# Patient Record
Sex: MALE | Race: WHITE | NOT HISPANIC OR LATINO | URBAN - METROPOLITAN AREA
[De-identification: names, ages, dates, MRNs, and addresses within clinical notes are randomized per-mention and may not be internally consistent; named-entity substitution may affect disease eponyms.]

---

## 2017-05-30 ENCOUNTER — EMERGENCY (EMERGENCY)
Facility: HOSPITAL | Age: 67
LOS: 1 days | Discharge: HOME | End: 2017-05-30
Attending: EMERGENCY MEDICINE | Admitting: EMERGENCY MEDICINE
Payer: COMMERCIAL

## 2017-05-30 VITALS
HEART RATE: 92 BPM | SYSTOLIC BLOOD PRESSURE: 138 MMHG | RESPIRATION RATE: 18 BRPM | DIASTOLIC BLOOD PRESSURE: 78 MMHG | OXYGEN SATURATION: 97 %

## 2017-05-30 DIAGNOSIS — I10 ESSENTIAL (PRIMARY) HYPERTENSION: ICD-10-CM

## 2017-05-30 DIAGNOSIS — F17.210 NICOTINE DEPENDENCE, CIGARETTES, UNCOMPLICATED: ICD-10-CM

## 2017-05-30 DIAGNOSIS — M54.5 LOW BACK PAIN: ICD-10-CM

## 2017-05-30 DIAGNOSIS — S22.088A OTHER FRACTURE OF T11-T12 VERTEBRA, INITIAL ENCOUNTER FOR CLOSED FRACTURE: ICD-10-CM
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
## 2017-05-30 DIAGNOSIS — R91.8 OTHER NONSPECIFIC ABNORMAL FINDING OF LUNG FIELD: ICD-10-CM
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
## 2017-05-30 LAB
APPEARANCE UR: CLEAR — SIGNIFICANT CHANGE UP
BACTERIA # UR AUTO: ABNORMAL /HPF
BILIRUB UR-MCNC: NEGATIVE — SIGNIFICANT CHANGE UP
COLOR SPEC: YELLOW — SIGNIFICANT CHANGE UP
DIFF PNL FLD: NEGATIVE — SIGNIFICANT CHANGE UP
GLUCOSE UR QL: NEGATIVE — SIGNIFICANT CHANGE UP
KETONES UR-MCNC: NEGATIVE — SIGNIFICANT CHANGE UP
LEUKOCYTE ESTERASE UR-ACNC: NEGATIVE — SIGNIFICANT CHANGE UP
NITRITE UR-MCNC: NEGATIVE — SIGNIFICANT CHANGE UP
PH UR: 6.5 — SIGNIFICANT CHANGE UP (ref 5–8)
PROT UR-MCNC: SIGNIFICANT CHANGE UP
RBC CASTS # UR COMP ASSIST: SIGNIFICANT CHANGE UP /HPF (ref 0–2)
SP GR SPEC: 1.01 — SIGNIFICANT CHANGE UP (ref 1.01–1.02)
UROBILINOGEN FLD QL: NEGATIVE — SIGNIFICANT CHANGE UP
WBC UR QL: SIGNIFICANT CHANGE UP /HPF (ref 0–5)

## 2017-05-30 PROCEDURE — 71250 CT THORAX DX C-: CPT | Mod: 26

## 2017-05-30 PROCEDURE — 74176 CT ABD & PELVIS W/O CONTRAST: CPT

## 2017-05-30 PROCEDURE — 99284 EMERGENCY DEPT VISIT MOD MDM: CPT | Mod: 25

## 2017-05-30 PROCEDURE — 71250 CT THORAX DX C-: CPT

## 2017-05-30 PROCEDURE — 74176 CT ABD & PELVIS W/O CONTRAST: CPT | Mod: 26

## 2017-05-30 PROCEDURE — 81001 URINALYSIS AUTO W/SCOPE: CPT

## 2017-05-30 PROCEDURE — 72131 CT LUMBAR SPINE W/O DYE: CPT | Mod: 26

## 2017-05-30 RX ORDER — LIDOCAINE 4 G/100G
1 CREAM TOPICAL ONCE
Qty: 0 | Refills: 0 | Status: COMPLETED | OUTPATIENT
Start: 2017-05-30 | End: 2017-05-30

## 2017-05-30 RX ORDER — ACETAMINOPHEN 500 MG
650 TABLET ORAL ONCE
Qty: 0 | Refills: 0 | Status: COMPLETED | OUTPATIENT
Start: 2017-05-30 | End: 2017-05-30

## 2017-05-30 RX ORDER — IBUPROFEN 200 MG
600 TABLET ORAL ONCE
Qty: 0 | Refills: 0 | Status: COMPLETED | OUTPATIENT
Start: 2017-05-30 | End: 2017-05-30

## 2017-05-30 RX ADMIN — LIDOCAINE 1 PATCH: 4 CREAM TOPICAL at 14:37

## 2017-05-30 RX ADMIN — Medication 600 MILLIGRAM(S): at 13:34

## 2017-05-30 RX ADMIN — Medication 650 MILLIGRAM(S): at 14:37

## 2017-05-30 RX ADMIN — Medication 600 MILLIGRAM(S): at 14:37

## 2017-05-30 RX ADMIN — LIDOCAINE 1 PATCH: 4 CREAM TOPICAL at 14:59

## 2017-05-30 RX ADMIN — LIDOCAINE 1 PATCH: 4 CREAM TOPICAL at 13:34

## 2017-05-30 RX ADMIN — Medication 650 MILLIGRAM(S): at 13:34

## 2017-05-30 NOTE — ED PROVIDER NOTE - CARE PLAN
Principal Discharge DX:	Fall, initial encounter Principal Discharge DX:	Other closed fracture of twelfth thoracic vertebra, initial encounter  Secondary Diagnosis:	Lung mass

## 2017-05-30 NOTE — ED PROVIDER NOTE - PHYSICAL EXAMINATION
Skin: Abrasion on RUE, no skin breaks in lower back  MSK: No midline spinal TTP; + lumbar paraspinal TTP  Neuro: CN grossly intact, AAO x 3, 5/5 strength in all extrem

## 2017-05-30 NOTE — ED PROVIDER NOTE - PROGRESS NOTE DETAILS
Ortho spine consulted for T12 compression fx. Ortho spine consulted for T12 compression fx. Discussed results of CT w/ pt, incl. lung mass suspicious for malignancy. primary care physician: Terrance Choi, [phone]. Pt wishes to leave before final read of chest CT. Discussed results of all CT scans w/ pt and important need to follow-up regarding lung mass and f/u with ortho regarding T12 compression fx.

## 2017-05-30 NOTE — ED PROVIDER NOTE - OBJECTIVE STATEMENT
67 y/o M presents after fall from approx 6 ft height after ladder while at work. Landed on buttocks, did not hit head, no LOC. Able to ambulate but feels pain in lower back, paraspinal region. No weakness, numbness, or tingling.   PMHx: HTN

## 2017-05-30 NOTE — ED PROVIDER NOTE - ATTENDING CONTRIBUTION TO CARE
pt is a 67 y/o male with fall from ladder about 6 foot as he slide down a wall with no head injury, not on AC, can not ambulate on scene.  pt with lumbar spine tenderness paraspinal and b/l post pelvis.  ct ordered of lspine/pelvis region, pain control. ua.

## 2017-05-30 NOTE — ED ADULT NURSE NOTE - OBJECTIVE STATEMENT
65 yo male A&OX3 presents to the ED with the c/o fall from 6 ft. Pt states that he was working on a roof and slid off a ladder. Pt denies hitting his head, states that he fell onto his back. B/L flank pain, no diff walking, no LOC. Pt BIBA, no n/v. Lungs clear, equal b/l no cough and no sob. Abd round, soft non tender and non distended. Pt able to ambulate.

## 2017-06-20 PROBLEM — Z00.00 ENCOUNTER FOR PREVENTIVE HEALTH EXAMINATION: Status: ACTIVE | Noted: 2017-06-20

## 2017-06-23 ENCOUNTER — APPOINTMENT (OUTPATIENT)
Dept: THORACIC SURGERY | Facility: HOSPITAL | Age: 67
End: 2017-06-23

## 2017-06-23 VITALS
DIASTOLIC BLOOD PRESSURE: 88 MMHG | SYSTOLIC BLOOD PRESSURE: 142 MMHG | WEIGHT: 200 LBS | OXYGEN SATURATION: 97 % | HEART RATE: 84 BPM | HEIGHT: 72 IN | BODY MASS INDEX: 27.09 KG/M2

## 2017-06-23 DIAGNOSIS — Z87.891 PERSONAL HISTORY OF NICOTINE DEPENDENCE: ICD-10-CM

## 2017-06-23 DIAGNOSIS — Z83.3 FAMILY HISTORY OF DIABETES MELLITUS: ICD-10-CM

## 2017-06-23 DIAGNOSIS — Z78.9 OTHER SPECIFIED HEALTH STATUS: ICD-10-CM

## 2017-06-26 ENCOUNTER — OUTPATIENT (OUTPATIENT)
Dept: OUTPATIENT SERVICES | Facility: HOSPITAL | Age: 67
LOS: 1 days | End: 2017-06-26
Payer: COMMERCIAL

## 2017-06-26 VITALS
DIASTOLIC BLOOD PRESSURE: 70 MMHG | HEART RATE: 75 BPM | HEIGHT: 69 IN | WEIGHT: 195.11 LBS | SYSTOLIC BLOOD PRESSURE: 110 MMHG | RESPIRATION RATE: 16 BRPM | TEMPERATURE: 98 F

## 2017-06-26 DIAGNOSIS — R59.0 LOCALIZED ENLARGED LYMPH NODES: ICD-10-CM

## 2017-06-26 DIAGNOSIS — R91.1 SOLITARY PULMONARY NODULE: ICD-10-CM

## 2017-06-26 DIAGNOSIS — I10 ESSENTIAL (PRIMARY) HYPERTENSION: ICD-10-CM

## 2017-06-26 DIAGNOSIS — Z98.890 OTHER SPECIFIED POSTPROCEDURAL STATES: Chronic | ICD-10-CM

## 2017-06-26 LAB
BLD GP AB SCN SERPL QL: NEGATIVE — SIGNIFICANT CHANGE UP
BUN SERPL-MCNC: 15 MG/DL — SIGNIFICANT CHANGE UP (ref 7–23)
CALCIUM SERPL-MCNC: 9.5 MG/DL — SIGNIFICANT CHANGE UP (ref 8.4–10.5)
CHLORIDE SERPL-SCNC: 98 MMOL/L — SIGNIFICANT CHANGE UP (ref 98–107)
CO2 SERPL-SCNC: 25 MMOL/L — SIGNIFICANT CHANGE UP (ref 22–31)
CREAT SERPL-MCNC: 0.82 MG/DL — SIGNIFICANT CHANGE UP (ref 0.5–1.3)
GLUCOSE SERPL-MCNC: 114 MG/DL — HIGH (ref 70–99)
HCT VFR BLD CALC: 40.3 % — SIGNIFICANT CHANGE UP (ref 39–50)
HGB BLD-MCNC: 13.3 G/DL — SIGNIFICANT CHANGE UP (ref 13–17)
MCHC RBC-ENTMCNC: 28.2 PG — SIGNIFICANT CHANGE UP (ref 27–34)
MCHC RBC-ENTMCNC: 33 % — SIGNIFICANT CHANGE UP (ref 32–36)
MCV RBC AUTO: 85.6 FL — SIGNIFICANT CHANGE UP (ref 80–100)
PLATELET # BLD AUTO: 425 K/UL — HIGH (ref 150–400)
PMV BLD: 9.1 FL — SIGNIFICANT CHANGE UP (ref 7–13)
POTASSIUM SERPL-MCNC: 4.2 MMOL/L — SIGNIFICANT CHANGE UP (ref 3.5–5.3)
POTASSIUM SERPL-SCNC: 4.2 MMOL/L — SIGNIFICANT CHANGE UP (ref 3.5–5.3)
RBC # BLD: 4.71 M/UL — SIGNIFICANT CHANGE UP (ref 4.2–5.8)
RBC # FLD: 14.8 % — HIGH (ref 10.3–14.5)
RH IG SCN BLD-IMP: NEGATIVE — SIGNIFICANT CHANGE UP
SODIUM SERPL-SCNC: 136 MMOL/L — SIGNIFICANT CHANGE UP (ref 135–145)
WBC # BLD: 12.22 K/UL — HIGH (ref 3.8–10.5)
WBC # FLD AUTO: 12.22 K/UL — HIGH (ref 3.8–10.5)

## 2017-06-26 PROCEDURE — 93010 ELECTROCARDIOGRAM REPORT: CPT

## 2017-06-26 RX ORDER — SODIUM CHLORIDE 9 MG/ML
1000 INJECTION, SOLUTION INTRAVENOUS
Qty: 0 | Refills: 0 | Status: DISCONTINUED | OUTPATIENT
Start: 2017-06-28 | End: 2017-07-13

## 2017-06-26 NOTE — H&P PST ADULT - NEGATIVE NEUROLOGICAL SYMPTOMS
no syncope/no paresthesias/no difficulty walking/no loss of sensation/no transient paralysis/no confusion/no hemiparesis/no focal seizures/no generalized seizures/no weakness/no tremors

## 2017-06-26 NOTE — H&P PST ADULT - NEUROLOGICAL DETAILS
responds to verbal commands/sensation intact/normal strength/responds to pain/alert and oriented x 3

## 2017-06-26 NOTE — H&P PST ADULT - HISTORY OF PRESENT ILLNESS
Pt is a 66 yr old male scheduled for Flexible Bronchoscopy, Endobronchial U/S , Mediastinoscopy with Dr bansal 6/28/17. Pt found to have left lung nodule with w/u for fall 5/17 - pt had CT scan and referred to Pulmonary and had recent PET scan. Pt hx of HTN and smoking.

## 2017-06-28 ENCOUNTER — RESULT REVIEW (OUTPATIENT)
Age: 67
End: 2017-06-28

## 2017-06-28 ENCOUNTER — OUTPATIENT (OUTPATIENT)
Dept: OUTPATIENT SERVICES | Facility: HOSPITAL | Age: 67
LOS: 1 days | Discharge: HOME | End: 2017-06-28
Payer: COMMERCIAL

## 2017-06-28 ENCOUNTER — APPOINTMENT (OUTPATIENT)
Dept: THORACIC SURGERY | Facility: HOSPITAL | Age: 67
End: 2017-06-28

## 2017-06-28 ENCOUNTER — TRANSCRIPTION ENCOUNTER (OUTPATIENT)
Age: 67
End: 2017-06-28

## 2017-06-28 VITALS
OXYGEN SATURATION: 99 % | RESPIRATION RATE: 16 BRPM | DIASTOLIC BLOOD PRESSURE: 64 MMHG | SYSTOLIC BLOOD PRESSURE: 104 MMHG | HEART RATE: 76 BPM

## 2017-06-28 VITALS
TEMPERATURE: 98 F | SYSTOLIC BLOOD PRESSURE: 114 MMHG | OXYGEN SATURATION: 97 % | RESPIRATION RATE: 16 BRPM | WEIGHT: 195.11 LBS | HEIGHT: 69 IN | HEART RATE: 78 BPM | DIASTOLIC BLOOD PRESSURE: 67 MMHG

## 2017-06-28 DIAGNOSIS — R59.0 LOCALIZED ENLARGED LYMPH NODES: ICD-10-CM

## 2017-06-28 DIAGNOSIS — Z98.890 OTHER SPECIFIED POSTPROCEDURAL STATES: Chronic | ICD-10-CM

## 2017-06-28 LAB — RH IG SCN BLD-IMP: NEGATIVE — SIGNIFICANT CHANGE UP

## 2017-06-28 PROCEDURE — 88173 CYTOPATH EVAL FNA REPORT: CPT | Mod: 26

## 2017-06-28 PROCEDURE — 88305 TISSUE EXAM BY PATHOLOGIST: CPT | Mod: 26

## 2017-06-28 PROCEDURE — 88172 CYTP DX EVAL FNA 1ST EA SITE: CPT | Mod: 26

## 2017-06-28 RX ORDER — FENTANYL CITRATE 50 UG/ML
50 INJECTION INTRAVENOUS
Qty: 0 | Refills: 0 | Status: DISCONTINUED | OUTPATIENT
Start: 2017-06-28 | End: 2017-06-28

## 2017-06-28 RX ORDER — FENTANYL CITRATE 50 UG/ML
25 INJECTION INTRAVENOUS
Qty: 0 | Refills: 0 | Status: DISCONTINUED | OUTPATIENT
Start: 2017-06-28 | End: 2017-06-28

## 2017-06-28 RX ORDER — ONDANSETRON 8 MG/1
4 TABLET, FILM COATED ORAL ONCE
Qty: 0 | Refills: 0 | Status: DISCONTINUED | OUTPATIENT
Start: 2017-06-28 | End: 2017-06-28

## 2017-06-28 RX ADMIN — SODIUM CHLORIDE 30 MILLILITER(S): 9 INJECTION, SOLUTION INTRAVENOUS at 09:50

## 2017-06-28 NOTE — ASU DISCHARGE PLAN (ADULT/PEDIATRIC). - ITEMS TO FOLLOWUP WITH YOUR PHYSICIAN'S
Follow up with Dr. Larsen in 7-10 days. Follow up with Dr. Larsen in 1 week .Follow up with your pulmonologist in 1-2 weeks

## 2017-06-28 NOTE — ASU DISCHARGE PLAN (ADULT/PEDIATRIC). - MEDICATION SUMMARY - MEDICATIONS TO TAKE
I will START or STAY ON the medications listed below when I get home from the hospital:    ibuprofen 400 mg oral tablet  -- 1 tab(s) by mouth every 6 hours, As Needed last taken 6/12/17  -- Indication: For as needed for pain    valsartan-hydrochlorothiazide 320mg-12.5mg oral tablet  -- 1 tab(s) by mouth once a day  -- Indication: For HTN

## 2017-06-28 NOTE — ASU DISCHARGE PLAN (ADULT/PEDIATRIC). - NOTIFY
Persistent Nausea and Vomiting/Unable to Urinate/Increased Irritability or Sluggishness/Fever greater than 101/Pain not relieved by Medications

## 2017-06-28 NOTE — ASU DISCHARGE PLAN (ADULT/PEDIATRIC). - SPECIAL INSTRUCTIONS
Coughing up blood tinged sputum is normal and expected. If you cough up more than a tablespoon of bright red blood notify MD

## 2017-06-29 LAB
NON-GYNECOLOGICAL CYTOLOGY STUDY: SIGNIFICANT CHANGE UP
NON-GYNECOLOGICAL CYTOLOGY STUDY: SIGNIFICANT CHANGE UP

## 2017-07-10 LAB — NON-GYNECOLOGICAL CYTOLOGY STUDY: SIGNIFICANT CHANGE UP

## 2017-07-11 ENCOUNTER — APPOINTMENT (OUTPATIENT)
Dept: THORACIC SURGERY | Facility: CLINIC | Age: 67
End: 2017-07-11

## 2017-07-11 VITALS
DIASTOLIC BLOOD PRESSURE: 70 MMHG | OXYGEN SATURATION: 98 % | HEART RATE: 79 BPM | SYSTOLIC BLOOD PRESSURE: 110 MMHG | HEIGHT: 72 IN

## 2017-07-11 LAB — NON-GYNECOLOGICAL CYTOLOGY STUDY: SIGNIFICANT CHANGE UP

## 2017-08-01 ENCOUNTER — OUTPATIENT (OUTPATIENT)
Dept: OUTPATIENT SERVICES | Facility: HOSPITAL | Age: 67
LOS: 1 days | End: 2017-08-01
Payer: COMMERCIAL

## 2017-08-01 VITALS
TEMPERATURE: 98 F | DIASTOLIC BLOOD PRESSURE: 72 MMHG | WEIGHT: 195.11 LBS | RESPIRATION RATE: 18 BRPM | OXYGEN SATURATION: 98 % | HEART RATE: 73 BPM | SYSTOLIC BLOOD PRESSURE: 108 MMHG | HEIGHT: 70 IN

## 2017-08-01 DIAGNOSIS — R91.1 SOLITARY PULMONARY NODULE: ICD-10-CM

## 2017-08-01 DIAGNOSIS — R59.0 LOCALIZED ENLARGED LYMPH NODES: ICD-10-CM

## 2017-08-01 DIAGNOSIS — Z98.890 OTHER SPECIFIED POSTPROCEDURAL STATES: Chronic | ICD-10-CM

## 2017-08-01 DIAGNOSIS — I10 ESSENTIAL (PRIMARY) HYPERTENSION: ICD-10-CM

## 2017-08-01 LAB
BUN SERPL-MCNC: 15 MG/DL — SIGNIFICANT CHANGE UP (ref 7–23)
CALCIUM SERPL-MCNC: 9.4 MG/DL — SIGNIFICANT CHANGE UP (ref 8.4–10.5)
CHLORIDE SERPL-SCNC: 98 MMOL/L — SIGNIFICANT CHANGE UP (ref 98–107)
CO2 SERPL-SCNC: 25 MMOL/L — SIGNIFICANT CHANGE UP (ref 22–31)
CREAT SERPL-MCNC: 0.96 MG/DL — SIGNIFICANT CHANGE UP (ref 0.5–1.3)
GLUCOSE SERPL-MCNC: 106 MG/DL — HIGH (ref 70–99)
HCT VFR BLD CALC: 38.6 % — LOW (ref 39–50)
HGB BLD-MCNC: 12.7 G/DL — LOW (ref 13–17)
MCHC RBC-ENTMCNC: 27.4 PG — SIGNIFICANT CHANGE UP (ref 27–34)
MCHC RBC-ENTMCNC: 32.9 % — SIGNIFICANT CHANGE UP (ref 32–36)
MCV RBC AUTO: 83.2 FL — SIGNIFICANT CHANGE UP (ref 80–100)
NRBC # FLD: 0 — SIGNIFICANT CHANGE UP
PLATELET # BLD AUTO: 380 K/UL — SIGNIFICANT CHANGE UP (ref 150–400)
PMV BLD: 9.7 FL — SIGNIFICANT CHANGE UP (ref 7–13)
POTASSIUM SERPL-MCNC: 4.5 MMOL/L — SIGNIFICANT CHANGE UP (ref 3.5–5.3)
POTASSIUM SERPL-SCNC: 4.5 MMOL/L — SIGNIFICANT CHANGE UP (ref 3.5–5.3)
RBC # BLD: 4.64 M/UL — SIGNIFICANT CHANGE UP (ref 4.2–5.8)
RBC # FLD: 15.2 % — HIGH (ref 10.3–14.5)
SODIUM SERPL-SCNC: 135 MMOL/L — SIGNIFICANT CHANGE UP (ref 135–145)
WBC # BLD: 10.16 K/UL — SIGNIFICANT CHANGE UP (ref 3.8–10.5)
WBC # FLD AUTO: 10.16 K/UL — SIGNIFICANT CHANGE UP (ref 3.8–10.5)

## 2017-08-01 PROCEDURE — 93010 ELECTROCARDIOGRAM REPORT: CPT

## 2017-08-01 RX ORDER — SODIUM CHLORIDE 9 MG/ML
1000 INJECTION, SOLUTION INTRAVENOUS
Qty: 0 | Refills: 0 | Status: DISCONTINUED | OUTPATIENT
Start: 2017-08-03 | End: 2017-08-18

## 2017-08-01 RX ORDER — ALPRAZOLAM 0.25 MG
0.25 TABLET ORAL ONCE
Qty: 0 | Refills: 0 | Status: DISCONTINUED | OUTPATIENT
Start: 2017-08-02 | End: 2017-08-17

## 2017-08-01 RX ORDER — SODIUM CHLORIDE 9 MG/ML
3 INJECTION INTRAMUSCULAR; INTRAVENOUS; SUBCUTANEOUS EVERY 8 HOURS
Qty: 0 | Refills: 0 | Status: DISCONTINUED | OUTPATIENT
Start: 2017-08-03 | End: 2017-08-18

## 2017-08-01 NOTE — H&P PST ADULT - NEGATIVE RESPIRATORY AND THORAX SYMPTOMS
no wheezing/no dyspnea/no cough no wheezing/no cough/no dyspnea/no hemoptysis/no pleuritic chest pain

## 2017-08-01 NOTE — H&P PST ADULT - PROBLEM SELECTOR PLAN 1
Insertion of [id number]-[id number] scheduled for 8/3/2017.  Pre-op instructions given. Pt verbalized understanding.  Pepcid given for GI prophylaxis.  Chlorhexidine wash instructions given.

## 2017-08-01 NOTE — H&P PST ADULT - MUSCULOSKELETAL
details… detailed exam normal strength/no joint warmth/no joint swelling/ROM intact/no calf tenderness/no joint erythema negative

## 2017-08-01 NOTE — H&P PST ADULT - RESPIRATORY AND THORAX COMMENTS
Pt found to have pulmonary nodule with w/u after fall 5/17 - PET Scan done Saturday Stopped smoking 6/2017 - denies cough, wheezing, SOB, reports lung cancer discovered incidentally on CT scan due to back injury in 6/2017 Pt reports he stopped smoking 6/2017 - denies cough, wheezing, SOB, reports lung cancer discovered incidentally on CT scan due to back injury in 6/2017

## 2017-08-01 NOTE — H&P PST ADULT - HISTORY OF PRESENT ILLNESS
67yo male with medical history of HTN, reports diagnosed with Lung cancer in 6/2017 and chemotherapy is recommended. Pt presents today for presurgical evaluation for Insertion of [id number]-r-keed scheduled for 8/3/2017.

## 2017-08-01 NOTE — H&P PST ADULT - ACTIVITY
walks daily, gardening , 1 flight stairs w/o SOB ADLs, chores, very active at work - walking a lot, climbing ladders etc

## 2017-08-01 NOTE — H&P PST ADULT - NSANTHOSAYNRD_GEN_A_CORE
denies testing denies sleep study/No. DASHAWN screening performed.  STOP BANG Legend: 0-2 = LOW Risk; 3-4 = INTERMEDIATE Risk; 5-8 = HIGH Risk

## 2017-08-01 NOTE — H&P PST ADULT - LYMPHATIC
anterior cervical R/anterior cervical L/supraclavicular L/supraclavicular R/posterior cervical L/posterior cervical R

## 2017-08-01 NOTE — H&P PST ADULT - PROBLEM SELECTOR PLAN 2
Pt instructed to take meds as prescribed. Pt instructed to take meds as prescribed.  DASHAWN precaution - stop bang 4, OR booking notified.

## 2017-08-01 NOTE — H&P PST ADULT - MALLAMPATI CLASS
Class II - visualization of the soft palate, fauces, and uvula/with phonation Class II - visualization of the soft palate, fauces, and uvula

## 2017-08-01 NOTE — H&P PST ADULT - FAMILY HISTORY
Mother  Still living? Yes, Estimated age: Age Unknown  Diabetes mellitus, Age at diagnosis: Age Unknown

## 2017-08-01 NOTE — H&P PST ADULT - NEGATIVE NEUROLOGICAL SYMPTOMS
no loss of sensation/no weakness/no transient paralysis/no confusion/no paresthesias/no generalized seizures/no focal seizures/no tremors/no syncope/no difficulty walking/no hemiparesis

## 2017-08-01 NOTE — H&P PST ADULT - RS GEN PE MLT RESP DETAILS PC
respirations non-labored/airway patent/breath sounds equal/good air movement/clear to auscultation bilaterally

## 2017-08-02 ENCOUNTER — OUTPATIENT (OUTPATIENT)
Dept: OUTPATIENT SERVICES | Facility: HOSPITAL | Age: 67
LOS: 1 days | End: 2017-08-02
Payer: COMMERCIAL

## 2017-08-02 DIAGNOSIS — C34.32 MALIGNANT NEOPLASM OF LOWER LOBE, LEFT BRONCHUS OR LUNG: ICD-10-CM

## 2017-08-02 DIAGNOSIS — Z98.890 OTHER SPECIFIED POSTPROCEDURAL STATES: Chronic | ICD-10-CM

## 2017-08-02 PROCEDURE — 76604 US EXAM CHEST: CPT | Mod: 26

## 2017-08-02 PROCEDURE — 76604 US EXAM CHEST: CPT

## 2017-08-03 ENCOUNTER — APPOINTMENT (OUTPATIENT)
Dept: THORACIC SURGERY | Facility: HOSPITAL | Age: 67
End: 2017-08-03
Payer: COMMERCIAL

## 2017-08-03 ENCOUNTER — OUTPATIENT (OUTPATIENT)
Dept: OUTPATIENT SERVICES | Facility: HOSPITAL | Age: 67
LOS: 1 days | Discharge: HOME | End: 2017-08-03
Payer: COMMERCIAL

## 2017-08-03 ENCOUNTER — TRANSCRIPTION ENCOUNTER (OUTPATIENT)
Age: 67
End: 2017-08-03

## 2017-08-03 VITALS
OXYGEN SATURATION: 97 % | DIASTOLIC BLOOD PRESSURE: 63 MMHG | TEMPERATURE: 98 F | HEIGHT: 70 IN | WEIGHT: 195.11 LBS | RESPIRATION RATE: 16 BRPM | SYSTOLIC BLOOD PRESSURE: 114 MMHG | HEART RATE: 73 BPM

## 2017-08-03 VITALS
SYSTOLIC BLOOD PRESSURE: 100 MMHG | HEART RATE: 71 BPM | OXYGEN SATURATION: 97 % | DIASTOLIC BLOOD PRESSURE: 62 MMHG | RESPIRATION RATE: 16 BRPM

## 2017-08-03 DIAGNOSIS — R59.0 LOCALIZED ENLARGED LYMPH NODES: ICD-10-CM

## 2017-08-03 DIAGNOSIS — Z98.890 OTHER SPECIFIED POSTPROCEDURAL STATES: Chronic | ICD-10-CM

## 2017-08-03 PROCEDURE — 36561 INSERT TUNNELED CV CATH: CPT

## 2017-08-03 RX ORDER — ASPIRIN/CALCIUM CARB/MAGNESIUM 324 MG
1 TABLET ORAL
Qty: 0 | Refills: 0 | COMMUNITY

## 2017-08-03 RX ORDER — OXYCODONE HYDROCHLORIDE 5 MG/1
1 TABLET ORAL
Qty: 12 | Refills: 0 | OUTPATIENT
Start: 2017-08-03 | End: 2017-08-06

## 2017-08-03 NOTE — BRIEF OPERATIVE NOTE - PRE-OP DX
Malignant neoplasm of lung, unspecified laterality, unspecified part of lung  08/03/2017    Active  Ramon Alcantar

## 2017-08-03 NOTE — ASU DISCHARGE PLAN (ADULT/PEDIATRIC). - POST OP PHONE #
[phone] pt. granted permission to leave message /and or speak with whoever answers the phone.

## 2017-08-03 NOTE — ASU DISCHARGE PLAN (ADULT/PEDIATRIC). - NOTIFY
Bleeding that does not stop/Fever greater than 101/Swelling that continues Increased Irritability or Sluggishness/Bleeding that does not stop/Persistent Nausea and Vomiting/Pain not relieved by Medications/Unable to Urinate/Fever greater than 101/Inability to Tolerate Liquids or Foods/Swelling that continues

## 2017-08-03 NOTE — ASU DISCHARGE PLAN (ADULT/PEDIATRIC). - MEDICATION SUMMARY - MEDICATIONS TO TAKE
I will START or STAY ON the medications listed below when I get home from the hospital:    aspirin 81 mg oral tablet  -- 1 tab(s) by mouth once a day. Last dose 8/1/2017  -- Indication: For Localized enlarged lymph nodes    acetaminophen-oxycodone 325 mg-5 mg oral tablet  -- 1 tab(s) by mouth every 6 hours, As Needed -for severe pain MDD:4  -- Caution federal law prohibits the transfer of this drug to any person other  than the person for whom it was prescribed.  May cause drowsiness.  Alcohol may intensify this effect.  Use care when operating dangerous machinery.  This prescription cannot be refilled.  This product contains acetaminophen.  Do not use  with any other product containing acetaminophen to prevent possible liver damage.  Using more of this medication than prescribed may cause serious breathing problems.    -- Indication: For severe pain    valsartan-hydrochlorothiazide 320mg-12.5mg oral tablet  -- 1 tab(s) by mouth once a day  -- Indication: For htn    folic acid 1 mg oral tablet  -- 1 tab(s) by mouth once a day  -- Indication: For anemia

## 2017-08-04 PROCEDURE — 71010: CPT | Mod: 26

## 2017-08-04 RX ORDER — ASPIRIN/CALCIUM CARB/MAGNESIUM 324 MG
1 TABLET ORAL
Qty: 0 | Refills: 0 | COMMUNITY
Start: 2017-08-04

## 2017-09-26 ENCOUNTER — APPOINTMENT (OUTPATIENT)
Dept: THORACIC SURGERY | Facility: CLINIC | Age: 67
End: 2017-09-26
Payer: COMMERCIAL

## 2017-09-26 VITALS
HEART RATE: 86 BPM | RESPIRATION RATE: 18 BRPM | DIASTOLIC BLOOD PRESSURE: 88 MMHG | SYSTOLIC BLOOD PRESSURE: 110 MMHG | WEIGHT: 190 LBS | OXYGEN SATURATION: 98 % | HEIGHT: 72 IN | BODY MASS INDEX: 25.73 KG/M2

## 2017-09-26 DIAGNOSIS — Z92.3 PERSONAL HISTORY OF IRRADIATION: ICD-10-CM

## 2017-09-26 DIAGNOSIS — Z87.898 PERSONAL HISTORY OF OTHER SPECIFIED CONDITIONS: ICD-10-CM

## 2017-09-26 DIAGNOSIS — Z78.9 OTHER SPECIFIED HEALTH STATUS: ICD-10-CM

## 2017-09-26 DIAGNOSIS — R91.1 SOLITARY PULMONARY NODULE: ICD-10-CM

## 2017-09-26 DIAGNOSIS — S22.089A UNSPECIFIED FRACTURE OF T11-T12 VERTEBRA, INITIAL ENCOUNTER FOR CLOSED FRACTURE: ICD-10-CM

## 2017-09-26 DIAGNOSIS — Z91.81 HISTORY OF FALLING: ICD-10-CM

## 2017-09-26 DIAGNOSIS — Z92.21 PERSONAL HISTORY OF ANTINEOPLASTIC CHEMOTHERAPY: ICD-10-CM

## 2017-09-26 PROCEDURE — 99215 OFFICE O/P EST HI 40 MIN: CPT

## 2017-10-02 PROBLEM — Z87.898 HISTORY OF LYMPHADENOPATHY: Status: RESOLVED | Noted: 2017-10-02 | Resolved: 2017-10-02

## 2017-10-02 PROBLEM — R91.1 NODULE OF LEFT LUNG: Status: RESOLVED | Noted: 2017-10-02 | Resolved: 2017-10-02

## 2017-10-02 PROBLEM — Z92.21 HISTORY OF CHEMOTHERAPY: Status: RESOLVED | Noted: 2017-10-02 | Resolved: 2017-10-02

## 2017-10-02 PROBLEM — S22.089A FRACTURE OF T12 VERTEBRA: Status: RESOLVED | Noted: 2017-10-02 | Resolved: 2017-10-02

## 2017-10-02 PROBLEM — Z91.81 STATUS POST FALL: Status: RESOLVED | Noted: 2017-10-02 | Resolved: 2017-10-02

## 2017-10-02 PROBLEM — Z78.9 HISTORY OF FALL FROM LADDER: Status: RESOLVED | Noted: 2017-10-02 | Resolved: 2017-10-02

## 2017-10-23 ENCOUNTER — INPATIENT (INPATIENT)
Facility: HOSPITAL | Age: 67
LOS: 0 days | Discharge: HOME | DRG: 309 | End: 2017-10-24
Attending: INTERNAL MEDICINE | Admitting: INTERNAL MEDICINE
Payer: COMMERCIAL

## 2017-10-23 VITALS
TEMPERATURE: 98 F | SYSTOLIC BLOOD PRESSURE: 126 MMHG | OXYGEN SATURATION: 96 % | RESPIRATION RATE: 18 BRPM | DIASTOLIC BLOOD PRESSURE: 90 MMHG | HEART RATE: 152 BPM | HEIGHT: 71 IN | WEIGHT: 175.05 LBS

## 2017-10-23 DIAGNOSIS — Z87.891 PERSONAL HISTORY OF NICOTINE DEPENDENCE: ICD-10-CM

## 2017-10-23 DIAGNOSIS — C34.90 MALIGNANT NEOPLASM OF UNSPECIFIED PART OF UNSPECIFIED BRONCHUS OR LUNG: ICD-10-CM

## 2017-10-23 DIAGNOSIS — R13.10 DYSPHAGIA, UNSPECIFIED: ICD-10-CM

## 2017-10-23 DIAGNOSIS — I48.92 UNSPECIFIED ATRIAL FLUTTER: ICD-10-CM

## 2017-10-23 DIAGNOSIS — Z98.890 OTHER SPECIFIED POSTPROCEDURAL STATES: Chronic | ICD-10-CM

## 2017-10-23 DIAGNOSIS — I10 ESSENTIAL (PRIMARY) HYPERTENSION: ICD-10-CM

## 2017-10-23 DIAGNOSIS — J43.9 EMPHYSEMA, UNSPECIFIED: ICD-10-CM

## 2017-10-23 DIAGNOSIS — N17.9 ACUTE KIDNEY FAILURE, UNSPECIFIED: ICD-10-CM

## 2017-10-23 LAB
ALBUMIN SERPL ELPH-MCNC: 3.2 G/DL — LOW (ref 3.3–5)
ALP SERPL-CCNC: 75 U/L — SIGNIFICANT CHANGE UP (ref 30–120)
ALT FLD-CCNC: 17 U/L DA — SIGNIFICANT CHANGE UP (ref 10–60)
ANION GAP SERPL CALC-SCNC: 16 MMOL/L — SIGNIFICANT CHANGE UP (ref 5–17)
APTT BLD: 29 SEC — SIGNIFICANT CHANGE UP (ref 27.5–37.4)
AST SERPL-CCNC: 22 U/L — SIGNIFICANT CHANGE UP (ref 10–40)
BILIRUB SERPL-MCNC: 0.3 MG/DL — SIGNIFICANT CHANGE UP (ref 0.2–1.2)
BUN SERPL-MCNC: 24 MG/DL — HIGH (ref 7–23)
CALCIUM SERPL-MCNC: 9.5 MG/DL — SIGNIFICANT CHANGE UP (ref 8.4–10.5)
CHLORIDE SERPL-SCNC: 98 MMOL/L — SIGNIFICANT CHANGE UP (ref 96–108)
CK MB CFR SERPL CALC: 1.6 NG/ML — SIGNIFICANT CHANGE UP (ref 0–3.6)
CK SERPL-CCNC: 50 U/L — SIGNIFICANT CHANGE UP (ref 39–308)
CO2 SERPL-SCNC: 23 MMOL/L — SIGNIFICANT CHANGE UP (ref 22–31)
CREAT SERPL-MCNC: 1.75 MG/DL — HIGH (ref 0.5–1.3)
D DIMER BLD IA.RAPID-MCNC: 774 NG/ML DDU — HIGH
EOSINOPHIL NFR BLD AUTO: 1 % — SIGNIFICANT CHANGE UP (ref 0–6)
GIANT PLATELETS BLD QL SMEAR: PRESENT — SIGNIFICANT CHANGE UP
GLUCOSE SERPL-MCNC: 109 MG/DL — HIGH (ref 70–99)
HCT VFR BLD CALC: 34.3 % — LOW (ref 39–50)
HGB BLD-MCNC: 11.2 G/DL — LOW (ref 13–17)
INR BLD: 1.11 RATIO — SIGNIFICANT CHANGE UP (ref 0.88–1.16)
LYMPHOCYTES # BLD AUTO: 8 % — LOW (ref 13–44)
MCHC RBC-ENTMCNC: 28.4 PG — SIGNIFICANT CHANGE UP (ref 27–34)
MCHC RBC-ENTMCNC: 32.5 GM/DL — SIGNIFICANT CHANGE UP (ref 32–36)
MCV RBC AUTO: 87.4 FL — SIGNIFICANT CHANGE UP (ref 80–100)
METAMYELOCYTES # FLD: 1 % — HIGH (ref 0–0)
MONOCYTES NFR BLD AUTO: 24 % — HIGH (ref 2–14)
NEUTROPHILS NFR BLD AUTO: 63 % — SIGNIFICANT CHANGE UP (ref 43–77)
NEUTS BAND # BLD: 1 % — SIGNIFICANT CHANGE UP (ref 0–8)
NT-PROBNP SERPL-SCNC: 1074 PG/ML — HIGH (ref 0–125)
PLAT MORPH BLD: NORMAL — SIGNIFICANT CHANGE UP
PLATELET # BLD AUTO: 292 K/UL — SIGNIFICANT CHANGE UP (ref 150–400)
POTASSIUM SERPL-MCNC: 3.5 MMOL/L — SIGNIFICANT CHANGE UP (ref 3.5–5.3)
POTASSIUM SERPL-SCNC: 3.5 MMOL/L — SIGNIFICANT CHANGE UP (ref 3.5–5.3)
PROT SERPL-MCNC: 7.2 G/DL — SIGNIFICANT CHANGE UP (ref 6–8.3)
PROTHROM AB SERPL-ACNC: 12.1 SEC — SIGNIFICANT CHANGE UP (ref 9.8–12.7)
RBC # BLD: 3.92 M/UL — LOW (ref 4.2–5.8)
RBC # FLD: 20.3 % — HIGH (ref 10.3–14.5)
RBC BLD AUTO: SIGNIFICANT CHANGE UP
SCHISTOCYTES BLD QL AUTO: SLIGHT — SIGNIFICANT CHANGE UP
SODIUM SERPL-SCNC: 137 MMOL/L — SIGNIFICANT CHANGE UP (ref 135–145)
T3 SERPL-MCNC: 91 NG/DL — SIGNIFICANT CHANGE UP (ref 80–200)
T4 AB SER-ACNC: 8.7 UG/DL — SIGNIFICANT CHANGE UP (ref 4.6–12)
TROPONIN I SERPL-MCNC: 0.01 NG/ML — LOW (ref 0.02–0.06)
TROPONIN I SERPL-MCNC: 0.03 NG/ML — SIGNIFICANT CHANGE UP (ref 0.02–0.06)
VARIANT LYMPHS # BLD: 2 % — SIGNIFICANT CHANGE UP (ref 0–6)
WBC # BLD: 8.6 K/UL — SIGNIFICANT CHANGE UP (ref 3.8–10.5)
WBC # FLD AUTO: 8.6 K/UL — SIGNIFICANT CHANGE UP (ref 3.8–10.5)

## 2017-10-23 PROCEDURE — 93306 TTE W/DOPPLER COMPLETE: CPT | Mod: 26

## 2017-10-23 PROCEDURE — 76775 US EXAM ABDO BACK WALL LIM: CPT | Mod: 26

## 2017-10-23 PROCEDURE — 71010: CPT | Mod: 26

## 2017-10-23 PROCEDURE — 99223 1ST HOSP IP/OBS HIGH 75: CPT | Mod: AI

## 2017-10-23 PROCEDURE — 93010 ELECTROCARDIOGRAM REPORT: CPT

## 2017-10-23 PROCEDURE — 99285 EMERGENCY DEPT VISIT HI MDM: CPT

## 2017-10-23 PROCEDURE — 93970 EXTREMITY STUDY: CPT | Mod: 26

## 2017-10-23 RX ORDER — DIPHENHYDRAMINE HYDROCHLORIDE AND LIDOCAINE HYDROCHLORIDE AND ALUMINUM HYDROXIDE AND MAGNESIUM HYDRO
30 KIT
Qty: 0 | Refills: 0 | Status: DISCONTINUED | OUTPATIENT
Start: 2017-10-23 | End: 2017-10-24

## 2017-10-23 RX ORDER — TIOTROPIUM BROMIDE 18 UG/1
1 CAPSULE ORAL; RESPIRATORY (INHALATION) DAILY
Qty: 0 | Refills: 0 | Status: DISCONTINUED | OUTPATIENT
Start: 2017-10-23 | End: 2017-10-24

## 2017-10-23 RX ORDER — ALBUTEROL 90 UG/1
2 AEROSOL, METERED ORAL EVERY 6 HOURS
Qty: 0 | Refills: 0 | Status: DISCONTINUED | OUTPATIENT
Start: 2017-10-23 | End: 2017-10-24

## 2017-10-23 RX ORDER — ENOXAPARIN SODIUM 100 MG/ML
40 INJECTION SUBCUTANEOUS DAILY
Qty: 0 | Refills: 0 | Status: DISCONTINUED | OUTPATIENT
Start: 2017-10-23 | End: 2017-10-24

## 2017-10-23 RX ORDER — SODIUM CHLORIDE 9 MG/ML
1000 INJECTION INTRAMUSCULAR; INTRAVENOUS; SUBCUTANEOUS
Qty: 0 | Refills: 0 | Status: COMPLETED | OUTPATIENT
Start: 2017-10-23 | End: 2017-10-23

## 2017-10-23 RX ORDER — SUCRALFATE 1 G
1 TABLET ORAL
Qty: 0 | Refills: 0 | Status: DISCONTINUED | OUTPATIENT
Start: 2017-10-23 | End: 2017-10-24

## 2017-10-23 RX ORDER — DILTIAZEM HCL 120 MG
120 CAPSULE, EXT RELEASE 24 HR ORAL DAILY
Qty: 0 | Refills: 0 | Status: DISCONTINUED | OUTPATIENT
Start: 2017-10-23 | End: 2017-10-24

## 2017-10-23 RX ORDER — PANTOPRAZOLE SODIUM 20 MG/1
40 TABLET, DELAYED RELEASE ORAL EVERY 12 HOURS
Qty: 0 | Refills: 0 | Status: DISCONTINUED | OUTPATIENT
Start: 2017-10-23 | End: 2017-10-24

## 2017-10-23 RX ORDER — SODIUM CHLORIDE 9 MG/ML
1000 INJECTION INTRAMUSCULAR; INTRAVENOUS; SUBCUTANEOUS
Qty: 0 | Refills: 0 | Status: DISCONTINUED | OUTPATIENT
Start: 2017-10-23 | End: 2017-10-24

## 2017-10-23 RX ADMIN — Medication 120 MILLIGRAM(S): at 12:15

## 2017-10-23 RX ADMIN — ENOXAPARIN SODIUM 40 MILLIGRAM(S): 100 INJECTION SUBCUTANEOUS at 16:51

## 2017-10-23 RX ADMIN — SODIUM CHLORIDE 150 MILLILITER(S): 9 INJECTION INTRAMUSCULAR; INTRAVENOUS; SUBCUTANEOUS at 15:01

## 2017-10-23 RX ADMIN — SODIUM CHLORIDE 1000 MILLILITER(S): 9 INJECTION INTRAMUSCULAR; INTRAVENOUS; SUBCUTANEOUS at 12:14

## 2017-10-23 NOTE — CONSULT NOTE ADULT - PROBLEM SELECTOR RECOMMENDATION 3
new onset  cardio eval noted  check lytes, thyroid studies, LE dopplers,   D Dimer noted, elevated, may be due to known dx of cancer  not a candidate for CTA chest at present  monitored unit  ce serially  check ProBNP  pt will be on Eliquis as per Cardio

## 2017-10-23 NOTE — H&P ADULT - ASSESSMENT
65 y/o m with pmh of htn, (diet controlled), non-small lung ca with stage IIIa, on chemo, admitted for aflutter

## 2017-10-23 NOTE — H&P ADULT - PROBLEM SELECTOR PLAN 2
s/p chemo  D-Dimer mildly elevated likely  malingnacy related  but will r/o dvt, as pt cant go for CTA due to RASHAWN

## 2017-10-23 NOTE — ED PROVIDER NOTE - OBJECTIVE STATEMENT
67 yo male with hx of lung cancer on chemo/rad tx, sent by ambulance from oncology office when noted to have rapid atrial flutter on ekg today. Pt denies any symptoms, feels well. No cardiac hx.

## 2017-10-23 NOTE — CONSULT NOTE ADULT - PROBLEM SELECTOR RECOMMENDATION 5
spiriva  proventil PRN  ct chest shows Emphysema - smoking cess ed  monitor sat  keep sat > 88 pct  seasonal vaccination  will need PFT as outpatient  will discuss with pt  prognosis guarded

## 2017-10-23 NOTE — H&P ADULT - NSHPPHYSICALEXAM_GEN_ALL_CORE
PHYSICAL EXAM:  Vital Signs Last 24 Hrs  T(C): 36.6 (23 Oct 2017 09:48), Max: 36.6 (23 Oct 2017 09:48)  T(F): 97.9 (23 Oct 2017 09:48), Max: 97.9 (23 Oct 2017 09:48)  HR: 154 (23 Oct 2017 10:15) (152 - 154)  BP: 97/84 (23 Oct 2017 10:15) (97/84 - 126/90)  BP(mean): --  RR: 15 (23 Oct 2017 10:15) (15 - 18)  SpO2: 100% (23 Oct 2017 10:15) (96% - 100%)    GENERAL: NAD, well-groomed, well-developed  HEAD:  Atraumatic, Normocephalic  EYES: EOMI, PERRLA, conjunctiva and sclera clear  ENMT: No tonsillar erythema, exudates, or enlargement; Moist mucous membranes, Good dentition, No lesions  NECK: Supple, No JVD, Normal thyroid  NERVOUS SYSTEM:  Alert & Oriented X3, Good concentration; Motor Strength 5/5 B/L upper and lower extremities; CHEST/LUNG: Clear to auscultation bilaterally; No rales, rhonchi, wheezing, or rubs  HEART: regular, rapid   ABDOMEN: Soft, Nontender, Nondistended; Bowel sounds present  EXTREMITIES:  2+ Peripheral Pulses, No clubbing, cyanosis, or edema  LYMPH: No lymphadenopathy noted  SKIN: No rashes or lesions

## 2017-10-23 NOTE — CONSULT NOTE ADULT - SUBJECTIVE AND OBJECTIVE BOX
Date/Time Patient Seen:  		  Referring MD:   Data Reviewed	       Patient is a 66y old  Male who presents with a chief complaint of my doc sent me in with rapid heart rate (23 Oct 2017 11:37)      Subjective/HPI  seen and examined  vs and meds reviewed  pt alert and oriented  s/p 3 cycles of chemo in [place] office and radiation therapy   pt reports weight loss    ct chest from prior records shows left Lung Ca and Severe Emphysema       PAST MEDICAL & SURGICAL HISTORY:  Lung nodule: left  Smoking history  HTN (hypertension)  H/O inguinal hernia repair: 1972  No significant past surgical history        Medication list         MEDICATIONS  (STANDING):  diltiazem    milliGRAM(s) Oral daily  diltiazem Infusion 10 mG/Hr (10 mL/Hr) IV Continuous <Continuous>  enoxaparin Injectable 40 milliGRAM(s) SubCutaneous daily  sodium chloride 0.9% Bolus 1000 milliLiter(s) IV Bolus every 1 hour  tiotropium 18 MICROgram(s) Capsule 1 Capsule(s) Inhalation daily    MEDICATIONS  (PRN):  ALBUTerol    90 MICROgram(s) HFA Inhaler 2 Puff(s) Inhalation every 6 hours PRN Shortness of Breath and/or Wheezing         Vitals log        ICU Vital Signs Last 24 Hrs  T(C): 36.6 (23 Oct 2017 09:48), Max: 36.6 (23 Oct 2017 09:48)  T(F): 97.9 (23 Oct 2017 09:48), Max: 97.9 (23 Oct 2017 09:48)  HR: 154 (23 Oct 2017 10:15) (152 - 154)  BP: 97/84 (23 Oct 2017 10:15) (97/84 - 126/90)  BP(mean): --  ABP: --  ABP(mean): --  RR: 15 (23 Oct 2017 10:15) (15 - 18)  SpO2: 100% (23 Oct 2017 10:15) (96% - 100%)           Input and Output:  I&O's Detail      Lab Data                        11.2   8.6   )-----------( 292      ( 23 Oct 2017 09:56 )             34.3     10-23    137  |  98  |  24<H>  ----------------------------<  109<H>  3.5   |  23  |  1.75<H>    Ca    9.5      23 Oct 2017 09:56    TPro  7.2  /  Alb  3.2<L>  /  TBili  0.3  /  DBili  x   /  AST  22  /  ALT  17  /  AlkPhos  75  10-23      CARDIAC MARKERS ( 23 Oct 2017 09:56 )  .013 ng/mL / x     / x     / x     / 1.6 ng/mL    smoker  non drinker  currently on disability from work  lives with girlfriend      Review of Systems	  new onset AFIB  weight loss      Objective     Physical Examination    head at  heart - s1s2  NSR now  lungs - dec BS  abd - soft  cn grossly int  moves all extr      Pertinent Lab findings & Imaging      Martin:  NO   Adequate UO     I&O's Detail           Discussed with:     Cultures:	        Radiology  cxr clear  old ct shows severe emphysema

## 2017-10-23 NOTE — CONSULT NOTE ADULT - ASSESSMENT
The patient is a 66 year old male with a history of HTN, lung cancer currently undergoing chemotherapy and radiation therapy who was sent in for rapid atrial flutter.    Plan:  - Received diltiazem 10 mg IV with little effect. Start diltiazem drip at 10 mg/hr and titrate up as needed.  - Likely a component of dehydration. Continue IVF.  - Start apixaban 5 mg bid  - Check TSH  - Check echocardiogram  - D-dimer elevated. No symptomatic or hypoxic. Cr elevated so cannot receive CTA chest right now. Low suspicion for PE. Patient will be treated with AC regardless.  - Further plan to be based on response to diltiazem drip

## 2017-10-23 NOTE — CONSULT NOTE ADULT - SUBJECTIVE AND OBJECTIVE BOX
History of Present Illness: The patient is a 66 year old male with a history of HTN, lung cancer currently undergoing chemotherapy and radiation therapy who was sent in for rapid atrial flutter. He denies palpitations, dizziness, shortness of breath, chest pain. He went to a routine visit with his oncologist and was noted to be tachycardic. He is currently undergoing radiation therapy and chemotherapy, unsure which medications he is on. The only symptom he has noticed is what he describes as esophageal irritation from radiation therapy which has caused decreased PO intake.    Past Medical/Surgical History:  HTN, lung cancer    Medications:  MEDICATIONS  (STANDING):  diltiazem Infusion 10 mG/Hr (10 mL/Hr) IV Continuous <Continuous>    MEDICATIONS  (PRN):      Family History: Non-contributory family history of premature cardiovascular atherosclerotic disease    Social History: No tobacco, alcohol or drug use    Review of Systems:  General: No fevers, chills, weight loss or gain  Skin: No rashes, color changes  Cardiovascular: No chest pain, orthopnea  Respiratory: No shortness of breath, cough  Gastrointestinal: No nausea, abdominal pain  Genitourinary: No incontinence, pain with urination  Musculoskeletal: No pain, swelling, decreased range of motion  Neurological: No headache, weakness  Psychiatric: No depression, anxiety  Endocrine: No weight loss or gain, increased thirst  All other systems are comprehensively negative.    Physical Exam:  Vitals:        Vital Signs Last 24 Hrs  T(C): 36.6 (23 Oct 2017 09:48), Max: 36.6 (23 Oct 2017 09:48)  T(F): 97.9 (23 Oct 2017 09:48), Max: 97.9 (23 Oct 2017 09:48)  HR: 154 (23 Oct 2017 10:15) (152 - 154)  BP: 97/84 (23 Oct 2017 10:15) (97/84 - 126/90)  BP(mean): --  RR: 15 (23 Oct 2017 10:15) (15 - 18)  SpO2: 100% (23 Oct 2017 10:15) (96% - 100%)  General: NAD  HEENT: MMM  Neck: No JVD, no carotid bruit  Lungs: CTAB  CV: Tachycardic, nl S1/S2, no M/R/G  Abdomen: S/NT/ND, +BS  Extremities: No LE edema, no cyanosis  Neuro: AAOx3, non-focal  Skin: No rash    Labs:                        11.2   8.6   )-----------( 292      ( 23 Oct 2017 09:56 )             34.3     10-23    137  |  98  |  24<H>  ----------------------------<  109<H>  3.5   |  23  |  1.75<H>    Ca    9.5      23 Oct 2017 09:56    TPro  7.2  /  Alb  3.2<L>  /  TBili  0.3  /  DBili  x   /  AST  22  /  ALT  17  /  AlkPhos  75  10-23    CARDIAC MARKERS ( 23 Oct 2017 09:56 )  .013 ng/mL / x     / x     / x     / 1.6 ng/mL      PT/INR - ( 23 Oct 2017 09:56 )   PT: 12.1 sec;   INR: 1.11 ratio         PTT - ( 23 Oct 2017 09:56 )  PTT:29.0 sec    ECG: Atrial flutter 2:1

## 2017-10-23 NOTE — H&P ADULT - HISTORY OF PRESENT ILLNESS
65 y/o m with pmh of htn, (diet controlled), non-small lung ca with stage IIIa, on chemo, went to hemonc clinic for a follow up and found to have rapid heart rate, pt was asymptomatic,   pt was sent to ER for further workup, pt found to be in Aflutter with heart rate of 155, pt denies any dizziness, lightheadness, cp, palpitaiton,  shortness of breath.  pt found also to be in ARF likely secondary to chemo, and dehydration as pt states for past few weeks has severe odynopagia, tried magic wash, with little improvement, therefore has not been eating well, pt was placed on cardizem drip

## 2017-10-23 NOTE — ED PROVIDER NOTE - MEDICAL DECISION MAKING DETAILS
65 yo male with lung cancer found in new onset aflutter today. Plan - LAbs, CE, CXR, Cardio consult. Cardizem 10mg IV given.

## 2017-10-23 NOTE — PROGRESS NOTE ADULT - PROBLEM SELECTOR PLAN 1
ppi and carafate  magic mouthash   upper gastrointestinal endoscopy if no improvement  soft diet  to follow

## 2017-10-23 NOTE — ED ADULT NURSE REASSESSMENT NOTE - NS ED NURSE REASSESS COMMENT FT1
Pt. resting in bed.  Cardiac monitor RSR 70.  Dr. Altamirano cardiologist aware.  PO cardizem initiated. Pt. remains comfortable, no c/p, no sob.

## 2017-10-23 NOTE — CONSULT NOTE ADULT - ASSESSMENT
65 yo male with NSCLC stage IIIA (T1N2) with involvement of LLL lung mass, left hilar and subcarinal lymph nodes, s/p Cisplatin/Alimta/Radiation therapy completed on 10/9/17 presented from office after being found to have atrial flutter  - continue hydration given patient with poor po intake  - nutrition discussed with patient and wife  - rhythm converted with cardizem drip as ordered by Cardiology; no need for transfer for cardioversion  - anticoagulation for now with Lovenox for transient A-flutter; long term anticoagulation to be determined by cardiology  - planned for repeat PET/CT on 10/26/17 as outpatient to determine response to therapy and possibly surgery for underlying lung cancer

## 2017-10-23 NOTE — CONSULT NOTE ADULT - PROBLEM SELECTOR RECOMMENDATION 9
mets lung ca  on chemo and radiation  known to Dr. Orellana group and is s.p. 3 cycles of chemo  s/p radiation therapy  reports weight loss and esophagitis after radiation treatement

## 2017-10-23 NOTE — CONSULT NOTE ADULT - SUBJECTIVE AND OBJECTIVE BOX
Patient is a 66y old  Male who presents with a chief complaint of my doc sent me in with rapid heart rate (23 Oct 2017 11:37)      HPI:  65 y/o m with pmh of htn, (diet controlled), diagnosed with non-small lung ca with stage IIIa (T1N2M0 Alk/ROS neg, PD-L1 pos) in June 2017, underwent evaluation by surgeon (Dr. Salas) and underwent combination chemo (Cisplatin/Alimta) with Radiation therapy which he had from 8/14/17-10/9/17. His course has been complicated by poor nutrition due to radiation induced esophagitis despite use of oral carafate, maalox and diflucan.   He was seen in the office today and found to have tachycardia; in office his EKG showed A-flutter and he was referred to the ER for further evaluation. Once in ER after hydration and cardizem drip, he converted to NSR.   Asked by pulmonary to evaluate.    ROS:  Negative except for: poor appetite, odynophagia, skin dani on back from radiation    PAST MEDICAL & SURGICAL HISTORY:  Lung nodule: left  Smoking history  HTN (hypertension)  H/O inguinal hernia repair: 1972      SOCIAL HISTORY:  + ex tobacco; 40 pack year smoking history quit in 5/2017  + EtOH; prior to chemo drank about 5-6 beers per week  ;  denies illicit drugs    FAMILY HISTORY:  No pertinent family history in first degree relatives      MEDICATIONS  (STANDING):  diltiazem    milliGRAM(s) Oral daily  enoxaparin Injectable 40 milliGRAM(s) SubCutaneous daily  tiotropium 18 MICROgram(s) Capsule 1 Capsule(s) Inhalation daily    MEDICATIONS  (PRN):  ALBUTerol    90 MICROgram(s) HFA Inhaler 2 Puff(s) Inhalation every 6 hours PRN Shortness of Breath and/or Wheezing    Allergies  No Known Allergies    Vital Signs Last 24 Hrs  T(C): 36.6 (23 Oct 2017 09:48), Max: 36.6 (23 Oct 2017 09:48)  T(F): 97.9 (23 Oct 2017 09:48), Max: 97.9 (23 Oct 2017 09:48)  HR: 76 (23 Oct 2017 13:16) (71 - 154)  BP: 101/70 (23 Oct 2017 13:16) (97/84 - 126/90)  RR: 15 (23 Oct 2017 13:16) (15 - 18)  SpO2: 99% (23 Oct 2017 13:16) (96% - 100%)    PHYSICAL EXAM  General: adult in NAD  HEENT: clear oropharynx, mucous membranes dry, anicteric sclera, pink conjunctivae  Neck: supple  CV: normal S1S2 with no murmur rubs or gallops  Lungs: clear to auscultation, no wheezes, no rhales; left chest infusaport without evidence of infection  Abdomen: soft non-tender non-distended, no hepato/splenomegaly  Ext: no clubbing cyanosis or edema  Skin: healing radiation burn on back  Neuro: alert and oriented X3 no focal deficits    LABS:    CBC Full  -  ( 23 Oct 2017 09:56 )  WBC Count : 8.6 K/uL  Hemoglobin : 11.2 g/dL  Hematocrit : 34.3 %  Platelet Count - Automated : 292 K/uL  Mean Cell Volume : 87.4 fl  Mean Cell Hemoglobin : 28.4 pg  Mean Cell Hemoglobin Concentration : 32.5 gm/dL  Auto Neutrophil % : 63.0 %  Auto Lymphocyte % : 8.0 %  Auto Monocyte % : 24.0 %  Auto Eosinophil % : 1.0 %    10-23    137  |  98  |  24<H>  ----------------------------<  109<H>  3.5   |  23  |  1.75<H>    Ca    9.5      23 Oct 2017 09:56    TPro  7.2  /  Alb  3.2<L>  /  TBili  0.3  /  DBili  x   /  AST  22  /  ALT  17  /  AlkPhos  75  10-23    PT/INR - ( 23 Oct 2017 09:56 )   PT: 12.1 sec;   INR: 1.11 ratio    PTT - ( 23 Oct 2017 09:56 )  PTT:29.0 sec      RADIOLOGY :  < from: Xray Chest 1 View AP- PORTABLE-Urgent (10.23.17 @ 10:07) >  AM:  CHEST-PORTABLE URGENT                                  PROCEDURE DATE:  10/23/2017          INTERPRETATION:  Clinical information: Tachycardia    Portable exam, 9:58 AM.    Comparison study dated 8/3/2017.    Cardiac monitor leads present. Mediport catheter present on the left, tip   localized to SVC. Clear lungs. No sign of acute infiltrate effusion or   congestive failure. Heart size within normal limits. Hilar regions,   mediastinal contours intact. Osseous structures intact.    IMPRESSION: See above report    < end of copied text >    < from: US Duplex Venous Lower Ext Complete, Bilateral (10.23.17 @ 13:13) >  IMPRESSION:     No evidence of bilateral lower extremity deep venous thrombosis.    < end of copied text >      < from: US Renal (10.23.17 @ 13:20) >  EXAM:  US KIDNEY(S)                                  PROCEDURE DATE:  10/23/2017          INTERPRETATION:   Clinical information: Acute kidney injury    Transverse and longitudinal images obtained.    Right kidney: Right kidney measures 11 cm vertical height containing   multiple cysts, largest in the lower pole with a maximum span of 3.7 cm.   No solid masses or hydronephrotic changes present in the right kidney. No   calculi evident. No right perirenal collections present.        Left kidney:Left kidney measures 13.1 cm vertical height showing marked   hydronephrotic changes similar to a CT study dated 5/30/2017. No solid   masses or calculi evident. Multiple cysts present, largest in lower pole   with a maximum span of 3.6 cm. That cystis septated. No left perirenal   collections evident.    Urinary bladder contained 196 cc of urine, patient was unable to void.    IMPRESSION:    See above report.      < end of copied text >

## 2017-10-23 NOTE — ED ADULT NURSE NOTE - CHPI ED SYMPTOMS NEG
no syncope/no cough/no nausea/no fever/no vomiting/no dizziness/no chills/no diaphoresis/no shortness of breath/no chest pain/no back pain

## 2017-10-23 NOTE — H&P ADULT - PROBLEM SELECTOR PLAN 1
cardizem drip  spcu  cpk/trop times 3  now revereted back to sinus  will monitor for 24 hours   cardio eval  DVT prophalxis

## 2017-10-23 NOTE — CONSULT NOTE ADULT - PROBLEM SELECTOR RECOMMENDATION 2
RASHAWN  IVF  monitor labs  cr and lytes  replete lytes as needed  I and O  BP control  may need US renal if RASHAWN does not resolve with hydration

## 2017-10-23 NOTE — PROGRESS NOTE ADULT - SUBJECTIVE AND OBJECTIVE BOX
Chief Complaint:  Patient is a 66y old  Male who presents with a chief complaint of My doctor sent me in my heart rate was rasing 65 y/o m with pmh of htn, (diet controlled), non-small lung ca with stage IIIa, on chemo, went to [facility]monc clinic for a follow up and found to have rapid heart rate, pt was asymptomatic,   pt was sent to ER for further workup, pt found to be in Aflutter with heart rate of 155, pt denies any dizziness, lightheadness, cp, palpitaiton,  shortness of breath.  pt found also to be in ARF likely secondary to chemo, and dehydration as pt states for past few weeks has severe odynopagia, tried magic wash, with little improvement, therefore has not been eating well, pt was placed on cardizem drip  he has dysphagia and odynophagia to solids and liquids as well and he had an  upper gastrointestinal endoscopy done in the past 1 month ago, he is here for workup of his heart rate    Allergies:  No Known Allergies      Medications:  ALBUTerol    90 MICROgram(s) HFA Inhaler 2 Puff(s) Inhalation every 6 hours PRN  diltiazem    milliGRAM(s) Oral daily  enoxaparin Injectable 40 milliGRAM(s) SubCutaneous daily  sodium chloride 0.9%. 1000 milliLiter(s) IV Continuous <Continuous>  tiotropium 18 MICROgram(s) Capsule 1 Capsule(s) Inhalation daily      PMHX/PSHX:  Lung nodule  Smoking history  HTN (hypertension)  H/O inguinal hernia repair  No significant past surgical history      Family history:  No pertinent family history in first degree relatives  Diabetes mellitus  Diabetes mellitus      Social History: no etoh lives at home heavy smoker quit 5 months ago, denies etoh or drug abuse    ROS:     General:  No wt loss, fevers, chills, night sweats, fatigue,   Eyes:  Good vision, no reported pain  ENT:  No sore throat, pain, runny nose, dysphagia  CV:  No pain, palpitations, hypo/hypertension  Resp:  No dyspnea, cough, tachypnea, wheezing  GI:  No pain, No nausea, No vomiting, No diarrhea, No constipation, No weight loss, No fever, No pruritis, No rectal bleeding, No tarry stools, No dysphagia,  :  No pain, bleeding, incontinence, nocturia  Muscle:  No pain, weakness  Neuro:  No weakness, tingling, memory problems  Psych:  No fatigue, insomnia, mood problems, depression  Endocrine:  No polyuria, polydipsia, cold/heat intolerance  Heme:  No petechiae, ecchymosis, easy bruisability  Skin:  No rash, tattoos, scars, edema      PHYSICAL EXAM:   Vital Signs:  Vital Signs Last 24 Hrs  T(C): 36.8 (23 Oct 2017 17:40), Max: 36.8 (23 Oct 2017 17:40)  T(F): 98.2 (23 Oct 2017 17:40), Max: 98.2 (23 Oct 2017 17:40)  HR: 65 (23 Oct 2017 17:40) (65 - 154)  BP: 158/66 (23 Oct 2017 17:40) (97/84 - 158/66)  BP(mean): --  RR: 16 (23 Oct 2017 17:40) (13 - 18)  SpO2: 96% (23 Oct 2017 17:40) (95% - 100%)  Daily Height in cm: 180.34 (23 Oct 2017 09:48)    Daily     GENERAL:  Appears stated age, well-groomed, well-nourished, no distress  HEENT:  NC/AT,  conjunctivae clear and pink, no thyromegaly, nodules, adenopathy, no JVD, sclera -anicteric  CHEST:  Full & symmetric excursion, no increased effort, breath sounds clear  HEART:  Regular rhythm, S1, S2, no murmur/rub/S3/S4, no abdominal bruit, no edema  ABDOMEN:  Soft, non-tender, non-distended, normoactive bowel sounds,  no masses ,no hepato-splenomegaly, no signs of chronic liver disease  EXTEREMITIES:  no cyanosis,clubbing or edema  SKIN:  No rash/erythema/ecchymoses/petechiae/wounds/abscess/warm/dry  NEURO:  Alert, oriented, no asterixis, no tremor, no encephalopathy    LABS:                        11.2   8.6   )-----------( 292      ( 23 Oct 2017 09:56 )             34.3     10-23    137  |  98  |  24<H>  ----------------------------<  109<H>  3.5   |  23  |  1.75<H>    Ca    9.5      23 Oct 2017 09:56    TPro  7.2  /  Alb  3.2<L>  /  TBili  0.3  /  DBili  x   /  AST  22  /  ALT  17  /  AlkPhos  75  10-23    LIVER FUNCTIONS - ( 23 Oct 2017 09:56 )  Alb: 3.2 g/dL / Pro: 7.2 g/dL / ALK PHOS: 75 U/L / ALT: 17 U/L DA / AST: 22 U/L / GGT: x           PT/INR - ( 23 Oct 2017 09:56 )   PT: 12.1 sec;   INR: 1.11 ratio         PTT - ( 23 Oct 2017 09:56 )  PTT:29.0 sec        Imaging:

## 2017-10-23 NOTE — H&P ADULT - NSHPREVIEWOFSYSTEMS_GEN_ALL_CORE
REVIEW OF SYSTEMS:    EYES: No eye pain, visual disturbances, or discharge  ENMT:  No difficulty hearing, tinnitus, vertigo; No sinus or throat pain  NECK: No pain or stiffness  BREASTS: No pain, masses, or nipple discharge  RESPIRATORY: No cough, wheezing, chills or hemoptysis; No shortness of breath  CARDIOVASCULAR: rapid heart rate   GASTROINTESTINAL: No abdominal or epigastric pain. No nausea, vomiting, or hematemesis; No diarrhea or constipation. No melena or hematochezia.  GENITOURINARY: No dysuria, frequency, hematuria, or incontinence  NEUROLOGICAL: No headaches, memory loss, loss of strength, numbness, or tremors  SKIN: No itching, burning, rashes, or lesions   LYMPH NODES: No enlarged glands  ENDOCRINE: No heat or cold intolerance; No hair loss; No polydipsia or polyuria  MUSCULOSKELETAL: No joint pain or swelling; No muscle, back, or extremity pain  PSYCHIATRIC: No depression, anxiety, mood swings, or difficulty sleeping  HEME/LYMPH: No easy bruising, or bleeding gums  ALLERGY AND IMMUNOLOGIC: No hives or eczema

## 2017-10-24 ENCOUNTER — TRANSCRIPTION ENCOUNTER (OUTPATIENT)
Age: 67
End: 2017-10-24

## 2017-10-24 VITALS
TEMPERATURE: 98 F | SYSTOLIC BLOOD PRESSURE: 123 MMHG | OXYGEN SATURATION: 97 % | DIASTOLIC BLOOD PRESSURE: 82 MMHG | RESPIRATION RATE: 18 BRPM | HEART RATE: 66 BPM

## 2017-10-24 LAB
ALBUMIN SERPL ELPH-MCNC: 2.3 G/DL — LOW (ref 3.3–5)
ALP SERPL-CCNC: 58 U/L — SIGNIFICANT CHANGE UP (ref 30–120)
ALT FLD-CCNC: 12 U/L DA — SIGNIFICANT CHANGE UP (ref 10–60)
ANION GAP SERPL CALC-SCNC: 12 MMOL/L — SIGNIFICANT CHANGE UP (ref 5–17)
AST SERPL-CCNC: 19 U/L — SIGNIFICANT CHANGE UP (ref 10–40)
BILIRUB SERPL-MCNC: 0.3 MG/DL — SIGNIFICANT CHANGE UP (ref 0.2–1.2)
BUN SERPL-MCNC: 18 MG/DL — SIGNIFICANT CHANGE UP (ref 7–23)
CALCIUM SERPL-MCNC: 8.4 MG/DL — SIGNIFICANT CHANGE UP (ref 8.4–10.5)
CHLORIDE SERPL-SCNC: 105 MMOL/L — SIGNIFICANT CHANGE UP (ref 96–108)
CK SERPL-CCNC: 43 U/L — SIGNIFICANT CHANGE UP (ref 39–308)
CK SERPL-CCNC: 46 U/L — SIGNIFICANT CHANGE UP (ref 39–308)
CO2 SERPL-SCNC: 22 MMOL/L — SIGNIFICANT CHANGE UP (ref 22–31)
CREAT SERPL-MCNC: 1.34 MG/DL — HIGH (ref 0.5–1.3)
GLUCOSE SERPL-MCNC: 81 MG/DL — SIGNIFICANT CHANGE UP (ref 70–99)
POTASSIUM SERPL-MCNC: 3.7 MMOL/L — SIGNIFICANT CHANGE UP (ref 3.5–5.3)
POTASSIUM SERPL-SCNC: 3.7 MMOL/L — SIGNIFICANT CHANGE UP (ref 3.5–5.3)
PROT SERPL-MCNC: 5.4 G/DL — LOW (ref 6–8.3)
SODIUM SERPL-SCNC: 139 MMOL/L — SIGNIFICANT CHANGE UP (ref 135–145)
TROPONIN I SERPL-MCNC: 0.03 NG/ML — SIGNIFICANT CHANGE UP (ref 0.02–0.06)
TROPONIN I SERPL-MCNC: 0.04 NG/ML — SIGNIFICANT CHANGE UP (ref 0.02–0.06)
TSH SERPL-MCNC: 0.25 UIU/ML — LOW (ref 0.27–4.2)

## 2017-10-24 PROCEDURE — 82550 ASSAY OF CK (CPK): CPT

## 2017-10-24 PROCEDURE — 94640 AIRWAY INHALATION TREATMENT: CPT

## 2017-10-24 PROCEDURE — 84436 ASSAY OF TOTAL THYROXINE: CPT

## 2017-10-24 PROCEDURE — 84480 ASSAY TRIIODOTHYRONINE (T3): CPT

## 2017-10-24 PROCEDURE — 93306 TTE W/DOPPLER COMPLETE: CPT

## 2017-10-24 PROCEDURE — 99285 EMERGENCY DEPT VISIT HI MDM: CPT

## 2017-10-24 PROCEDURE — 96374 THER/PROPH/DIAG INJ IV PUSH: CPT

## 2017-10-24 PROCEDURE — 85730 THROMBOPLASTIN TIME PARTIAL: CPT

## 2017-10-24 PROCEDURE — 85379 FIBRIN DEGRADATION QUANT: CPT

## 2017-10-24 PROCEDURE — 84484 ASSAY OF TROPONIN QUANT: CPT

## 2017-10-24 PROCEDURE — 83880 ASSAY OF NATRIURETIC PEPTIDE: CPT

## 2017-10-24 PROCEDURE — 84443 ASSAY THYROID STIM HORMONE: CPT

## 2017-10-24 PROCEDURE — 93005 ELECTROCARDIOGRAM TRACING: CPT

## 2017-10-24 PROCEDURE — 93970 EXTREMITY STUDY: CPT

## 2017-10-24 PROCEDURE — 76775 US EXAM ABDO BACK WALL LIM: CPT

## 2017-10-24 PROCEDURE — 85610 PROTHROMBIN TIME: CPT

## 2017-10-24 PROCEDURE — 80053 COMPREHEN METABOLIC PANEL: CPT

## 2017-10-24 PROCEDURE — 85027 COMPLETE CBC AUTOMATED: CPT

## 2017-10-24 PROCEDURE — 82553 CREATINE MB FRACTION: CPT

## 2017-10-24 PROCEDURE — 36415 COLL VENOUS BLD VENIPUNCTURE: CPT

## 2017-10-24 PROCEDURE — 99239 HOSP IP/OBS DSCHRG MGMT >30: CPT

## 2017-10-24 PROCEDURE — 71045 X-RAY EXAM CHEST 1 VIEW: CPT

## 2017-10-24 RX ORDER — APIXABAN 2.5 MG/1
1 TABLET, FILM COATED ORAL
Qty: 60 | Refills: 0 | OUTPATIENT
Start: 2017-10-24 | End: 2017-11-23

## 2017-10-24 RX ORDER — PANTOPRAZOLE SODIUM 20 MG/1
1 TABLET, DELAYED RELEASE ORAL
Qty: 60 | Refills: 0 | OUTPATIENT
Start: 2017-10-24 | End: 2017-11-23

## 2017-10-24 RX ORDER — SUCRALFATE 1 G
10 TABLET ORAL
Qty: 60 | Refills: 0 | OUTPATIENT
Start: 2017-10-24 | End: 2017-11-23

## 2017-10-24 RX ORDER — FOLIC ACID 0.8 MG
1 TABLET ORAL
Qty: 0 | Refills: 0 | COMMUNITY

## 2017-10-24 RX ORDER — DILTIAZEM HCL 120 MG
1 CAPSULE, EXT RELEASE 24 HR ORAL
Qty: 30 | Refills: 0 | OUTPATIENT
Start: 2017-10-24 | End: 2017-11-23

## 2017-10-24 RX ADMIN — Medication 120 MILLIGRAM(S): at 06:04

## 2017-10-24 RX ADMIN — PANTOPRAZOLE SODIUM 40 MILLIGRAM(S): 20 TABLET, DELAYED RELEASE ORAL at 06:03

## 2017-10-24 RX ADMIN — Medication 1 GRAM(S): at 06:04

## 2017-10-24 RX ADMIN — SODIUM CHLORIDE 150 MILLILITER(S): 9 INJECTION INTRAMUSCULAR; INTRAVENOUS; SUBCUTANEOUS at 05:30

## 2017-10-24 RX ADMIN — SODIUM CHLORIDE 100 MILLILITER(S): 9 INJECTION INTRAMUSCULAR; INTRAVENOUS; SUBCUTANEOUS at 08:12

## 2017-10-24 RX ADMIN — ENOXAPARIN SODIUM 40 MILLIGRAM(S): 100 INJECTION SUBCUTANEOUS at 11:20

## 2017-10-24 NOTE — DISCHARGE NOTE ADULT - HOSPITAL COURSE
65 y/o m with pmh of htn, (diet controlled), non-small lung ca with stage IIIa, on chemo, went to hemonc clinic for a follow up and found to have rapid heart rate, pt was asymptomatic,   pt was sent to ER for further workup, pt found to be in Aflutter with heart rate of 155, pt denies any dizziness, lightheadness, cp, palpitaiton,  shortness of breath, pt was admitted for  Atrial flutte was placed on cardizem dirp, aflutter conerted back to sinus rhytm currently on po cardizem, tolerating well, cpk/trop times 3 negative, cardio wants patient to continue on caridzem and eliquis given unknown duration for aflutter , and will follow up as outpt s  Lung cancer.  Plan: s/p chemo  D-Dimer mildly elevated likely  malingnacy related  DVT negative    echo wnr, no cardiac straining        Acute renal failure, unspecified acute renal failure type.  Plan: likely from dehydration/chemo and HCTZ induced  iv fluids 125 cc/hr  renal sono WNR   creatine trending lower  d/c hctz.     Lung ca  f/u anu as outpt     pt can proceed with d/c planning with close outt follow up, PMD informed

## 2017-10-24 NOTE — DISCHARGE NOTE ADULT - PATIENT PORTAL LINK FT
“You can access the Follow[facility] Patient Portal, offered by [facility], by registering with the following website: [email or web address]”

## 2017-10-24 NOTE — DISCHARGE NOTE ADULT - CARE PROVIDER_API CALL
Pedrito Orellana), Hematology; Internal Medicine; Medical Oncology  [address]  [address]  [place] [address]  Phone: [phone]  Fax: [phone]    Terrance Mccormick (DO), Family Medicine  [address]  [address]  [place] [address]  Phone: [phone]  Fax: [phone]    Dandy Altamirano (MD), Cardiovascular Disease; Internal Medicine  [address]  [address]  [place] [address]  Phone: [phone]  Fax: [phone]

## 2017-10-24 NOTE — DISCHARGE NOTE ADULT - MEDICATION SUMMARY - MEDICATIONS TO TAKE
I will START or STAY ON the medications listed below when I get home from the hospital:    dilTIAZem 120 mg/24 hours oral capsule, extended release  -- 1 cap(s) by mouth once a day  -- Indication: For Aflutter    Eliquis 5 mg oral tablet  -- 1 tab(s) by mouth 2 times a day   -- Check with your doctor before becoming pregnant.  It is very important that you take or use this exactly as directed.  Do not skip doses or discontinue unless directed by your doctor.  Obtain medical advice before taking any non-prescription drugs as some may affect the action of this medication.    -- Indication: For Aflutter    sucralfate 1 g/10 mL oral suspension  -- 10 milliliter(s) by mouth 2 times a day  -- Indication: For odonopahgiua    Protonix 40 mg oral delayed release tablet  -- 1 tab(s) by mouth 2 times a day   -- It is very important that you take or use this exactly as directed.  Do not skip doses or discontinue unless directed by your doctor.  Obtain medical advice before taking any non-prescription drugs as some may affect the action of this medication.  Swallow whole.  Do not crush.    -- Indication: For gerd

## 2017-10-24 NOTE — PROGRESS NOTE ADULT - ASSESSMENT
The patient is a 66 year old male with a history of HTN, lung cancer currently undergoing chemotherapy and radiation therapy who was sent in for rapid atrial flutter.    Plan:  - Cr improved with IVF  - Continue apixaban 5 mg bid  - Continue diltiazem  mg daily  - Echocardiogram with normal LV systolic function, no significant valve issues  - TSH slightly low, normal free T4. Defer to primary.  - Discharge planning

## 2017-10-24 NOTE — PROGRESS NOTE ADULT - PROBLEM SELECTOR PLAN 1
spiriva  proventil PRN  o2 support as needed  keep sat > 90 pct  will need seasonal vaccination  smoking cess ed

## 2017-10-24 NOTE — PROGRESS NOTE ADULT - SUBJECTIVE AND OBJECTIVE BOX
Chief Complaint: Atrial flutter    Interval Events: Converted back to sinus rhythm yesterday.    Review of Systems:  General: No fevers, chills, weight loss or gain  Skin: No rashes, color changes  Cardiovascular: No chest pain, orthopnea  Respiratory: No shortness of breath, cough  Gastrointestinal: No nausea, abdominal pain  Genitourinary: No incontinence, pain with urination  Musculoskeletal: No pain, swelling, decreased range of motion  Neurological: No headache, weakness  Psychiatric: No depression, anxiety  Endocrine: No weight loss or gain, increased thirst  All other systems are comprehensively negative.    Physical Exam:  Vitals:        Vital Signs Last 24 Hrs  T(C): 36.8 (24 Oct 2017 10:49), Max: 36.8 (23 Oct 2017 17:40)  T(F): 98.2 (24 Oct 2017 10:49), Max: 98.2 (23 Oct 2017 17:40)  HR: 66 (24 Oct 2017 10:49) (65 - 80)  BP: 123/82 (24 Oct 2017 10:49) (101/70 - 158/66)  BP(mean): --  RR: 18 (24 Oct 2017 10:49) (13 - 18)  SpO2: 97% (24 Oct 2017 10:49) (95% - 100%)  General: NAD  HEENT: MMM  Neck: No JVD, no carotid bruit  Lungs: CTAB  CV: RRR, nl S1/S2, no M/R/G  Abdomen: S/NT/ND, +BS  Extremities: No LE edema, no cyanosis  Neuro: AAOx3, non-focal  Skin: No rash    Labs:                        11.2   8.6   )-----------( 292      ( 23 Oct 2017 09:56 )             34.3     10-24    139  |  105  |  18  ----------------------------<  81  3.7   |  22  |  1.34<H>    Ca    8.4      24 Oct 2017 07:58    TPro  5.4<L>  /  Alb  2.3<L>  /  TBili  0.3  /  DBili  x   /  AST  19  /  ALT  12  /  AlkPhos  58  10-24    CARDIAC MARKERS ( 24 Oct 2017 08:00 )  .036 ng/mL / x     / 46 U/L / x     / x      CARDIAC MARKERS ( 24 Oct 2017 01:59 )  .032 ng/mL / x     / 43 U/L / x     / x      CARDIAC MARKERS ( 23 Oct 2017 17:24 )  .030 ng/mL / x     / 50 U/L / x     / x      CARDIAC MARKERS ( 23 Oct 2017 09:56 )  .013 ng/mL / x     / x     / x     / 1.6 ng/mL      PT/INR - ( 23 Oct 2017 09:56 )   PT: 12.1 sec;   INR: 1.11 ratio         PTT - ( 23 Oct 2017 09:56 )  PTT:29.0 sec    Telemetry: Sinus rhythm

## 2017-10-24 NOTE — PROGRESS NOTE ADULT - SUBJECTIVE AND OBJECTIVE BOX
Date/Time Patient Seen:  		  Referring MD:   Data Reviewed	       Patient is a 66y old  Male who presents with a chief complaint of My doctor sent me in my heart rate was rasing (23 Oct 2017 15:48)  in bed  seen and examined  vs and meds reviewed  on IVF  on AC      Subjective/HPI     PAST MEDICAL & SURGICAL HISTORY:  Lung nodule: left  Smoking history  HTN (hypertension)  H/O inguinal hernia repair: 1972  No significant past surgical history        Medication list         MEDICATIONS  (STANDING):  diltiazem    milliGRAM(s) Oral daily  enoxaparin Injectable 40 milliGRAM(s) SubCutaneous daily  pantoprazole  Injectable 40 milliGRAM(s) IV Push every 12 hours  sodium chloride 0.9%. 1000 milliLiter(s) (100 mL/Hr) IV Continuous <Continuous>  sucralfate suspension 1 Gram(s) Oral two times a day  tiotropium 18 MICROgram(s) Capsule 1 Capsule(s) Inhalation daily    MEDICATIONS  (PRN):  ALBUTerol    90 MICROgram(s) HFA Inhaler 2 Puff(s) Inhalation every 6 hours PRN Shortness of Breath and/or Wheezing  FIRST- Mouthwash  BLM 30 milliLiter(s) Swish and Swallow four times a day PRN pain         Vitals log        ICU Vital Signs Last 24 Hrs  T(C): 36.8 (24 Oct 2017 04:52), Max: 36.8 (23 Oct 2017 17:40)  T(F): 98.2 (24 Oct 2017 04:52), Max: 98.2 (23 Oct 2017 17:40)  HR: 66 (24 Oct 2017 04:52) (65 - 154)  BP: 114/66 (24 Oct 2017 04:52) (97/84 - 158/66)  BP(mean): --  ABP: --  ABP(mean): --  RR: 18 (24 Oct 2017 04:52) (13 - 18)  SpO2: 97% (24 Oct 2017 04:52) (95% - 100%)           Input and Output:  I&O's Detail    23 Oct 2017 07:01  -  24 Oct 2017 06:47  --------------------------------------------------------  IN:    sodium chloride 0.9%.: 900 mL  Total IN: 900 mL    OUT:  Total OUT: 0 mL    Total NET: 900 mL          Lab Data                        11.2   8.6   )-----------( 292      ( 23 Oct 2017 09:56 )             34.3     10-23    137  |  98  |  24<H>  ----------------------------<  109<H>  3.5   |  23  |  1.75<H>    Ca    9.5      23 Oct 2017 09:56    TPro  7.2  /  Alb  3.2<L>  /  TBili  0.3  /  DBili  x   /  AST  22  /  ALT  17  /  AlkPhos  75  10-23      CARDIAC MARKERS ( 24 Oct 2017 01:59 )  .032 ng/mL / x     / 43 U/L / x     / x      CARDIAC MARKERS ( 23 Oct 2017 17:24 )  .030 ng/mL / x     / 50 U/L / x     / x      CARDIAC MARKERS ( 23 Oct 2017 09:56 )  .013 ng/mL / x     / x     / x     / 1.6 ng/mL        Review of Systems	      Objective     Physical Examination    head at  heart - s1s2  lungs - dec BS  abd - soft      Pertinent Lab findings & Imaging      Mario:  NO   Adequate UO     I&O's Detail    23 Oct 2017 07:01  -  24 Oct 2017 06:47  --------------------------------------------------------  IN:    sodium chloride 0.9%.: 900 mL  Total IN: 900 mL    OUT:  Total OUT: 0 mL    Total NET: 900 mL               Discussed with:     Cultures:	        Radiology

## 2017-10-24 NOTE — DISCHARGE NOTE ADULT - MEDICATION SUMMARY - MEDICATIONS TO STOP TAKING
I will STOP taking the medications listed below when I get home from the hospital:    valsartan-hydrochlorothiazide 320mg-12.5mg oral tablet  -- 1 tab(s) by mouth once a day

## 2017-10-24 NOTE — PROGRESS NOTE ADULT - PROBLEM SELECTOR PLAN 4
lung ca  stage IIIa - NSCLC  known to heme onc  d dimer elevated - poss due to ca or new pe  will discuss with hem onc value of v/q scan

## 2017-10-24 NOTE — DISCHARGE NOTE ADULT - CARE PLAN
Principal Discharge DX:	Atrial flutter  Goal:	rate converted back to sinus, continue on cardizem po/elqiuis  Instructions for follow-up, activity and diet:	f/u cardio in one week  Secondary Diagnosis:	Acute renal failure, unspecified acute renal failure type  Goal:	secondary to dehydration, resolved  Instructions for follow-up, activity and diet:	d/c hctz  Secondary Diagnosis:	HTN (hypertension)  Goal:	stable, d/c hctz, will be on cardizem  Secondary Diagnosis:	Lung cancer  Goal:	f/u onc as outpt in a week

## 2017-10-24 NOTE — PROGRESS NOTE ADULT - SUBJECTIVE AND OBJECTIVE BOX
INTERVAL HPI/OVERNIGHT EVENTS:  HPI:  67 y/o m with pmh of htn, (diet controlled), non-small lung ca with stage IIIa, on chemo, went to [facility] for a follow up and found to have rapid heart rate, pt was asymptomatic,   pt was sent to ER for further workup, pt found to be in Aflutter with heart rate of 155, pt denies any dizziness, lightheadness, cp, palpitaiton,  shortness of breath.  pt found also to be in ARF likely secondary to chemo, and dehydration as pt states for past few weeks has severe odynopagia, tried magic wash, with little improvement, therefore has not been.no   eating well, pt was placed on cardizem drip  No new overnight event.  No N/V/D.  Tolerating diet.      MEDICATIONS  (STANDING):  diltiazem    milliGRAM(s) Oral daily  enoxaparin Injectable 40 milliGRAM(s) SubCutaneous daily  pantoprazole  Injectable 40 milliGRAM(s) IV Push every 12 hours  sodium chloride 0.9%. 1000 milliLiter(s) (100 mL/Hr) IV Continuous <Continuous>  sucralfate suspension 1 Gram(s) Oral two times a day  tiotropium 18 MICROgram(s) Capsule 1 Capsule(s) Inhalation daily    MEDICATIONS  (PRN):  ALBUTerol    90 MICROgram(s) HFA Inhaler 2 Puff(s) Inhalation every 6 hours PRN Shortness of Breath and/or Wheezing  FIRST- Mouthwash  BLM 30 milliLiter(s) Swish and Swallow four times a day PRN pain      Allergies    No Known Allergies    Intolerances          General:  No wt loss, fevers, chills, night sweats, fatigue,   Eyes:  Good vision, no reported pain  ENT:  No sore throat, pain, runny nose, dysphagia  CV:  No pain, palpitations, hypo/hypertension  Resp:  No dyspnea, cough, tachypnea, wheezing  GI:  No pain, No nausea, No vomiting, No diarrhea, No constipation, No weight loss, No fever, No pruritis, No rectal bleeding, No tarry stools, No dysphagia,  :  No pain, bleeding, incontinence, nocturia  Muscle:  No pain, weakness  Neuro:  No weakness, tingling, memory problems  Psych:  No fatigue, insomnia, mood problems, depression  Endocrine:  No polyuria, polydipsia, cold/heat intolerance  Heme:  No petechiae, ecchymosis, easy bruisability  Skin:  No rash, tattoos, scars, edema      PHYSICAL EXAM:   Vital Signs:  Vital Signs Last 24 Hrs  T(C): 36.8 (24 Oct 2017 10:49), Max: 36.8 (23 Oct 2017 17:40)  T(F): 98.2 (24 Oct 2017 10:49), Max: 98.2 (23 Oct 2017 17:40)  HR: 66 (24 Oct 2017 10:49) (65 - 80)  BP: 123/82 (24 Oct 2017 10:49) (108/78 - 158/66)  BP(mean): --  RR: 18 (24 Oct 2017 10:49) (13 - 18)  SpO2: 97% (24 Oct 2017 10:49) (95% - 99%)  Daily     Daily Weight in k.7 (24 Oct 2017 04:52)I&O's Summary    23 Oct 2017 07:  -  24 Oct 2017 07:00  --------------------------------------------------------  IN: 900 mL / OUT: 0 mL / NET: 900 mL    24 Oct 2017 07:01  -  24 Oct 2017 13:33  --------------------------------------------------------  IN: 240 mL / OUT: 0 mL / NET: 240 mL        GENERAL:  Appears stated age, well-groomed, well-nourished, no distress  HEENT:  NC/AT,  conjunctivae clear and pink, no thyromegaly, nodules, adenopathy, no JVD, sclera -anicteric  CHEST:  Full & symmetric excursion, no increased effort, breath sounds clear  HEART:  Regular rhythm, S1, S2, no murmur/rub/S3/S4, no abdominal bruit, no edema  ABDOMEN:  Soft, non-tender, non-distended, normoactive bowel sounds,  no masses ,no hepato-splenomegaly, no signs of chronic liver disease  EXTEREMITIES:  no cyanosis,clubbing or edema  SKIN:  No rash/erythema/ecchymoses/petechiae/wounds/abscess/warm/dry  NEURO:  Alert, oriented, no asterixis, no tremor, no encephalopathy      LABS:                        11.2   8.6   )-----------( 292      ( 23 Oct 2017 09:56 )             34.3     10-    139  |  105  |  18  ----------------------------<  81  3.7   |  22  |  1.34<H>    Ca    8.4      24 Oct 2017 07:58    TPro  5.4<L>  /  Alb  2.3<L>  /  TBili  0.3  /  DBili  x   /  AST  19  /  ALT  12  /  AlkPhos  58  10-24    PT/INR - ( 23 Oct 2017 09:56 )   PT: 12.1 sec;   INR: 1.11 ratio         PTT - ( 23 Oct 2017 09:56 )  PTT:29.0 sec    amylase   lipase  RADIOLOGY & ADDITIONAL TESTS:

## 2017-10-24 NOTE — DISCHARGE NOTE ADULT - PLAN OF CARE
rate converted back to sinus, continue on cardizem po/elqiuis f/u cardio in one week secondary to dehydration, resolved d/c hctz alicia, d/c hctz, will be on [facility] f/u onc as outpt in a week

## 2017-10-24 NOTE — PROGRESS NOTE ADULT - PROBLEM SELECTOR PLAN 5
ac as per cardio, eliquis - on last cardio note ?!?  monitor labs  replete lytes as needed  I and O  BP control  tele monitoring  need to consider vq scan to eval for VTE in pt with malignancy  will discuss with pmd and hem onc

## 2017-10-31 ENCOUNTER — APPOINTMENT (OUTPATIENT)
Dept: THORACIC SURGERY | Facility: CLINIC | Age: 67
End: 2017-10-31
Payer: COMMERCIAL

## 2017-10-31 VITALS
HEIGHT: 72 IN | WEIGHT: 175 LBS | HEART RATE: 96 BPM | OXYGEN SATURATION: 99 % | DIASTOLIC BLOOD PRESSURE: 85 MMHG | BODY MASS INDEX: 23.7 KG/M2 | SYSTOLIC BLOOD PRESSURE: 120 MMHG | RESPIRATION RATE: 16 BRPM

## 2017-10-31 PROCEDURE — 99215 OFFICE O/P EST HI 40 MIN: CPT

## 2017-10-31 RX ORDER — VALSARTAN AND HYDROCHLOROTHIAZIDE 320; 25 MG/1; MG/1
TABLET, FILM COATED ORAL
Refills: 0 | Status: COMPLETED | COMMUNITY
End: 2017-10-31

## 2017-11-01 DIAGNOSIS — R94.31 ABNORMAL ELECTROCARDIOGRAM [ECG] [EKG]: ICD-10-CM

## 2017-11-03 ENCOUNTER — OUTPATIENT (OUTPATIENT)
Dept: OUTPATIENT SERVICES | Facility: HOSPITAL | Age: 67
LOS: 1 days | End: 2017-11-03

## 2017-11-03 VITALS
HEIGHT: 70 IN | TEMPERATURE: 98 F | RESPIRATION RATE: 16 BRPM | SYSTOLIC BLOOD PRESSURE: 140 MMHG | WEIGHT: 175.05 LBS | HEART RATE: 90 BPM | DIASTOLIC BLOOD PRESSURE: 92 MMHG

## 2017-11-03 DIAGNOSIS — R59.0 LOCALIZED ENLARGED LYMPH NODES: ICD-10-CM

## 2017-11-03 DIAGNOSIS — I48.92 UNSPECIFIED ATRIAL FLUTTER: ICD-10-CM

## 2017-11-03 DIAGNOSIS — Z92.21 PERSONAL HISTORY OF ANTINEOPLASTIC CHEMOTHERAPY: Chronic | ICD-10-CM

## 2017-11-03 DIAGNOSIS — I10 ESSENTIAL (PRIMARY) HYPERTENSION: ICD-10-CM

## 2017-11-03 DIAGNOSIS — Z98.890 OTHER SPECIFIED POSTPROCEDURAL STATES: Chronic | ICD-10-CM

## 2017-11-03 LAB
BLD GP AB SCN SERPL QL: NEGATIVE — SIGNIFICANT CHANGE UP
BUN SERPL-MCNC: 14 MG/DL — SIGNIFICANT CHANGE UP (ref 7–23)
CALCIUM SERPL-MCNC: 9.3 MG/DL — SIGNIFICANT CHANGE UP (ref 8.4–10.5)
CHLORIDE SERPL-SCNC: 99 MMOL/L — SIGNIFICANT CHANGE UP (ref 98–107)
CO2 SERPL-SCNC: 28 MMOL/L — SIGNIFICANT CHANGE UP (ref 22–31)
CREAT SERPL-MCNC: 1.61 MG/DL — HIGH (ref 0.5–1.3)
GLUCOSE SERPL-MCNC: 102 MG/DL — HIGH (ref 70–99)
HCT VFR BLD CALC: 29.5 % — LOW (ref 39–50)
HGB BLD-MCNC: 9.8 G/DL — LOW (ref 13–17)
MCHC RBC-ENTMCNC: 29.4 PG — SIGNIFICANT CHANGE UP (ref 27–34)
MCHC RBC-ENTMCNC: 33.2 % — SIGNIFICANT CHANGE UP (ref 32–36)
MCV RBC AUTO: 88.6 FL — SIGNIFICANT CHANGE UP (ref 80–100)
NRBC # FLD: 0 — SIGNIFICANT CHANGE UP
PLATELET # BLD AUTO: 276 K/UL — SIGNIFICANT CHANGE UP (ref 150–400)
PMV BLD: 10.9 FL — SIGNIFICANT CHANGE UP (ref 7–13)
POTASSIUM SERPL-MCNC: 3.7 MMOL/L — SIGNIFICANT CHANGE UP (ref 3.5–5.3)
POTASSIUM SERPL-SCNC: 3.7 MMOL/L — SIGNIFICANT CHANGE UP (ref 3.5–5.3)
RBC # BLD: 3.33 M/UL — LOW (ref 4.2–5.8)
RBC # FLD: 23.5 % — HIGH (ref 10.3–14.5)
RH IG SCN BLD-IMP: NEGATIVE — SIGNIFICANT CHANGE UP
SODIUM SERPL-SCNC: 139 MMOL/L — SIGNIFICANT CHANGE UP (ref 135–145)
WBC # BLD: 8.02 K/UL — SIGNIFICANT CHANGE UP (ref 3.8–10.5)
WBC # FLD AUTO: 8.02 K/UL — SIGNIFICANT CHANGE UP (ref 3.8–10.5)

## 2017-11-03 RX ORDER — SODIUM CHLORIDE 9 MG/ML
1000 INJECTION, SOLUTION INTRAVENOUS
Qty: 0 | Refills: 0 | Status: DISCONTINUED | OUTPATIENT
Start: 2017-11-06 | End: 2017-11-21

## 2017-11-03 RX ORDER — SODIUM CHLORIDE 9 MG/ML
3 INJECTION INTRAMUSCULAR; INTRAVENOUS; SUBCUTANEOUS EVERY 8 HOURS
Qty: 0 | Refills: 0 | Status: DISCONTINUED | OUTPATIENT
Start: 2017-11-06 | End: 2017-11-21

## 2017-11-03 NOTE — H&P PST ADULT - VENOUS THROMBOEMBOLISM CURRENT STATUS
minor surgery planned present cancer or chemotherapy/major surgery, including arthroscopic and laparoscopic (greater than 1 hr)

## 2017-11-03 NOTE — H&P PST ADULT - CARDIOVASCULAR COMMENTS
states he has not been taking his blood pressure medication regularly because it has been running low, states he told his PMD states he stopped taking his blood pressure medication 2 weeks ago due to low blood pressure, saw cardiologist yesterday who started him on HCTZ to help with LE edema

## 2017-11-03 NOTE — H&P PST ADULT - HISTORY OF PRESENT ILLNESS
66 year old male with diagnosis of lung cancer in 6/2017, s/p radiation and chemo presents today for presurgical evaluation for ...    Pt reports recent episode of dehyration for which he was evaluated at [place] [facility]. Pt was found to be in aflutter and was discharged. Pt followed up with Cardiologist and obtained clearance. 66 year old male with diagnosis of lung cancer in 6/2017, s/p radiation and chemo presents today for presurgical evaluation for Bronchoscopy, Mediastinoscopy scheduled on 11/6/17 and Bronchoscopy, Left Video Assisted Thoracoscopy, Lung Resection, Possible Thoracotomy with Epidural scheduled on 11/13/17.    Pt reports recent episode of dehydration on 10/23/17 for which he was evaluated at [place] [facility]. Pt was found to be in aflutter and was treated and discharged. Pt states he followed up with Cardiologist and obtained clearance for upcoming surgeries.

## 2017-11-03 NOTE — H&P PST ADULT - PMH
GERD (gastroesophageal reflux disease)    HTN (hypertension)    Lung cancer  s/p radiation and chemotherapy  Smoking history Atrial flutter  10/2017  GERD (gastroesophageal reflux disease)    HTN (hypertension)    Lung cancer  s/p radiation and chemotherapy  Smoking history

## 2017-11-03 NOTE — H&P PST ADULT - MUSCULOSKELETAL
details… detailed exam no joint warmth/no calf tenderness/no joint erythema/normal strength/ROM intact

## 2017-11-03 NOTE — H&P PST ADULT - NEGATIVE ENMT SYMPTOMS
no hearing difficulty/no tinnitus/no sinus symptoms/no dysphagia/no throat pain/no vertigo/no ear pain

## 2017-11-03 NOTE — H&P PST ADULT - NSANTHOSAYNRD_GEN_A_CORE
No. DASHAWN screening performed.  STOP BANG Legend: 0-2 = LOW Risk; 3-4 = INTERMEDIATE Risk; 5-8 = HIGH Risk

## 2017-11-03 NOTE — H&P PST ADULT - PROBLEM SELECTOR PLAN 1
Bronchoscopy, Mediastinoscopy scheduled on 11/6/17 and Bronchoscopy, Left Video Assisted Thoracoscopy, Lung Resection, Possible Thoracotomy with Epidural scheduled on 11/13/17.  Pre-op instructions provided. Pt verbalized understanding.   Pt to take own medication for GI ppx.  Chlorhexidine wash provided and instructions given.

## 2017-11-06 ENCOUNTER — RESULT REVIEW (OUTPATIENT)
Age: 67
End: 2017-11-06

## 2017-11-06 ENCOUNTER — APPOINTMENT (OUTPATIENT)
Dept: THORACIC SURGERY | Facility: HOSPITAL | Age: 67
End: 2017-11-06
Payer: COMMERCIAL

## 2017-11-06 ENCOUNTER — OUTPATIENT (OUTPATIENT)
Dept: OUTPATIENT SERVICES | Facility: HOSPITAL | Age: 67
LOS: 1 days | Discharge: HOME | End: 2017-11-06
Payer: COMMERCIAL

## 2017-11-06 VITALS
SYSTOLIC BLOOD PRESSURE: 136 MMHG | WEIGHT: 175.05 LBS | DIASTOLIC BLOOD PRESSURE: 76 MMHG | TEMPERATURE: 98 F | HEART RATE: 88 BPM | HEIGHT: 70 IN | OXYGEN SATURATION: 98 % | RESPIRATION RATE: 22 BRPM

## 2017-11-06 VITALS
SYSTOLIC BLOOD PRESSURE: 114 MMHG | HEART RATE: 78 BPM | RESPIRATION RATE: 15 BRPM | DIASTOLIC BLOOD PRESSURE: 83 MMHG | OXYGEN SATURATION: 100 %

## 2017-11-06 DIAGNOSIS — Z92.21 PERSONAL HISTORY OF ANTINEOPLASTIC CHEMOTHERAPY: Chronic | ICD-10-CM

## 2017-11-06 DIAGNOSIS — R59.0 LOCALIZED ENLARGED LYMPH NODES: ICD-10-CM

## 2017-11-06 DIAGNOSIS — Z98.890 OTHER SPECIFIED POSTPROCEDURAL STATES: Chronic | ICD-10-CM

## 2017-11-06 PROCEDURE — 71010: CPT | Mod: 26

## 2017-11-06 PROCEDURE — 31622 DX BRONCHOSCOPE/WASH: CPT

## 2017-11-06 PROCEDURE — 88305 TISSUE EXAM BY PATHOLOGIST: CPT | Mod: 26

## 2017-11-06 PROCEDURE — 39401 MEDIASTINOSCPY W/MEDSTNL BX: CPT

## 2017-11-06 RX ORDER — FENTANYL CITRATE 50 UG/ML
50 INJECTION INTRAVENOUS
Qty: 0 | Refills: 0 | Status: DISCONTINUED | OUTPATIENT
Start: 2017-11-06 | End: 2017-11-06

## 2017-11-06 RX ORDER — ONDANSETRON 8 MG/1
4 TABLET, FILM COATED ORAL ONCE
Qty: 0 | Refills: 0 | Status: DISCONTINUED | OUTPATIENT
Start: 2017-11-06 | End: 2017-11-06

## 2017-11-06 RX ADMIN — SODIUM CHLORIDE 30 MILLILITER(S): 9 INJECTION, SOLUTION INTRAVENOUS at 08:15

## 2017-11-06 NOTE — ASU DISCHARGE PLAN (ADULT/PEDIATRIC). - MEDICATION SUMMARY - MEDICATIONS TO TAKE
I will START or STAY ON the medications listed below when I get home from the hospital:    Percocet 5/325 oral tablet  -- 1 tab(s) by mouth every 4 hours, As Needed -for moderate pain MDD:6   -- Caution federal law prohibits the transfer of this drug to any person other  than the person for whom it was prescribed.  May cause drowsiness.  Alcohol may intensify this effect.  Use care when operating dangerous machinery.  This prescription cannot be refilled.  This product contains acetaminophen.  Do not use  with any other product containing acetaminophen to prevent possible liver damage.  Using more of this medication than prescribed may cause serious breathing problems.    -- Indication: For moderate pain    Tylenol 325 mg oral tablet  -- 2 tab(s) by mouth every 4 hours, As Needed mild pain, do not take tylenol and percocet together as both contain tylenol  -- Indication: For mild pain    aspirin 81 mg oral tablet  -- 1 tab(s) by mouth once a day. last dose 11/2/2017  -- Indication: For anti platelet    valsartan-hydrochlorothiazide 320mg-12.5mg oral tablet  -- 1 tab(s) by mouth once a day  -- Indication: For hypertension    hydroCHLOROthiazide 12.5 mg oral capsule  -- 1 cap(s) by mouth once a day  -- Indication: For hypertension    pantoprazole 40 mg oral delayed release tablet  -- 1 tab(s) by mouth once a day  -- Indication: For GERD

## 2017-11-06 NOTE — ASU DISCHARGE PLAN (ADULT/PEDIATRIC). - NOTIFY
Bleeding that does not stop/Inability to Tolerate Liquids or Foods/Shortness of breath/Swelling that continues/Fever greater than 101/Pain not relieved by Medications Unable to Urinate/Fever greater than 101/Shortness of breath/Inability to Tolerate Liquids or Foods/Pain not relieved by Medications/Bleeding that does not stop/Swelling that continues

## 2017-11-06 NOTE — ASU PATIENT PROFILE, ADULT - PMH
Atrial flutter  10/2017  GERD (gastroesophageal reflux disease)    HTN (hypertension)    Lung cancer  s/p radiation and chemotherapy  Smoking history

## 2017-11-06 NOTE — ASU DISCHARGE PLAN (ADULT/PEDIATRIC). - NURSING INSTRUCTIONS
Do not take pain medication on an empty stomach.  Increase fluids and fiber in diet to prevent constipation.     Contact your healthcare provider if:   •You have a fever.  •Your pain does not go away, even after you take medicine.   •You have new symptoms or your symptoms are worse than before.  •You cannot swallow.  •You cough up blood.  •You are confused and dizzy or feel like you are going to faint.  •You have shortness of breath.  •Your lips or nails turn blue.  •You have chest pain.

## 2017-11-07 ENCOUNTER — TRANSCRIPTION ENCOUNTER (OUTPATIENT)
Age: 67
End: 2017-11-07

## 2017-11-08 LAB — SURGICAL PATHOLOGY STUDY: SIGNIFICANT CHANGE UP

## 2017-11-13 ENCOUNTER — RESULT REVIEW (OUTPATIENT)
Age: 67
End: 2017-11-13

## 2017-11-13 ENCOUNTER — INPATIENT (INPATIENT)
Facility: HOSPITAL | Age: 67
LOS: 3 days | Discharge: HOME HEALTH | End: 2017-11-17
Attending: THORACIC SURGERY (CARDIOTHORACIC VASCULAR SURGERY) | Admitting: THORACIC SURGERY (CARDIOTHORACIC VASCULAR SURGERY)
Payer: COMMERCIAL

## 2017-11-13 ENCOUNTER — APPOINTMENT (OUTPATIENT)
Dept: THORACIC SURGERY | Facility: HOSPITAL | Age: 67
End: 2017-11-13
Payer: COMMERCIAL

## 2017-11-13 VITALS
HEIGHT: 70 IN | RESPIRATION RATE: 18 BRPM | OXYGEN SATURATION: 95 % | TEMPERATURE: 98 F | SYSTOLIC BLOOD PRESSURE: 128 MMHG | HEART RATE: 78 BPM | WEIGHT: 175.05 LBS | DIASTOLIC BLOOD PRESSURE: 85 MMHG

## 2017-11-13 DIAGNOSIS — Z90.2 ACQUIRED ABSENCE OF LUNG [PART OF]: Chronic | ICD-10-CM

## 2017-11-13 DIAGNOSIS — R91.1 SOLITARY PULMONARY NODULE: ICD-10-CM

## 2017-11-13 DIAGNOSIS — Z92.21 PERSONAL HISTORY OF ANTINEOPLASTIC CHEMOTHERAPY: Chronic | ICD-10-CM

## 2017-11-13 DIAGNOSIS — Z98.890 OTHER SPECIFIED POSTPROCEDURAL STATES: Chronic | ICD-10-CM

## 2017-11-13 LAB
BASE EXCESS BLDA CALC-SCNC: -0.2 MMOL/L — SIGNIFICANT CHANGE UP
CA-I BLDA-SCNC: 1.27 MMOL/L — SIGNIFICANT CHANGE UP (ref 1.15–1.29)
GLUCOSE BLDA-MCNC: 128 MG/DL — HIGH (ref 70–99)
HCO3 BLDA-SCNC: 24 MMOL/L — SIGNIFICANT CHANGE UP (ref 22–26)
HCT VFR BLDA CALC: 26.2 % — LOW (ref 39–51)
HGB BLDA-MCNC: 8.4 G/DL — LOW (ref 13–17)
PCO2 BLDA: 54 MMHG — HIGH (ref 35–48)
PH BLDA: 7.29 PH — LOW (ref 7.35–7.45)
PO2 BLDA: 89 MMHG — SIGNIFICANT CHANGE UP (ref 83–108)
POTASSIUM BLDA-SCNC: 3.4 MMOL/L — SIGNIFICANT CHANGE UP (ref 3.4–4.5)
SAO2 % BLDA: 95.4 % — SIGNIFICANT CHANGE UP (ref 95–99)
SODIUM BLDA-SCNC: 135 MMOL/L — LOW (ref 136–146)

## 2017-11-13 PROCEDURE — 99233 SBSQ HOSP IP/OBS HIGH 50: CPT

## 2017-11-13 PROCEDURE — 31622 DX BRONCHOSCOPE/WASH: CPT | Mod: 59

## 2017-11-13 PROCEDURE — 88305 TISSUE EXAM BY PATHOLOGIST: CPT | Mod: 26

## 2017-11-13 PROCEDURE — 32663 THORACOSCOPY W/LOBECTOMY: CPT | Mod: 59

## 2017-11-13 PROCEDURE — 32674 THORACOSCOPY LYMPH NODE EXC: CPT

## 2017-11-13 PROCEDURE — 88309 TISSUE EXAM BY PATHOLOGIST: CPT | Mod: 26

## 2017-11-13 PROCEDURE — S2900 ROBOTIC SURGICAL SYSTEM: CPT | Mod: NC

## 2017-11-13 PROCEDURE — 32124 EXPLORE CHEST FREE ADHESIONS: CPT

## 2017-11-13 PROCEDURE — 88313 SPECIAL STAINS GROUP 2: CPT | Mod: 26

## 2017-11-13 PROCEDURE — 71010: CPT | Mod: 26

## 2017-11-13 RX ORDER — INFLUENZA VIRUS VACCINE 15; 15; 15; 15 UG/.5ML; UG/.5ML; UG/.5ML; UG/.5ML
0.5 SUSPENSION INTRAMUSCULAR ONCE
Qty: 0 | Refills: 0 | Status: DISCONTINUED | OUTPATIENT
Start: 2017-11-13 | End: 2017-11-17

## 2017-11-13 RX ORDER — ONDANSETRON 8 MG/1
4 TABLET, FILM COATED ORAL EVERY 6 HOURS
Qty: 0 | Refills: 0 | Status: DISCONTINUED | OUTPATIENT
Start: 2017-11-13 | End: 2017-11-14

## 2017-11-13 RX ORDER — HEPARIN SODIUM 5000 [USP'U]/ML
5000 INJECTION INTRAVENOUS; SUBCUTANEOUS EVERY 12 HOURS
Qty: 0 | Refills: 0 | Status: DISCONTINUED | OUTPATIENT
Start: 2017-11-13 | End: 2017-11-17

## 2017-11-13 RX ORDER — HYDROMORPHONE HYDROCHLORIDE 2 MG/ML
0.5 INJECTION INTRAMUSCULAR; INTRAVENOUS; SUBCUTANEOUS
Qty: 0 | Refills: 0 | Status: DISCONTINUED | OUTPATIENT
Start: 2017-11-13 | End: 2017-11-14

## 2017-11-13 RX ORDER — TAMSULOSIN HYDROCHLORIDE 0.4 MG/1
0.4 CAPSULE ORAL AT BEDTIME
Qty: 0 | Refills: 0 | Status: DISCONTINUED | OUTPATIENT
Start: 2017-11-13 | End: 2017-11-17

## 2017-11-13 RX ORDER — PANTOPRAZOLE SODIUM 20 MG/1
40 TABLET, DELAYED RELEASE ORAL
Qty: 0 | Refills: 0 | Status: DISCONTINUED | OUTPATIENT
Start: 2017-11-13 | End: 2017-11-17

## 2017-11-13 RX ORDER — DIPHENHYDRAMINE HCL 50 MG
25 CAPSULE ORAL EVERY 4 HOURS
Qty: 0 | Refills: 0 | Status: DISCONTINUED | OUTPATIENT
Start: 2017-11-13 | End: 2017-11-14

## 2017-11-13 RX ORDER — NALOXONE HYDROCHLORIDE 4 MG/.1ML
0.1 SPRAY NASAL
Qty: 0 | Refills: 0 | Status: DISCONTINUED | OUTPATIENT
Start: 2017-11-13 | End: 2017-11-14

## 2017-11-13 RX ORDER — SODIUM CHLORIDE 9 MG/ML
1000 INJECTION, SOLUTION INTRAVENOUS
Qty: 0 | Refills: 0 | Status: DISCONTINUED | OUTPATIENT
Start: 2017-11-13 | End: 2017-11-14

## 2017-11-13 RX ORDER — DOCUSATE SODIUM 100 MG
100 CAPSULE ORAL THREE TIMES A DAY
Qty: 0 | Refills: 0 | Status: DISCONTINUED | OUTPATIENT
Start: 2017-11-13 | End: 2017-11-17

## 2017-11-13 RX ADMIN — HYDROMORPHONE HYDROCHLORIDE 0.5 MILLIGRAM(S): 2 INJECTION INTRAMUSCULAR; INTRAVENOUS; SUBCUTANEOUS at 17:27

## 2017-11-13 RX ADMIN — Medication 100 MILLIGRAM(S): at 21:20

## 2017-11-13 RX ADMIN — TAMSULOSIN HYDROCHLORIDE 0.4 MILLIGRAM(S): 0.4 CAPSULE ORAL at 21:20

## 2017-11-13 RX ADMIN — HYDROMORPHONE HYDROCHLORIDE 0.5 MILLIGRAM(S): 2 INJECTION INTRAMUSCULAR; INTRAVENOUS; SUBCUTANEOUS at 17:42

## 2017-11-13 NOTE — PROGRESS NOTE ADULT - SUBJECTIVE AND OBJECTIVE BOX
DELFINO GONZALEZ            [id number]         No Known Allergies               66 year old male with diagnosis of lung cancer in 6/2017, s/p radiation and chemo presents today for presurgical evaluation for Bronchoscopy, Mediastinoscopy scheduled on 11/6/17 and Bronchoscopy, Left Video Assisted Thoracoscopy, Lung Resection, Possible Thoracotomy with Epidural scheduled on 11/13/17.    Pt reports recent episode of dehydration on 10/23/17 for which he was evaluated at [place] [facility]. Pt was found to be in aflutter and was treated and discharged. Pt states he followed up with Cardiologist and obtained clearance for upcoming surgeries.HPI:       Procedure:  Uniportal VATS converted to thoracotomy / Left Lower Lobectomy  POD# 0    Issues:  Lung cancer  Postop pain  HTN  GERD      Home Medications:  aspirin 81 mg oral tablet: 1 tab(s) orally once a day. last dose 11/2/2017 (13 Nov 2017 11:34)  hydroCHLOROthiazide 12.5 mg oral capsule: 1 cap(s) orally once a day (13 Nov 2017 11:34)  pantoprazole 40 mg oral delayed release tablet: 1 tab(s) orally once a day (13 Nov 2017 11:34)  Tylenol 325 mg oral tablet: 2 tab(s) orally every 4 hours, As Needed mild pain, do not take tylenol and percocet together as both contain tylenol (13 Nov 2017 11:34)  valsartan-hydrochlorothiazide 320mg-12.5mg oral tablet: 1 tab(s) orally once a day (13 Nov 2017 11:34)      PAST MEDICAL & SURGICAL HISTORY:  Atrial flutter: 10/2017  GERD (gastroesophageal reflux disease)  Lung cancer: s/p radiation and chemotherapy  Smoking history  HTN (hypertension)  History of chemotherapy: left chest wall infusaport placement  H/O inguinal hernia repair: 1972        ICU Vital Signs Last 24 Hrs  T(C): 35.7 (13 Nov 2017 17:15), Max: 36.6 (13 Nov 2017 11:22)  T(F): 96.2 (13 Nov 2017 17:15), Max: 97.9 (13 Nov 2017 11:22)  HR: 72 (13 Nov 2017 18:00) (72 - 82)  BP: 110/70 (13 Nov 2017 17:45) (110/70 - 144/63)  BP(mean): 79 (13 Nov 2017 17:45) (79 - 79)  ABP: 102/50 (13 Nov 2017 18:00) (102/50 - 127/71)  ABP(mean): 71 (13 Nov 2017 18:00) (71 - 93)  RR: 12 (13 Nov 2017 18:00) (12 - 18)  SpO2: 100% (13 Nov 2017 18:00) (92% - 100%)    I&O's Summary    13 Nov 2017 07:01  -  13 Nov 2017 19:26  --------------------------------------------------------  IN: 30 mL / OUT: 30 mL / NET: 0 mL      CAPILLARY BLOOD GLUCOSE          MEDICATIONS  (STANDING):  docusate sodium 100 milliGRAM(s) Oral three times a day  HYDROmorphone (10 MICROgram(s)/mL) + BUpivacaine 0.0625% in 0.9% Sodium Chloride PCEA 250 milliLiter(s) Epidural PCA Continuous  lactated ringers. 1000 milliLiter(s) (30 mL/Hr) IV Continuous <Continuous>  pantoprazole    Tablet 40 milliGRAM(s) Oral before breakfast    MEDICATIONS  (PRN):  diphenhydrAMINE   Injectable 25 milliGRAM(s) IV Push every 4 hours PRN Pruritus  HYDROmorphone  Injectable 0.5 milliGRAM(s) IV Push every 3 hours PRN Severe Pain unrelieved by PCEA  HYDROmorphone (10 MICROgram(s)/mL) + BUpivacaine 0.0625% in 0.9% Sodium Chloride PCEA Rescue Clinician  Bolus 5 milliLiter(s) Epidural every 15 minutes PRN for Pain Score greater than 6  naloxone Injectable 0.1 milliGRAM(s) IV Push every 3 minutes PRN For ANY of the following changes in patient status:  A. RR LESS THAN 10 breaths per minute, B. Oxygen saturation LESS THAN 90%, C. Sedation score of 6  ondansetron Injectable 4 milliGRAM(s) IV Push every 6 hours PRN Nausea          Physical exam:                             General:               Pt is awake, alert,  not in any distress                                                  Neuro:                  Nonfocal                             Cardiovascular:     S1 & S2                          Respiratory:         Air entry is fair and equal on both sides, has bilateral conducted sounds                           GI:                          Soft, nondistended and nontender, Bowel sounds active                            Ext:                        No cyanosis or edema       Labs:                                                                 CXR:    Postop changes, bilateral congestion      Plan:    General: 66yMale s/p Uniportal VATS converted to thoracotomy / Left Lower Lobectomy,  POD# 0   experiencing  pain with deep breathing.                             Neuro:                                         Pain control with PCEA / Tylenol    Plan to D/C EPIDURAL in AM if pain control is good                            Cardiovascular:                                          Continue hemodynamic monitoring.    HTN: Hold antihypertensives                            Respiratory:                                         Pt is on 2L  nasal canula,                                           Comfortable, not in any distress.                                         Using incentive spirometry                                          Monitor chest tube output                                         Chest tube to water seal                                                                Continue bronchodilators, pulmonary toilet                            GI                                         On clear liquids                                          Continue GI prophylaxis with Protonix                                         Continue Zofran / Reglan for nausea - PRN	                                                                 Renal:                                         Continue LR 30cc/hr                                         Monitor I/Os and electrolytes                                         Martin                                                  Hem/ Onc:                                                                                  Monitor chest tube output &  signs of bleeding.                                          Follow CBC in AM                           Infectious disease:                                            No signs of infection. Monitor for fever / leukocytosis.                                          All surgical incision / chest tube  sites look clean                            Endocrine                                             Continue Accu-Checks with coverage    Pt is on SQ Heparin and Venodynes for DVT prophylaxis.     Pertinent clinical, laboratory, radiographic, hemodynamic, echocardiographic, respiratory data, microbiologic data and chart were reviewed and analyzed frequently throughout the course of the day and night  Patient seen, examined and plan discussed with CT Surgeon Dr. Larsen / CTICU team during rounds.    Pt's status discussed with family at bedside, updated status.          Artie Call MD DELFINO GONZALEZ            [id number]         No Known Allergies               66 year old male with diagnosis of lung cancer in 6/2017, s/p radiation and chemo presents today for presurgical evaluation for Bronchoscopy, Mediastinoscopy scheduled on 11/6/17 and Bronchoscopy, Left Video Assisted Thoracoscopy, Lung Resection, Possible Thoracotomy with Epidural scheduled on 11/13/17.    Pt reports recent episode of dehydration on 10/23/17 for which he was evaluated at [place] [facility]. Pt was found to be in aflutter and was treated and discharged. Pt states he followed up with Cardiologist and obtained clearance for upcoming surgeries.HPI:       Procedure:  Uniportal VATS  Left Lower Lobectomy  POD# 0    Issues:  Lung cancer  Postop pain  HTN  GERD      Home Medications:  aspirin 81 mg oral tablet: 1 tab(s) orally once a day. last dose 11/2/2017 (13 Nov 2017 11:34)  hydroCHLOROthiazide 12.5 mg oral capsule: 1 cap(s) orally once a day (13 Nov 2017 11:34)  pantoprazole 40 mg oral delayed release tablet: 1 tab(s) orally once a day (13 Nov 2017 11:34)  Tylenol 325 mg oral tablet: 2 tab(s) orally every 4 hours, As Needed mild pain, do not take tylenol and percocet together as both contain tylenol (13 Nov 2017 11:34)  valsartan-hydrochlorothiazide 320mg-12.5mg oral tablet: 1 tab(s) orally once a day (13 Nov 2017 11:34)      PAST MEDICAL & SURGICAL HISTORY:  Atrial flutter: 10/2017  GERD (gastroesophageal reflux disease)  Lung cancer: s/p radiation and chemotherapy  Smoking history  HTN (hypertension)  History of chemotherapy: left chest wall infusaport placement  H/O inguinal hernia repair: 1972        ICU Vital Signs Last 24 Hrs  T(C): 35.7 (13 Nov 2017 17:15), Max: 36.6 (13 Nov 2017 11:22)  T(F): 96.2 (13 Nov 2017 17:15), Max: 97.9 (13 Nov 2017 11:22)  HR: 72 (13 Nov 2017 18:00) (72 - 82)  BP: 110/70 (13 Nov 2017 17:45) (110/70 - 144/63)  BP(mean): 79 (13 Nov 2017 17:45) (79 - 79)  ABP: 102/50 (13 Nov 2017 18:00) (102/50 - 127/71)  ABP(mean): 71 (13 Nov 2017 18:00) (71 - 93)  RR: 12 (13 Nov 2017 18:00) (12 - 18)  SpO2: 100% (13 Nov 2017 18:00) (92% - 100%)    I&O's Summary    13 Nov 2017 07:01  -  13 Nov 2017 19:26  --------------------------------------------------------  IN: 30 mL / OUT: 30 mL / NET: 0 mL      CAPILLARY BLOOD GLUCOSE          MEDICATIONS  (STANDING):  docusate sodium 100 milliGRAM(s) Oral three times a day  HYDROmorphone (10 MICROgram(s)/mL) + BUpivacaine 0.0625% in 0.9% Sodium Chloride PCEA 250 milliLiter(s) Epidural PCA Continuous  lactated ringers. 1000 milliLiter(s) (30 mL/Hr) IV Continuous <Continuous>  pantoprazole    Tablet 40 milliGRAM(s) Oral before breakfast    MEDICATIONS  (PRN):  diphenhydrAMINE   Injectable 25 milliGRAM(s) IV Push every 4 hours PRN Pruritus  HYDROmorphone  Injectable 0.5 milliGRAM(s) IV Push every 3 hours PRN Severe Pain unrelieved by PCEA  HYDROmorphone (10 MICROgram(s)/mL) + BUpivacaine 0.0625% in 0.9% Sodium Chloride PCEA Rescue Clinician  Bolus 5 milliLiter(s) Epidural every 15 minutes PRN for Pain Score greater than 6  naloxone Injectable 0.1 milliGRAM(s) IV Push every 3 minutes PRN For ANY of the following changes in patient status:  A. RR LESS THAN 10 breaths per minute, B. Oxygen saturation LESS THAN 90%, C. Sedation score of 6  ondansetron Injectable 4 milliGRAM(s) IV Push every 6 hours PRN Nausea          Physical exam:                             General:               Pt is awake, alert,  not in any distress                                                  Neuro:                  Nonfocal                             Cardiovascular:     S1 & S2                          Respiratory:         Air entry is fair and equal on both sides, has bilateral conducted sounds                           GI:                          Soft, nondistended and nontender, Bowel sounds active                            Ext:                        No cyanosis or edema       Labs:                                                                 CXR:    Postop changes, bilateral congestion      Plan:    General: 66yMale s/p Uniportal VATS  Left Lower Lobectomy,  POD# 0   experiencing  pain with deep breathing.                             Neuro:                                         Pain control with PCEA / Tylenol    Plan to D/C EPIDURAL in AM if pain control is good                            Cardiovascular:                                          Continue hemodynamic monitoring.    HTN: Hold antihypertensives                            Respiratory:                                         Pt is on 2L  nasal canula,                                           Comfortable, not in any distress.                                         Using incentive spirometry                                          Monitor chest tube output                                         Chest tube to water seal                                                                Continue bronchodilators, pulmonary toilet                            GI                                         On clear liquids                                          Continue GI prophylaxis with Protonix                                         Continue Zofran / Reglan for nausea - PRN	                                                                 Renal:                                         Continue LR 30cc/hr                                         Monitor I/Os and electrolytes                                         Martin                                                  Hem/ Onc:                                                                                  Monitor chest tube output &  signs of bleeding.                                          Follow CBC in AM                           Infectious disease:                                            No signs of infection. Monitor for fever / leukocytosis.                                          All surgical incision / chest tube  sites look clean                            Endocrine                                             Continue Accu-Checks with coverage    Pt is on SQ Heparin 5000 uts Q12hrs and Venodynes for DVT prophylaxis.     Pertinent clinical, laboratory, radiographic, hemodynamic, echocardiographic, respiratory data, microbiologic data and chart were reviewed and analyzed frequently throughout the course of the day and night  Patient seen, examined and plan discussed with CT Surgeon Dr. Larsen / CTICU team during rounds.    Pt's status discussed with family at bedside, updated status.          Artie Call MD

## 2017-11-13 NOTE — BRIEF OPERATIVE NOTE - PROCEDURE
<<-----Click on this checkbox to enter Procedure Pneumonolysis  11/13/2017    Active  MALEXIS6  Lymph node dissection  11/13/2017  Hilar and mediastinal  Active  MALEXIS6  Video assisted thoracoscopic surgery  11/13/2017  Uniportal Left Lower Lobectomy  Active  [email or web address]  Flexible bronchoscopy by cardiothoracic surgery  11/13/2017    Active  MALEXIS6

## 2017-11-14 ENCOUNTER — TRANSCRIPTION ENCOUNTER (OUTPATIENT)
Age: 67
End: 2017-11-14

## 2017-11-14 LAB
APTT BLD: 34.9 SEC — SIGNIFICANT CHANGE UP (ref 27.5–37.4)
BUN SERPL-MCNC: 27 MG/DL — HIGH (ref 7–23)
CALCIUM SERPL-MCNC: 9.2 MG/DL — SIGNIFICANT CHANGE UP (ref 8.4–10.5)
CHLORIDE SERPL-SCNC: 100 MMOL/L — SIGNIFICANT CHANGE UP (ref 98–107)
CO2 SERPL-SCNC: 22 MMOL/L — SIGNIFICANT CHANGE UP (ref 22–31)
CREAT SERPL-MCNC: 1.27 MG/DL — SIGNIFICANT CHANGE UP (ref 0.5–1.3)
GLUCOSE SERPL-MCNC: 144 MG/DL — HIGH (ref 70–99)
HCT VFR BLD CALC: 28.3 % — LOW (ref 39–50)
HGB BLD-MCNC: 9.2 G/DL — LOW (ref 13–17)
INR BLD: 1.1 — SIGNIFICANT CHANGE UP (ref 0.88–1.17)
MCHC RBC-ENTMCNC: 29.8 PG — SIGNIFICANT CHANGE UP (ref 27–34)
MCHC RBC-ENTMCNC: 32.5 % — SIGNIFICANT CHANGE UP (ref 32–36)
MCV RBC AUTO: 91.6 FL — SIGNIFICANT CHANGE UP (ref 80–100)
NRBC # FLD: 0 — SIGNIFICANT CHANGE UP
PLATELET # BLD AUTO: 294 K/UL — SIGNIFICANT CHANGE UP (ref 150–400)
PMV BLD: 10.2 FL — SIGNIFICANT CHANGE UP (ref 7–13)
POTASSIUM SERPL-MCNC: 5.1 MMOL/L — SIGNIFICANT CHANGE UP (ref 3.5–5.3)
POTASSIUM SERPL-SCNC: 5.1 MMOL/L — SIGNIFICANT CHANGE UP (ref 3.5–5.3)
PROTHROM AB SERPL-ACNC: 12.3 SEC — SIGNIFICANT CHANGE UP (ref 9.8–13.1)
RBC # BLD: 3.09 M/UL — LOW (ref 4.2–5.8)
RBC # FLD: 23.5 % — HIGH (ref 10.3–14.5)
SODIUM SERPL-SCNC: 136 MMOL/L — SIGNIFICANT CHANGE UP (ref 135–145)
WBC # BLD: 14.3 K/UL — HIGH (ref 3.8–10.5)
WBC # FLD AUTO: 14.3 K/UL — HIGH (ref 3.8–10.5)

## 2017-11-14 PROCEDURE — 71010: CPT | Mod: 26

## 2017-11-14 PROCEDURE — 99233 SBSQ HOSP IP/OBS HIGH 50: CPT

## 2017-11-14 RX ORDER — OXYCODONE HYDROCHLORIDE 5 MG/1
5 TABLET ORAL
Qty: 0 | Refills: 0 | Status: DISCONTINUED | OUTPATIENT
Start: 2017-11-14 | End: 2017-11-14

## 2017-11-14 RX ORDER — HYDROMORPHONE HYDROCHLORIDE 2 MG/ML
0.5 INJECTION INTRAMUSCULAR; INTRAVENOUS; SUBCUTANEOUS
Qty: 0 | Refills: 0 | Status: DISCONTINUED | OUTPATIENT
Start: 2017-11-14 | End: 2017-11-16

## 2017-11-14 RX ORDER — OXYCODONE HYDROCHLORIDE 5 MG/1
10 TABLET ORAL
Qty: 0 | Refills: 0 | Status: DISCONTINUED | OUTPATIENT
Start: 2017-11-14 | End: 2017-11-14

## 2017-11-14 RX ORDER — ONDANSETRON 8 MG/1
4 TABLET, FILM COATED ORAL EVERY 6 HOURS
Qty: 0 | Refills: 0 | Status: DISCONTINUED | OUTPATIENT
Start: 2017-11-14 | End: 2017-11-16

## 2017-11-14 RX ORDER — SODIUM CHLORIDE 9 MG/ML
1000 INJECTION INTRAMUSCULAR; INTRAVENOUS; SUBCUTANEOUS
Qty: 0 | Refills: 0 | Status: DISCONTINUED | OUTPATIENT
Start: 2017-11-14 | End: 2017-11-16

## 2017-11-14 RX ORDER — HYDROMORPHONE HYDROCHLORIDE 2 MG/ML
30 INJECTION INTRAMUSCULAR; INTRAVENOUS; SUBCUTANEOUS
Qty: 0 | Refills: 0 | Status: DISCONTINUED | OUTPATIENT
Start: 2017-11-14 | End: 2017-11-16

## 2017-11-14 RX ORDER — NALOXONE HYDROCHLORIDE 4 MG/.1ML
0.1 SPRAY NASAL
Qty: 0 | Refills: 0 | Status: DISCONTINUED | OUTPATIENT
Start: 2017-11-14 | End: 2017-11-16

## 2017-11-14 RX ADMIN — HYDROMORPHONE HYDROCHLORIDE 30 MILLILITER(S): 2 INJECTION INTRAMUSCULAR; INTRAVENOUS; SUBCUTANEOUS at 11:25

## 2017-11-14 RX ADMIN — HYDROMORPHONE HYDROCHLORIDE 0.5 MILLIGRAM(S): 2 INJECTION INTRAMUSCULAR; INTRAVENOUS; SUBCUTANEOUS at 16:51

## 2017-11-14 RX ADMIN — Medication 100 MILLIGRAM(S): at 06:41

## 2017-11-14 RX ADMIN — SODIUM CHLORIDE 30 MILLILITER(S): 9 INJECTION INTRAMUSCULAR; INTRAVENOUS; SUBCUTANEOUS at 08:51

## 2017-11-14 RX ADMIN — HEPARIN SODIUM 5000 UNIT(S): 5000 INJECTION INTRAVENOUS; SUBCUTANEOUS at 06:41

## 2017-11-14 RX ADMIN — Medication 100 MILLIGRAM(S): at 22:31

## 2017-11-14 RX ADMIN — TAMSULOSIN HYDROCHLORIDE 0.4 MILLIGRAM(S): 0.4 CAPSULE ORAL at 22:31

## 2017-11-14 RX ADMIN — Medication 100 MILLIGRAM(S): at 13:44

## 2017-11-14 RX ADMIN — HEPARIN SODIUM 5000 UNIT(S): 5000 INJECTION INTRAVENOUS; SUBCUTANEOUS at 17:17

## 2017-11-14 RX ADMIN — SODIUM CHLORIDE 30 MILLILITER(S): 9 INJECTION INTRAMUSCULAR; INTRAVENOUS; SUBCUTANEOUS at 22:32

## 2017-11-14 RX ADMIN — PANTOPRAZOLE SODIUM 40 MILLIGRAM(S): 20 TABLET, DELAYED RELEASE ORAL at 06:41

## 2017-11-14 RX ADMIN — HYDROMORPHONE HYDROCHLORIDE 30 MILLILITER(S): 2 INJECTION INTRAMUSCULAR; INTRAVENOUS; SUBCUTANEOUS at 19:11

## 2017-11-14 NOTE — DISCHARGE NOTE ADULT - CARE PROVIDER_API CALL
Kamaljit Larsen), Surgery; Thoracic Surgery  [address] [address] [place] [address]  Phone: [phone]  Fax: [phone]

## 2017-11-14 NOTE — DISCHARGE NOTE ADULT - MEDICATION SUMMARY - MEDICATIONS TO TAKE
I will START or STAY ON the medications listed below when I get home from the hospital:    aspirin 81 mg oral tablet  -- 1 tab(s) by mouth once a day.   -- Indication: For anti platelet    Percocet 5/325 oral tablet  -- 2 tab(s) by mouth every 4 hours, As Needed  -for severe pain. Can take 1 tab for moderate MDD:10  -- Caution federal law prohibits the transfer of this drug to any person other  than the person for whom it was prescribed.  May cause drowsiness.  Alcohol may intensify this effect.  Use care when operating dangerous machinery.  This prescription cannot be refilled.  This product contains acetaminophen.  Do not use  with any other product containing acetaminophen to prevent possible liver damage.  Using more of this medication than prescribed may cause serious breathing problems.    -- Indication: For pain    Tylenol 325 mg oral tablet  -- 2 tab(s) by mouth every 4 hours, As Needed mild pain, do not take tylenol and percocet together as both contain tylenol  -- Indication: For pain    metoprolol tartrate 50 mg oral tablet  -- 1 tab(s) by mouth 2 times a day  -- Indication: For heart rate c ontrol    docusate sodium 100 mg oral capsule  -- 1 cap(s) by mouth 3 times a day  -- Indication: For constipation    pantoprazole 40 mg oral delayed release tablet  -- 1 tab(s) by mouth once a day  -- Indication: For Stomach protection

## 2017-11-14 NOTE — DISCHARGE NOTE ADULT - MEDICATION SUMMARY - MEDICATIONS TO STOP TAKING
I will STOP taking the medications listed below when I get home from the hospital:    valsartan-hydrochlorothiazide 320mg-12.5mg oral tablet  -- 1 tab(s) by mouth once a day    hydroCHLOROthiazide 12.5 mg oral capsule  -- 1 cap(s) by mouth once a day

## 2017-11-14 NOTE — DISCHARGE NOTE ADULT - HOME CARE AGENCY
[facility] [phone] initial visit will be day after discharge home. A nurse will call prior to the home visit.

## 2017-11-14 NOTE — DISCHARGE NOTE ADULT - MEDICATION SUMMARY - MEDICATIONS TO CHANGE
I will SWITCH the dose or number of times a day I take the medications listed below when I get home from the hospital:    Percocet 5/325 oral tablet  -- 1 tab(s) by mouth every 4 hours, As Needed -for moderate pain MDD:6   -- Caution federal law prohibits the transfer of this drug to any person other  than the person for whom it was prescribed.  May cause drowsiness.  Alcohol may intensify this effect.  Use care when operating dangerous machinery.  This prescription cannot be refilled.  This product contains acetaminophen.  Do not use  with any other product containing acetaminophen to prevent possible liver damage.  Using more of this medication than prescribed may cause serious breathing problems.

## 2017-11-14 NOTE — DISCHARGE NOTE ADULT - NS AS ACTIVITY OBS
Do not drive or operate machinery/Showering allowed/Walking-Indoors allowed/Do not make important decisions/Walking-Outdoors allowed/No Heavy lifting/straining/Stairs allowed Walking-Indoors allowed/Do not make important decisions/Do not drive or operate machinery/No Heavy lifting/straining/Walking-Outdoors allowed/Stairs allowed/Sex allowed

## 2017-11-14 NOTE — DISCHARGE NOTE ADULT - PLAN OF CARE
s/p left vats, left lower lobectomy- continued wound healing Follow up with Dr. Larsen in 2 weeks   Chest x-ray prior to appointment with Dr. Larsen.  Please bring copy of chest x-ray film to appointment with Dr. Larsen  Follow up with primary care provider in one week   Pain management  Use incentive spirometer  Increase ambulation as tolerated Follow up with Dr. Larsen next week in the office. Call to make your apt. [phone]  Chest x-ray prior to appointment with Dr. Larsen.  Please bring copy of chest x-ray film to appointment with Dr. Larsen  Follow up with primary care provider in one week   Pain management  Use incentive spirometer  Increase ambulation as tolerated. Walk 4-5 x per day. Increase as tolerated. You may climb stairs. Keep chest tube site clean and dry. Just sponge bathe until tube in removed. Visiting nurse to see you and change CT site dressing daily and empty mini atrium as needed. Keep track of amount emptied and notify Dr. Larsen at your apt. You had some Afib while in hospital. You meds were increased. Stop other blood pressure medications for now. See Dr. Rodríguez next week for follow up.

## 2017-11-14 NOTE — DISCHARGE NOTE ADULT - INSTRUCTIONS
Resume your usual diet. Take medication as ordered, follow up with MD as advised, monitor chest drain output, call MD if any sign of infection, gradually increase activity, eat heart healthy diet

## 2017-11-14 NOTE — DISCHARGE NOTE ADULT - CARE PLAN
Principal Discharge DX:	Lung cancer  Goal:	s/p left vats, left lower lobectomy- continued wound healing  Instructions for follow-up, activity and diet:	Follow up with Dr. Larsen in 2 weeks   Chest x-ray prior to appointment with Dr. Larsen.  Please bring copy of chest x-ray film to appointment with Dr. Larsen  Follow up with primary care provider in one week   Pain management  Use incentive spirometer  Increase ambulation as tolerated Principal Discharge DX:	Lung cancer  Goal:	s/p left vats, left lower lobectomy- continued wound healing  Instructions for follow-up, activity and diet:	Follow up with Dr. Larsen next week in the office. Call to make your apt. [phone]  Chest x-ray prior to appointment with Dr. Larsen.  Please bring copy of chest x-ray film to appointment with Dr. Larsen  Follow up with primary care provider in one week   Pain management  Use incentive spirometer  Increase ambulation as tolerated. Walk 4-5 x per day. Increase as tolerated. You may climb stairs. Keep chest tube site clean and dry. Just sponge bathe until tube in removed. Visiting nurse to see you and change CT site dressing daily and empty mini atrium as needed. Keep track of amount emptied and notify Dr. Larsen at your apt.  Secondary Diagnosis:	Paroxysmal atrial fibrillation  Instructions for follow-up, activity and diet:	You had some Afib while in hospital. You meds were increased. Stop other blood pressure medications for now. See Dr. Rodríguez next week for follow up.

## 2017-11-14 NOTE — DISCHARGE NOTE ADULT - HOSPITAL COURSE
66 year old male with diagnosis of lung cancer in 6/2017, s/p radiation and chemo.  Patient s/p left vats, left lower lobectomy on 11/13/17, post op course without any complication. 66 year old male with diagnosis of lung cancer in 6/2017, s/p radiation and chemo.  Patient s/p left vats, left lower lobectomy on 11/13/17, post op course pt. did well. CT remained for extended time due to high output from continued pleural effusion. 66 year old male with diagnosis of lung cancer in 6/2017, s/p radiation and chemo.  Patient s/p left vats, left lower lobectomy on 11/13/17, post op course pt. did well. CT remained for extended time due to high output from continued pleural effusion. Epidural and darden removed. Pain continued to be controlled. Pt. had brief episode of afib, beta blockers increased. Seen by Cardiology, planned for fu as outpt. CT placed to mini atrium today. VNS arranged. Pt. feels well. Ambulating frequently. CXR and Vitals stable. VATS c/d/i. Cleared for discharge by Dr. Larsen.

## 2017-11-14 NOTE — PROGRESS NOTE ADULT - SUBJECTIVE AND OBJECTIVE BOX
DELFINO GONZALEZ          [id number]    HPI:      Procedure:  POD # :     Issues:        Interval/Overnight Events/ ROS  Pt remained hemodynamically stable overnight, not on any pressors or inotropes. OOB to chair, breathing comfortably with minimal pain. Ambulated several times . Denies pain, no SOB, no palpitations, no nausea/ no vomiting, no dizziness  A-line and darden d/sulema         PAST MEDICAL & SURGICAL HISTORY:  Atrial flutter: 10/2017  GERD (gastroesophageal reflux disease)  Lung cancer: s/p radiation and chemotherapy  Smoking history  HTN (hypertension)  History of chemotherapy: left chest wall infusaport placement  H/O inguinal hernia repair: 1972    Allergies    No Known Allergies    Intolerances            ***VITAL SIGNS:  Vital Signs Last 24 Hrs  T(C): 36.5 (14 Nov 2017 11:40), Max: 36.7 (14 Nov 2017 08:00)  T(F): 97.7 (14 Nov 2017 11:40), Max: 98 (14 Nov 2017 08:00)  HR: 87 (14 Nov 2017 11:40) (66 - 99)  BP: 124/84 (14 Nov 2017 11:40) (95/62 - 144/63)  BP(mean): 80 (14 Nov 2017 10:00) (70 - 91)  RR: 16 (14 Nov 2017 11:40) (10 - 20)  SpO2: 100% (14 Nov 2017 11:40) (92% - 100%)    I/Os:   I&O's Detail    13 Nov 2017 07:01  -  14 Nov 2017 07:00  --------------------------------------------------------  IN:    lactated ringers.: 330 mL    Oral Fluid: 240 mL    sodium chloride 0.9%.: 60 mL  Total IN: 630 mL    OUT:    Chest Tube: 130 mL    Indwelling Catheter - Urethral: 455 mL  Total OUT: 585 mL    Total NET: 45 mL      14 Nov 2017 07:01  -  14 Nov 2017 11:48  --------------------------------------------------------  IN:    Oral Fluid: 250 mL    sodium chloride 0.9%.: 120 mL  Total IN: 370 mL    OUT:    Indwelling Catheter - Urethral: 60 mL  Total OUT: 60 mL    Total NET: 310 mL          CAPILLARY BLOOD GLUCOSE          =======================  MEDICATIONS  ===================  MEDICATIONS  (STANDING):  docusate sodium 100 milliGRAM(s) Oral three times a day  heparin  Injectable 5000 Unit(s) SubCutaneous every 12 hours  HYDROmorphone PCA (1 mG/mL) 30 milliLiter(s) PCA Continuous PCA Continuous  influenza   Vaccine 0.5 milliLiter(s) IntraMuscular once  pantoprazole    Tablet 40 milliGRAM(s) Oral before breakfast  sodium chloride 0.9%. 1000 milliLiter(s) (30 mL/Hr) IV Continuous <Continuous>  tamsulosin 0.4 milliGRAM(s) Oral at bedtime    MEDICATIONS  (PRN):  HYDROmorphone PCA (1 mG/mL) Rescue Clinician Bolus 0.5 milliGRAM(s) IV Push every 15 minutes PRN for Pain Scale GREATER THAN 6  naloxone Injectable 0.1 milliGRAM(s) IV Push every 3 minutes PRN For ANY of the following changes in patient status:  A. RR LESS THAN 10 breaths per minute, B. Oxygen saturation LESS THAN 90%, C. Sedation score of 6  ondansetron Injectable 4 milliGRAM(s) IV Push every 6 hours PRN Nausea      ======================VENTILATOR SETTINGS  ==============      =================== PATIENT CARE ACCESS DEVICES ==========  Peripheral IV  Central Venous Line	R	L	IJ	Fem	SC			Placed:   Arterial Line	R	L	PT	DP	Fem	Rad	Ax	Placed:   Midline:				  Urinary Catheter, Date Placed:   Necessity of urinary, arterial, and venous catheters discussed    ======================= PHYSICAL EXAM===================  General:                         Comfortable, Awake, alert, not in any distress  Neuro:                            Moving all extremities to commands. No focal deficits	  HEENT:                           GOPAL/ ETT/ NGT/ trach  Respiratory:	Lungs clear on auscultation bilaterally with good aeration.                                           No rales, rhonchi, no wheezing. Effort even and unlabored.  CV:		Regular rate and rhythm. Normal S1/S2. No murmurs  Abdomen:	                     Soft,  nontender, not-distended. Bowel sounds present / absent.   Skin:		No rash.  Extremities:	Warm, no cyanosis or edema.  Palpable pulses    ============================ LABS =======================                        9.2    14.30 )-----------( 294      ( 14 Nov 2017 03:20 )             28.3     11-14    136  |  100  |  27<H>  ----------------------------<  144<H>  5.1   |  22  |  1.27    Ca    9.2      14 Nov 2017 03:20        PT/INR - ( 14 Nov 2017 03:20 )   PT: 12.3 SEC;   INR: 1.10          PTT - ( 14 Nov 2017 03:20 )  PTT:34.9 SEC  ABG - ( 13 Nov 2017 14:03 )  pH: 7.29  /  pCO2: 54    /  pO2: 89    / HCO3: 24    / Base Excess: -0.2  /  SaO2: 95.4                    ===================== IMAGING STUDIES ===================  Radiology personally reviewed.    ====================ASSESSMENT AND PLAN ================      ====================== NEUROLOGY=======================  Pain control with PCA / PCEA / Tylenol IV / Toradol / Percocet  Pt is on Precedex for agitation  Pt is sedated with Propofol / Fentanyl    ==================== RESPIRATORY========================  Pt is on            L nasal canula / Face tent____% FiO2  Comfortable, no evidence of distress.  Using incentive spirometry & doing                ml  Monitor chest tube output  Chest tube to suction / water seal	    Mechanical Ventilation:    Mechanical ventilator status assessed & settings reviewed  Continue bronchodilators, pulmonary toilet  Head of bed elevation to 30-40 degrees    ====================CARDIOVASCULAR=====================  Continue hemodynamic monitoring/ telemetry  Not on any pressors  Continue cardiovascular / antihypertensive medications    ===================== RENAL ============================  Continue LR 30CC/hr      D/C IVF  Monitor I/Os, BUN/ Cr  and electrolytes  D/C Darden      Keep Darden  BPH: Continue Flomax/ Finasteride      ==================== GASTROINTESTINAL===================  On regular diet, tolerating well  Continue GI prophylaxis with Pepcid / Protonix  Continue Zofran / Reglan for nausea - PRN	  NPO    =======================    ENDOCRIN  =====================  Glycemic monitoring  F/S with coverage  ===================HEMATOLOGIC/ONCOLOGIC =============  Monitor chest tube output. No signs of active bleeding.   Follow CBC, coags  in AM  DVT prophylaxis with SCD, sc Heparin    ========================INFECTIOUS DISEASE===============  No signs of infection. Monitor for fever / leukocytosis.  All surgical incision / chest tube  sites look clean  D/C Darden      Pertinent clinical, laboratory, radiographic, hemodynamic, echocardiographic, respiratory data, microbiologic data and chart were reviewed and analyzed frequently throughout the course of the day and night. GI and DVT prophylaxis, glycemic control, head of bed elevation and skin care issues were addressed.  Patient seen, examined and plan discussed with CT Surgery / CTICU team during rounds.         I have spent               minutes of critical care time with this pt between            am/pm    and               am/ pm         minutes spent on total encounter; more than 50% of the visit was spent counseling and/or coordinating care by the attending physician.        BRITTON Garcia MD DELFINO GONZALEZ          [id number]    HPI: 66 year old male with diagnosis of lung cancer in 6/2017, s/p radiation and chemo admitted for Bronchoscopy, Mediastinoscopy,  Left Video Assisted Thoracoscopy, Lung Resection, Possible Thoracotomy with Epidural scheduled .  Pt reports recent episode of dehydration on 10/23/17 for which he was evaluated at [place] [facility]. Pt was found to be in aflutter and was treated and discharged. Pt states he followed up with Cardiologist and obtained clearance for upcoming surgeries.       Procedure:  Uniportal VATS  Left Lower Lobectomy  POD# 0    Issues:  Lung cancer  Postop pain  HTN  GERD      Home Medications:  aspirin 81 mg oral tablet: 1 tab(s) orally once a day. last dose 11/2/2017 (13 Nov 2017 11:34)  hydroCHLOROthiazide 12.5 mg oral capsule: 1 cap(s) orally once a day (13 Nov 2017 11:34)  pantoprazole 40 mg oral delayed release tablet: 1 tab(s) orally once a day (13 Nov 2017 11:34)  Tylenol 325 mg oral tablet: 2 tab(s) orally every 4 hours, As Needed mild pain, do not take tylenol and percocet together as both contain tylenol (13 Nov 2017 11:34)  valsartan-hydrochlorothiazide 320mg-12.5mg oral tablet: 1 tab(s) orally once a day (13 Nov 2017 11:34)        Procedure:  POD # :     Issues:        Interval/Overnight Events/ ROS  Pt remained hemodynamically stable overnight, not on any pressors or inotropes. OOB to chair, breathing comfortably with minimal pain. Ambulated several times . Denies pain, no SOB, no palpitations, no nausea/ no vomiting, no dizziness  A-line and darden d/sulema         PAST MEDICAL & SURGICAL HISTORY:  Atrial flutter: 10/2017  GERD (gastroesophageal reflux disease)  Lung cancer: s/p radiation and chemotherapy  Smoking history  HTN (hypertension)  History of chemotherapy: left chest wall infusaport placement  H/O inguinal hernia repair: 1972    Allergies    No Known Allergies    Intolerances            ***VITAL SIGNS:  Vital Signs Last 24 Hrs  T(C): 36.5 (14 Nov 2017 11:40), Max: 36.7 (14 Nov 2017 08:00)  T(F): 97.7 (14 Nov 2017 11:40), Max: 98 (14 Nov 2017 08:00)  HR: 87 (14 Nov 2017 11:40) (66 - 99)  BP: 124/84 (14 Nov 2017 11:40) (95/62 - 144/63)  BP(mean): 80 (14 Nov 2017 10:00) (70 - 91)  RR: 16 (14 Nov 2017 11:40) (10 - 20)  SpO2: 100% (14 Nov 2017 11:40) (92% - 100%)    I/Os:   I&O's Detail    13 Nov 2017 07:01  -  14 Nov 2017 07:00  --------------------------------------------------------  IN:    lactated ringers.: 330 mL    Oral Fluid: 240 mL    sodium chloride 0.9%.: 60 mL  Total IN: 630 mL    OUT:    Chest Tube: 130 mL    Indwelling Catheter - Urethral: 455 mL  Total OUT: 585 mL    Total NET: 45 mL      14 Nov 2017 07:01  -  14 Nov 2017 11:48  --------------------------------------------------------  IN:    Oral Fluid: 250 mL    sodium chloride 0.9%.: 120 mL  Total IN: 370 mL    OUT:    Indwelling Catheter - Urethral: 60 mL  Total OUT: 60 mL    Total NET: 310 mL          CAPILLARY BLOOD GLUCOSE          =======================  MEDICATIONS  ===================  MEDICATIONS  (STANDING):  docusate sodium 100 milliGRAM(s) Oral three times a day  heparin  Injectable 5000 Unit(s) SubCutaneous every 12 hours  HYDROmorphone PCA (1 mG/mL) 30 milliLiter(s) PCA Continuous PCA Continuous  influenza   Vaccine 0.5 milliLiter(s) IntraMuscular once  pantoprazole    Tablet 40 milliGRAM(s) Oral before breakfast  sodium chloride 0.9%. 1000 milliLiter(s) (30 mL/Hr) IV Continuous <Continuous>  tamsulosin 0.4 milliGRAM(s) Oral at bedtime    MEDICATIONS  (PRN):  HYDROmorphone PCA (1 mG/mL) Rescue Clinician Bolus 0.5 milliGRAM(s) IV Push every 15 minutes PRN for Pain Scale GREATER THAN 6  naloxone Injectable 0.1 milliGRAM(s) IV Push every 3 minutes PRN For ANY of the following changes in patient status:  A. RR LESS THAN 10 breaths per minute, B. Oxygen saturation LESS THAN 90%, C. Sedation score of 6  ondansetron Injectable 4 milliGRAM(s) IV Push every 6 hours PRN Nausea      ======================VENTILATOR SETTINGS  ==============      =================== PATIENT CARE ACCESS DEVICES ==========  Peripheral IV  Central Venous Line	R	L	IJ	Fem	SC			Placed:   Arterial Line	R	L	PT	DP	Fem	Rad	Ax	Placed:   Midline:				  Urinary Catheter, Date Placed:   Necessity of urinary, arterial, and venous catheters discussed    ======================= PHYSICAL EXAM===================  General:                         Comfortable, Awake, alert, not in any distress  Neuro:                            Moving all extremities to commands. No focal deficits	  HEENT:                           GOPAL/ ETT/ NGT/ trach  Respiratory:	Lungs clear on auscultation bilaterally with good aeration.                                           No rales, rhonchi, no wheezing. Effort even and unlabored.  CV:		Regular rate and rhythm. Normal S1/S2. No murmurs  Abdomen:	                     Soft,  nontender, not-distended. Bowel sounds present / absent.   Skin:		No rash.  Extremities:	Warm, no cyanosis or edema.  Palpable pulses    ============================ LABS =======================                        9.2    14.30 )-----------( 294      ( 14 Nov 2017 03:20 )             28.3     11-14    136  |  100  |  27<H>  ----------------------------<  144<H>  5.1   |  22  |  1.27    Ca    9.2      14 Nov 2017 03:20        PT/INR - ( 14 Nov 2017 03:20 )   PT: 12.3 SEC;   INR: 1.10          PTT - ( 14 Nov 2017 03:20 )  PTT:34.9 SEC  ABG - ( 13 Nov 2017 14:03 )  pH: 7.29  /  pCO2: 54    /  pO2: 89    / HCO3: 24    / Base Excess: -0.2  /  SaO2: 95.4                    ===================== IMAGING STUDIES ===================  Radiology personally reviewed.    ====================ASSESSMENT AND PLAN ================      ====================== NEUROLOGY=======================  Pain control with PCA / PCEA / Tylenol IV / Toradol / Percocet  Pt is on Precedex for agitation  Pt is sedated with Propofol / Fentanyl    ==================== RESPIRATORY========================  Pt is on            L nasal canula / Face tent____% FiO2  Comfortable, no evidence of distress.  Using incentive spirometry & doing                ml  Monitor chest tube output  Chest tube to suction / water seal	    Mechanical Ventilation:    Mechanical ventilator status assessed & settings reviewed  Continue bronchodilators, pulmonary toilet  Head of bed elevation to 30-40 degrees    ====================CARDIOVASCULAR=====================  Continue hemodynamic monitoring/ telemetry  Not on any pressors  Continue cardiovascular / antihypertensive medications    ===================== RENAL ============================  Continue LR 30CC/hr      D/C IVF  Monitor I/Os, BUN/ Cr  and electrolytes  D/C Darden      Keep Darden  BPH: Continue Flomax/ Finasteride      ==================== GASTROINTESTINAL===================  On regular diet, tolerating well  Continue GI prophylaxis with Pepcid / Protonix  Continue Zofran / Reglan for nausea - PRN	  NPO    =======================    ENDOCRIN  =====================  Glycemic monitoring  F/S with coverage  ===================HEMATOLOGIC/ONCOLOGIC =============  Monitor chest tube output. No signs of active bleeding.   Follow CBC, coags  in AM  DVT prophylaxis with SCD, sc Heparin    ========================INFECTIOUS DISEASE===============  No signs of infection. Monitor for fever / leukocytosis.  All surgical incision / chest tube  sites look clean  D/C Darden      Pertinent clinical, laboratory, radiographic, hemodynamic, echocardiographic, respiratory data, microbiologic data and chart were reviewed and analyzed frequently throughout the course of the day and night. GI and DVT prophylaxis, glycemic control, head of bed elevation and skin care issues were addressed.  Patient seen, examined and plan discussed with CT Surgery / CTICU team during rounds.         I have spent               minutes of critical care time with this pt between            am/pm    and               am/ pm         minutes spent on total encounter; more than 50% of the visit was spent counseling and/or coordinating care by the attending physician.        BRITTON Garcia MD DELFINO GONZALEZ          MRN-[id number]    HPI: 66 year old male with diagnosis of lung cancer in 6/2017, s/p radiation and chemo admitted for Bronchoscopy, Mediastinoscopy,  Left Video Assisted Thoracoscopy, Lung Resection, Possible Thoracotomy with Epidural pain control  Pt reports recent episode of dehydration on 10/23/17 for which he was evaluated at [place] [facility]. Pt was found to be in aflutter and was treated and discharged. Pt states he followed up with Cardiologist and obtained clearance for upcoming surgeries.       Procedure:  Uniportal VATS,  Left Lower Lobectomy  POD# 1    Issues:   left chest tube in place  Lung cancer  Postop pain  HTN  GERD      Interval/Overnight Events/ ROS  Pt remained hemodynamically stable overnight, not on any pressors or inotropes. OOB to chair, breathing comfortably with minimal pain. Ambulated , Denies pain, no SOB, no palpitations, no nausea/ no vomiting, no dizziness  A-line d/sulema , Martin to D/c later  today        PAST MEDICAL & SURGICAL HISTORY:  Atrial flutter: 10/2017  GERD (gastroesophageal reflux disease)  Lung cancer: s/p radiation and chemotherapy  Smoking history  HTN (hypertension)  History of chemotherapy: left chest wall infusaport placement  H/O inguinal hernia repair: 1972    Home Medications:  aspirin 81 mg oral tablet: 1 tab(s) orally once a day. last dose 11/2/2017 (13 Nov 2017 11:34)  hydroCHLOROthiazide 12.5 mg oral capsule: 1 cap(s) orally once a day (13 Nov 2017 11:34)  pantoprazole 40 mg oral delayed release tablet: 1 tab(s) orally once a day (13 Nov 2017 11:34)  Tylenol 325 mg oral tablet: 2 tab(s) orally every 4 hours, As Needed mild pain, do not take tylenol and percocet together as both contain tylenol (13 Nov 2017 11:34)  valsartan-hydrochlorothiazide 320mg-12.5mg oral tablet: 1 tab(s) orally once a day (13 Nov 2017 11:34)      Allergies  No Known Allergies      ***VITAL SIGNS:  Vital Signs Last 24 Hrs  T(C): 36.5 (14 Nov 2017 11:40), Max: 36.7 (14 Nov 2017 08:00)  T(F): 97.7 (14 Nov 2017 11:40), Max: 98 (14 Nov 2017 08:00)  HR: 87 (14 Nov 2017 11:40) (66 - 99)  BP: 124/84 (14 Nov 2017 11:40) (95/62 - 144/63)  BP(mean): 80 (14 Nov 2017 10:00) (70 - 91)  RR: 16 (14 Nov 2017 11:40) (10 - 20)  SpO2: 100% (14 Nov 2017 11:40) (92% - 100%)    I/Os:   I&O's Detail    13 Nov 2017 07:01  -  14 Nov 2017 07:00  --------------------------------------------------------  IN:    lactated ringers.: 330 mL    Oral Fluid: 240 mL    sodium chloride 0.9%.: 60 mL  Total IN: 630 mL    OUT:    Chest Tube: 130 mL    Indwelling Catheter - Urethral: 455 mL  Total OUT: 585 mL    Total NET: 45 mL      14 Nov 2017 07:01  -  14 Nov 2017 11:48  --------------------------------------------------------  IN:    Oral Fluid: 250 mL    sodium chloride 0.9%.: 120 mL  Total IN: 370 mL    OUT:    Indwelling Catheter - Urethral: 60 mL  Total OUT: 60 mL    Total NET: 310 mL          =======================  MEDICATIONS  ===================  MEDICATIONS  (STANDING):  docusate sodium 100 milliGRAM(s) Oral three times a day  heparin  Injectable 5000 Unit(s) SubCutaneous every 12 hours  HYDROmorphone PCA (1 mG/mL) 30 milliLiter(s) PCA Continuous  influenza   Vaccine 0.5 milliLiter(s) IntraMuscular once  pantoprazole    Tablet 40 milliGRAM(s) Oral before breakfast  sodium chloride 0.9%. 1000 milliLiter(s) (30 mL/Hr) IV Continuous   tamsulosin 0.4 milliGRAM(s) Oral at bedtime    MEDICATIONS  (PRN):  HYDROmorphone PCA (1 mG/mL) Rescue Clinician Bolus 0.5 milliGRAM(s) IV Push every 15 minutes PRN for Pain Scale GREATER THAN 6  naloxone Injectable 0.1 milliGRAM(s) IV Push every 3 minutes PRN For ANY of the following changes in patient status:  A. RR LESS THAN 10 breaths per minute, B. Oxygen saturation LESS THAN 90%, C. Sedation score of 6  ondansetron Injectable 4 milliGRAM(s) IV Push every 6 hours PRN Nausea            =================== PATIENT CARE ACCESS DEVICES ==========  Peripheral IV (+) 				  Urinary Catheter, Date Placed: 11/13/17  Necessity of urinary, arterial, and venous catheters discussed    ======================= PHYSICAL EXAM===================  General:            Comfortable, Awake, alert, not in any distress  Neuro:              Moving all extremities to commands. No focal deficits	  HEENT:              GOPAL  Respiratory:	Lungs coarse on auscultation bilaterally with good aeration.                           No rales, rhonchi, no wheezing. Effort even and unlabored.                          Left chest tube site - clean, intermittent  air leak  CV:		Regular rate and rhythm. Normal S1/S2. No murmurs  Abdomen:	Soft,  nontender, not-distended. Bowel sounds present   Skin:		No rash.  Extremities:	Warm, no cyanosis or edema.  Palpable pulses    ============================ LABS =======================                        9.2    14.30 )-----------( 294      ( 14 Nov 2017 03:20 )             28.3     11-14    136  |  100  |  27<H>  ----------------------------<  144<H>  5.1   |  22    |  1.27    Ca    9.2      14 Nov 2017 03:20      PT/INR - ( 14 Nov 2017 03:20 )   PT: 12.3 SEC;   INR: 1.10     PTT:34.9 SEC    ABG - ( 13 Nov 2017 14:03 ) pH: 7.29  /  pCO2: 54    /  pO2: 89    / HCO3: 24    / Base Excess: -0.2  /  SaO2: 95.4          ===================== IMAGING STUDIES ===================  Radiology personally reviewed.    < from: Xray Chest 1 View AP -PORTABLE-Routine (11.14.17 @ 07:42) >  EXAM:  RAD CHEST PORTABLE ROUTINE      EXAM:  RAD CHEST AP PA 1 VIEW    PROCEDURE DATE:  Nov 13 2017     INTERPRETATION:  TIME OF EXAM: November 13, 2017 at 5:30 PM; November 14   at 7:20 AM    CLINICAL INFORMATION: Post left thoracotomy. Portable chest    TECHNIQUE:          INTERPRETATION:     Left-sided chest tube is seen with its tip overlying the apex of this   hemithorax.  Mediport in place.    Loss of volume in the left lung with elevation of the left hemidiaphragm.   The lungs are clear. No effusion or pneumothorax. Subcutaneous emphysema   are in the left side of the chest.    November 14 at 7:20 AM:  No interval change in the appearance of the left chest tube. Improved   aeration of the left lung. Bibasilar streaky atelectasis. No effusions or   pneumothorax.      COMPARISON:   November 6, 2017      IMPRESSION:  Status post left thoracotomy with chest tube.        < end of copied text >    ====================ASSESSMENT AND PLAN ================  66 y. M s/p Uniportal VATS,  Left Lower Lobectomy  POD# 1    Issues:   left chest tube in place  Lung cancer  Postop pain  HTN  GERD              BPH - on Flomax  ====================== NEUROLOGY=======================  Pain control with PRN Tylenol IV / Toradol / Percocet  D/c-ed epidural cath by anesthesia    ==================== RESPIRATORY========================  Pt is on  room air  Comfortable, no evidence of distress.  Using incentive spirometry & doing  1000 ml  Monitor chest tube output  Chest tube to  water seal	  Continue bronchodilators, pulmonary toilet  Head of bed elevation to 30-40 degrees  OOB, ambulation    ====================CARDIOVASCULAR=====================  Continue hemodynamic monitoring/ telemetry  Not on any pressors    ===================== RENAL ============================  Continue  NS 30CC/hr      Monitor I/Os, BUN/ Cr  and electrolytes  D/C Martin in PM  BPH: Continue Flomax/ Finasteride      ==================== GASTROINTESTINAL===================  On regular diet, tolerating well  Continue GI prophylaxis with Pepcid / Protonix  Continue Zofran / Reglan for nausea - PRN	  NPO    =======================    ENDOCRIN  =====================  Glycemic monitoring  F/S with coverage  ===================HEMATOLOGIC/ONCOLOGIC =============  Monitor chest tube output. No signs of active bleeding.   Follow CBC, coags  in AM  DVT prophylaxis with SCD, sc Heparin    ========================INFECTIOUS DISEASE===============  No signs of infection. Monitor for fever / leukocytosis.  All surgical incision / chest tube  sites look clean  D/C Martin      Pertinent clinical, laboratory, radiographic, hemodynamic, echocardiographic, respiratory data, microbiologic data and chart were reviewed and analyzed frequently throughout the course of the day and night. GI and DVT prophylaxis, glycemic control, head of bed elevation and skin care issues were addressed.  Patient seen, examined and plan discussed with CT Surgery / CTICU team during rounds.         I have spent               minutes of critical care time with this pt between            am/pm    and               am/ pm         minutes spent on total encounter; more than 50% of the visit was spent counseling and/or coordinating care by the attending physician.        BRITTON Garcia MD DELFINO GONZALEZ          MRN-[id number]    HPI: 66 year old male with diagnosis of lung cancer in 6/2017, s/p radiation and chemo admitted for Bronchoscopy, Mediastinoscopy,  Left Video Assisted Thoracoscopy, Lung Resection, Possible Thoracotomy with Epidural pain control  Pt reports recent episode of dehydration on 10/23/17 for which he was evaluated at [place] [facility]. Pt was found to be in aflutter and was treated and discharged. Pt states he followed up with Cardiologist and obtained clearance for upcoming surgeries.       Procedure:  Uniportal VATS,  Left Lower Lobectomy  POD# 1    Issues:   left chest tube in place  Lung cancer  Postop pain  HTN  GERD      Interval/Overnight Events/ ROS  Pt remained hemodynamically stable overnight, not on any pressors or inotropes. OOB to chair, breathing comfortably with minimal pain. Ambulated , Denies pain, no SOB, no palpitations, no nausea/ no vomiting, no dizziness  A-line d/sulema , Martin to D/c later  today        PAST MEDICAL & SURGICAL HISTORY:  Atrial flutter: 10/2017  GERD (gastroesophageal reflux disease)  Lung cancer: s/p radiation and chemotherapy  Smoking history  HTN (hypertension)  History of chemotherapy: left chest wall infusaport placement  H/O inguinal hernia repair: 1972    Home Medications:  aspirin 81 mg oral tablet: 1 tab(s) orally once a day. last dose 11/2/2017 (13 Nov 2017 11:34)  hydroCHLOROthiazide 12.5 mg oral capsule: 1 cap(s) orally once a day (13 Nov 2017 11:34)  pantoprazole 40 mg oral delayed release tablet: 1 tab(s) orally once a day (13 Nov 2017 11:34)  Tylenol 325 mg oral tablet: 2 tab(s) orally every 4 hours, As Needed mild pain, do not take tylenol and percocet together as both contain tylenol (13 Nov 2017 11:34)  valsartan-hydrochlorothiazide 320mg-12.5mg oral tablet: 1 tab(s) orally once a day (13 Nov 2017 11:34)      Allergies  No Known Allergies      ***VITAL SIGNS:  Vital Signs Last 24 Hrs  T(C): 36.5 (14 Nov 2017 11:40), Max: 36.7 (14 Nov 2017 08:00)  T(F): 97.7 (14 Nov 2017 11:40), Max: 98 (14 Nov 2017 08:00)  HR: 87 (14 Nov 2017 11:40) (66 - 99)  BP: 124/84 (14 Nov 2017 11:40) (95/62 - 144/63)  BP(mean): 80 (14 Nov 2017 10:00) (70 - 91)  RR: 16 (14 Nov 2017 11:40) (10 - 20)  SpO2: 100% (14 Nov 2017 11:40) (92% - 100%)    I/Os:   I&O's Detail    13 Nov 2017 07:01  -  14 Nov 2017 07:00  --------------------------------------------------------  IN:    lactated ringers.: 330 mL    Oral Fluid: 240 mL    sodium chloride 0.9%.: 60 mL  Total IN: 630 mL    OUT:    Chest Tube: 130 mL    Indwelling Catheter - Urethral: 455 mL  Total OUT: 585 mL    Total NET: 45 mL      14 Nov 2017 07:01  -  14 Nov 2017 11:48  --------------------------------------------------------  IN:    Oral Fluid: 250 mL    sodium chloride 0.9%.: 120 mL  Total IN: 370 mL    OUT:    Indwelling Catheter - Urethral: 60 mL  Total OUT: 60 mL    Total NET: 310 mL          =======================  MEDICATIONS  ===================  MEDICATIONS  (STANDING):  docusate sodium 100 milliGRAM(s) Oral three times a day  heparin  Injectable 5000 Unit(s) SubCutaneous every 12 hours  HYDROmorphone PCA (1 mG/mL) 30 milliLiter(s) PCA Continuous  influenza   Vaccine 0.5 milliLiter(s) IntraMuscular once  pantoprazole    Tablet 40 milliGRAM(s) Oral before breakfast  sodium chloride 0.9%. 1000 milliLiter(s) (30 mL/Hr) IV Continuous   tamsulosin 0.4 milliGRAM(s) Oral at bedtime    MEDICATIONS  (PRN):  HYDROmorphone PCA (1 mG/mL) Rescue Clinician Bolus 0.5 milliGRAM(s) IV Push every 15 minutes PRN for Pain Scale GREATER THAN 6  naloxone Injectable 0.1 milliGRAM(s) IV Push every 3 minutes PRN For ANY of the following changes in patient status:  A. RR LESS THAN 10 breaths per minute, B. Oxygen saturation LESS THAN 90%, C. Sedation score of 6  ondansetron Injectable 4 milliGRAM(s) IV Push every 6 hours PRN Nausea            =================== PATIENT CARE ACCESS DEVICES ==========  Peripheral IV (+) 				  Urinary Catheter, Date Placed: 11/13/17  Necessity of urinary, arterial, and venous catheters discussed    ======================= PHYSICAL EXAM===================  General:            Comfortable, Awake, alert, not in any distress  Neuro:              Moving all extremities to commands. No focal deficits	  HEENT:              GOPAL  Respiratory:	Lungs coarse on auscultation bilaterally with good aeration.                           No rales, rhonchi, no wheezing. Effort even and unlabored.                          Left chest tube site - clean, intermittent  air leak  CV:		Regular rate and rhythm. Normal S1/S2. No murmurs  Abdomen:	Soft,  nontender, not-distended. Bowel sounds present   Skin:		No rash.  Extremities:	Warm, no cyanosis or edema.  Palpable pulses    ============================ LABS =======================                        9.2    14.30 )-----------( 294      ( 14 Nov 2017 03:20 )             28.3     11-14    136  |  100  |  27<H>  ----------------------------<  144<H>  5.1   |  22    |  1.27    Ca    9.2      14 Nov 2017 03:20      PT/INR - ( 14 Nov 2017 03:20 )   PT: 12.3 SEC;   INR: 1.10     PTT:34.9 SEC    ABG - ( 13 Nov 2017 14:03 ) pH: 7.29  /  pCO2: 54    /  pO2: 89    / HCO3: 24    / Base Excess: -0.2  /  SaO2: 95.4          ===================== IMAGING STUDIES ===================  Radiology personally reviewed.    < from: Xray Chest 1 View AP -PORTABLE-Routine (11.14.17 @ 07:42) >  EXAM:  RAD CHEST PORTABLE ROUTINE      EXAM:  RAD CHEST AP PA 1 VIEW    PROCEDURE DATE:  Nov 13 2017     INTERPRETATION:  TIME OF EXAM: November 13, 2017 at 5:30 PM; November 14   at 7:20 AM    CLINICAL INFORMATION: Post left thoracotomy. Portable chest    TECHNIQUE:          INTERPRETATION:     Left-sided chest tube is seen with its tip overlying the apex of this   hemithorax.  Mediport in place.    Loss of volume in the left lung with elevation of the left hemidiaphragm.   The lungs are clear. No effusion or pneumothorax. Subcutaneous emphysema   are in the left side of the chest.    November 14 at 7:20 AM:  No interval change in the appearance of the left chest tube. Improved   aeration of the left lung. Bibasilar streaky atelectasis. No effusions or   pneumothorax.      COMPARISON:   November 6, 2017      IMPRESSION:  Status post left thoracotomy with chest tube.        < end of copied text >    ====================ASSESSMENT AND PLAN ================  66 y. M s/p Uniportal VATS,  Left Lower Lobectomy  POD# 1    Issues:   left chest tube in place  Lung cancer  Postop pain  HTN  GERD              BPH - on Flomax  ====================== NEUROLOGY=======================  Pain control with PRN Tylenol IV / Toradol / Percocet  D/c-ed epidural cath by anesthesia    ==================== RESPIRATORY========================  Pt is on  room air  Comfortable, no evidence of distress.  Using incentive spirometry & doing  1000 ml  Monitor chest tube output  Chest tube to  water seal	  Continue bronchodilators, pulmonary toilet  Head of bed elevation to 30-40 degrees  OOB, ambulation    ====================CARDIOVASCULAR=====================  Continue hemodynamic monitoring/ telemetry  Not on any pressors    ===================== RENAL ============================  Continue  NS 30CC/hr      Monitor I/Os, BUN/ Cr  and electrolytes  D/C Martin in PM  BPH: Continue Flomax      ==================== GASTROINTESTINAL===================  On regular diet, tolerating well  Continue GI prophylaxis with Protonix  Continue Zofran / Reglan for nausea - PRN	      =======================    ENDOCRIN  =====================  Glycemic monitoring  F/S with coverage  ===================HEMATOLOGIC/ONCOLOGIC =============  Monitor chest tube output. No signs of active bleeding.   Follow CBC, coags  in AM  DVT prophylaxis with SCD, sc Heparin    ========================INFECTIOUS DISEASE===============  No signs of infection. Monitor for fever / leukocytosis.  All surgical incision / chest tube  sites look clean  D/C Martin in PM      Pertinent clinical, laboratory, radiographic, hemodynamic, echocardiographic, respiratory data, microbiologic data and chart were reviewed and analyzed frequently throughout the course of the day and night. GI and DVT prophylaxis, glycemic control, head of bed elevation and skin care issues were addressed.  Patient seen, examined and plan discussed with CT Surgery / CTICU team during rounds.         I have spent    35    minutes of critical care time with this pt between  7  am   and  11:30  am         minutes spent on total encounter; more than 50% of the visit was spent counseling and/or coordinating care by the attending physician.        BRITTON Garcia MD

## 2017-11-14 NOTE — PROGRESS NOTE ADULT - SUBJECTIVE AND OBJECTIVE BOX
Anesthesia Pain Management Service    SUBJECTIVE: Pt doing well with PCEA without problems reported.    Therapy:	  [ ] IV PCA	   [ X] Epidural           [ ] s/p Spinal Opoid              [ ] Postpartum infusion	  [ ] Patient controlled regional anesthesia (PCRA)    [ ] prn Analgesics    Allergies    No Known Allergies    Intolerances      MEDICATIONS  (STANDING):  docusate sodium 100 milliGRAM(s) Oral three times a day  heparin  Injectable 5000 Unit(s) SubCutaneous every 12 hours  HYDROmorphone PCA (1 mG/mL) 30 milliLiter(s) PCA Continuous PCA Continuous  influenza   Vaccine 0.5 milliLiter(s) IntraMuscular once  pantoprazole    Tablet 40 milliGRAM(s) Oral before breakfast  sodium chloride 0.9%. 1000 milliLiter(s) (30 mL/Hr) IV Continuous <Continuous>  tamsulosin 0.4 milliGRAM(s) Oral at bedtime    MEDICATIONS  (PRN):  HYDROmorphone PCA (1 mG/mL) Rescue Clinician Bolus 0.5 milliGRAM(s) IV Push every 15 minutes PRN for Pain Scale GREATER THAN 6  naloxone Injectable 0.1 milliGRAM(s) IV Push every 3 minutes PRN For ANY of the following changes in patient status:  A. RR LESS THAN 10 breaths per minute, B. Oxygen saturation LESS THAN 90%, C. Sedation score of 6  ondansetron Injectable 4 milliGRAM(s) IV Push every 6 hours PRN Nausea      OBJECTIVE:   [X] No new signs     [ ] Other:    Side Effects:  [X ] None			[ ] Other:    Assessment of Catheter Site:		[ X] Intact		[ ] Other:    ASSESSMENT/PLAN  [ X] Continue current therapy    [ ] Therapy changed to:    [ ] IV PCA       [ ] Epidural     [ ] prn Analgesics     Comments: Continue current PCEA settings.

## 2017-11-14 NOTE — PROGRESS NOTE ADULT - SUBJECTIVE AND OBJECTIVE BOX
Anesthesia Pain Management Service: Day _2_ of Epidural    SUBJECTIVE: Patient doing well with PCEA and no problems.  Pain Scale Score:   Refer to charted pain scores    THERAPY:  [x ] Epidural Bupivacaine 0.0625% and Hydromorphone  		[X ] 10 micrograms/mL	[ ] 5 micrograms/mL  [ ] Epidural Bupivacaine 0.0625% and Fentanyl - 2 micrograms/mL  [ ] Epidural Ropivacaine 0.1% plain – 1 mg/mL  [ ] Patient Controlled Regional Anesthesia (PCRA) Ropivacaine  		[ ] 0.2%			[ ] 0.1%    Demand dose __3_ lockout __15_ (minutes) Continuous Rate _4__ Total: _115ml__ Daily      MEDICATIONS  (STANDING):  docusate sodium 100 milliGRAM(s) Oral three times a day  heparin  Injectable 5000 Unit(s) SubCutaneous every 12 hours  HYDROmorphone (10 MICROgram(s)/mL) + BUpivacaine 0.0625% in 0.9% Sodium Chloride PCEA 250 milliLiter(s) Epidural PCA Continuous  influenza   Vaccine 0.5 milliLiter(s) IntraMuscular once  pantoprazole    Tablet 40 milliGRAM(s) Oral before breakfast  sodium chloride 0.9%. 1000 milliLiter(s) (30 mL/Hr) IV Continuous <Continuous>  tamsulosin 0.4 milliGRAM(s) Oral at bedtime    MEDICATIONS  (PRN):  diphenhydrAMINE   Injectable 25 milliGRAM(s) IV Push every 4 hours PRN Pruritus  HYDROmorphone  Injectable 0.5 milliGRAM(s) IV Push every 3 hours PRN Severe Pain unrelieved by PCEA  HYDROmorphone (10 MICROgram(s)/mL) + BUpivacaine 0.0625% in 0.9% Sodium Chloride PCEA Rescue Clinician  Bolus 5 milliLiter(s) Epidural every 15 minutes PRN for Pain Score greater than 6  naloxone Injectable 0.1 milliGRAM(s) IV Push every 3 minutes PRN For ANY of the following changes in patient status:  A. RR LESS THAN 10 breaths per minute, B. Oxygen saturation LESS THAN 90%, C. Sedation score of 6  ondansetron Injectable 4 milliGRAM(s) IV Push every 6 hours PRN Nausea      OBJECTIVE: pt. sitting up in chair     Assessment of Catheter Site:	[ ] Left	[ ] Right  [x ] Epidural 	[ ] Femoral	      [ ] Saphenous   [ ] Supraclavicular   [ ] Other:    [x ] Dressing intact	[x ] Site non-tender	[ x] Site without erythema, discharge, edema  [x ] Epidural tubing and connection checked	[x] Gross neurological exam within normal limits  [X ] Catheter removed – tip intact		[ ] Afebrile	  [ ] Febrile: ___       [ X] see Temp under VS below)    PT/INR - ( 14 Nov 2017 03:20 )   PT: 12.3 SEC;   INR: 1.10          PTT - ( 14 Nov 2017 03:20 )  PTT:34.9 SEC                      9.2    14.30 )-----------( 294      ( 14 Nov 2017 03:20 )             28.3     Vital Signs Last 24 Hrs  T(C): 36.7 (11-14-17 @ 08:00), Max: 36.7 (11-14-17 @ 08:00)  T(F): 98 (11-14-17 @ 08:00), Max: 98 (11-14-17 @ 08:00)  HR: 90 (11-14-17 @ 10:00) (66 - 99)  BP: 112/69 (11-14-17 @ 10:00) (95/62 - 144/63)  BP(mean): 80 (11-14-17 @ 10:00) (70 - 91)  RR: 16 (11-14-17 @ 10:00) (10 - 20)  SpO2: 98% (11-14-17 @ 10:00) (92% - 100%)      Sedation Score:	[x ] Alert	[ ] Drowsy	[ ] Arousable	[ ] Asleep	[ ] Unresponsive    Side Effects:	[x ] None	[ ] Nausea	[ ] Vomiting	[ ] Pruritus  		[ ] Weakness		[ ] Numbness	[ ] Other:    ASSESSMENT/ PLAN:    Therapy to  be:	[ ] Continue   [ X] Discontinued   [ X] Change to prn Analgesics    Documentation and Verification of current medications:  [ X ] Done	[ ] Not done, not eligible, reason:    Comments: Changed to IV or oral opioid medication at this point.  Epidural removed as per request of team.

## 2017-11-14 NOTE — DISCHARGE NOTE ADULT - PATIENT PORTAL LINK FT
“You can access the [employer] Patient Portal, offered by [facility], by registering with the following website: [email or web address]”

## 2017-11-15 ENCOUNTER — TRANSCRIPTION ENCOUNTER (OUTPATIENT)
Age: 67
End: 2017-11-15

## 2017-11-15 LAB
APTT BLD: 25.1 SEC — LOW (ref 27.5–37.4)
BUN SERPL-MCNC: 28 MG/DL — HIGH (ref 7–23)
CALCIUM SERPL-MCNC: 9.2 MG/DL — SIGNIFICANT CHANGE UP (ref 8.4–10.5)
CHLORIDE SERPL-SCNC: 96 MMOL/L — LOW (ref 98–107)
CO2 SERPL-SCNC: 26 MMOL/L — SIGNIFICANT CHANGE UP (ref 22–31)
CREAT SERPL-MCNC: 1.43 MG/DL — HIGH (ref 0.5–1.3)
GLUCOSE SERPL-MCNC: 119 MG/DL — HIGH (ref 70–99)
HCT VFR BLD CALC: 28.6 % — LOW (ref 39–50)
HGB BLD-MCNC: 9.4 G/DL — LOW (ref 13–17)
INR BLD: 1 — SIGNIFICANT CHANGE UP (ref 0.88–1.17)
MAGNESIUM SERPL-MCNC: 1.7 MG/DL — SIGNIFICANT CHANGE UP (ref 1.6–2.6)
MCHC RBC-ENTMCNC: 30.3 PG — SIGNIFICANT CHANGE UP (ref 27–34)
MCHC RBC-ENTMCNC: 32.9 % — SIGNIFICANT CHANGE UP (ref 32–36)
MCV RBC AUTO: 92.3 FL — SIGNIFICANT CHANGE UP (ref 80–100)
NRBC # FLD: 0 — SIGNIFICANT CHANGE UP
PLATELET # BLD AUTO: 323 K/UL — SIGNIFICANT CHANGE UP (ref 150–400)
PMV BLD: 9.8 FL — SIGNIFICANT CHANGE UP (ref 7–13)
POTASSIUM SERPL-MCNC: 4.2 MMOL/L — SIGNIFICANT CHANGE UP (ref 3.5–5.3)
POTASSIUM SERPL-SCNC: 4.2 MMOL/L — SIGNIFICANT CHANGE UP (ref 3.5–5.3)
PROTHROM AB SERPL-ACNC: 11.2 SEC — SIGNIFICANT CHANGE UP (ref 9.8–13.1)
RBC # BLD: 3.1 M/UL — LOW (ref 4.2–5.8)
RBC # FLD: 22.9 % — HIGH (ref 10.3–14.5)
SODIUM SERPL-SCNC: 139 MMOL/L — SIGNIFICANT CHANGE UP (ref 135–145)
WBC # BLD: 13.77 K/UL — HIGH (ref 3.8–10.5)
WBC # FLD AUTO: 13.77 K/UL — HIGH (ref 3.8–10.5)

## 2017-11-15 PROCEDURE — 71010: CPT | Mod: 26

## 2017-11-15 PROCEDURE — 93010 ELECTROCARDIOGRAM REPORT: CPT

## 2017-11-15 RX ORDER — METOPROLOL TARTRATE 50 MG
5 TABLET ORAL ONCE
Qty: 0 | Refills: 0 | Status: COMPLETED | OUTPATIENT
Start: 2017-11-15 | End: 2017-11-15

## 2017-11-15 RX ORDER — HYDROCHLOROTHIAZIDE 25 MG
12.5 TABLET ORAL DAILY
Qty: 0 | Refills: 0 | Status: DISCONTINUED | OUTPATIENT
Start: 2017-11-15 | End: 2017-11-16

## 2017-11-15 RX ORDER — METOPROLOL TARTRATE 50 MG
25 TABLET ORAL
Qty: 0 | Refills: 0 | Status: DISCONTINUED | OUTPATIENT
Start: 2017-11-15 | End: 2017-11-16

## 2017-11-15 RX ORDER — MAGNESIUM SULFATE 500 MG/ML
2 VIAL (ML) INJECTION ONCE
Qty: 0 | Refills: 0 | Status: COMPLETED | OUTPATIENT
Start: 2017-11-15 | End: 2017-11-15

## 2017-11-15 RX ADMIN — Medication 100 MILLIGRAM(S): at 05:46

## 2017-11-15 RX ADMIN — Medication 5 MILLIGRAM(S): at 07:46

## 2017-11-15 RX ADMIN — Medication 100 MILLIGRAM(S): at 21:16

## 2017-11-15 RX ADMIN — HYDROMORPHONE HYDROCHLORIDE 30 MILLILITER(S): 2 INJECTION INTRAMUSCULAR; INTRAVENOUS; SUBCUTANEOUS at 07:11

## 2017-11-15 RX ADMIN — Medication 50 GRAM(S): at 06:53

## 2017-11-15 RX ADMIN — SODIUM CHLORIDE 30 MILLILITER(S): 9 INJECTION INTRAMUSCULAR; INTRAVENOUS; SUBCUTANEOUS at 19:40

## 2017-11-15 RX ADMIN — Medication 25 MILLIGRAM(S): at 13:59

## 2017-11-15 RX ADMIN — HYDROMORPHONE HYDROCHLORIDE 30 MILLILITER(S): 2 INJECTION INTRAMUSCULAR; INTRAVENOUS; SUBCUTANEOUS at 19:12

## 2017-11-15 RX ADMIN — Medication 12.5 MILLIGRAM(S): at 09:50

## 2017-11-15 RX ADMIN — TAMSULOSIN HYDROCHLORIDE 0.4 MILLIGRAM(S): 0.4 CAPSULE ORAL at 21:16

## 2017-11-15 RX ADMIN — PANTOPRAZOLE SODIUM 40 MILLIGRAM(S): 20 TABLET, DELAYED RELEASE ORAL at 05:47

## 2017-11-15 RX ADMIN — HEPARIN SODIUM 5000 UNIT(S): 5000 INJECTION INTRAVENOUS; SUBCUTANEOUS at 17:38

## 2017-11-15 RX ADMIN — HEPARIN SODIUM 5000 UNIT(S): 5000 INJECTION INTRAVENOUS; SUBCUTANEOUS at 05:46

## 2017-11-15 NOTE — PROGRESS NOTE ADULT - SUBJECTIVE AND OBJECTIVE BOX
Subjective: no acute complaints    Pt had burst of MARK/SVT this AM and responded to IV Lopressor and is now on Lopressor PO BID and back in SR.     Vital Signs:  Vital Signs Last 24 Hrs  T(C): 36.7 (11-15-17 @ 16:23), Max: 36.8 (11-15-17 @ 12:09)  T(F): 98.1 (11-15-17 @ 16:23), Max: 98.3 (11-15-17 @ 12:09)  HR: 86 (11-15-17 @ 16:23) (79 - 102)  BP: 138/80 (11-15-17 @ 16:23) (116/77 - 140/68)  RR: 18 (11-15-17 @ 16:23) (16 - 18)  SpO2: 96% (11-15-17 @ 16:23) (90% - 96%) on (O2)    Telemetry/Alarms:  General: WN/WD NAD  Neurology: Awake, nonfocal, HUGHES x 4  Eyes: Scleras clear, PERRLA/ EOMI, Gross vision intact  ENT:Gross hearing intact, grossly patent pharynx, no stridor  Neck: Neck supple, trachea midline, No JVD,   Respiratory: CTA B/L, No wheezing, rales, rhonchi  CV: RRR, S1S2, no murmurs, rubs or gallops  Abdominal: Soft, NT, ND +BS, voiding  Extremities: No edema, + peripheral pulses  Skin: No Rashes, Hematoma, Ecchymosis  Lymphatic: No Neck, axilla, groin LAD  Psych: Oriented x 3, normal affect  Incisions: c,d,i  Tubes: left chest tube waterseal drained 418cc/24h no air leak serous fluid    Relevant labs, radiology and Medications reviewed                        9.4    13.77 )-----------( 323      ( 15 Nov 2017 06:24 )             28.6     11-15    139  |  96<L>  |  28<H>  ----------------------------<  119<H>  4.2   |  26  |  1.43<H>    Ca    9.2      15 Nov 2017 06:24  Mg     1.7     11-15      PT/INR - ( 15 Nov 2017 06:24 )   PT: 11.2 SEC;   INR: 1.00          PTT - ( 15 Nov 2017 06:24 )  PTT:25.1 SEC  MEDICATIONS  (STANDING):  docusate sodium 100 milliGRAM(s) Oral three times a day  heparin  Injectable 5000 Unit(s) SubCutaneous every 12 hours  hydrochlorothiazide 12.5 milliGRAM(s) Oral daily  HYDROmorphone PCA (1 mG/mL) 30 milliLiter(s) PCA Continuous PCA Continuous  influenza   Vaccine 0.5 milliLiter(s) IntraMuscular once  metoprolol     tartrate 25 milliGRAM(s) Oral two times a day  pantoprazole    Tablet 40 milliGRAM(s) Oral before breakfast  sodium chloride 0.9%. 1000 milliLiter(s) (30 mL/Hr) IV Continuous <Continuous>  tamsulosin 0.4 milliGRAM(s) Oral at bedtime    MEDICATIONS  (PRN):  HYDROmorphone PCA (1 mG/mL) Rescue Clinician Bolus 0.5 milliGRAM(s) IV Push every 15 minutes PRN for Pain Scale GREATER THAN 6  naloxone Injectable 0.1 milliGRAM(s) IV Push every 3 minutes PRN For ANY of the following changes in patient status:  A. RR LESS THAN 10 breaths per minute, B. Oxygen saturation LESS THAN 90%, C. Sedation score of 6  ondansetron Injectable 4 milliGRAM(s) IV Push every 6 hours PRN Nausea    Pertinent Physical Exam  I&O's Summary    14 Nov 2017 07:01  -  15 Nov 2017 07:00  --------------------------------------------------------  IN: 730 mL / OUT: 1578 mL / NET: -848 mL    15 Nov 2017 07:01  -  15 Nov 2017 17:05  --------------------------------------------------------  IN: 0 mL / OUT: 550 mL / NET: -550 mL        Assessment  66y Male  w/ PAST MEDICAL & SURGICAL HISTORY:  Atrial flutter: 10/2017  GERD (gastroesophageal reflux disease)  Lung cancer: s/p radiation and chemotherapy  Smoking history  HTN (hypertension)  History of chemotherapy: left chest wall infusaport placement  H/O inguinal hernia repair: 1972      On (11/13/17), patient underwent Flexible bronchoscopy by cardiothoracic surgery  Video assisted thoracoscopic surgery  Lymph node dissection  Pneumonolysis  . Postoperative course/issues: chest tube remains for increased drainage    PLAN  Neuro: Pain management  Pulm: Encourage coughing, deep breathing and use of incentive spirometry. Wean off supplemental oxygen as able. Daily CXR.   Cardio: Monitor telemetry/alarms, continue beta blockers  GI: Tolerating diet. Continue stool softeners.  Renal: monitor urine output, supplement electrolytes as needed  Vasc: Heparin SC/SCDs for DVT prophylaxis  Heme: Stable H/H. .   ID: Off antibiotics. Stable.  Therapy: OOB/ambulate  Tubes: Monitor Chest tube output  Disposition: Aim to D/C to home once chest tube removed  Discussed with Cardiothoracic Team at AM rounds.

## 2017-11-15 NOTE — PROVIDER CONTACT NOTE (OTHER) - ACTION/TREATMENT ORDERED:
Team ordered 12 lead EKG, and stat Mg. Sulfate, Lopressor, and Hydrochlorthiazide. 12 Lead EKG done. PM RN hung Mg. Sulfate. Waiting for Pharmacy to verify rest of meds. AM RN notified.

## 2017-11-15 NOTE — PROGRESS NOTE ADULT - SUBJECTIVE AND OBJECTIVE BOX
Day _2__ of Anesthesia Pain Management Service    SUBJECTIVE:    Therapy:	  x[ ] IV PCA	   [ ] Epidural           [ ] s/p Spinal Opoid              [ ] Postpartum infusion	  [ ] Patient controlled regional anesthesia (PCRA)    [ ] prn Analgesics    OBJECTIVE:   [x ] No new signs     [ ] Other:    Side Effects:  [x ] None			[ ] Other:    Assessment of Catheter Site:		[ ] Intact		[ ] Other:    ASSESSMENT/PLAN  [x ] Continue current therapy    [ ] Therapy changed to:    [ ] IV PCA       [ ] Epidural     [ ] prn Analgesics     Comments:

## 2017-11-15 NOTE — PROGRESS NOTE ADULT - SUBJECTIVE AND OBJECTIVE BOX
Anesthesia Pain Management Service    SUBJECTIVE: Patient is doing well with IV PCA and no significant problems reported.    Pain Scale Score	At rest: _2__ 	With Activity: ___ 	[X ] Refer to charted pain scores    THERAPY:    [ ] IV PCA Morphine		[ ] 5 mg/mL	[ ] 1 mg/mL  [X ] IV PCA Hydromorphone	[ ] 5 mg/mL	[X ] 1 mg/mL  [ ] IV PCA Fentanyl		[ ] 50 micrograms/mL    Demand dose __0.2_ lockout __6_ (minutes) Continuous Rate _0__ Total: _3.3__  mg used (in past 24 hours)      MEDICATIONS  (STANDING):  docusate sodium 100 milliGRAM(s) Oral three times a day  heparin  Injectable 5000 Unit(s) SubCutaneous every 12 hours  hydrochlorothiazide 12.5 milliGRAM(s) Oral daily  HYDROmorphone PCA (1 mG/mL) 30 milliLiter(s) PCA Continuous PCA Continuous  influenza   Vaccine 0.5 milliLiter(s) IntraMuscular once  metoprolol     tartrate 25 milliGRAM(s) Oral two times a day  pantoprazole    Tablet 40 milliGRAM(s) Oral before breakfast  sodium chloride 0.9%. 1000 milliLiter(s) (30 mL/Hr) IV Continuous <Continuous>  tamsulosin 0.4 milliGRAM(s) Oral at bedtime    MEDICATIONS  (PRN):  HYDROmorphone PCA (1 mG/mL) Rescue Clinician Bolus 0.5 milliGRAM(s) IV Push every 15 minutes PRN for Pain Scale GREATER THAN 6  naloxone Injectable 0.1 milliGRAM(s) IV Push every 3 minutes PRN For ANY of the following changes in patient status:  A. RR LESS THAN 10 breaths per minute, B. Oxygen saturation LESS THAN 90%, C. Sedation score of 6  ondansetron Injectable 4 milliGRAM(s) IV Push every 6 hours PRN Nausea      OBJECTIVE: pt. sitting up in bed    Sedation Score:	[ X] Alert	[ ] Drowsy 	[ ] Arousable	[ ] Asleep	[ ] Unresponsive    Side Effects:	[X ] None	[ ] Nausea	[ ] Vomiting	[ ] Pruritus  		[ ] Other:    Vital Signs Last 24 Hrs  T(C): 36.6 (15 Nov 2017 07:57), Max: 36.7 (14 Nov 2017 16:54)  T(F): 97.8 (15 Nov 2017 07:57), Max: 98.1 (14 Nov 2017 16:54)  HR: 82 (15 Nov 2017 07:57) (79 - 124)  BP: 139/76 (15 Nov 2017 07:57) (124/73 - 140/68)  BP(mean): --  RR: 16 (15 Nov 2017 07:57) (16 - 18)  SpO2: 96% (15 Nov 2017 07:57) (90% - 100%)    ASSESSMENT/ PLAN    Therapy to  be:	[ X] Continue   [ ] Discontinued   [ ] Change to prn Analgesics    Documentation and Verification of current medications:   [X] Done	[ ] Not done, not elligible    Comments:  pt. instructed to ask nurse for pain meds as needed Anesthesia Pain Management Service    SUBJECTIVE: Patient is doing well with IV PCA and no significant problems reported.    Pain Scale Score	At rest: _2__ 	With Activity: ___ 	[X ] Refer to charted pain scores    THERAPY:    [ ] IV PCA Morphine		[ ] 5 mg/mL	[ ] 1 mg/mL  [X ] IV PCA Hydromorphone	[ ] 5 mg/mL	[X ] 1 mg/mL  [ ] IV PCA Fentanyl		[ ] 50 micrograms/mL    Demand dose __0.2_ lockout __6_ (minutes) Continuous Rate _0__ Total: _3.3__  mg used (in past 24 hours)      MEDICATIONS  (STANDING):  docusate sodium 100 milliGRAM(s) Oral three times a day  heparin  Injectable 5000 Unit(s) SubCutaneous every 12 hours  hydrochlorothiazide 12.5 milliGRAM(s) Oral daily  HYDROmorphone PCA (1 mG/mL) 30 milliLiter(s) PCA Continuous PCA Continuous  influenza   Vaccine 0.5 milliLiter(s) IntraMuscular once  metoprolol     tartrate 25 milliGRAM(s) Oral two times a day  pantoprazole    Tablet 40 milliGRAM(s) Oral before breakfast  sodium chloride 0.9%. 1000 milliLiter(s) (30 mL/Hr) IV Continuous <Continuous>  tamsulosin 0.4 milliGRAM(s) Oral at bedtime    MEDICATIONS  (PRN):  HYDROmorphone PCA (1 mG/mL) Rescue Clinician Bolus 0.5 milliGRAM(s) IV Push every 15 minutes PRN for Pain Scale GREATER THAN 6  naloxone Injectable 0.1 milliGRAM(s) IV Push every 3 minutes PRN For ANY of the following changes in patient status:  A. RR LESS THAN 10 breaths per minute, B. Oxygen saturation LESS THAN 90%, C. Sedation score of 6  ondansetron Injectable 4 milliGRAM(s) IV Push every 6 hours PRN Nausea      OBJECTIVE: pt. sitting up in bed    Sedation Score:	[ X] Alert	[ ] Drowsy 	[ ] Arousable	[ ] Asleep	[ ] Unresponsive    Side Effects:	[X ] None	[ ] Nausea	[ ] Vomiting	[ ] Pruritus  		[ ] Other:    Vital Signs Last 24 Hrs  T(C): 36.6 (15 Nov 2017 07:57), Max: 36.7 (14 Nov 2017 16:54)  T(F): 97.8 (15 Nov 2017 07:57), Max: 98.1 (14 Nov 2017 16:54)  HR: 82 (15 Nov 2017 07:57) (79 - 124)  BP: 139/76 (15 Nov 2017 07:57) (124/73 - 140/68)  BP(mean): --  RR: 16 (15 Nov 2017 07:57) (16 - 18)  SpO2: 96% (15 Nov 2017 07:57) (90% - 100%)    ASSESSMENT/ PLAN    Therapy to  be:	[ X] Continue   [ ] Discontinued   [ ] Change to prn Analgesics    Documentation and Verification of current medications:   [X] Done	[ ] Not done, not elligible    Comments:  CT still in place, continue current therapy

## 2017-11-16 LAB
APTT BLD: 27.4 SEC — LOW (ref 27.5–37.4)
BUN SERPL-MCNC: 20 MG/DL — SIGNIFICANT CHANGE UP (ref 7–23)
CALCIUM SERPL-MCNC: 8.9 MG/DL — SIGNIFICANT CHANGE UP (ref 8.4–10.5)
CHLORIDE SERPL-SCNC: 94 MMOL/L — LOW (ref 98–107)
CO2 SERPL-SCNC: 25 MMOL/L — SIGNIFICANT CHANGE UP (ref 22–31)
CREAT SERPL-MCNC: 1.23 MG/DL — SIGNIFICANT CHANGE UP (ref 0.5–1.3)
GLUCOSE SERPL-MCNC: 102 MG/DL — HIGH (ref 70–99)
HCT VFR BLD CALC: 24.3 % — LOW (ref 39–50)
HGB BLD-MCNC: 8 G/DL — LOW (ref 13–17)
INR BLD: 1.03 — SIGNIFICANT CHANGE UP (ref 0.88–1.17)
MCHC RBC-ENTMCNC: 29.6 PG — SIGNIFICANT CHANGE UP (ref 27–34)
MCHC RBC-ENTMCNC: 32.9 % — SIGNIFICANT CHANGE UP (ref 32–36)
MCV RBC AUTO: 90 FL — SIGNIFICANT CHANGE UP (ref 80–100)
NRBC # FLD: 0 — SIGNIFICANT CHANGE UP
PLATELET # BLD AUTO: 288 K/UL — SIGNIFICANT CHANGE UP (ref 150–400)
PMV BLD: 10.2 FL — SIGNIFICANT CHANGE UP (ref 7–13)
POTASSIUM SERPL-MCNC: 3.8 MMOL/L — SIGNIFICANT CHANGE UP (ref 3.5–5.3)
POTASSIUM SERPL-SCNC: 3.8 MMOL/L — SIGNIFICANT CHANGE UP (ref 3.5–5.3)
PROTHROM AB SERPL-ACNC: 11.5 SEC — SIGNIFICANT CHANGE UP (ref 9.8–13.1)
RBC # BLD: 2.7 M/UL — LOW (ref 4.2–5.8)
RBC # FLD: 22.6 % — HIGH (ref 10.3–14.5)
SODIUM SERPL-SCNC: 137 MMOL/L — SIGNIFICANT CHANGE UP (ref 135–145)
WBC # BLD: 9.71 K/UL — SIGNIFICANT CHANGE UP (ref 3.8–10.5)
WBC # FLD AUTO: 9.71 K/UL — SIGNIFICANT CHANGE UP (ref 3.8–10.5)

## 2017-11-16 PROCEDURE — 71010: CPT | Mod: 26

## 2017-11-16 RX ORDER — POTASSIUM CHLORIDE 20 MEQ
40 PACKET (EA) ORAL ONCE
Qty: 0 | Refills: 0 | Status: COMPLETED | OUTPATIENT
Start: 2017-11-16 | End: 2017-11-16

## 2017-11-16 RX ORDER — METOPROLOL TARTRATE 50 MG
25 TABLET ORAL ONCE
Qty: 0 | Refills: 0 | Status: COMPLETED | OUTPATIENT
Start: 2017-11-16 | End: 2017-11-16

## 2017-11-16 RX ORDER — OXYCODONE HYDROCHLORIDE 5 MG/1
10 TABLET ORAL
Qty: 0 | Refills: 0 | Status: DISCONTINUED | OUTPATIENT
Start: 2017-11-16 | End: 2017-11-17

## 2017-11-16 RX ORDER — METOPROLOL TARTRATE 50 MG
50 TABLET ORAL
Qty: 0 | Refills: 0 | Status: DISCONTINUED | OUTPATIENT
Start: 2017-11-16 | End: 2017-11-17

## 2017-11-16 RX ORDER — OXYCODONE HYDROCHLORIDE 5 MG/1
5 TABLET ORAL
Qty: 0 | Refills: 0 | Status: DISCONTINUED | OUTPATIENT
Start: 2017-11-16 | End: 2017-11-17

## 2017-11-16 RX ADMIN — Medication 50 MILLIGRAM(S): at 18:46

## 2017-11-16 RX ADMIN — HYDROMORPHONE HYDROCHLORIDE 30 MILLILITER(S): 2 INJECTION INTRAMUSCULAR; INTRAVENOUS; SUBCUTANEOUS at 07:13

## 2017-11-16 RX ADMIN — Medication 100 MILLIGRAM(S): at 13:07

## 2017-11-16 RX ADMIN — PANTOPRAZOLE SODIUM 40 MILLIGRAM(S): 20 TABLET, DELAYED RELEASE ORAL at 05:46

## 2017-11-16 RX ADMIN — Medication 100 MILLIGRAM(S): at 05:46

## 2017-11-16 RX ADMIN — Medication 12.5 MILLIGRAM(S): at 05:46

## 2017-11-16 RX ADMIN — Medication 25 MILLIGRAM(S): at 00:54

## 2017-11-16 RX ADMIN — Medication 25 MILLIGRAM(S): at 09:04

## 2017-11-16 RX ADMIN — TAMSULOSIN HYDROCHLORIDE 0.4 MILLIGRAM(S): 0.4 CAPSULE ORAL at 21:14

## 2017-11-16 RX ADMIN — Medication 40 MILLIEQUIVALENT(S): at 18:53

## 2017-11-16 RX ADMIN — Medication 25 MILLIGRAM(S): at 10:09

## 2017-11-16 RX ADMIN — HEPARIN SODIUM 5000 UNIT(S): 5000 INJECTION INTRAVENOUS; SUBCUTANEOUS at 18:46

## 2017-11-16 RX ADMIN — HEPARIN SODIUM 5000 UNIT(S): 5000 INJECTION INTRAVENOUS; SUBCUTANEOUS at 05:46

## 2017-11-16 NOTE — PROGRESS NOTE ADULT - SUBJECTIVE AND OBJECTIVE BOX
Anesthesia Pain Management Service    SUBJECTIVE: Pt now off IV PCA without problems reported.    Therapy:	  [ X] IV PCA	   [ ] Epidural           [ ] s/p Spinal Opoid              [ ] Postpartum infusion	  [ ] Patient controlled regional anesthesia (PCRA)    [ ] prn Analgesics    Allergies    No Known Allergies    Intolerances      MEDICATIONS  (STANDING):  docusate sodium 100 milliGRAM(s) Oral three times a day  heparin  Injectable 5000 Unit(s) SubCutaneous every 12 hours  influenza   Vaccine 0.5 milliLiter(s) IntraMuscular once  metoprolol     tartrate 50 milliGRAM(s) Oral two times a day  pantoprazole    Tablet 40 milliGRAM(s) Oral before breakfast  potassium chloride    Tablet ER 40 milliEquivalent(s) Oral once  tamsulosin 0.4 milliGRAM(s) Oral at bedtime    MEDICATIONS  (PRN):  oxyCODONE    IR 5 milliGRAM(s) Oral every 3 hours PRN Moderate Pain (4 - 6)  oxyCODONE    IR 10 milliGRAM(s) Oral every 3 hours PRN Severe Pain (7 - 10)      OBJECTIVE:   [X] No new signs     [ ] Other:    Side Effects:  [X ] None			[ ] Other:    Assessment of Catheter Site:		[ ] Intact		[ ] Other:    ASSESSMENT/PLAN  [ ] Continue current therapy    [X ] Therapy changed to:    [ ] IV PCA       [ ] Epidural     [ X] prn Analgesics     Comments: Opioids or Adjuvent analgesics to be used at this point.    Progress Note written now but Patient was seen earlier.

## 2017-11-16 NOTE — PROGRESS NOTE ADULT - SUBJECTIVE AND OBJECTIVE BOX
Subjective: "I'm really hoping I can get out of here soon. These wires are driving me crazy." Pt denies SOB, CP. Amb w min asssist. Using IS. Minimal use of PCA.     Vital Signs:  Vital Signs Last 24 Hrs  T(C): 36.7 (11-16-17 @ 12:59), Max: 36.9 (11-15-17 @ 20:27)  T(F): 98 (11-16-17 @ 12:59), Max: 98.5 (11-15-17 @ 20:27)  HR: 73 (11-16-17 @ 12:59) (73 - 110)  BP: 145/94 (11-16-17 @ 12:59) (123/80 - 145/94)  RR: 17 (11-16-17 @ 12:59) (17 - 18)  SpO2: 93% (11-16-17 @ 12:59) (92% - 98%) on (O2)    Telemetry/Alarms: This am MARK on Tele 110-128 for abt an hour then converted to SR.   General: WN/WD NAD  Neurology: Awake, nonfocal, HUGHES x 4  Eyes: Scleras clear, PERRLA/ EOMI, Gross vision intact  ENT:Gross hearing intact, grossly patent pharynx, no stridor  Neck: Neck supple, trachea midline, No JVD,   Respiratory: CTA B/L, slight decrease Left LL  No wheezing, rales, rhonchi  CV: RRR, S1S2, no murmurs, rubs or gallops. Rhythm irreg this am.   Abdominal: Soft, NT, ND +BS, + void, no BM  Extremities: No edema, + peripheral pulses  Skin: No Rashes, Hematoma, Ecchymosis  Lymphatic: No Neck, axilla, groin LAD  Psych: Oriented x 3, normal affect  Incisions: VATS c/d/i.   Tubes: CT 190cc 24hr total this am but then dumped additional 100cc by 9am. On WS, no AL.   Relevant labs, radiology and Medications reviewed           CXR minimal effusion, NO ptx             8.0    9.71  )-----------( 288      ( 16 Nov 2017 06:15 )             24.3     11-16    137  |  94<L>  |  20  ----------------------------<  102<H>  3.8   |  25  |  1.23    Ca    8.9      16 Nov 2017 06:15  Mg     1.7     11-15      PT/INR - ( 16 Nov 2017 06:15 )   PT: 11.5 SEC;   INR: 1.03          PTT - ( 16 Nov 2017 06:15 )  PTT:27.4 SEC  MEDICATIONS  (STANDING):  docusate sodium 100 milliGRAM(s) Oral three times a day  heparin  Injectable 5000 Unit(s) SubCutaneous every 12 hours  influenza   Vaccine 0.5 milliLiter(s) IntraMuscular once  metoprolol     tartrate 50 milliGRAM(s) Oral two times a day  pantoprazole    Tablet 40 milliGRAM(s) Oral before breakfast  tamsulosin 0.4 milliGRAM(s) Oral at bedtime    MEDICATIONS  (PRN):  oxyCODONE    IR 5 milliGRAM(s) Oral every 3 hours PRN Moderate Pain (4 - 6)  oxyCODONE    IR 10 milliGRAM(s) Oral every 3 hours PRN Severe Pain (7 - 10)    Pertinent Physical Exam  I&O's Summary    15 Nov 2017 07:01  -  16 Nov 2017 07:00  --------------------------------------------------------  IN: 330 mL / OUT: 1740 mL / NET: -1410 mL    16 Nov 2017 07:01  -  16 Nov 2017 14:31  --------------------------------------------------------  IN: 300 mL / OUT: 1070 mL / NET: -770 mL        Assessment  66y Male  w/ PAST MEDICAL & SURGICAL HISTORY:  Atrial flutter: 10/2017  GERD (gastroesophageal reflux disease)  Lung cancer: s/p radiation and chemotherapy  Smoking history  HTN (hypertension)  History of chemotherapy: left chest wall infusaport placement  H/O inguinal hernia repair: 1972  admitted with complaints of Patient is a 66y old  Male who presents with a chief complaint of .  On 11/13/17 pt. had a Left VATS LL lobectomy. Post op CT with small air leak and high output due to continued pleural effusion. To 8T on 11/14. Epidural and Martin removed. CT to waterseal. Now AL resolved but output still high. 11/16- Had MARK for 1 hr.  then converted.     PLAN  Neuro: Pain management. Will d/c PCA, start oral.   Pulm: Encourage coughing, deep breathing and use of incentive spirometry. Wean off supplemental oxygen as able. Daily CXR.   Cardio: Monitor telemetry/alarms. Increased dose of Lopressor given. Will discuss Afib w pt's outside Card Dr. Rodríguez. Had Aflutter in October.   GI: Tolerating diet. Continue stool softeners.  Renal: monitor urine output, supplement electrolytes as needed. Will supplement hypokalemia to keep K> 4.0 to avoid afib.   Vasc: Heparin SC/SCDs for DVT prophylaxis  Heme: Pt. w acute bld loss anemia post op. Will cont to monitor. HD stable.   ID: Off antibiotics. Stable.  Therapy: OOB/ambulate  Tubes: Monitor Chest tube output daily.   Disposition: Aim to D/C to home on CT to [facility] tomorrow if output still high. Pt. aware.   Discussed with Cardiothoracic Team at AM rounds.

## 2017-11-16 NOTE — PROGRESS NOTE ADULT - SUBJECTIVE AND OBJECTIVE BOX
Anesthesia Pain Management Service    SUBJECTIVE: " I feel good, no pain"  Pain Scale Score	At rest: _0__ 	With Activity: ___ 	[X ] Refer to charted pain scores    THERAPY:    [ ] IV PCA Morphine		[ ] 5 mg/mL	[ ] 1 mg/mL  [X ] IV PCA Hydromorphone	[ ] 5 mg/mL	[X ] 1 mg/mL  [ ] IV PCA Fentanyl		[ ] 50 micrograms/mL    Demand dose __0.2_ lockout __6_ (minutes) Continuous Rate _0__ Total: _7.4__   mg used (in past 24 hrs)      MEDICATIONS  (STANDING):  docusate sodium 100 milliGRAM(s) Oral three times a day  heparin  Injectable 5000 Unit(s) SubCutaneous every 12 hours  HYDROmorphone PCA (1 mG/mL) 30 milliLiter(s) PCA Continuous PCA Continuous  influenza   Vaccine 0.5 milliLiter(s) IntraMuscular once  metoprolol     tartrate 50 milliGRAM(s) Oral two times a day  pantoprazole    Tablet 40 milliGRAM(s) Oral before breakfast  sodium chloride 0.9%. 1000 milliLiter(s) (30 mL/Hr) IV Continuous <Continuous>  tamsulosin 0.4 milliGRAM(s) Oral at bedtime    MEDICATIONS  (PRN):  HYDROmorphone PCA (1 mG/mL) Rescue Clinician Bolus 0.5 milliGRAM(s) IV Push every 15 minutes PRN for Pain Scale GREATER THAN 6  naloxone Injectable 0.1 milliGRAM(s) IV Push every 3 minutes PRN For ANY of the following changes in patient status:  A. RR LESS THAN 10 breaths per minute, B. Oxygen saturation LESS THAN 90%, C. Sedation score of 6  ondansetron Injectable 4 milliGRAM(s) IV Push every 6 hours PRN Nausea      OBJECTIVE: pt. laying in bed    Sedation Score:	[ X] Alert	[ ] Drowsy 	[ ] Arousable	[ ] Asleep	[ ] Unresponsive    Side Effects:	[X ] None	[ ] Nausea	[ ] Vomiting	[ ] Pruritus  		[ ] Other:    Vital Signs Last 24 Hrs  T(C): 36.6 (16 Nov 2017 05:43), Max: 36.9 (15 Nov 2017 20:27)  T(F): 97.8 (16 Nov 2017 05:43), Max: 98.5 (15 Nov 2017 20:27)  HR: 110 (16 Nov 2017 09:04) (80 - 110)  BP: 128/74 (16 Nov 2017 08:51) (116/77 - 139/81)  BP(mean): --  RR: 18 (16 Nov 2017 08:51) (18 - 18)  SpO2: 94% (16 Nov 2017 08:51) (92% - 98%)    ASSESSMENT/ PLAN    Therapy to  be:	[ ] Continue   [ X] Discontinued   [X ] Change to prn Analgesics    Documentation and Verification of current medications:   [X] Done	[ ] Not done, not elligible    Comments: PRN Oral/IV opioids and/or Adjuvant medication to be ordered at this point.  orders dicusssed with team.

## 2017-11-17 VITALS
SYSTOLIC BLOOD PRESSURE: 112 MMHG | TEMPERATURE: 98 F | DIASTOLIC BLOOD PRESSURE: 71 MMHG | RESPIRATION RATE: 17 BRPM | HEART RATE: 73 BPM | OXYGEN SATURATION: 96 %

## 2017-11-17 LAB
APTT BLD: 27.4 SEC — LOW (ref 27.5–37.4)
BLD GP AB SCN SERPL QL: NEGATIVE — SIGNIFICANT CHANGE UP
BUN SERPL-MCNC: 18 MG/DL — SIGNIFICANT CHANGE UP (ref 7–23)
CALCIUM SERPL-MCNC: 8.8 MG/DL — SIGNIFICANT CHANGE UP (ref 8.4–10.5)
CHLORIDE SERPL-SCNC: 98 MMOL/L — SIGNIFICANT CHANGE UP (ref 98–107)
CO2 SERPL-SCNC: 23 MMOL/L — SIGNIFICANT CHANGE UP (ref 22–31)
CREAT SERPL-MCNC: 1.27 MG/DL — SIGNIFICANT CHANGE UP (ref 0.5–1.3)
GLUCOSE SERPL-MCNC: 100 MG/DL — HIGH (ref 70–99)
HCT VFR BLD CALC: 23.9 % — LOW (ref 39–50)
HGB BLD-MCNC: 8 G/DL — LOW (ref 13–17)
INR BLD: 1.03 — SIGNIFICANT CHANGE UP (ref 0.88–1.17)
MCHC RBC-ENTMCNC: 30 PG — SIGNIFICANT CHANGE UP (ref 27–34)
MCHC RBC-ENTMCNC: 33.5 % — SIGNIFICANT CHANGE UP (ref 32–36)
MCV RBC AUTO: 89.5 FL — SIGNIFICANT CHANGE UP (ref 80–100)
NRBC # FLD: 0 — SIGNIFICANT CHANGE UP
PLATELET # BLD AUTO: 306 K/UL — SIGNIFICANT CHANGE UP (ref 150–400)
PMV BLD: 10 FL — SIGNIFICANT CHANGE UP (ref 7–13)
POTASSIUM SERPL-MCNC: 3.9 MMOL/L — SIGNIFICANT CHANGE UP (ref 3.5–5.3)
POTASSIUM SERPL-SCNC: 3.9 MMOL/L — SIGNIFICANT CHANGE UP (ref 3.5–5.3)
PROTHROM AB SERPL-ACNC: 11.5 SEC — SIGNIFICANT CHANGE UP (ref 9.8–13.1)
RBC # BLD: 2.67 M/UL — LOW (ref 4.2–5.8)
RBC # FLD: 21.7 % — HIGH (ref 10.3–14.5)
RH IG SCN BLD-IMP: NEGATIVE — SIGNIFICANT CHANGE UP
SODIUM SERPL-SCNC: 134 MMOL/L — LOW (ref 135–145)
WBC # BLD: 9.33 K/UL — SIGNIFICANT CHANGE UP (ref 3.8–10.5)
WBC # FLD AUTO: 9.33 K/UL — SIGNIFICANT CHANGE UP (ref 3.8–10.5)

## 2017-11-17 PROCEDURE — 99238 HOSP IP/OBS DSCHRG MGMT 30/<: CPT

## 2017-11-17 PROCEDURE — 71010: CPT | Mod: 26

## 2017-11-17 RX ORDER — METOPROLOL TARTRATE 50 MG
1 TABLET ORAL
Qty: 60 | Refills: 0
Start: 2017-11-17 | End: 2017-12-17

## 2017-11-17 RX ORDER — ACETAMINOPHEN 500 MG
650 TABLET ORAL EVERY 6 HOURS
Qty: 0 | Refills: 0 | Status: DISCONTINUED | OUTPATIENT
Start: 2017-11-17 | End: 2017-11-17

## 2017-11-17 RX ORDER — ASPIRIN/CALCIUM CARB/MAGNESIUM 324 MG
1 TABLET ORAL
Qty: 0 | Refills: 0 | COMMUNITY

## 2017-11-17 RX ORDER — DOCUSATE SODIUM 100 MG
1 CAPSULE ORAL
Qty: 0 | Refills: 0 | DISCHARGE
Start: 2017-11-17

## 2017-11-17 RX ADMIN — Medication 100 MILLIGRAM(S): at 13:14

## 2017-11-17 RX ADMIN — Medication 650 MILLIGRAM(S): at 09:47

## 2017-11-17 RX ADMIN — Medication 50 MILLIGRAM(S): at 05:17

## 2017-11-17 RX ADMIN — PANTOPRAZOLE SODIUM 40 MILLIGRAM(S): 20 TABLET, DELAYED RELEASE ORAL at 05:18

## 2017-11-17 NOTE — CONSULT NOTE ADULT - SUBJECTIVE AND OBJECTIVE BOX
CHIEF COMPLAINT:Patient is a 66y old  Male who presents with a chief complaint of     HISTORY OF PRESENT ILLNESS:  This is a pleasant gentleman with history as below s/p VATS and Left Lower Lobectomy had episode of afib treated with av monique blockers  now in sinus  denies any significant sob, chest pain  no palpitation no dizziness      PAST MEDICAL & SURGICAL HISTORY:  Atrial flutter: 10/2017  GERD (gastroesophageal reflux disease)  Lung cancer: s/p radiation and chemotherapy  Smoking history  HTN (hypertension)  History of chemotherapy: left chest wall infusaport placement  H/O inguinal hernia repair: 1972          MEDICATIONS:  heparin  Injectable 5000 Unit(s) SubCutaneous every 12 hours  metoprolol     tartrate 50 milliGRAM(s) Oral two times a day  tamsulosin 0.4 milliGRAM(s) Oral at bedtime        oxyCODONE    IR 5 milliGRAM(s) Oral every 3 hours PRN  oxyCODONE    IR 10 milliGRAM(s) Oral every 3 hours PRN    docusate sodium 100 milliGRAM(s) Oral three times a day  pantoprazole    Tablet 40 milliGRAM(s) Oral before breakfast      influenza   Vaccine 0.5 milliLiter(s) IntraMuscular once      FAMILY HISTORY:  No pertinent family history in first degree relatives      Non-contributory    SOCIAL HISTORY:    No tobacco, drugs or etoh    Allergies    No Known Allergies    Intolerances    	    REVIEW OF SYSTEMS:  as above  The rest of the 14 points ROS reviewed and except above they are unremarkable.        PHYSICAL EXAM:  T(C): 36.8 (11-17-17 @ 05:15), Max: 36.9 (11-17-17 @ 01:07)  HR: 86 (11-17-17 @ 05:15) (73 - 110)  BP: 132/74 (11-17-17 @ 05:15) (128/74 - 150/89)  RR: 18 (11-17-17 @ 05:15) (17 - 18)  SpO2: 95% (11-17-17 @ 05:15) (92% - 95%)  Wt(kg): --  I&O's Summary    16 Nov 2017 07:01  -  17 Nov 2017 07:00  --------------------------------------------------------  IN: 300 mL / OUT: 2075 mL / NET: -1775 mL        Appearance: Normal	  HEENT:   Normal oral mucosa, PERRL, EOMI	  Cardiovascular: Normal S1 S2,    Murmur:   Neck: JVP normal  Respiratory: Lungs clear to auscultation  Gastrointestinal:  Soft, Non-tender, + BS	  Skin: normal   Neuro: No gross deficits.   Psychiatry:  Mood & affect appropriate  Ext: No edema    LABS/DATA:    TELEMETRY: 	    ECG:  	   	  CARDIAC MARKERS:                                  8.0    9.33  )-----------( 306      ( 17 Nov 2017 06:00 )             23.9     11-17    134<L>  |  98  |  18  ----------------------------<  100<H>  3.9   |  23  |  1.27    Ca    8.8      17 Nov 2017 06:00      proBNP:   Lipid Profile:   HgA1c:   TSH:     < from: US Transthoracic Echocardiogram w/Doppler Complete (10.23.17 @ 16:52) >  SUMMARY:  1.normal right and left ventricular systolic function  2. mild left atrial enlargement  3. mild mitral regurgitation.  4. Mild tricuspid regurgitation with normal right ventricular systolic   pressure  5. Mild aortic sclerosis with trace aortic regurgitation    < end of copied text >

## 2017-11-19 LAB — SURGICAL PATHOLOGY STUDY: SIGNIFICANT CHANGE UP

## 2017-11-21 ENCOUNTER — APPOINTMENT (OUTPATIENT)
Dept: RADIOLOGY | Facility: HOSPITAL | Age: 67
End: 2017-11-21

## 2017-11-21 ENCOUNTER — APPOINTMENT (OUTPATIENT)
Dept: THORACIC SURGERY | Facility: CLINIC | Age: 67
End: 2017-11-21
Payer: COMMERCIAL

## 2017-11-21 ENCOUNTER — OUTPATIENT (OUTPATIENT)
Dept: OUTPATIENT SERVICES | Facility: HOSPITAL | Age: 67
LOS: 1 days | End: 2017-11-21
Payer: COMMERCIAL

## 2017-11-21 VITALS
HEIGHT: 72 IN | WEIGHT: 175 LBS | DIASTOLIC BLOOD PRESSURE: 78 MMHG | SYSTOLIC BLOOD PRESSURE: 130 MMHG | OXYGEN SATURATION: 96 % | HEART RATE: 90 BPM | RESPIRATION RATE: 16 BRPM | BODY MASS INDEX: 23.7 KG/M2

## 2017-11-21 DIAGNOSIS — Z90.2 ACQUIRED ABSENCE OF LUNG [PART OF]: Chronic | ICD-10-CM

## 2017-11-21 DIAGNOSIS — Z92.21 PERSONAL HISTORY OF ANTINEOPLASTIC CHEMOTHERAPY: Chronic | ICD-10-CM

## 2017-11-21 DIAGNOSIS — Z98.890 OTHER SPECIFIED POSTPROCEDURAL STATES: Chronic | ICD-10-CM

## 2017-11-21 DIAGNOSIS — Z46.82 ENCOUNTER FOR FITTING AND ADJUSTMENT OF NON-VASCULAR CATHETER: ICD-10-CM

## 2017-11-21 PROCEDURE — 99024 POSTOP FOLLOW-UP VISIT: CPT

## 2017-11-21 PROCEDURE — 71020: CPT | Mod: 26

## 2017-11-28 ENCOUNTER — APPOINTMENT (OUTPATIENT)
Dept: THORACIC SURGERY | Facility: CLINIC | Age: 67
End: 2017-11-28
Payer: COMMERCIAL

## 2017-11-28 VITALS
WEIGHT: 180 LBS | BODY MASS INDEX: 25.2 KG/M2 | DIASTOLIC BLOOD PRESSURE: 93 MMHG | TEMPERATURE: 97.8 F | HEIGHT: 71 IN | OXYGEN SATURATION: 99 % | RESPIRATION RATE: 15 BRPM | SYSTOLIC BLOOD PRESSURE: 140 MMHG | HEART RATE: 86 BPM

## 2017-11-28 DIAGNOSIS — Z08 ENCOUNTER FOR FOLLOW-UP EXAMINATION AFTER COMPLETED TREATMENT FOR MALIGNANT NEOPLASM: ICD-10-CM

## 2017-11-28 DIAGNOSIS — Z85.118 ENCOUNTER FOR FOLLOW-UP EXAMINATION AFTER COMPLETED TREATMENT FOR MALIGNANT NEOPLASM: ICD-10-CM

## 2017-11-28 PROCEDURE — 99024 POSTOP FOLLOW-UP VISIT: CPT

## 2017-12-19 ENCOUNTER — OUTPATIENT (OUTPATIENT)
Dept: OUTPATIENT SERVICES | Facility: HOSPITAL | Age: 67
LOS: 1 days | Discharge: HOME | End: 2017-12-19
Payer: COMMERCIAL

## 2017-12-19 DIAGNOSIS — Z90.2 ACQUIRED ABSENCE OF LUNG [PART OF]: Chronic | ICD-10-CM

## 2017-12-19 DIAGNOSIS — Z98.890 OTHER SPECIFIED POSTPROCEDURAL STATES: Chronic | ICD-10-CM

## 2017-12-19 DIAGNOSIS — C34.32 MALIGNANT NEOPLASM OF LOWER LOBE, LEFT BRONCHUS OR LUNG: ICD-10-CM

## 2017-12-19 DIAGNOSIS — Z92.21 PERSONAL HISTORY OF ANTINEOPLASTIC CHEMOTHERAPY: Chronic | ICD-10-CM

## 2017-12-19 LAB
ALBUMIN SERPL ELPH-MCNC: 2.8 G/DL — LOW (ref 3.3–5)
ALP SERPL-CCNC: 87 U/L — SIGNIFICANT CHANGE UP (ref 40–120)
ALT FLD-CCNC: 14 U/L — SIGNIFICANT CHANGE UP (ref 12–78)
ANION GAP SERPL CALC-SCNC: 9 MMOL/L — SIGNIFICANT CHANGE UP (ref 5–17)
AST SERPL-CCNC: 15 U/L — SIGNIFICANT CHANGE UP (ref 15–37)
BILIRUB SERPL-MCNC: 0.2 MG/DL — SIGNIFICANT CHANGE UP (ref 0.2–1.2)
BUN SERPL-MCNC: 27 MG/DL — HIGH (ref 7–23)
CALCIUM SERPL-MCNC: 9 MG/DL — SIGNIFICANT CHANGE UP (ref 8.5–10.1)
CHLORIDE SERPL-SCNC: 105 MMOL/L — SIGNIFICANT CHANGE UP (ref 96–108)
CO2 SERPL-SCNC: 24 MMOL/L — SIGNIFICANT CHANGE UP (ref 22–31)
CREAT SERPL-MCNC: 1.6 MG/DL — HIGH (ref 0.5–1.3)
GLUCOSE SERPL-MCNC: 148 MG/DL — HIGH (ref 70–99)
HCT VFR BLD CALC: 31.4 % — LOW (ref 39–50)
HGB BLD-MCNC: 9.8 G/DL — LOW (ref 13–17)
MCHC RBC-ENTMCNC: 29.5 PG — SIGNIFICANT CHANGE UP (ref 27–34)
MCHC RBC-ENTMCNC: 31.2 GM/DL — LOW (ref 32–36)
MCV RBC AUTO: 94.6 FL — SIGNIFICANT CHANGE UP (ref 80–100)
PLATELET # BLD AUTO: 321 K/UL — SIGNIFICANT CHANGE UP (ref 150–400)
POTASSIUM SERPL-MCNC: 4.1 MMOL/L — SIGNIFICANT CHANGE UP (ref 3.5–5.3)
POTASSIUM SERPL-SCNC: 4.1 MMOL/L — SIGNIFICANT CHANGE UP (ref 3.5–5.3)
PROT SERPL-MCNC: 6.7 G/DL — SIGNIFICANT CHANGE UP (ref 6–8.3)
RBC # BLD: 3.32 M/UL — LOW (ref 4.2–5.8)
RBC # FLD: 16.3 % — HIGH (ref 10.3–14.5)
SODIUM SERPL-SCNC: 138 MMOL/L — SIGNIFICANT CHANGE UP (ref 135–145)
WBC # BLD: 14.3 K/UL — HIGH (ref 3.8–10.5)
WBC # FLD AUTO: 14.3 K/UL — HIGH (ref 3.8–10.5)

## 2017-12-19 PROCEDURE — 96375 TX/PRO/DX INJ NEW DRUG ADDON: CPT

## 2017-12-19 PROCEDURE — 96409 CHEMO IV PUSH SNGL DRUG: CPT

## 2017-12-19 PROCEDURE — 80053 COMPREHEN METABOLIC PANEL: CPT

## 2017-12-19 PROCEDURE — 85027 COMPLETE CBC AUTOMATED: CPT

## 2017-12-19 RX ORDER — PEMETREXED 500 MG/20ML
1000 INJECTION, SOLUTION, CONCENTRATE INTRAVENOUS ONCE
Qty: 0 | Refills: 0 | Status: DISCONTINUED | OUTPATIENT
Start: 2017-12-19 | End: 2018-01-03

## 2017-12-19 RX ORDER — ONDANSETRON 8 MG/1
10 TABLET, FILM COATED ORAL ONCE
Qty: 0 | Refills: 0 | Status: COMPLETED | OUTPATIENT
Start: 2017-12-19 | End: 2017-12-19

## 2017-12-19 RX ADMIN — ONDANSETRON 110 MILLIGRAM(S): 8 TABLET, FILM COATED ORAL at 10:03

## 2018-02-21 NOTE — H&P ADULT - REASON FOR ADMISSION
Bertin Merino is a 44 year old male presenting with lower lip mucocele   Denies known Latex allergy or symptoms of Latex sensitivity.  Medications verified, no changes.  History   Smoking Status   • Current Every Day Smoker   • Packs/day: 0.50   • Years: 20.00   • Types: Cigarettes   • Start date: 1/1/1997   Smokeless Tobacco   • Never Used     Comment: given 1-800 quit information       
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  He remembered biting the area.  It fluctuates in size. It is presently at 2 cm.  It is quite large.    ALLERGIES:  He has no allergies.    MEDICATIONS:  He takes no medicines.    SOCIAL HISTORY:   Does not smoke, does not drink.  He is a .    PROBLEM LIST:  His problem list includes vitamin D deficiency.      ASSESSMENT AND PLAN:  We talked about what a mucocele is, that it is not something that is an infection that needs to be removed if he wants to get rid of it.  It is quite large and he could have some lip changes from the removal, but he already has a pretty swollen left lower lip from the mucocele.  It is not infected.  We talked about bleeding and infections were not common.  We are going to schedule it for 45 minutes in the office.  He has to be off all NSAIDs for 5 days beforehand.  He will be on a liquid diet for 2 days, soft diet for 2 days, then regular diet.  He will ice it.  He will sleep elevated.  Patient wishes to go through with it, we will set it up.      Dictated By: Jesus Acevedo MD  Signing Provider: MD DEBBIE Pennington/BAKARI ([id number])  DD: 02/19/2018 17:07:15 TD: 02/21/2018 02:06:46    Copy Sent To:     
my doc sent me in with rapid heart rate

## 2018-03-06 ENCOUNTER — APPOINTMENT (OUTPATIENT)
Dept: THORACIC SURGERY | Facility: CLINIC | Age: 68
End: 2018-03-06
Payer: COMMERCIAL

## 2018-03-06 VITALS
OXYGEN SATURATION: 98 % | BODY MASS INDEX: 24.5 KG/M2 | HEART RATE: 88 BPM | HEIGHT: 71 IN | WEIGHT: 175 LBS | SYSTOLIC BLOOD PRESSURE: 128 MMHG | DIASTOLIC BLOOD PRESSURE: 68 MMHG

## 2018-03-06 PROCEDURE — 99213 OFFICE O/P EST LOW 20 MIN: CPT

## 2018-03-08 ENCOUNTER — APPOINTMENT (OUTPATIENT)
Dept: THORACIC SURGERY | Facility: CLINIC | Age: 68
End: 2018-03-08

## 2018-06-05 ENCOUNTER — APPOINTMENT (OUTPATIENT)
Dept: THORACIC SURGERY | Facility: CLINIC | Age: 68
End: 2018-06-05
Payer: COMMERCIAL

## 2018-06-05 VITALS
BODY MASS INDEX: 24.36 KG/M2 | HEIGHT: 71 IN | WEIGHT: 174 LBS | DIASTOLIC BLOOD PRESSURE: 78 MMHG | RESPIRATION RATE: 16 BRPM | SYSTOLIC BLOOD PRESSURE: 130 MMHG | HEART RATE: 78 BPM | OXYGEN SATURATION: 99 %

## 2018-06-05 PROCEDURE — 99214 OFFICE O/P EST MOD 30 MIN: CPT

## 2018-06-05 RX ORDER — HYDROCHLOROTHIAZIDE 12.5 MG/1
12.5 TABLET ORAL
Refills: 0 | Status: DISCONTINUED | COMMUNITY
End: 2018-06-05

## 2018-07-16 PROBLEM — I10 ESSENTIAL (PRIMARY) HYPERTENSION: Chronic | Status: ACTIVE | Noted: 2017-06-26

## 2018-07-16 PROBLEM — R91.1 SOLITARY PULMONARY NODULE: Chronic | Status: INACTIVE | Noted: 2017-06-26 | Resolved: 2017-11-03

## 2018-08-04 NOTE — H&P PST ADULT - VENOUS THROMBOEMBOLISM FOR WOMEN ONLY
[facility] Medicine Services  DISCHARGE SUMMARY    Patient Name: Maria T Garcia  : 1961  MRN: [id number]    Date of Admission: 2018  Date of Discharge:  2018  Primary Care Physician: Jessica Portillo    Consults     Date and Time Order Name Status Description    2018 1022 Inpatient Cardiology Consult      2018 0155 Inpatient Orthopedic Surgery Consult          Hospital Course     Presenting Problem:   Closed trimalleolar fracture of left ankle, initial encounter [S82.852A]  Closed trimalleolar fracture of left ankle, initial encounter [S82.852A]  Closed trimalleolar fracture of left ankle, initial encounter [S82.852A]    Active Hospital Problems    Diagnosis Date Noted   • Closed displaced trimalleolar fracture of left ankle [S82.852A] 2018   • Leukocytosis [D72.829] 2018   • Polycythemia [D75.1] 2018   • A-fib (CMS/[facility]) [I48.91] 2018   • Chronic anticoagulation [Z79.01] 2018   • Morbid obesity (CMS/[facility]) [E66.01] 2018   • Chronic combined systolic and diastolic congestive heart failure (CMS/[facility]) [I50.42] 2018   • COPD (chronic obstructive pulmonary disease) (CMS/[facility]) [J44.9] 2017   • Diabetes mellitus (CMS/[facility]) [E11.9] 2017   • Tobacco abuse [Z72.0] 2017      Resolved Hospital Problems    Diagnosis Date Noted Date Resolved   No resolved problems to display.          Hospital Course:  Maria T Garcia is a 57 y.o. female with history of atrial fibrillation, diabetes, CHF presented to the emergency department after she fell.  She stated she was using her walker became dizzy and fell but did not lose consciousness.  She complained of left ankle pain.  In the emergency department she was found to have a left trimalleolar fracture.  On  Dr. Valle performed a closed reduction of her left trimalleolar ankle fracture.  She has done well and had typical postoperative course.  Spoke with Dr. Valle and he is  okay with her discharging home today.  He would like her to elevate the extremity.  She may have touch weightbearing.  She should follow-up with him in 2 weeks for cast check.  He is okay with her resuming her Eliquis.  During this hospitalization she did receive full dose Lovenox.  Therapy recommended inpatient rehabilitation of which she refuses.  She also refused most assistive devices that were recommended.  Her other chronic conditions remained stable throughout hospitalization.    Discharge Follow Up Recommendations for labs/diagnostics:  Dr. Valle in 2 weeks for a cast check        Day of Discharge     HPI:   Left foot is a little tingly which she was told was normal. Wants to go home    Review of Systems  Gen- No fevers, chills  CV- No chest pain, palpitations  Resp- No cough, dyspnea  GI- No N/V/D, abd pain    Otherwise ROS is negative except as mentioned in the HPI.    Vital Signs:   Temp:  [97.5 °F (36.4 °C)-98.9 °F (37.2 °C)] 98.9 °F (37.2 °C)  Heart Rate:  [78-91] 91  Resp:  [14-18] 16  BP: ()/(52-72) 105/61     Physical Exam:  Gen-no acute distress, resting in bed  CV-RRR, S1 S2 normal, no m/r/g  Resp-CTAB, no wheezes  Abd-soft, NT, ND, +BS  Ext-no edema of right leg, left lower leg in cast  Neuro-A&Ox3, no focal deficits  Psych-appropriate mood      Pertinent  and/or Most Recent Results       Results from last 7 days  Lab Units 08/04/18  0455 08/03/18  0649 08/03/18  0648 08/02/18  0446 07/31/18  0812 07/30/18 2036   WBC 10*3/mm3 10.12  --  11.67* 11.96* 16.17* 18.13*   HEMOGLOBIN g/dL 13.9  --  13.8 13.6 14.6 15.7*   HEMATOCRIT % 44.1*  --  43.4 43.6 45.7* 48.4*   PLATELETS 10*3/mm3 260  --  260 232 248 256   SODIUM mmol/L 137 139  --  139 139 138   POTASSIUM mmol/L 3.2* 3.4*  --  3.5 4.6 3.8   CHLORIDE mmol/L 94* 93*  --  94* 97* 97*   CO2 mmol/L 39.0* 38.0*  --  38.0* 40.0* 35.0*   BUN mg/dL 13 13  --  16 18 18   CREATININE mg/dL 0.96 0.98  --  1.04 1.20 1.17   GLUCOSE mg/dL 110* 104*  --   99 168* 157*   CALCIUM mg/dL 9.1 9.6  --  8.5* 9.1 9.5       Results from last 7 days  Lab Units 07/30/18  2036   BILIRUBIN mg/dL 0.5   ALK PHOS U/L 140*   ALT (SGPT) U/L 21   AST (SGOT) U/L 20       Results from last 7 days  Lab Units 08/03/18  0649   CHOLESTEROL mg/dL 183   TRIGLYCERIDES mg/dL 188*   HDL CHOL mg/dL 35*       Results from last 7 days  Lab Units 07/30/18  2036   BNP pg/mL 18.0     Brief Urine Lab Results  (Last result in the past 365 days)      Color   Clarity   Blood   Leuk Est   Nitrite   Protein   CREAT   Urine HCG        07/30/18 2252 Yellow Clear Negative Trace(A) Negative Negative             Imaging Results (all)     Procedure Component Value Units Date/Time    FL C Arm During Surgery [[id number]] Collected:  08/02/18 1339     Updated:  08/02/18 1439    Narrative:       EXAMINATION: FL C ARM DURING SURGERY- 08/02/2018     INDICATION: Closed Reduction Metatarsal Fracture; S82.852A-Displaced  trimalleolar fracture of left lower leg, initial encounter for closed  fracture; W19.XXXA-Unspecified fall, initial encounter; postreduction  imaging     COMPARISON: NONE     FINDINGS: 5 seconds of fluoroscopy and 2 images used for closed  reduction of an ankle fracture. Please see the procedure report for full  details.       Impression:       Fluoroscopy used for re casting of an ankle fracture. Please  see the procedure report for full details.     D:  08/02/2018  E:  08/02/2018         This report was finalized on 8/2/2018 2:37 PM by Dr. Adrianna Benitez MD.       XR Tibia Fibula 2 View Left [[id number]] Collected:  07/30/18 2116     Updated:  07/30/18 2317    Narrative:       EXAM:  XR Left Tibia and Fibula, 2 Views    CLINICAL HISTORY:  Pain; Lower leg; Left; Additional info: Fall, pain    TECHNIQUE:  Frontal and lateral views of the left tibia and fibula.    COMPARISON:  No relevant prior studies available.    FINDINGS:  Bones/joints:  Trimalleolar fracture of the ankle with lateral and posterior    displacement.  Proximal tibia and fibula appear intact.    Soft tissues:  Diffuse soft tissue swelling.        Impression:       Trimalleolar fracture of the ankle with lateral and posterior displacement.   Proximal tibia and fibula appear intact.      THIS DOCUMENT HAS BEEN ELECTRONICALLY SIGNED BY ANAID CASTILLO MD    XR Ankle 3+ View Left [[id number]] Collected:  07/30/18 2115     Updated:  07/30/18 2315    Narrative:       EXAM:  XR Left Ankle Complete, 3 or More Views    CLINICAL HISTORY:  Pain; Ankle; Left; Additional info: Left ankle pain    TECHNIQUE:  Frontal, lateral and oblique views of the left ankle.    COMPARISON:  No relevant prior studies available.    FINDINGS:  Bones/joints:  There is fracture through the base of the medial malleolus.    There is fracture through the lateral malleolus superior to the level of the   talar dome.  Coronal oriented fracture through the posterior malleolus.  The   talus, medial malleolus fragment and lateral malleolus fragments are displaced   lateral.  No dislocation.  Soft tissues:  Diffuse soft tissue swelling.      Impression:       Trimalleolar fracture as above.    THIS DOCUMENT HAS BEEN ELECTRONICALLY SIGNED BY ANAID CASTILLO MD    XR Chest 1 View [[id number]] Collected:  07/30/18 2036     Updated:  07/30/18 2201    Narrative:       EXAM:    XR Chest, 1 View    EXAM DATE/TIME:    7/30/2018 8:36 PM    CLINICAL HISTORY:    57 years old, female; Pain; Chest pain; Additional info: Chest pain triage   protocol    TECHNIQUE:    Frontal view of the chest.    COMPARISON:    CR - XR CHEST 1 VW 2018-03-15 06:05    FINDINGS:    Lungs:  Increased central pulmonary and interstitial central markings.    Pleural space:  Unremarkable.  No pneumothorax.    Heart:  Cardiomegaly.    Mediastinum:  Unremarkable.    Bones/joints:  Unremarkable.      Impression:       Cardiomegaly with pulmonary cephalization.    THIS DOCUMENT HAS BEEN ELECTRONICALLY SIGNED BY HONG ORTEGA MD                   Results for orders placed during the hospital encounter of 07/30/18   Adult Transthoracic Echo Complete W/ Cont if Necessary Per Protocol    Narrative · Left ventricular systolic function is normal. Estimated EF = 60%.  · Left ventricular wall thickness is consistent with borderline concentric   hypertrophy.  · The left ventricular cavity is borderline dilated.  · Left ventricular diastolic dysfunction (grade I a) consistent with   impaired relaxation.  · Right ventricular cavity is moderately dilated.  · Systolic and diastolic flattening of the interventricular septum   consistent with right ventricle pressure and volume overload.  · Right atrial cavity size is mildly dilated.  · Mild mitral valve stenosis is present  · Mild mitral valve regurgitation is present  · Estimated right ventricular systolic pressure from tricuspid   regurgitation is moderately elevated (45-55 mmHg).            Discharge Details        Discharge Medications      New Medications      Instructions Start Date   oxyCODONE-acetaminophen 5-325 MG per tablet  Commonly known as:  PERCOCET   1 tablet, Oral, Every 6 Hours PRN         Continue These Medications      Instructions Start Date   apixaban 5 MG tablet tablet  Commonly known as:  ELIQUIS   5 mg, Oral, 2 Times Daily      aspirin 81 MG EC tablet   81 mg, Oral, Daily      cetirizine 10 MG tablet  Commonly known as:  zyrTEC   10 mg, Oral, Daily      citalopram 40 MG tablet  Commonly known as:  CeleXA   40 mg, Oral, Daily      dofetilide 250 MCG capsule  Commonly known as:  TIKOSYN   250 mcg, Oral, Every 12 Hours Scheduled      gabapentin 300 MG capsule  Commonly known as:  NEURONTIN   300 mg, Oral, Nightly      hydrOXYzine 50 MG capsule  Commonly known as:  VISTARIL   50 mg, Oral, Every Morning      metFORMIN  MG 24 hr tablet  Commonly known as:  GLUCOPHAGE-XR   500 mg, Oral, Every Evening      metoprolol tartrate 25 MG tablet  Commonly known as:  LOPRESSOR   12.5 mg, Oral, Every 12  Hours Scheduled      mometasone-formoterol 200-5 MCG/ACT inhaler  Commonly known as:  DULERA 200   2 puffs, Inhalation, 2 Times Daily - RT      omeprazole 40 MG capsule  Commonly known as:  priLOSEC   40 mg, Oral, Daily      potassium chloride 20 MEQ CR tablet  Commonly known as:  K-DUR,KLOR-CON   20 mEq, Oral, Daily      torsemide 20 MG tablet  Commonly known as:  DEMADEX   20 mg, Oral, Every 12 Hours      VENTOLIN  (90 Base) MCG/ACT inhaler  Generic drug:  albuterol   2 puffs, Inhalation, Every 6 Hours PRN           Discharge Disposition:  Home or Self Care    Discharge Diet: cardiac, diabetic as tolerated    Discharge Activity: elevate extremity, touch weight bearing    Code Status/Level of Support:  Code Status and Medical Interventions:   Ordered at: 07/31/18 0145     Level Of Support Discussed With:    Patient     Code Status:    CPR     Medical Interventions (Level of Support Prior to Arrest):    Full       Future Appointments  Date Time Provider Department Center   12/3/2018 2:30 PM Sony Toscano DO [facility] None       Additional Instructions for the Follow-ups that You Need to Schedule     Ambulatory Referral to Home Health    As directed      Face to Face Visit Date:  8/3/2018    Follow-up Provider for Plan of Care?:  I will be treating the patient on an ongoing basis.  Please send me the Plan of Care for signature.    Follow-up Provider:  GAYLE VALLE [[id number]]    Reason/Clinical Findings:  s/p ankle fracture    Describe mobility limitations that make leaving home difficult:  impaired functional mobility, balance, gait, and endurance    Nursing/Therapeutic Services Requested:  Physical Therapy Occupational Therapy    PT orders:  Gait Training Transfer training Strengthening    Weight Bearing Status:  Non-Weight Bearing    Occupational orders:  Strengthening         Discharge Follow-up with Specialty: Dr. Valle in 2 weeks for a cast check    As directed      Specialty:  Dr. Valle in 2 weeks  for a cast check               Time Spent on Discharge:  38 minutes    Electronically signed by LISSETTE Gasca, 08/04/18, 11:46 AM.       not applicable

## 2018-09-18 ENCOUNTER — APPOINTMENT (OUTPATIENT)
Dept: THORACIC SURGERY | Facility: CLINIC | Age: 68
End: 2018-09-18
Payer: COMMERCIAL

## 2018-09-18 VITALS
RESPIRATION RATE: 16 BRPM | HEIGHT: 71 IN | HEART RATE: 84 BPM | DIASTOLIC BLOOD PRESSURE: 90 MMHG | OXYGEN SATURATION: 98 % | BODY MASS INDEX: 25.34 KG/M2 | WEIGHT: 181 LBS | SYSTOLIC BLOOD PRESSURE: 160 MMHG

## 2018-09-18 PROBLEM — K21.9 GASTRO-ESOPHAGEAL REFLUX DISEASE WITHOUT ESOPHAGITIS: Chronic | Status: ACTIVE | Noted: 2017-11-03

## 2018-09-18 PROCEDURE — 99213 OFFICE O/P EST LOW 20 MIN: CPT

## 2018-10-15 NOTE — H&P PST ADULT - LYMPH NODES
- CIWA protocol initiated  - Patient initially with SINA 113, mild tachycardia which resolved with fluids and ativan in ED  - Resume Librium 25mg q8h, though pt refused this AM  - If CIWA >8, ativan PRN 2mg q2h  - CIWA currently 2, night team to check twice overnight  - Banana bag and daily folate detailed exam

## 2018-10-16 ENCOUNTER — APPOINTMENT (OUTPATIENT)
Dept: THORACIC SURGERY | Facility: CLINIC | Age: 68
End: 2018-10-16
Payer: COMMERCIAL

## 2018-10-16 VITALS
DIASTOLIC BLOOD PRESSURE: 94 MMHG | SYSTOLIC BLOOD PRESSURE: 138 MMHG | HEART RATE: 93 BPM | OXYGEN SATURATION: 98 % | WEIGHT: 183 LBS | HEIGHT: 71 IN | RESPIRATION RATE: 19 BRPM | BODY MASS INDEX: 25.62 KG/M2 | TEMPERATURE: 97.7 F

## 2018-10-16 DIAGNOSIS — Z85.118 PERSONAL HISTORY OF OTHER MALIGNANT NEOPLASM OF BRONCHUS AND LUNG: ICD-10-CM

## 2018-10-16 PROCEDURE — 99213 OFFICE O/P EST LOW 20 MIN: CPT

## 2018-10-16 RX ORDER — IRBESARTAN 300 MG/1
300 TABLET ORAL
Refills: 0 | Status: DISCONTINUED | COMMUNITY
End: 2018-10-16

## 2018-10-16 RX ORDER — HYDROCHLOROTHIAZIDE 12.5 MG/1
12.5 TABLET ORAL
Refills: 0 | Status: DISCONTINUED | COMMUNITY
End: 2018-10-16

## 2019-01-22 ENCOUNTER — APPOINTMENT (OUTPATIENT)
Dept: THORACIC SURGERY | Facility: CLINIC | Age: 69
End: 2019-01-22
Payer: COMMERCIAL

## 2019-01-22 VITALS
HEIGHT: 71 IN | WEIGHT: 185 LBS | SYSTOLIC BLOOD PRESSURE: 126 MMHG | OXYGEN SATURATION: 100 % | HEART RATE: 94 BPM | BODY MASS INDEX: 25.9 KG/M2 | RESPIRATION RATE: 16 BRPM | TEMPERATURE: 97.9 F | DIASTOLIC BLOOD PRESSURE: 87 MMHG

## 2019-01-22 PROCEDURE — 99213 OFFICE O/P EST LOW 20 MIN: CPT

## 2019-01-22 NOTE — PHYSICAL EXAM
[Sclera] : the sclera and conjunctiva were normal [Neck Appearance] : the appearance of the neck was normal [Neck Cervical Mass (___cm)] : no neck mass was observed [Jugular Venous Distention Increased] : there was no jugular-venous distention [] : no respiratory distress [Respiration, Rhythm And Depth] : normal respiratory rhythm and effort [Exaggerated Use Of Accessory Muscles For Inspiration] : no accessory muscle use [Auscultation Breath Sounds / Voice Sounds] : lungs were clear to auscultation bilaterally [Heart Rate And Rhythm] : heart rate was normal and rhythm regular [Chest Visual Inspection Thoracic Asymmetry] : no chest asymmetry [Diminished Respiratory Excursion] : normal chest expansion [2+] : left 2+ [Bowel Sounds] : normal bowel sounds [Abdomen Soft] : soft [Abdomen Tenderness] : non-tender [Cervical Lymph Nodes Enlarged Posterior Bilaterally] : posterior cervical [Cervical Lymph Nodes Enlarged Anterior Bilaterally] : anterior cervical [Supraclavicular Lymph Nodes Enlarged Bilaterally] : supraclavicular [No CVA Tenderness] : no ~M costovertebral angle tenderness [Abnormal Walk] : normal gait [Skin Color & Pigmentation] : normal skin color and pigmentation [No Focal Deficits] : no focal deficits [Oriented To Time, Place, And Person] : oriented to person, place, and time [Impaired Insight] : insight and judgment were intact [Affect] : the affect was normal [Mood] : the mood was normal

## 2019-01-28 NOTE — HISTORY OF PRESENT ILLNESS
[FreeTextEntry1] : 68 year old male s/p bronchoscopy, EBUS with biopsy on 6/28/17. Final pathology reveals a level 7 and 10 lymph node positive for metastatic adenocarcinoma S/p neoadjuvant chemotherapy and radiation treatment for known lung cancer. He is status post Flexible Bronchoscopy, Uniportal Left VATS lower lobectomy, mediastinal lymph node dx, hilar node dissection on 11/5/2017. Pathology revealed Mucinous adenocarcinoma T2N0Mx. He presents today for a follow up appointment.\par \par On previous CT Scan of the Chest 9/15/2018 revealed:\par -Persistent dense parenchymal opacity LLL with volume loss, scarring, atelectasis\par -New less dense patchy groundglass opacities proceeding more posteriorly and superiorly now present\par -Increased mucoid impaction RLL bronchi where now there are tree-in-bud infiltrates paralleling the right hemidiaphragm in the central right lung base indicating a new bronchial infiltrate or inflammatory process\par -Decompressed sclerotic T6 vertebral body which may represent a metastatic lesion with pathologic fracture\par \par Follow up PET dated 9/22/18 demonstrates:\par - Diffusely increased FDG uptake identified fusing the previously described, morphologically stable areas of parenchymal opacity with air bronchograms and volume loss in the inferior lingula as well as in the previously described adjacent parenchymal groundglass opacities (SUV 3.7). \par \par CT chest on 1/19/19:\par -Resolved recently evident patchy parenchymal infiltrates left lung\par -There is near resolution of more nodular type infiltrates in the central lung base however slight persistent infiltrates are still present in this area. \par -There are stable chronic subpleural densities in the posterior medial right lung base. \par -There are stable mild left pleural effusion.\par -Recently evident elements mucoid impaction involving the right lower lobe have improved with mild residual. \par \par The patient denies shortness of breath, cough, fever, chills, dysphagia or hemoptysis.

## 2019-01-28 NOTE — ASSESSMENT
[FreeTextEntry1] : 67 year old male s/p had neoadjuvant chemotherapy and radiation treatment for known lung cancer. He is status post Flexible Bronchoscopy, Uniportal Left VATS lower lobectomy, mediastinal lymph node dx, hilar node dissection on 11/5/2017. Pathology revealed Mucinous adenocarcinoma T2N0Mx. \par \par CT chest on 1/19/19:\par -Resolved recently evident patchy parenchymal infiltrates left lung\par -There is near resolution of more nodular type infiltrates in the central lung base however slight persistent infiltrates are still present in this area. \par -There are stable chronic subpleural densities in the posterior medial right lung base. \par -There are stable mild left pleural effusion.\par -Recently evident elements mucoid impaction involving the right lower lobe have improved with mild residual. \par \par I have reviewed the images and recommended the patient follow up in 3 months with a CT scan of the chest. \par \par Written by Janey Hernandez NP, acting as a scribe for Kamaljit Larsen MD.\par The documentation recorded by the scribe accurately reflects the service I personally performed and the decisions made by me. Kamaljit Larsen MD\par \par

## 2019-01-28 NOTE — CONSULT LETTER
[Dear  ___] : Dear  [unfilled], [Courtesy Letter:] : I had the pleasure of seeing your patient, [unfilled], in my office today. [Please see my note below.] : Please see my note below. [Sincerely,] : Sincerely, [DrYesenia  ___] : Dr. SANTIAGO [DrYesenia ___] : Dr. SANTIAGO [FreeTextEntry2] : Dr. Nam Andrea (Pulm/ref)\par Dr. العراقي (Onc)\par Dr. Mccormick (PCP)  [FreeTextEntry3] : Kamaljit Larsen MD, FACS \par Chief, Division of Thoracic Surgery \par Director, Minimally Invasive Thoracic Surgery \par Department of Cardiovascular and Thoracic Surgery \par [facility] \par , Cardiovascular and Thoracic Surgery \par [facility] [employer] [facility]\par

## 2019-02-07 ENCOUNTER — TRANSCRIPTION ENCOUNTER (OUTPATIENT)
Age: 69
End: 2019-02-07

## 2019-02-19 ENCOUNTER — NON-APPOINTMENT (OUTPATIENT)
Age: 69
End: 2019-02-19

## 2019-02-19 ENCOUNTER — APPOINTMENT (OUTPATIENT)
Dept: CARDIOLOGY | Facility: CLINIC | Age: 69
End: 2019-02-19
Payer: COMMERCIAL

## 2019-02-19 ENCOUNTER — EMERGENCY (EMERGENCY)
Facility: HOSPITAL | Age: 69
LOS: 1 days | Discharge: ANOTHER HOSPITAL | End: 2019-02-19
Attending: EMERGENCY MEDICINE | Admitting: EMERGENCY MEDICINE
Payer: COMMERCIAL

## 2019-02-19 ENCOUNTER — INPATIENT (INPATIENT)
Facility: HOSPITAL | Age: 69
LOS: 2 days | Discharge: NURSING, REHAB OR LONG-TERM CARE | DRG: 315 | End: 2019-02-22
Attending: INTERNAL MEDICINE | Admitting: INTERNAL MEDICINE
Payer: COMMERCIAL

## 2019-02-19 VITALS
SYSTOLIC BLOOD PRESSURE: 106 MMHG | TEMPERATURE: 98 F | OXYGEN SATURATION: 100 % | DIASTOLIC BLOOD PRESSURE: 75 MMHG | WEIGHT: 184.97 LBS | RESPIRATION RATE: 16 BRPM | HEIGHT: 71 IN | HEART RATE: 172 BPM

## 2019-02-19 VITALS
RESPIRATION RATE: 18 BRPM | TEMPERATURE: 99 F | HEART RATE: 109 BPM | SYSTOLIC BLOOD PRESSURE: 112 MMHG | DIASTOLIC BLOOD PRESSURE: 65 MMHG | OXYGEN SATURATION: 95 %

## 2019-02-19 VITALS
SYSTOLIC BLOOD PRESSURE: 109 MMHG | OXYGEN SATURATION: 100 % | BODY MASS INDEX: 25.76 KG/M2 | WEIGHT: 184 LBS | HEART RATE: 68 BPM | DIASTOLIC BLOOD PRESSURE: 78 MMHG | HEIGHT: 71 IN

## 2019-02-19 VITALS
SYSTOLIC BLOOD PRESSURE: 111 MMHG | TEMPERATURE: 98 F | HEART RATE: 94 BPM | DIASTOLIC BLOOD PRESSURE: 66 MMHG | RESPIRATION RATE: 16 BRPM | OXYGEN SATURATION: 93 %

## 2019-02-19 DIAGNOSIS — Z98.890 OTHER SPECIFIED POSTPROCEDURAL STATES: Chronic | ICD-10-CM

## 2019-02-19 DIAGNOSIS — I47.1 SUPRAVENTRICULAR TACHYCARDIA: ICD-10-CM

## 2019-02-19 DIAGNOSIS — Z92.21 PERSONAL HISTORY OF ANTINEOPLASTIC CHEMOTHERAPY: Chronic | ICD-10-CM

## 2019-02-19 DIAGNOSIS — J44.9 CHRONIC OBSTRUCTIVE PULMONARY DISEASE, UNSPECIFIED: ICD-10-CM

## 2019-02-19 LAB
ALBUMIN SERPL ELPH-MCNC: 2.5 G/DL — LOW (ref 3.3–5)
ALP SERPL-CCNC: 119 U/L — SIGNIFICANT CHANGE UP (ref 40–120)
ALT FLD-CCNC: 45 U/L — SIGNIFICANT CHANGE UP (ref 12–78)
ANION GAP SERPL CALC-SCNC: 12 MMOL/L — SIGNIFICANT CHANGE UP (ref 5–17)
ANION GAP SERPL CALC-SCNC: 13 MMOL/L — SIGNIFICANT CHANGE UP (ref 5–17)
AST SERPL-CCNC: 24 U/L — SIGNIFICANT CHANGE UP (ref 15–37)
BASOPHILS # BLD AUTO: 0 K/UL — SIGNIFICANT CHANGE UP (ref 0–0.2)
BASOPHILS NFR BLD AUTO: 0 % — SIGNIFICANT CHANGE UP (ref 0–2)
BILIRUB SERPL-MCNC: 0.6 MG/DL — SIGNIFICANT CHANGE UP (ref 0.2–1.2)
BUN SERPL-MCNC: 30 MG/DL — HIGH (ref 7–23)
BUN SERPL-MCNC: 32 MG/DL — HIGH (ref 7–23)
CALCIUM SERPL-MCNC: 8.7 MG/DL — SIGNIFICANT CHANGE UP (ref 8.5–10.1)
CALCIUM SERPL-MCNC: 8.8 MG/DL — SIGNIFICANT CHANGE UP (ref 8.4–10.5)
CHLORIDE SERPL-SCNC: 101 MMOL/L — SIGNIFICANT CHANGE UP (ref 96–108)
CHLORIDE SERPL-SCNC: 103 MMOL/L — SIGNIFICANT CHANGE UP (ref 96–108)
CK MB CFR SERPL CALC: 2.5 NG/ML — SIGNIFICANT CHANGE UP (ref 0–6.7)
CK SERPL-CCNC: 41 U/L — SIGNIFICANT CHANGE UP (ref 26–308)
CO2 SERPL-SCNC: 22 MMOL/L — SIGNIFICANT CHANGE UP (ref 22–31)
CO2 SERPL-SCNC: 23 MMOL/L — SIGNIFICANT CHANGE UP (ref 22–31)
CREAT SERPL-MCNC: 1.76 MG/DL — HIGH (ref 0.5–1.3)
CREAT SERPL-MCNC: 2 MG/DL — HIGH (ref 0.5–1.3)
EOSINOPHIL # BLD AUTO: 0 K/UL — SIGNIFICANT CHANGE UP (ref 0–0.5)
EOSINOPHIL NFR BLD AUTO: 0.2 % — SIGNIFICANT CHANGE UP (ref 0–6)
GLUCOSE SERPL-MCNC: 110 MG/DL — HIGH (ref 70–99)
GLUCOSE SERPL-MCNC: 139 MG/DL — HIGH (ref 70–99)
HCT VFR BLD CALC: 32.7 % — LOW (ref 39–50)
HCT VFR BLD CALC: 37.3 % — LOW (ref 39–50)
HGB BLD-MCNC: 10.8 G/DL — LOW (ref 13–17)
HGB BLD-MCNC: 12.5 G/DL — LOW (ref 13–17)
LYMPHOCYTES # BLD AUTO: 0.5 K/UL — LOW (ref 1–3.3)
LYMPHOCYTES # BLD AUTO: 3.9 % — LOW (ref 13–44)
MCHC RBC-ENTMCNC: 28.8 PG — SIGNIFICANT CHANGE UP (ref 27–34)
MCHC RBC-ENTMCNC: 29.3 PG — SIGNIFICANT CHANGE UP (ref 27–34)
MCHC RBC-ENTMCNC: 33.1 GM/DL — SIGNIFICANT CHANGE UP (ref 32–36)
MCHC RBC-ENTMCNC: 33.5 GM/DL — SIGNIFICANT CHANGE UP (ref 32–36)
MCV RBC AUTO: 85.9 FL — SIGNIFICANT CHANGE UP (ref 80–100)
MCV RBC AUTO: 88.4 FL — SIGNIFICANT CHANGE UP (ref 80–100)
MONOCYTES # BLD AUTO: 1.5 K/UL — HIGH (ref 0–0.9)
MONOCYTES NFR BLD AUTO: 11.5 % — SIGNIFICANT CHANGE UP (ref 2–14)
NEUTROPHILS # BLD AUTO: 10.8 K/UL — HIGH (ref 1.8–7.4)
NEUTROPHILS NFR BLD AUTO: 84.4 % — HIGH (ref 43–77)
NRBC # BLD: 0 /100 WBCS — SIGNIFICANT CHANGE UP (ref 0–0)
PLATELET # BLD AUTO: 478 K/UL — HIGH (ref 150–400)
PLATELET # BLD AUTO: 580 K/UL — HIGH (ref 150–400)
POTASSIUM SERPL-MCNC: 4.6 MMOL/L — SIGNIFICANT CHANGE UP (ref 3.5–5.3)
POTASSIUM SERPL-MCNC: 4.9 MMOL/L — SIGNIFICANT CHANGE UP (ref 3.5–5.3)
POTASSIUM SERPL-SCNC: 4.6 MMOL/L — SIGNIFICANT CHANGE UP (ref 3.5–5.3)
POTASSIUM SERPL-SCNC: 4.9 MMOL/L — SIGNIFICANT CHANGE UP (ref 3.5–5.3)
PROT SERPL-MCNC: 7.2 G/DL — SIGNIFICANT CHANGE UP (ref 6–8.3)
RBC # BLD: 3.7 M/UL — LOW (ref 4.2–5.8)
RBC # BLD: 4.34 M/UL — SIGNIFICANT CHANGE UP (ref 4.2–5.8)
RBC # FLD: 13.6 % — SIGNIFICANT CHANGE UP (ref 10.3–14.5)
RBC # FLD: 15.2 % — HIGH (ref 10.3–14.5)
SODIUM SERPL-SCNC: 135 MMOL/L — SIGNIFICANT CHANGE UP (ref 135–145)
SODIUM SERPL-SCNC: 139 MMOL/L — SIGNIFICANT CHANGE UP (ref 135–145)
TROPONIN I SERPL-MCNC: 0.52 NG/ML — HIGH (ref 0.01–0.04)
TROPONIN T, HIGH SENSITIVITY RESULT: 325 NG/L — HIGH (ref 0–51)
WBC # BLD: 12.8 K/UL — HIGH (ref 3.8–10.5)
WBC # BLD: 14.33 K/UL — HIGH (ref 3.8–10.5)
WBC # FLD AUTO: 12.8 K/UL — HIGH (ref 3.8–10.5)
WBC # FLD AUTO: 14.33 K/UL — HIGH (ref 3.8–10.5)

## 2019-02-19 PROCEDURE — 93010 ELECTROCARDIOGRAM REPORT: CPT | Mod: NC

## 2019-02-19 PROCEDURE — 80053 COMPREHEN METABOLIC PANEL: CPT

## 2019-02-19 PROCEDURE — 85027 COMPLETE CBC AUTOMATED: CPT

## 2019-02-19 PROCEDURE — 93005 ELECTROCARDIOGRAM TRACING: CPT

## 2019-02-19 PROCEDURE — 84484 ASSAY OF TROPONIN QUANT: CPT

## 2019-02-19 PROCEDURE — 71045 X-RAY EXAM CHEST 1 VIEW: CPT

## 2019-02-19 PROCEDURE — 99285 EMERGENCY DEPT VISIT HI MDM: CPT | Mod: 25

## 2019-02-19 PROCEDURE — 93010 ELECTROCARDIOGRAM REPORT: CPT | Mod: 76

## 2019-02-19 PROCEDURE — 71250 CT THORAX DX C-: CPT | Mod: 26

## 2019-02-19 PROCEDURE — 82550 ASSAY OF CK (CPK): CPT

## 2019-02-19 PROCEDURE — 99285 EMERGENCY DEPT VISIT HI MDM: CPT

## 2019-02-19 PROCEDURE — 93000 ELECTROCARDIOGRAM COMPLETE: CPT

## 2019-02-19 PROCEDURE — 96374 THER/PROPH/DIAG INJ IV PUSH: CPT

## 2019-02-19 PROCEDURE — 70450 CT HEAD/BRAIN W/O DYE: CPT | Mod: 26

## 2019-02-19 PROCEDURE — 71045 X-RAY EXAM CHEST 1 VIEW: CPT | Mod: 26

## 2019-02-19 PROCEDURE — 36415 COLL VENOUS BLD VENIPUNCTURE: CPT

## 2019-02-19 RX ORDER — ASPIRIN/CALCIUM CARB/MAGNESIUM 324 MG
324 TABLET ORAL ONCE
Qty: 0 | Refills: 0 | Status: COMPLETED | OUTPATIENT
Start: 2019-02-19 | End: 2019-02-19

## 2019-02-19 RX ORDER — SODIUM CHLORIDE 9 MG/ML
1000 INJECTION, SOLUTION INTRAVENOUS ONCE
Qty: 0 | Refills: 0 | Status: COMPLETED | OUTPATIENT
Start: 2019-02-19 | End: 2019-02-19

## 2019-02-19 RX ORDER — ASPIRIN/CALCIUM CARB/MAGNESIUM 324 MG
81 TABLET ORAL DAILY
Qty: 0 | Refills: 0 | Status: DISCONTINUED | OUTPATIENT
Start: 2019-02-19 | End: 2019-02-22

## 2019-02-19 RX ORDER — AZITHROMYCIN 500 MG/1
500 TABLET, FILM COATED ORAL EVERY 24 HOURS
Qty: 0 | Refills: 0 | Status: DISCONTINUED | OUTPATIENT
Start: 2019-02-19 | End: 2019-02-20

## 2019-02-19 RX ORDER — CEFTRIAXONE 500 MG/1
1 INJECTION, POWDER, FOR SOLUTION INTRAMUSCULAR; INTRAVENOUS EVERY 24 HOURS
Qty: 0 | Refills: 0 | Status: DISCONTINUED | OUTPATIENT
Start: 2019-02-19 | End: 2019-02-20

## 2019-02-19 RX ORDER — ASPIRIN/CALCIUM CARB/MAGNESIUM 324 MG
162 TABLET ORAL ONCE
Qty: 0 | Refills: 0 | Status: DISCONTINUED | OUTPATIENT
Start: 2019-02-19 | End: 2019-02-19

## 2019-02-19 RX ORDER — METOPROLOL TARTRATE 50 MG
12.5 TABLET ORAL
Qty: 0 | Refills: 0 | Status: DISCONTINUED | OUTPATIENT
Start: 2019-02-19 | End: 2019-02-21

## 2019-02-19 RX ORDER — ADENOSINE 3 MG/ML
6 INJECTION INTRAVENOUS ONCE
Qty: 0 | Refills: 0 | Status: COMPLETED | OUTPATIENT
Start: 2019-02-19 | End: 2019-02-19

## 2019-02-19 RX ADMIN — Medication 324 MILLIGRAM(S): at 15:59

## 2019-02-19 RX ADMIN — ADENOSINE 6 MILLIGRAM(S): 3 INJECTION INTRAVENOUS at 15:53

## 2019-02-19 RX ADMIN — SODIUM CHLORIDE 1000 MILLILITER(S): 9 INJECTION, SOLUTION INTRAVENOUS at 19:53

## 2019-02-19 RX ADMIN — AZITHROMYCIN 250 MILLIGRAM(S): 500 TABLET, FILM COATED ORAL at 23:29

## 2019-02-19 NOTE — ED ADULT NURSE REASSESSMENT NOTE - NS ED NURSE REASSESS COMMENT FT1
report received from TESS Hampton. Patient A&Ox3, ambulatory no complaints at this time. Patient admitted awaiting bed assignment. VSS. Patient on cardiac monitor, NSR. Will continue to monitor.

## 2019-02-19 NOTE — ED PROVIDER NOTE - OBJECTIVE STATEMENT
67 yo M pw was seeking medical care with his doctors over past ~ 1 week for occasional dizzy spells. no loc. No fall / trauma. NO chest pain / sob. Today, pt went to cardiology, Dr Houston. Pt was initially in NSR, then found in SVT. They were unable to convert pt at office (no IV meds). Pt was then sent to ed. Upon arrival, triage nurse states HR was in 80's. After completion of triage, pt again in SVT ~ 170. No cp/sob/palp. pt not feeling the svt. No dizzy at this time. no agg/allev factors. NO other inj or co.

## 2019-02-19 NOTE — ED PROVIDER NOTE - NSRISKOFTRANSFER_ED_A_ED
Deterioration of Condition En Route/Increased Pain/Death or Disability/Transportation Risk (There is always a risk of traffic delays resulting in deterioration of condition.)

## 2019-02-19 NOTE — ED PROVIDER NOTE - PHYSICAL EXAMINATION
Attending MD Lynn:    Gen: well appearing, no distress, oriented x 3  Neck: supple, no swelling, trachea midline  CV: heart with reg rhythm, no obvious murmur appreciated   Resp: CTAB, breathing comfortably  Abd: soft, NT, ND  Extremities: extremities warm to the touch, no peripheral edema   Msk: no extremity deformities or bony tenderness  Pysch: appropriate affect    Neuro: moves all extremities spontaneously, no gross motor or sensory deficits

## 2019-02-19 NOTE — H&P ADULT - HISTORY OF PRESENT ILLNESS
68M h/o lung cancer s/p radiation/ chemo, htn, gerd, atrial flutter transferred from [place] for eval and management of SVT , Pt says he felt dizzy 2 weeks ago while at work , went to [facility] found to be hypotensive was told not to take bp meds , pt went to his cardiologist found to be in svt , sent to [facility] er was given Adenosine sent to [facility] , pt denies chest pain, shortness of breath, nausea,v headache, dizziness, weakness numbness in ext , fever , was started on amoxicillin yesterday for productive sputum

## 2019-02-19 NOTE — ED PROVIDER NOTE - CARE PLAN
Principal Discharge DX:	SVT (supraventricular tachycardia) Principal Discharge DX:	SVT (supraventricular tachycardia)  Goal:	recurrent

## 2019-02-19 NOTE — CONSULT NOTE ADULT - SUBJECTIVE AND OBJECTIVE BOX
[facility] Consultation    CHIEF COMPLAINT: Patient is a 68y old  Male who presents with a chief complaint of lightheadedness and palps    HPI:  Patient was seen by Dr. Houston in the office today for intermittent lightheadedness and palpitations and noted to have a supraventricular tachycardia. He was sent to the emergency department where he had a heart rate of 170 with some palpitations. He reports no dyspnea or chest pain    PAST MEDICAL & SURGICAL HISTORY:  Atrial flutter: 10/2017  GERD (gastroesophageal reflux disease)  Lung cancer: s/p radiation and chemotherapy  Smoking history  HTN (hypertension)  History of chemotherapy: left chest wall infusaport placement  H/O inguinal hernia repair: 1972      SOCIAL HISTORY: D/C tob 2017    FAMILY HISTORY: no CAD  No pertinent family history in first degree relatives      MEDICATIONS  (STANDING):  aspirin  chewable 324 milliGRAM(s) Oral once    Allergies    No Known Allergies    REVIEW OF SYSTEMS:    CONSTITUTIONAL: No weakness, fevers or chills  EYES: No visual changes, No diplopia  ENMT: No throat pain , No exudate  NECK: No pain or stiffness  RESPIRATORY: No cough, wheezing, hemoptysis; No shortness of breath  CARDIOVASCULAR: No chest pain ; pos palpitations  GASTROINTESTINAL: No abdominal pain. No nausea, vomiting, or hematemesis; No diarrhea or constipation. No melena or hematochezia.  GENITOURINARY: No dysuria, frequency or hematuria  NEUROLOGICAL: No numbness or weakness  SKIN: No itching or rash  All other review of systems is negative unless indicated above    VITAL SIGNS:   Vital Signs Last 24 Hrs  T(C): 36.7 (19 Feb 2019 15:17), Max: 36.7 (19 Feb 2019 15:17)  T(F): 98 (19 Feb 2019 15:17), Max: 98 (19 Feb 2019 15:17)  HR: 102 (19 Feb 2019 15:52) (102 - 172)  BP: 114/67 (19 Feb 2019 15:52) (106/75 - 114/67)  BP(mean): --  RR: 16 (19 Feb 2019 15:52) (16 - 16)  SpO2: 95% (19 Feb 2019 15:52) (95% - 100%)      PHYSICAL EXAM:    Constitutional: NAD, awake and alert, well-developed  Eyes:  EOMI,  Pupils round, no lesions  ENMT: no exudate or erythema  Pulmonary: Non-labored, breath sounds are clear bilaterally, No wheezing, rales or rhonchi  Cardiovascular: PMI not palpable tachy S1 and S2, no murmurs, rubs, gallops or clicks  Gastrointestinal: Bowel Sounds present, soft, nontender.   Lymph: No peripheral edema. No cervical lymphadenopathy.  Neurological: Alert, no focal deficits  Skin: No rashes. Changes of chronic venous stasis. No cyanosis.  Psych:  Mood & affect appropriate    LABS: All Labs Reviewed:                        12.5   14.33 )-----------( 580      ( 19 Feb 2019 15:47 )             37.3       ECG: SVT at 170/min      < from: Xray Chest 2 Views PA/Lat (11.21.17 @ 15:31) >    EXAM:  RAD CHEST PA LAT        PROCEDURE DATE:  Nov 21 2017         INTERPRETATION:  Clinical indications: Follow-up.    PA and lateral views of the chest are obtained.    Comparison is made with the prior chest x-ray of November 20, 2017.    IMPRESSION: There is a left Mediport with the tip in the superior vena   cava. There are trace bilateral pleural effusions with bibasilar   atelectasis. There has been interval removal of the left-sided chest tube   since the prior x-ray. There is a small left pneumothorax which is   predominantly unchanged since the prior chest x-ray of November 20, 2017   given differences in technique.                  CYNTHIA VILLAFANA M.D. ATTENDING RADIOLOGIST  This document has been electronically signed. Nov 21 2017  3:37PM                  < end of copied text >    < from: US Transthoracic Echocardiogram w/Doppler Complete (10.23.17 @ 16:52) >    EXAM:  US TTE W DOPPLER COMPLETE                                  PROCEDURE DATE:  10/23/2017          INTERPRETATION:    Indication: Atrial flutter    Technician: Tess Garay    Study Quality: Fair    Height 5 feet 11 inches, weight 175 pounds, blood pressure 146/82 mmHg    Measurements:  Aortic root size 3.8 cm, left it is 4.2 cm, left   ventricular end-diastolic diameter 4.8 cm, left ventricular end-systolic   diameter 3.2 cm, septal wall thickness 1.1 cm, posterior wall thickness   1.0 cm, aortic velocity 1.4 m/s, mitral E velocity 1.7 m/s, ejection   fraction 65%.     Mitral Valve: Appears thickened with normal opening with mild mitral   regurgitation  Aortic Valve: Mildly thickened with normal cusp excursion with trace   aortic regurgitation  Left Atrium: Mildly dilated  Left Ventricle: Normal size with normal wall thickness with normal left   ventricular systolic function.  Pericardium: No pericardial effusion  Tricuspid Valve: Appears normal with mild tricuspid regurgitation with   normal right ventricular systolic pressure 28 mmHg  Pulmonic Valve: Appears normal with trace pulmonic regurgitation  Right Ventricle: Normal size with normal right ventricular systolic   function  Right Atrium: Normal size    SUMMARY:  1.normal right and left ventricular systolic function  2. mild left atrial enlargement  3. mild mitral regurgitation.  4. Mild tricuspid regurgitation with normal right ventricular systolic   pressure  5. Mild aortic sclerosis with trace aortic regurgitation                  [facility]  This document has been electronically signed. Oct 24 2017  8:29AM                < end of copied text >

## 2019-02-19 NOTE — CONSULT NOTE ADULT - ASSESSMENT
A: 69 yo male w h/o lung ca (2017), HTN, p/w AVNRT.    - Currently in sinus rhythm and hemodynamically stable  - Admit to tele  - Start Toprol 25 BID  - Check TTE  - Will plan for medical management for now. If recurrent episodes of AVNRT will consider ablation.  - May place ILR prior to d/c.    Mendez Gray MD  Cardiology Fellow  p: [phone]

## 2019-02-19 NOTE — H&P ADULT - ASSESSMENT
pt with above history p/w dizziness episode of svt        - tele, EP eval, start low dose bb     Leukocytosis - check ct chest with out contrast for ? consolidation , start CAP coverage , check  procalcitonin     Renal insufficiency - pt received Contrast few weeks ago for ct chest , ?arf on ckd stage 3 , avoid nephrotoxic agents    Dizziness- given lung ca will obtain ct head to r/o mets     PAS for dvt prophylaxis     discussed management plan with pt

## 2019-02-19 NOTE — ED ADULT NURSE NOTE - OBJECTIVE STATEMENT
68 yr old male transfer from [facility] for SVT. per pt he was at the cardiologist today when he went into SVT pt was sent to [facility], when he arrived to the hospital he was in NSR and then went  back into SVT. pt was given 6mg of adenosine and ti broke. pt sent to [facility] for EP study. upon assessment pt is A&ox4, lungs clear carmina, abd is soft, non tender. pt denies chest pain, sob, fever,  chills, nausea or vomiting

## 2019-02-19 NOTE — ED PROVIDER NOTE - ENMT, MLM
Airway patent, Nasal mucosa clear. Mouth with normal mucosa. Throat has no vesicles, no oropharyngeal exudates and uvula is midline. MM  Moist. neck supple.

## 2019-02-19 NOTE — ED PROVIDER NOTE - PROGRESS NOTE DETAILS
Pt doing well sp adenosine, in NSR. Pt still asymtpomatic. Pt was seen by Dr HARDEEP Blackwood, jania started to give adenosine. He has dw Dr Licea, EP, at [place]. Pt to be xfered to Cath lab, Iggy Dr Licea. Pt doing well, still in NSR. [facility], ED Dr Razo, hospitalist Dr Godinez - pt accepted to ED [place]

## 2019-02-19 NOTE — CONSULT NOTE ADULT - ASSESSMENT
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 He is tolerating his from a hemodynamic standpoint with no angina, dyspnea, or other evidence of heart failure. I discussed the situation extensively with the patient and with Dr. Poli Licea of electrophysiology. He will be transferred to [facility] for electrophysiology evaluation and hopeful ablation for cure. This appears to be the most reasonable approach and the patient understands all of the ramifications involved. 60 minutes were spent with the patient and cord coordination of care personally by me

## 2019-02-19 NOTE — ED ADULT NURSE NOTE - NSIMPLEMENTINTERV_GEN_ALL_ED
Implemented All Universal Safety Interventions:  [place] to call system. Call bell, personal items and telephone within reach. Instruct patient to call for assistance. Room bathroom lighting operational. Non-slip footwear when patient is off stretcher. Physically safe environment: no spills, clutter or unnecessary equipment. Stretcher in lowest position, wheels locked, appropriate side rails in place.

## 2019-02-19 NOTE — ED PROVIDER NOTE - CARE PLAN
Principal Discharge DX:	SVT (supraventricular tachycardia) Principal Discharge DX:	SVT (supraventricular tachycardia)  Secondary Diagnosis:	Elevated troponin level

## 2019-02-19 NOTE — ED PROVIDER NOTE - CLINICAL SUMMARY MEDICAL DECISION MAKING FREE TEXT BOX
Attending MD aBiley: 68M with ho lung CA sp resection (reportedly in remission), HTN presenting as transfer from OSH with report of SVT that broke sp adenosine x 1 6mg. HR in 90s here, will confirm pt still in NSR. Patient had multiple bouts of palps and dizziness last week, presumably SVT as well. transferred for EP evaluation for likely ablation. Will maintain on tele, admit to hospitalist, EP will evaluate

## 2019-02-19 NOTE — ED ADULT NURSE NOTE - NSIMPLEMENTINTERV_GEN_ALL_ED
Implemented All Universal Safety Interventions:  [place] to call system. Call bell, personal items and telephone within reach. Instruct patient to call for assistance. Room bathroom lighting operational. Non-slip footwear when patient is off stretcher. Physically safe environment: no spills, clutter or unnecessary equipment. Stretcher in lowest position, wheels locked, appropriate side rails in place.

## 2019-02-19 NOTE — ED PROVIDER NOTE - OBJECTIVE STATEMENT
68M presentnig for evaluation of SVT from OSH. Patient felt dizzines and palpitations the day of presentation. Seen by his cardiology Dr Houston who stated patient was in SVT, referred to [facility]. Received adenosine x 1 there and converted to NSR. No chest pain, no dyspnea. No f/c. Feels well now

## 2019-02-19 NOTE — ED PROVIDER NOTE - PROGRESS NOTE DETAILS
Patient endorsed to Dr. Thomas, patient care to be assumed by this practitioner at this time. Attending MD Bailey: hsT noted to be elevated in 300s, no active chest pain, EKG here without diagnostic ischemic changes. Suspect demand from SVT but will continue to trend, aspirin load. Cardiology consulting.

## 2019-02-19 NOTE — CONSULT NOTE ADULT - SUBJECTIVE AND OBJECTIVE BOX
CHIEF COMPLAINT: Rapid heart rate    HISTORY OF PRESENT ILLNESS: Pt is a 67 yo male w h/o lung ca (2017), HTN who was sent to [facility] today for a rapid heart rate by his PMD. At [place] [facility] pt was found to be in AVNRT. He received Adenosine 6 mg x 1 and converted to sinus rhythm. He was transferred to [facility] for further management. Pt reports he was seeing his PMD for intermittent episodes of dizziness over the past few weeks. However while in AVNRT today he did not feel dizzy or any symptoms at all. He denies any recent chest pain or dyspnea on exertion. No peripheral edema or orthopnea.      Allergies    No Known Allergies    Intolerances    	    MEDICATIONS:  aspirin enteric coated 81 milliGRAM(s) Oral daily  metoprolol tartrate 12.5 milliGRAM(s) Oral two times a day    azithromycin  IVPB 500 milliGRAM(s) IV Intermittent every 24 hours  cefTRIAXone   IVPB 1 Gram(s) IV Intermittent every 24 hours                PAST MEDICAL & SURGICAL HISTORY:  Atrial flutter: 10/2017  GERD (gastroesophageal reflux disease)  Lung cancer: s/p radiation and chemotherapy  Smoking history  HTN (hypertension)  History of chemotherapy: left chest wall infusaport placement  H/O inguinal hernia repair: 1972      FAMILY HISTORY:  No pertinent family history in first degree relatives      SOCIAL HISTORY:    Former smoker (quit 2017, 40 pack yr history)      REVIEW OF SYSTEMS:  General: no fatigue/malaise, weight loss/gain.  Skin: no rashes.  Ophthalmologic: no blurred vision, no loss of vision. 	  ENT: no sore throat, rhinorrhea, sinus congestion.  Respiratory: no SOB, cough or wheeze.  Gastrointestinal:  no N/V/D, no melena/hematemesis/hematochezia.  Genitourinary: no dysuria/hesitancy or hematuria.  Musculoskeletal: no myalgias or arthralgias.  Neurological: no changes in vision or hearing, no syncope, intermittent episodes of dizziness  Psychiatric: no unusual stress/anxiety.   Hematology/Lymphatics: no unusual bleeding, bruising and no lymphadenopathy.  Endocrine: no unusual thirst.   All others negative except as stated above and in HPI.    PHYSICAL EXAM:  T(C): 36.6 (02-19-19 @ 22:43), Max: 37.1 (02-19-19 @ 19:13)  HR: 84 (02-19-19 @ 22:43) (84 - 172)  BP: 130/67 (02-19-19 @ 22:43) (103/70 - 130/67)  RR: 16 (02-19-19 @ 22:43) (16 - 18)  SpO2: 97% (02-19-19 @ 22:43) (93% - 100%)  Wt(kg): --  I&O's Summary      Appearance: No distress	  HEENT:   Normal oral mucosa, PERRL, EOMI	  Lymphatic: No lymphadenopathy  Cardiovascular: Normal S1 S2, No JVD, No murmurs, No edema  Respiratory: Lungs clear to auscultation	  Psychiatry: A & O x 3, Mood & affect appropriate  Gastrointestinal:  Soft, Non-tender, + BS	  Skin: No rashes, No ecchymoses, No cyanosis	  Neurologic: Non-focal  Extremities: Normal range of motion, No clubbing, cyanosis or edema  Vascular: Peripheral pulses palpable 2+ bilaterally        LABS:	 	    CBC Full  -  ( 19 Feb 2019 19:00 )  WBC Count : 12.8 K/uL  Hemoglobin : 10.8 g/dL  Hematocrit : 32.7 %  Platelet Count - Automated : 478 K/uL  Mean Cell Volume : 88.4 fl  Mean Cell Hemoglobin : 29.3 pg  Mean Cell Hemoglobin Concentration : 33.1 gm/dL  Auto Neutrophil # : 10.8 K/uL  Auto Lymphocyte # : 0.5 K/uL  Auto Monocyte # : 1.5 K/uL  Auto Eosinophil # : 0.0 K/uL  Auto Basophil # : 0.0 K/uL  Auto Neutrophil % : 84.4 %  Auto Lymphocyte % : 3.9 %  Auto Monocyte % : 11.5 %  Auto Eosinophil % : 0.2 %  Auto Basophil % : 0.0 %    02-19    135  |  101  |  30<H>  ----------------------------<  110<H>  4.9   |  22  |  1.76<H>  02-19    139  |  103  |  32<H>  ----------------------------<  139<H>  4.6   |  23  |  2.00<H>    Ca    8.8      19 Feb 2019 19:00  Ca    8.7      19 Feb 2019 15:47    TPro  7.2  /  Alb  2.5<L>  /  TBili  0.6  /  DBili  x   /  AST  24  /  ALT  45  /  AlkPhos  119  02-19      proBNP:   Lipid Profile:   HgA1c:   TSH:       CARDIAC MARKERS:  Troponin I, Serum: .522 ng/mL (02-19 @ 15:47)            TELEMETRY: 	    ECG:  	From outside hospital while in SVT, appears to be AVNRT, short RP tachycardia,  w ST depressions in V2-V5 and t wave inv in aVL. After SVT resolved EKG showed sinus rhythm  T wave inv aVL  RADIOLOGY:  OTHER: 	    PREVIOUS DIAGNOSTIC TESTING:    [ ] Echocardiogram:   [ ]  Catheterization:   [ ] Stress Test:

## 2019-02-20 LAB
ALBUMIN SERPL ELPH-MCNC: 2.5 G/DL — LOW (ref 3.3–5)
ALP SERPL-CCNC: 87 U/L — SIGNIFICANT CHANGE UP (ref 40–120)
ALT FLD-CCNC: 33 U/L — SIGNIFICANT CHANGE UP (ref 10–45)
ANION GAP SERPL CALC-SCNC: 12 MMOL/L — SIGNIFICANT CHANGE UP (ref 5–17)
ANISOCYTOSIS BLD QL: SLIGHT — SIGNIFICANT CHANGE UP
AST SERPL-CCNC: 20 U/L — SIGNIFICANT CHANGE UP (ref 10–40)
BASOPHILS # BLD AUTO: 0.04 K/UL — SIGNIFICANT CHANGE UP (ref 0–0.2)
BASOPHILS NFR BLD AUTO: 0.3 % — SIGNIFICANT CHANGE UP (ref 0–2)
BILIRUB DIRECT SERPL-MCNC: 0.1 MG/DL — SIGNIFICANT CHANGE UP (ref 0–0.2)
BILIRUB INDIRECT FLD-MCNC: 0.2 MG/DL — SIGNIFICANT CHANGE UP (ref 0.2–1)
BILIRUB SERPL-MCNC: 0.3 MG/DL — SIGNIFICANT CHANGE UP (ref 0.2–1.2)
BUN SERPL-MCNC: 27 MG/DL — HIGH (ref 7–23)
CALCIUM SERPL-MCNC: 8.6 MG/DL — SIGNIFICANT CHANGE UP (ref 8.4–10.5)
CHLORIDE SERPL-SCNC: 104 MMOL/L — SIGNIFICANT CHANGE UP (ref 96–108)
CO2 SERPL-SCNC: 20 MMOL/L — LOW (ref 22–31)
CREAT SERPL-MCNC: 1.41 MG/DL — HIGH (ref 0.5–1.3)
EOSINOPHIL # BLD AUTO: 0.14 K/UL — SIGNIFICANT CHANGE UP (ref 0–0.5)
EOSINOPHIL NFR BLD AUTO: 1.2 % — SIGNIFICANT CHANGE UP (ref 0–6)
GLUCOSE SERPL-MCNC: 106 MG/DL — HIGH (ref 70–99)
HCT VFR BLD CALC: 32.7 % — LOW (ref 39–50)
HGB BLD-MCNC: 10.7 G/DL — LOW (ref 13–17)
HYPOCHROMIA BLD QL: SLIGHT — SIGNIFICANT CHANGE UP
IMM GRANULOCYTES NFR BLD AUTO: 0.5 % — SIGNIFICANT CHANGE UP (ref 0–1.5)
LYMPHOCYTES # BLD AUTO: 0.87 K/UL — LOW (ref 1–3.3)
LYMPHOCYTES # BLD AUTO: 7.5 % — LOW (ref 13–44)
MANUAL SMEAR VERIFICATION: SIGNIFICANT CHANGE UP
MCHC RBC-ENTMCNC: 28.8 PG — SIGNIFICANT CHANGE UP (ref 27–34)
MCHC RBC-ENTMCNC: 32.7 GM/DL — SIGNIFICANT CHANGE UP (ref 32–36)
MCV RBC AUTO: 88.1 FL — SIGNIFICANT CHANGE UP (ref 80–100)
MONOCYTES # BLD AUTO: 1.79 K/UL — HIGH (ref 0–0.9)
MONOCYTES NFR BLD AUTO: 15.4 % — HIGH (ref 2–14)
NEUTROPHILS # BLD AUTO: 8.73 K/UL — HIGH (ref 1.8–7.4)
NEUTROPHILS NFR BLD AUTO: 75.1 % — SIGNIFICANT CHANGE UP (ref 43–77)
PLAT MORPH BLD: NORMAL — SIGNIFICANT CHANGE UP
PLATELET # BLD AUTO: 465 K/UL — HIGH (ref 150–400)
POIKILOCYTOSIS BLD QL AUTO: SLIGHT — SIGNIFICANT CHANGE UP
POTASSIUM SERPL-MCNC: 4.6 MMOL/L — SIGNIFICANT CHANGE UP (ref 3.5–5.3)
POTASSIUM SERPL-SCNC: 4.6 MMOL/L — SIGNIFICANT CHANGE UP (ref 3.5–5.3)
PROT SERPL-MCNC: 5.8 G/DL — LOW (ref 6–8.3)
RBC # BLD: 3.71 M/UL — LOW (ref 4.2–5.8)
RBC # FLD: 15.5 % — HIGH (ref 10.3–14.5)
RBC BLD AUTO: ABNORMAL
SODIUM SERPL-SCNC: 136 MMOL/L — SIGNIFICANT CHANGE UP (ref 135–145)
TARGETS BLD QL SMEAR: SLIGHT — SIGNIFICANT CHANGE UP
TROPONIN T, HIGH SENSITIVITY RESULT: 302 NG/L — HIGH (ref 0–51)
TROPONIN T, HIGH SENSITIVITY RESULT: 310 NG/L — HIGH (ref 0–51)
WBC # BLD: 11.63 K/UL — HIGH (ref 3.8–10.5)
WBC # FLD AUTO: 11.63 K/UL — HIGH (ref 3.8–10.5)

## 2019-02-20 PROCEDURE — 93306 TTE W/DOPPLER COMPLETE: CPT | Mod: 26

## 2019-02-20 PROCEDURE — 93010 ELECTROCARDIOGRAM REPORT: CPT | Mod: 76

## 2019-02-20 PROCEDURE — 99254 IP/OBS CNSLTJ NEW/EST MOD 60: CPT

## 2019-02-20 PROCEDURE — 99255 IP/OBS CONSLTJ NEW/EST HI 80: CPT

## 2019-02-20 RX ORDER — DILTIAZEM HCL 120 MG
5 CAPSULE, EXT RELEASE 24 HR ORAL
Qty: 125 | Refills: 0 | Status: DISCONTINUED | OUTPATIENT
Start: 2019-02-20 | End: 2019-02-22

## 2019-02-20 RX ORDER — HEPARIN SODIUM 5000 [USP'U]/ML
5000 INJECTION INTRAVENOUS; SUBCUTANEOUS EVERY 8 HOURS
Qty: 0 | Refills: 0 | Status: DISCONTINUED | OUTPATIENT
Start: 2019-02-20 | End: 2019-02-22

## 2019-02-20 RX ORDER — HEPARIN SODIUM 5000 [USP'U]/ML
6500 INJECTION INTRAVENOUS; SUBCUTANEOUS EVERY 6 HOURS
Qty: 0 | Refills: 0 | Status: DISCONTINUED | OUTPATIENT
Start: 2019-02-20 | End: 2019-02-20

## 2019-02-20 RX ORDER — SODIUM CHLORIDE 9 MG/ML
1000 INJECTION INTRAMUSCULAR; INTRAVENOUS; SUBCUTANEOUS ONCE
Qty: 0 | Refills: 0 | Status: COMPLETED | OUTPATIENT
Start: 2019-02-20 | End: 2019-02-20

## 2019-02-20 RX ORDER — HEPARIN SODIUM 5000 [USP'U]/ML
INJECTION INTRAVENOUS; SUBCUTANEOUS
Qty: 25000 | Refills: 0 | Status: DISCONTINUED | OUTPATIENT
Start: 2019-02-20 | End: 2019-02-20

## 2019-02-20 RX ORDER — HEPARIN SODIUM 5000 [USP'U]/ML
3000 INJECTION INTRAVENOUS; SUBCUTANEOUS EVERY 6 HOURS
Qty: 0 | Refills: 0 | Status: DISCONTINUED | OUTPATIENT
Start: 2019-02-20 | End: 2019-02-20

## 2019-02-20 RX ADMIN — Medication 81 MILLIGRAM(S): at 11:50

## 2019-02-20 RX ADMIN — Medication 12.5 MILLIGRAM(S): at 05:16

## 2019-02-20 RX ADMIN — CEFTRIAXONE 100 GRAM(S): 500 INJECTION, POWDER, FOR SOLUTION INTRAMUSCULAR; INTRAVENOUS at 01:21

## 2019-02-20 RX ADMIN — Medication 12.5 MILLIGRAM(S): at 16:00

## 2019-02-20 RX ADMIN — Medication 15 MG/HR: at 23:55

## 2019-02-20 RX ADMIN — HEPARIN SODIUM 5000 UNIT(S): 5000 INJECTION INTRAVENOUS; SUBCUTANEOUS at 23:54

## 2019-02-20 RX ADMIN — SODIUM CHLORIDE 1000 MILLILITER(S): 9 INJECTION INTRAMUSCULAR; INTRAVENOUS; SUBCUTANEOUS at 21:19

## 2019-02-20 RX ADMIN — Medication 10 MG/HR: at 21:43

## 2019-02-20 NOTE — CHART NOTE - NSCHARTNOTEFT_GEN_A_CORE
RN asked to evaluate patient. Patient seen and examined. No chest pain, palpitations, SOB. Review of tele shows Atrial Flutter with HR 140s. EKG obtained at 20:14 c/w Atrial Flutter.    Plan:  1. Cardizem 10mg IVP now  2. Cardizem gtt, goal HR < 120  3. CHADSVASC= 2, start heparin for AC    Discussed with Dr. Amarilys Mane M.D.  Cardiology Fellow RN asked to evaluate patient. Patient seen and examined. No chest pain, palpitations, SOB. Review of tele shows Atrial Flutter with HR 140s. EKG obtained at 20:14 demonstrating Atrial Flutter, 2:1 conduction, .    Plan:  1. Cardizem 10mg IVP now  2. Cardizem gtt, goal HR < 120  3. CHADSVASC= 2, start Eliquis 5mg BID    Discussed with Dr. Amarilys Mane M.D.  Cardiology Fellow

## 2019-02-20 NOTE — PROGRESS NOTE ADULT - ASSESSMENT
68 year old male with h/o lung cancer s/p radiation/ chemo, HTN, GERD, Atrial flutter transferred from [place] for evaluation and management of SVT.     - evaluated by cards- ct chest with pericardial effusion / rt pleural effusion, echo with moderate effusion, thoracic sx eval   - EP evaluated pt , cont toprol 25 , ? ILR  - Evaluated by ID  for leukocytosis - as per ID , observe off abx

## 2019-02-20 NOTE — CHART NOTE - NSCHARTNOTEFT_GEN_A_CORE
S: Patient feeling well. No current complaints    O:   Gen: lying in bed in NAD  Heart: RRR, nl s1, s2  Lungs: decreased bibasilar breath sounds with left sided crackles  Abd: soft, NTND  Ext: no edema    A/P:  - 2 brief episodes of SVT overnight, asymptomatic, lasted about 10 seconds  - would switch metoprolol to succinate and increase to 25mg PO BID as BP tolerates

## 2019-02-20 NOTE — PROVIDER CONTACT NOTE (OTHER) - ASSESSMENT
Pt A&Ox4, all other VSS at thia time. Pt denies chest pain, pressure or palpitations. Pt hemodynamically stable. Pt A&Ox4, all other VSS at this time. Pt denies chest pain, pressure or palpitations. Pt hemodynamically stable.

## 2019-02-20 NOTE — PROVIDER CONTACT NOTE (OTHER) - ACTION/TREATMENT ORDERED:
PA aware. States to reschedule metoprolol 12.5P PO to now and give. Will continue to monitor patient. PA aware. States to reschedule metoprolol 12.5mg PO to now and give. Will continue to monitor patient.

## 2019-02-20 NOTE — CONSULT NOTE ADULT - ASSESSMENT
68 year old male with h/o lung cancer s/p radiation/ chemo, HTN, GERD, Atrial flutter transferred from [facility] for evaluation and management of SVT.     Initially started as dizziness 2 weeks ago while at work  Went to [facility] found to be hypotensive and stopped BP meds  Saw his Cardiologist found to be in SVT, sent to [facility] ED was given Adenosine and now sent to [facility] for EP evaluation  Started on amoxicillin yesterday for occasional productive cough with scant yellow sputum  Found to have leukocytosis I suspect is reactive  Remained afebrile  RASHAWN which has been improving  CT Head performed for dizziness found to have calvarial lucencies may represent bony metastatic disease  CT Chest with large pericardial effusion measuring high than simple fluid - possibly hemorrhage is increased. New moderate layering right pleural effusion measuring simple fluid  No pneumonia noted on CT chest  Nontoxic    Recommend:  -Discontinue ceftriaxone and azithromycin  -Continue to monitor off antibiotics  -TTE for pericardial effusion  -Will defer to primary team regarding metastatic workup regarding CT head findings - MRI and bone scan

## 2019-02-20 NOTE — CONSULT NOTE ADULT - ASSESSMENT
Nam has several ongoing issues with the main reason for his emergency department visit due to dizziness. The symptoms do not clearly correlate with his documented paroxysmal supraventricular tachycardia. He continues to have some episodes of SVT overnight, likely AVNRT. This was discussed extensively with Dr. Licea of electrophysiology and the patient will likely take a conservative approach. His other issues given his history of lung cancer now with a right pleural effusion and a possible pericardial effusion. He has an elevated white blood count although no clear evidence for infection    Recommend    Continue electrophysiology followup with scheduling of implantable loop recorder    Continue beta blocker therapy for now    Echocardiography to evaluate for pericardial effusion    Infectious disease consult in progress    Consider oncology followup    Further management will be dependent on his clinical course.    His medical situation is complicated and was discussed extensively with the patient Nam has several ongoing issues with the main reason for his emergency department visit due to dizziness. The symptoms do not clearly correlate with his documented paroxysmal supraventricular tachycardia. He continues to have some episodes of SVT overnight, likely AVNRT. This was discussed extensively with Dr. Licea of electrophysiology and the patient will likely take a conservative approach. His other issues given his history of lung cancer now with a right pleural effusion and a possible pericardial effusion. He has an elevated white blood count although no clear evidence for infection  The etiology of his mildly elevated troponin level is unclear but could theoretically represent demand ischemia in the setting of his rapid SVT    Recommend    Continue electrophysiology followup with scheduling of implantable loop recorder    Continue beta blocker therapy for now    Echocardiography to evaluate for pericardial effusion    Infectious disease consult in progress    Consider oncology followup    DVT prophylaxis    Repeat troponin level    Further management will be dependent on his clinical course.    His medical situation is complicated and was discussed extensively with the patient

## 2019-02-20 NOTE — PROGRESS NOTE ADULT - SUBJECTIVE AND OBJECTIVE BOX
SUBJECTIVE / OVERNIGHT EVENTS: pt denies chest pain, shortness of breath, nausea, vomiting dizziness      MEDICATIONS  (STANDING):  aspirin enteric coated 81 milliGRAM(s) Oral daily  diltiazem Injectable 10 milliGRAM(s) IV Push once  metoprolol tartrate 12.5 milliGRAM(s) Oral two times a day  sodium chloride 0.9% Bolus 1000 milliLiter(s) IV Bolus once    MEDICATIONS  (PRN):      Vital Signs Last 24 Hrs  T(C): 36.6 (20 Feb 2019 20:53), Max: 37.1 (20 Feb 2019 18:14)  T(F): 97.9 (20 Feb 2019 20:53), Max: 98.8 (20 Feb 2019 18:14)  HR: 140 (20 Feb 2019 20:53) (84 - 140)  BP: 112/76 (20 Feb 2019 20:53) (100/76 - 134/92)  BP(mean): --  RR: 18 (20 Feb 2019 20:53) (16 - 20)  SpO2: 94% (20 Feb 2019 20:53) (92% - 100%)  CAPILLARY BLOOD GLUCOSE        I&O's Summary      Constitutional: No fever, fatigue  Skin: No rash.  Eyes: No recent vision problems or eye pain.  ENT: No congestion, ear pain, or sore throat.  Cardiovascular: No chest pain or palpation.  Respiratory: No cough, shortness of breath, congestion, or wheezing.  Gastrointestinal: No abdominal pain, nausea, vomiting, or diarrhea.  Genitourinary: No dysuria.  Musculoskeletal: No joint swelling.  Neurologic: No headache.    PHYSICAL EXAM:  GENERAL: NAD  EYES: EOMI, PERRLA  NECK: Supple, No JVD  CHEST/LUNG: dec breath sounds rt base   HEART:  S1 , S2 +  ABDOMEN: soft , bs+  EXTREMITIES:no edema    NEUROLOGY:alert awake oriented       LABS:                        10.7   11.63 )-----------( 465      ( 20 Feb 2019 13:10 )             32.7     02-20    136  |  104  |  27<H>  ----------------------------<  106<H>  4.6   |  20<L>  |  1.41<H>    Ca    8.6      20 Feb 2019 05:33    TPro  5.8<L>  /  Alb  2.5<L>  /  TBili  0.3  /  DBili  0.1  /  AST  20  /  ALT  33  /  AlkPhos  87  02-20      CARDIAC MARKERS ( 19 Feb 2019 20:04 )  x     / x     / x     / x     / 2.5 ng/mL  CARDIAC MARKERS ( 19 Feb 2019 15:47 )  .522 ng/mL / x     / 41 U/L / x     / x              RADIOLOGY & ADDITIONAL TESTS:    Imaging Personally Reviewed:    Consultant(s) Notes Reviewed:      Care Discussed with Consultants/Other Providers:

## 2019-02-20 NOTE — CONSULT NOTE ADULT - SUBJECTIVE AND OBJECTIVE BOX
[facility] Consultation    CHIEF COMPLAINT: Patient is a 68y old  Male who presents with a chief complaint of Dizziness (20 Feb 2019 08:59)      HPI:  68M h/o lung cancer s/p radiation/ chemo, htn, gerd, atrial flutter transferred from [place] for eval and management of SVT , Pt says he felt dizzy 2 weeks ago while at work , went to [facility] found to be hypotensive was told not to take bp meds , pt went to his cardiologist found to be in svt , sent to [place] er was given Adenosine sent to [facility] , pt denies chest pain, shortness of breath, nausea,v headache, dizziness, weakness numbness in ext , fever , was started on amoxicillin yesterday for productive sputum (19 Feb 2019 22:32)    He is awake and alert this morning with no significant palpitations. He was noted to have short runs of supraventricular tachycardia but did not have any specific symptoms.      PAST MEDICAL & SURGICAL HISTORY:  Atrial flutter: 10/2017  GERD (gastroesophageal reflux disease)  Lung cancer: s/p radiation and chemotherapy  Smoking history  HTN (hypertension)  History of chemotherapy: left chest wall infusaport placement  H/O inguinal hernia repair: 1972      SOCIAL HISTORY: d/c tob 2017    FAMILY HISTORY: no CAD  No pertinent family history in first degree relatives      MEDICATIONS  (STANDING):  aspirin enteric coated 81 milliGRAM(s) Oral daily  cefTRIAXone   IVPB 1 Gram(s) IV Intermittent every 24 hours  metoprolol tartrate 12.5 milliGRAM(s) Oral two times a day    MEDICATIONS  (PRN):      Allergies    No Known Allergies    Intolerances        REVIEW OF SYSTEMS:    CONSTITUTIONAL: No weakness, fevers or chills  EYES: No visual changes, No diplopia  ENMT: No throat pain , No exudate  NECK: No pain or stiffness  RESPIRATORY: pos cough, no wheezing, hemoptysis; No shortness of breath  CARDIOVASCULAR: No chest pain or palpitations  GASTROINTESTINAL: No abdominal pain. No nausea, vomiting, or hematemesis; No diarrhea or constipation. No melena or hematochezia.  GENITOURINARY: No dysuria, frequency or hematuria  NEUROLOGICAL: No numbness or weakness  SKIN: No itching or rash  All other review of systems is negative unless indicated above    VITAL SIGNS:   Vital Signs Last 24 Hrs  T(C): 37 (20 Feb 2019 09:34), Max: 37.1 (19 Feb 2019 19:13)  T(F): 98.6 (20 Feb 2019 09:34), Max: 98.7 (19 Feb 2019 19:13)  HR: 98 (20 Feb 2019 09:34) (84 - 172)  BP: 134/92 (20 Feb 2019 09:34) (100/76 - 134/92)  BP(mean): --  RR: 20 (20 Feb 2019 09:34) (16 - 20)  SpO2: 96% (20 Feb 2019 09:34) (93% - 100%)    I&O's Summary      PHYSICAL EXAM:    Constitutional: NAD, awake and alert, well-developed  Eyes:  EOMI,  Pupils round, no lesions  ENMT: no exudate or erythema  Pulmonary: few scattered rhonchi  Cardiovascular: PMI not palpable non-displaced Regular S1 and S2, no murmurs, rubs, gallops or clicks  Gastrointestinal: Bowel Sounds present, soft, nontender.   Lymph: No peripheral edema. No cervical lymphadenopathy.  Neurological: Alert, no focal deficits  Skin: No rashes. Changes of chronic venous stasis. No cyanosis.  Psych:  Mood & affect appropriate    LABS: All Labs Reviewed:                        10.8   12.8  )-----------( 478      ( 19 Feb 2019 19:00 )             32.7                         12.5   14.33 )-----------( 580      ( 19 Feb 2019 15:47 )             37.3     20 Feb 2019 05:33    136    |  104    |  27     ----------------------------<  106    4.6     |  20     |  1.41   19 Feb 2019 19:00    135    |  101    |  30     ----------------------------<  110    4.9     |  22     |  1.76   19 Feb 2019 15:47    139    |  103    |  32     ----------------------------<  139    4.6     |  23     |  2.00     Ca    8.6        20 Feb 2019 05:33  Ca    8.8        19 Feb 2019 19:00  Ca    8.7        19 Feb 2019 15:47    TPro  5.8    /  Alb  2.5    /  TBili  0.3    /  DBili  0.1    /  AST  20     /  ALT  33     /  AlkPhos  87     20 Feb 2019 05:33  TPro  7.2    /  Alb  2.5    /  TBili  0.6    /  DBili  x      /  AST  24     /  ALT  45     /  AlkPhos  119    19 Feb 2019 15:47      CARDIAC MARKERS ( 19 Feb 2019 20:04 )  x     / x     / x     / x     / 2.5 ng/mL  CARDIAC MARKERS ( 19 Feb 2019 15:47 )  .522 ng/mL / x     / 41 U/L / x     / x          < from: 12 Lead ECG (02.20.19 @ 00:18) >    Ventricular Rate 91 BPM    Atrial Rate 91 BPM    P-R Interval 134 ms    QRS Duration 86 ms    Q-T Interval 384 ms    QTC Calculation(Bezet) 472 ms    P Axis 64 degrees    R Axis 7 degrees    T Axis 85 degrees    Diagnosis Line NORMAL SINUS RHYTHM  CANNOT RULE OUT INFERIOR INFARCT , AGE UNDETERMINED  ABNORMAL ECG    Confirmed by ATTENDING, ED ([id number]),  JEFF MEJIA ([id number]) on 2/20/2019 7:06:12 AM    < end of copied text >    < from: CT Chest No Cont (02.19.19 @ 23:45) >    EXAM:  CT CHEST                            PROCEDURE DATE:  02/19/2019            INTERPRETATION:  CT CHEST WITHOUT CONTRAST    INDICATION: Lung cancer. Metastatic mucinous adenocarcinoma.    TECHNIQUE: Unenhanced helical images were obtained of the chest. Coronal   and sagittal images were reconstructed. Maximum intensity projection   images were generated.     COMPARISON: CT chest 5/20/2017.    FINDINGS:     TUBES/LINES: Tip of the left Mediport catheter within SVC.    AIRWAYS, LUNGS, PLEURA:Again noted the patient is status post left lower   lobectomy. Moderate amount of secretion and narrowing of the left main   bronchus is again noted. Foci of mucoid impaction within the basilar   right lower lobe.     Small-moderate layering right pleural effusion measuring simple fluid and   partial atelectasis of right lower lobe is new from prior. A trace left   pleural effusion and posttreatment changes within the left lower lung are   unchanged.    Moderate centrilobular emphysema. No pneumothorax.    MEDIASTINUM, LYMPH NODES: 0.8 cm short axis right lower paratracheal   lymph node (series 2 image 51) is new from prior. Multiple subcentimeter   periesophageal lymph nodes are unchanged.    HEART AND VASCULATURE: Normal heart size. Large pericardial effusion   measuring higher than simple fluid, possibly representing hemorrhage is   increased. The thoracic aorta and pulmonary artery are normal in course   and caliber. Mild calcific coronary atherosclerosis.    UPPER ABDOMEN: Small hiatal hernia. Multiple hepatic cysts. Severe left   hydronephrosis is unchanged. Cholelithiasis.    BONES AND SOFT TISSUES: Severe compression fracture deformity of T6 and   T12 vertebral bodies, likely pathology unchanged.    LOWER NECK: Unremarkable.    IMPRESSION:     1.  In comparison with 1/19/2019, large pericardial effusion measuring   higher than simple fluid (possibly hemorrhage) is increased. Recommend   further evaluation with echocardiogram.    2.  New moderate layering right pleural effusion measuring simple fluid   and partial atelectasis of right lower lobe.                    ZIYAD GLASS M.D., ATTENDING RADIOLOGIST  This document has been electronically signed. Feb 20 2019  8:33AM                < end of copied text >  < from: US Transthoracic Echocardiogram w/Doppler Complete (10.23.17 @ 16:52) >    EXAM:  US TTE W DOPPLER COMPLETE                                  PROCEDURE DATE:  10/23/2017          INTERPRETATION:    Indication: Atrial flutter    Technician: Tess Garay    Study Quality: Fair    Height 5 feet 11 inches, weight 175 pounds, blood pressure 146/82 mmHg    Measurements:  Aortic root size 3.8 cm, left it is 4.2 cm, left   ventricular end-diastolic diameter 4.8 cm, left ventricular end-systolic   diameter 3.2 cm, septal wall thickness 1.1 cm, posterior wall thickness   1.0 cm, aortic velocity 1.4 m/s, mitral E velocity 1.7 m/s, ejection   fraction 65%.     Mitral Valve: Appears thickened with normal opening with mild mitral   regurgitation  Aortic Valve: Mildly thickened with normal cusp excursion with trace   aortic regurgitation  Left Atrium: Mildly dilated  Left Ventricle: Normal size with normal wall thickness with normal left   ventricular systolic function.  Pericardium: No pericardial effusion  Tricuspid Valve: Appears normal with mild tricuspid regurgitation with   normal right ventricular systolic pressure 28 mmHg  Pulmonic Valve: Appears normal with trace pulmonic regurgitation  Right Ventricle: Normal size with normal right ventricular systolic   function  Right Atrium: Normal size    SUMMARY:  1.normal right and left ventricular systolic function  2. mild left atrial enlargement  3. mild mitral regurgitation.  4. Mild tricuspid regurgitation with normal right ventricular systolic   pressure  5. Mild aortic sclerosis with trace aortic regurgitation                  SANDRA R [facility]  This document has been electronically signed. Oct 24 2017  8:29AM                < end of copied text >

## 2019-02-20 NOTE — CONSULT NOTE ADULT - SUBJECTIVE AND OBJECTIVE BOX
HPI:  68 year old male with h/o lung cancer s/p radiation/ chemo, HTN, GERD, Atrial flutter transferred from [place] for evaluation and management of SVT. Pt states he felt dizzy 2 weeks ago while at work , went to [facility] found to be hypotensive was told not to take his BP meds. He then went to his cardiologist found to be in SVT, sent to [place] ED was given Adenosine and sent to [facility]. Patient denies chest pain, shortness of breath, nausea, vomiting, headache, dizziness, weakness numbness in extremities, fever, was started on amoxicillin yesterday for productive sputum. Found to have leukocytosis, has remained afebrile, RASHAWN which has been improving. CT Head performed for dizziness found to have calvarial lucencies may represent bony metastatic disease. CT Chest with large pericardial effusion measuring high than simple fluid - possibly hemorrhage is increased. New moderate layering right pleural effusion measuring simple fluid.    PAST MEDICAL & SURGICAL HISTORY:  Atrial flutter: 10/2017  GERD (gastroesophageal reflux disease)  Lung cancer: s/p radiation and chemotherapy  Smoking history  HTN (hypertension)  History of chemotherapy: left chest wall infusaport placement  H/O inguinal hernia repair: 1972    Allergies    No Known Allergies    Intolerances    ANTIMICROBIALS:  azithromycin  IVPB 500 every 24 hours  cefTRIAXone   IVPB 1 every 24 hours    OTHER MEDS:  aspirin enteric coated 81 milliGRAM(s) Oral daily  metoprolol tartrate 12.5 milliGRAM(s) Oral two times a day    SOCIAL HISTORY:    FAMILY HISTORY:  No pertinent family history in first degree relatives    Drug Dosing Weight  Height (cm): 180.34 (19 Feb 2019 15:17)  Weight (kg): 83.9 (19 Feb 2019 15:17)  BMI (kg/m2): 25.8 (19 Feb 2019 15:17)  BSA (m2): 2.04 (19 Feb 2019 15:17)    PE:    Vital Signs Last 24 Hrs  T(C): 36.8 (20 Feb 2019 04:13), Max: 37.1 (19 Feb 2019 19:13)  T(F): 98.2 (20 Feb 2019 04:13), Max: 98.7 (19 Feb 2019 19:13)  HR: 100 (20 Feb 2019 04:13) (84 - 172)  BP: 102/70 (20 Feb 2019 04:13) (100/76 - 130/67)  BP(mean): --  RR: 18 (20 Feb 2019 04:13) (16 - 18)  SpO2: 96% (20 Feb 2019 04:13) (93% - 100%)    Gen: AOx3, NAD, non-toxic, pleasant  CV: S1+S2 normal, no murmurs, nontachycardic  Resp: Clear bilat, no resp distress, no crackles/wheezes  Abd: Soft, nontender, +BS  Ext: No LE edema, no wounds  : No Martin  IV/Skin: No thrombophlebitis  Msk: No low back pain, no arthralgias, no joint swelling  Neuro: No sensory deficits, no motor deficits    LABS:                          10.8   12.8  )-----------( 478      ( 19 Feb 2019 19:00 )             32.7       02-20    136  |  104  |  27<H>  ----------------------------<  106<H>  4.6   |  20<L>  |  1.41<H>    Ca    8.6      20 Feb 2019 05:33    TPro  5.8<L>  /  Alb  2.5<L>  /  TBili  0.3  /  DBili  0.1  /  AST  20  /  ALT  33  /  AlkPhos  87  02-20    MICROBIOLOGY:  v    RADIOLOGY:    < from: CT Head No Cont (02.19.19 @ 23:45) >  Impression:    No evidence for parenchymal metastatic disease. MRI is recommended as a   more sensitive examination for the detection of intracranial metastatic   disease.  Scattered calvarial lucencies may represent bony metastatic disease.   Nuclear medicine bone scan can be obtained for further evaluation.      < end of copied text >    < from: CT Chest No Cont (02.19.19 @ 23:45) >  IMPRESSION:     1.  In comparison with 1/19/2019, large pericardial effusion measuring   higher than simple fluid (possibly hemorrhage) is increased. Recommend   further evaluation with echocardiogram.    2.  New moderate layering right pleural effusion measuring simple fluid   and partial atelectasis of right lower lobe.    < end of copied text > HPI:  68 year old male with h/o lung cancer s/p radiation/ chemo, HTN, GERD, Atrial flutter transferred from [place] for evaluation and management of SVT. Pt states he felt dizzy 2 weeks ago while at work , went to [facility] found to be hypotensive was told not to take his BP meds. He then went to his cardiologist found to be in SVT, sent to [place] ED was given Adenosine and sent to [facility]. Patient denies chest pain, shortness of breath, nausea, vomiting, headache, dizziness, weakness numbness in extremities, fever, was started on amoxicillin yesterday for occasional productive cough with scant yellow sputum. Found to have leukocytosis, has remained afebrile, RASHAWN which has been improving. CT Head performed for dizziness found to have calvarial lucencies may represent bony metastatic disease. CT Chest with large pericardial effusion measuring high than simple fluid - possibly hemorrhage is increased. New moderate layering right pleural effusion measuring simple fluid.    PAST MEDICAL & SURGICAL HISTORY:  Atrial flutter: 10/2017  GERD (gastroesophageal reflux disease)  Lung cancer: s/p radiation and chemotherapy  Smoking history  HTN (hypertension)  History of chemotherapy: left chest wall infusaport placement  H/O inguinal hernia repair: 1972    Allergies    No Known Allergies    Intolerances    ANTIMICROBIALS:  azithromycin  IVPB 500 every 24 hours  cefTRIAXone   IVPB 1 every 24 hours    OTHER MEDS:  aspirin enteric coated 81 milliGRAM(s) Oral daily  metoprolol tartrate 12.5 milliGRAM(s) Oral two times a day    SOCIAL HISTORY: Former smoker    FAMILY HISTORY:  Mother and father with DM    Drug Dosing Weight  Height (cm): 180.34 (19 Feb 2019 15:17)  Weight (kg): 83.9 (19 Feb 2019 15:17)  BMI (kg/m2): 25.8 (19 Feb 2019 15:17)  BSA (m2): 2.04 (19 Feb 2019 15:17)    PE:    Vital Signs Last 24 Hrs  T(C): 36.8 (20 Feb 2019 04:13), Max: 37.1 (19 Feb 2019 19:13)  T(F): 98.2 (20 Feb 2019 04:13), Max: 98.7 (19 Feb 2019 19:13)  HR: 100 (20 Feb 2019 04:13) (84 - 172)  BP: 102/70 (20 Feb 2019 04:13) (100/76 - 130/67)  BP(mean): --  RR: 18 (20 Feb 2019 04:13) (16 - 18)  SpO2: 96% (20 Feb 2019 04:13) (93% - 100%)    Gen: AOx3, NAD, non-toxic, pleasant  CV: S1+S2 normal, no murmurs  Resp: Crackles LLL, decreased BS in RLL  Abd: Soft, nontender, +BS  Ext: No LE edema, no wounds  : No Martin  IV/Skin: No thrombophlebitis, mediport intact  Msk: No low back pain, no arthralgias, no joint swelling  Neuro: No sensory deficits, no motor deficits    LABS:                          10.8   12.8  )-----------( 478      ( 19 Feb 2019 19:00 )             32.7       02-20    136  |  104  |  27<H>  ----------------------------<  106<H>  4.6   |  20<L>  |  1.41<H>    Ca    8.6      20 Feb 2019 05:33    TPro  5.8<L>  /  Alb  2.5<L>  /  TBili  0.3  /  DBili  0.1  /  AST  20  /  ALT  33  /  AlkPhos  87  02-20    MICROBIOLOGY:  v    RADIOLOGY:    < from: CT Head No Cont (02.19.19 @ 23:45) >  Impression:    No evidence for parenchymal metastatic disease. MRI is recommended as a   more sensitive examination for the detection of intracranial metastatic   disease.  Scattered calvarial lucencies may represent bony metastatic disease.   Nuclear medicine bone scan can be obtained for further evaluation.      < end of copied text >    < from: CT Chest No Cont (02.19.19 @ 23:45) >  IMPRESSION:     1.  In comparison with 1/19/2019, large pericardial effusion measuring   higher than simple fluid (possibly hemorrhage) is increased. Recommend   further evaluation with echocardiogram.    2.  New moderate layering right pleural effusion measuring simple fluid   and partial atelectasis of right lower lobe.    < end of copied text >

## 2019-02-21 LAB
ANION GAP SERPL CALC-SCNC: 12 MMOL/L — SIGNIFICANT CHANGE UP (ref 5–17)
APTT BLD: 29 SEC — SIGNIFICANT CHANGE UP (ref 27.5–36.3)
BUN SERPL-MCNC: 24 MG/DL — HIGH (ref 7–23)
CALCIUM SERPL-MCNC: 8.6 MG/DL — SIGNIFICANT CHANGE UP (ref 8.4–10.5)
CHLORIDE SERPL-SCNC: 102 MMOL/L — SIGNIFICANT CHANGE UP (ref 96–108)
CHOLEST SERPL-MCNC: 82 MG/DL — SIGNIFICANT CHANGE UP (ref 10–199)
CO2 SERPL-SCNC: 19 MMOL/L — LOW (ref 22–31)
CREAT SERPL-MCNC: 1.25 MG/DL — SIGNIFICANT CHANGE UP (ref 0.5–1.3)
GLUCOSE SERPL-MCNC: 115 MG/DL — HIGH (ref 70–99)
HCT VFR BLD CALC: 32.1 % — LOW (ref 39–50)
HCV AB S/CO SERPL IA: 0.13 S/CO — SIGNIFICANT CHANGE UP (ref 0–0.79)
HCV AB SERPL-IMP: SIGNIFICANT CHANGE UP
HDLC SERPL-MCNC: 18 MG/DL — LOW
HGB BLD-MCNC: 11.1 G/DL — LOW (ref 13–17)
LIPID PNL WITH DIRECT LDL SERPL: 50 MG/DL — SIGNIFICANT CHANGE UP
MCHC RBC-ENTMCNC: 30.6 PG — SIGNIFICANT CHANGE UP (ref 27–34)
MCHC RBC-ENTMCNC: 34.4 GM/DL — SIGNIFICANT CHANGE UP (ref 32–36)
MCV RBC AUTO: 88.9 FL — SIGNIFICANT CHANGE UP (ref 80–100)
PLATELET # BLD AUTO: 452 K/UL — HIGH (ref 150–400)
POTASSIUM SERPL-MCNC: 4.4 MMOL/L — SIGNIFICANT CHANGE UP (ref 3.5–5.3)
POTASSIUM SERPL-SCNC: 4.4 MMOL/L — SIGNIFICANT CHANGE UP (ref 3.5–5.3)
RBC # BLD: 3.61 M/UL — LOW (ref 4.2–5.8)
RBC # FLD: 14 % — SIGNIFICANT CHANGE UP (ref 10.3–14.5)
SODIUM SERPL-SCNC: 133 MMOL/L — LOW (ref 135–145)
TOTAL CHOLESTEROL/HDL RATIO MEASUREMENT: 4.6 RATIO — SIGNIFICANT CHANGE UP (ref 3.4–9.6)
TRIGL SERPL-MCNC: 70 MG/DL — SIGNIFICANT CHANGE UP (ref 10–149)
WBC # BLD: 8.9 K/UL — SIGNIFICANT CHANGE UP (ref 3.8–10.5)
WBC # FLD AUTO: 8.9 K/UL — SIGNIFICANT CHANGE UP (ref 3.8–10.5)

## 2019-02-21 PROCEDURE — 99233 SBSQ HOSP IP/OBS HIGH 50: CPT

## 2019-02-21 PROCEDURE — 99232 SBSQ HOSP IP/OBS MODERATE 35: CPT

## 2019-02-21 PROCEDURE — 93010 ELECTROCARDIOGRAM REPORT: CPT

## 2019-02-21 RX ORDER — METOPROLOL TARTRATE 50 MG
25 TABLET ORAL
Qty: 0 | Refills: 0 | Status: DISCONTINUED | OUTPATIENT
Start: 2019-02-22 | End: 2019-02-22

## 2019-02-21 RX ORDER — AMIODARONE HYDROCHLORIDE 400 MG/1
TABLET ORAL
Qty: 0 | Refills: 0 | Status: DISCONTINUED | OUTPATIENT
Start: 2019-02-21 | End: 2019-02-22

## 2019-02-21 RX ORDER — AMIODARONE HYDROCHLORIDE 400 MG/1
200 TABLET ORAL DAILY
Qty: 0 | Refills: 0 | Status: CANCELLED | OUTPATIENT
Start: 2019-02-25 | End: 2019-02-22

## 2019-02-21 RX ORDER — METOPROLOL TARTRATE 50 MG
25 TABLET ORAL
Qty: 0 | Refills: 0 | Status: DISCONTINUED | OUTPATIENT
Start: 2019-02-21 | End: 2019-02-21

## 2019-02-21 RX ORDER — AMIODARONE HYDROCHLORIDE 400 MG/1
400 TABLET ORAL EVERY 8 HOURS
Qty: 0 | Refills: 0 | Status: DISCONTINUED | OUTPATIENT
Start: 2019-02-21 | End: 2019-02-22

## 2019-02-21 RX ADMIN — HEPARIN SODIUM 5000 UNIT(S): 5000 INJECTION INTRAVENOUS; SUBCUTANEOUS at 21:23

## 2019-02-21 RX ADMIN — Medication 10 MG/HR: at 08:32

## 2019-02-21 RX ADMIN — AMIODARONE HYDROCHLORIDE 400 MILLIGRAM(S): 400 TABLET ORAL at 21:23

## 2019-02-21 RX ADMIN — HEPARIN SODIUM 5000 UNIT(S): 5000 INJECTION INTRAVENOUS; SUBCUTANEOUS at 13:05

## 2019-02-21 RX ADMIN — Medication 12.5 MILLIGRAM(S): at 17:24

## 2019-02-21 RX ADMIN — Medication 81 MILLIGRAM(S): at 08:33

## 2019-02-21 RX ADMIN — Medication 10 MG/HR: at 01:15

## 2019-02-21 RX ADMIN — HEPARIN SODIUM 5000 UNIT(S): 5000 INJECTION INTRAVENOUS; SUBCUTANEOUS at 06:42

## 2019-02-21 RX ADMIN — Medication 12.5 MILLIGRAM(S): at 06:40

## 2019-02-21 NOTE — PROGRESS NOTE ADULT - SUBJECTIVE AND OBJECTIVE BOX
CC: Patient is a 68y old  Male who presents with a chief complaint of dizziness (21 Feb 2019 10:08)    ID following for leukocytosis    Interval History/ROS: Patient had episode of SVT overnight, started on Cardizem drip. Denies palpitation, chest pain, SOB, fever, chills. Occasional cough.    Rest of ROS negative.    Allergies  No Known Allergies    ANTIMICROBIALS:  None    OTHER MEDS:  aspirin enteric coated 81 milliGRAM(s) Oral daily  diltiazem Infusion 10 mG/Hr IV Continuous <Continuous>  heparin  Injectable 5000 Unit(s) SubCutaneous every 8 hours  metoprolol tartrate 12.5 milliGRAM(s) Oral two times a day    PE:    Vital Signs Last 24 Hrs  T(C): 36.4 (21 Feb 2019 04:11), Max: 37.1 (20 Feb 2019 18:14)  T(F): 97.5 (21 Feb 2019 04:11), Max: 98.8 (20 Feb 2019 18:14)  HR: 84 (21 Feb 2019 06:39) (75 - 142)  BP: 115/73 (21 Feb 2019 06:39) (98/63 - 120/84)  BP(mean): --  RR: 19 (21 Feb 2019 04:11) (18 - 20)  SpO2: 91% (21 Feb 2019 04:11) (91% - 95%)    Gen: AOx3, NAD, non-toxic, pleasant  CV: S1+S2 normal, no murmurs  Resp: Crackles LLL  Abd: Soft, nontender, +BS  Ext: No LE edema, no wounds  : No Martin  IV/Skin: No thrombophlebitis  Neuro: no focal deficits    LABS:                          11.1   8.9   )-----------( 452      ( 21 Feb 2019 07:03 )             32.1       02-21    133<L>  |  102  |  24<H>  ----------------------------<  115<H>  4.4   |  19<L>  |  1.25    Ca    8.6      21 Feb 2019 07:03    TPro  5.8<L>  /  Alb  2.5<L>  /  TBili  0.3  /  DBili  0.1  /  AST  20  /  ALT  33  /  AlkPhos  87  02-20    MICROBIOLOGY:  v    RADIOLOGY:    < from: CT Head No Cont (02.19.19 @ 23:45) >  Impression:    No evidence for parenchymal metastatic disease. MRI is recommended as a   more sensitive examination for the detection of intracranial metastatic   disease.  Scattered calvarial lucencies may represent bony metastatic disease.   Nuclear medicine bone scan can be obtained for further evaluation.        < end of copied text >

## 2019-02-21 NOTE — PROGRESS NOTE ADULT - SUBJECTIVE AND OBJECTIVE BOX
Patient seen and examined at bedside.    Overnight Events: Patient converted to SVT overnight, possible AFl. He was started on dilt gtt and converted to NSR with 2.7sec conversion pause. He was asymptomatic throughout. He notes his thoracic surgery team saw him yesterday and will discuss if any intervention is needed regarding his pleural/pericardial effusions.    Review Of Systems: No chest pain, shortness of breath, or palpitations            Current Meds:  aspirin enteric coated 81 milliGRAM(s) Oral daily  diltiazem Infusion 10 mG/Hr IV Continuous <Continuous>  heparin  Injectable 5000 Unit(s) SubCutaneous every 8 hours  metoprolol tartrate 12.5 milliGRAM(s) Oral two times a day      Vitals:  T(F): 97.5 (), Max: 98.8 ()  HR: 84 () (75 - 142)  BP: 115/73 () (98/63 - 120/84)  RR: 19 ()  SpO2: 91% ()  I&O's Summary    2019 07:  -  2019 07:00  --------------------------------------------------------  IN: 690 mL / OUT: 0 mL / NET: 690 mL    2019 07:  -  2019 10:08  --------------------------------------------------------  IN: 240 mL / OUT: 0 mL / NET: 240 mL        Physical Exam:  Appearance: No acute distress;  HEENT:  EOMI, sclera anicteric, Normal oral mucosa  Cardiovascular: RRR, S1, S2, no murmurs, rubs, or gallops; no edema; no JVD  Respiratory: decrease bibasilar breath sounds with LLL crackles  Gastrointestinal: soft, non-tender, non-distended with normal bowel sounds  Musculoskeletal: No clubbing; no joint deformity   Neurologic: Non-focal  Lymphatic: No lymphadenopathy  Psychiatry: AAOx3, mood & affect appropriate  Skin: No rashes, ecchymoses, or cyanosis                          11.1   8.9   )-----------( 452      ( 2019 07:03 )             32.1     02-    133<L>  |  102  |  24<H>  ----------------------------<  115<H>  4.4   |  19<L>  |  1.25    Ca    8.6      2019 07:03    TPro  5.8<L>  /  Alb  2.5<L>  /  TBili  0.3  /  DBili  0.1  /  AST  20  /  ALT  33  /  AlkPhos  87  02-20    PTT - ( 2019 07:03 )  PTT:29.0 sec  CARDIAC MARKERS ( 2019 10:39 )  302 ng/L / x     / x     / x     / x     / x      CARDIAC MARKERS ( 2019 02:24 )  310 ng/L / x     / x     / x     / x     / x      CARDIAC MARKERS ( 2019 20:04 )  x     / x     / x     / x     / x     / 2.5 ng/mL  CARDIAC MARKERS ( 2019 19:00 )  325 ng/L / x     / x     / x     / x     / x      CARDIAC MARKERS ( 2019 15:47 )  x     / .522 ng/mL / x     / 41 U/L / x     / x            Total Cholesterol: 82  LDL: 50  HDL: 18  T      New ECG(s): Personally reviewed    Echo:  19  Conclusions:  1. Right atrial enlargement.  2. Normal tricuspid valve. Mild-moderate tricuspid regurgitation.  3. Estimated pulmonary artery systolic pressure equals 39 mm Hg, assuming right atrial pressure equals 8 mm Hg, consistent with borderline pulmonary pressures.  4. Thickened pericardium.   Small to moderate  pericardial effusion with fibrotic changes.  *** No previous Echo exam.    Imaging:  CT head 19  Impression:    No evidence for parenchymal metastatic disease. MRI is recommended as a more sensitive examination for the detection of intracranial metastatic disease.  Scattered calvarial lucencies may represent bony metastatic disease.   Nuclear medicine bone scan can be obtained for further evaluation.    Interpretation of Telemetry: SVT, 140s. NSR 50s-70s

## 2019-02-21 NOTE — PROGRESS NOTE ADULT - ASSESSMENT
67 yo M with PMH significant for lung CA s/p chemo and lobectomy, HTN, who was transferred for evaluation of tachycardia.      #Atrial flutter  CHADSVASC 2.  - continue diltiazem gtt, wean as tolerated  - 69 yo M with PMH significant for lung CA s/p chemo and lobectomy, HTN, who was transferred for evaluation of tachycardia.      #Atrial flutter  CHADSVASC 2.  - continue diltiazem gtt, wean as tolerated  - indicated for AC with hep gtt vs DOAC but awaiting thoracic eval given CT with new lymph node, R pleural effusion, CT head with possible bony metastatic disease.  - would start amiodarone load    **Incomplete Note, to be discussed with EP team** 67 yo M with PMH significant for lung CA s/p chemo and lobectomy, HTN, who was transferred for evaluation of tachycardia.      #Atrial flutter  CHADSVASC 2.  - continue diltiazem gtt, wean as tolerated  - start hep gtt, currently awaiting thoracic eval given CT with new lymph node, R pleural effusion, CT head with possible bony metastatic disease so will holf off on PO anticoagulation for now  - would start amiodarone load 400mg BID

## 2019-02-21 NOTE — PROGRESS NOTE ADULT - SUBJECTIVE AND OBJECTIVE BOX
[facility]    Jovi Houston, Franki, Isma, Crystal, Jean-Paul, Latoyajo      [phone]    CHIEF COMPLAINT: Patient is a 68y old  Male who presents with a chief complaint of dizziness (20 Feb 2019 17:23)      Follow Up: psvt    Interim history: The patient reports no new symptoms.  Denies chest discomfort and shortness of breath.  No abdominal pain.  No new neurologic symptoms. Persistent svt prob flutter, with offset 1245a, 2.7s pause      MEDICATIONS  (STANDING):  aspirin enteric coated 81 milliGRAM(s) Oral daily  diltiazem Infusion 10 mG/Hr (10 mL/Hr) IV Continuous <Continuous>  heparin  Injectable 5000 Unit(s) SubCutaneous every 8 hours  metoprolol tartrate 12.5 milliGRAM(s) Oral two times a day    MEDICATIONS  (PRN):      REVIEW OF SYSTEMS:  eye, ent, GI, , allergic, dermatologic, musculoskeletal and neurologic are negative except as described above    Vital Signs Last 24 Hrs  T(C): 36.4 (21 Feb 2019 04:11), Max: 37.1 (20 Feb 2019 18:14)  T(F): 97.5 (21 Feb 2019 04:11), Max: 98.8 (20 Feb 2019 18:14)  HR: 84 (21 Feb 2019 06:39) (75 - 142)  BP: 115/73 (21 Feb 2019 06:39) (98/63 - 134/92)  BP(mean): --  RR: 19 (21 Feb 2019 04:11) (18 - 20)  SpO2: 91% (21 Feb 2019 04:11) (91% - 96%)    I&O's Summary    20 Feb 2019 07:01  -  21 Feb 2019 07:00  --------------------------------------------------------  IN: 690 mL / OUT: 0 mL / NET: 690 mL    21 Feb 2019 07:01  -  21 Feb 2019 09:19  --------------------------------------------------------  IN: 240 mL / OUT: 0 mL / NET: 240 mL        Telemetry past 24h: svt likely afl, 140 conv pause to sr/sb 2.7s    PHYSICAL EXAM:    Constitutional: well-nourished, well-developed, NAD   HEENT:  MMM, sclerae anicteric, conjunctivae clear, no oral cyanosis.  Pulmonary: Non-labored, mild wheeze bilaterally  Cardiovascular: Regular, S1 and S2.  No murmur.  No rubs, gallops or clicks  Gastrointestinal: Bowel Sounds present, soft, nontender.   Lymph: No peripheral edema.   Neurological: Alert, no focal deficits  Skin: No rashes.  Psych:  Mood & affect appropriate    LABS: All Labs Reviewed:                        11.1   8.9   )-----------( 452      ( 21 Feb 2019 07:03 )             32.1                         10.7   11.63 )-----------( 465      ( 20 Feb 2019 13:10 )             32.7                         10.8   12.8  )-----------( 478      ( 19 Feb 2019 19:00 )             32.7     21 Feb 2019 07:03    133    |  102    |  24     ----------------------------<  115    4.4     |  19     |  1.25   20 Feb 2019 05:33    136    |  104    |  27     ----------------------------<  106    4.6     |  20     |  1.41   19 Feb 2019 19:00    135    |  101    |  30     ----------------------------<  110    4.9     |  22     |  1.76     Ca    8.6        21 Feb 2019 07:03  Ca    8.6        20 Feb 2019 05:33  Ca    8.8        19 Feb 2019 19:00    TPro  5.8    /  Alb  2.5    /  TBili  0.3    /  DBili  0.1    /  AST  20     /  ALT  33     /  AlkPhos  87     20 Feb 2019 05:33  TPro  7.2    /  Alb  2.5    /  TBili  0.6    /  DBili  x      /  AST  24     /  ALT  45     /  AlkPhos  119    19 Feb 2019 15:47    PTT - ( 21 Feb 2019 07:03 )  PTT:29.0 sec  CARDIAC MARKERS ( 19 Feb 2019 20:04 )  x     / x     / x     / x     / 2.5 ng/mL  CARDIAC MARKERS ( 19 Feb 2019 15:47 )  .522 ng/mL / x     / 41 U/L / x     / x          Blood Culture:         RADIOLOGY:    EKG:    Echo: [facility]    Jovi Houston, Franki, Isma, Crsytal, Jean-Paul, Latoyajo      [phone]    CHIEF COMPLAINT: Patient is a 68y old  Male who presents with a chief complaint of dizziness (20 Feb 2019 17:23)      Follow Up: psvt    Interim history: The patient reports no new symptoms.  Denies chest discomfort and shortness of breath.  No abdominal pain.  No new neurologic symptoms. Persistent svt prob flutter, with offset 1245a, 2.7s pause      MEDICATIONS  (STANDING):  aspirin enteric coated 81 milliGRAM(s) Oral daily  diltiazem Infusion 10 mG/Hr (10 mL/Hr) IV Continuous <Continuous>  heparin  Injectable 5000 Unit(s) SubCutaneous every 8 hours  metoprolol tartrate 12.5 milliGRAM(s) Oral two times a day    MEDICATIONS  (PRN):      REVIEW OF SYSTEMS:  eye, ent, GI, , allergic, dermatologic, musculoskeletal and neurologic are negative except as described above    Vital Signs Last 24 Hrs  T(C): 36.4 (21 Feb 2019 04:11), Max: 37.1 (20 Feb 2019 18:14)  T(F): 97.5 (21 Feb 2019 04:11), Max: 98.8 (20 Feb 2019 18:14)  HR: 84 (21 Feb 2019 06:39) (75 - 142)  BP: 115/73 (21 Feb 2019 06:39) (98/63 - 134/92)  BP(mean): --  RR: 19 (21 Feb 2019 04:11) (18 - 20)  SpO2: 91% (21 Feb 2019 04:11) (91% - 96%)    I&O's Summary    20 Feb 2019 07:01  -  21 Feb 2019 07:00  --------------------------------------------------------  IN: 690 mL / OUT: 0 mL / NET: 690 mL    21 Feb 2019 07:01  -  21 Feb 2019 09:19  --------------------------------------------------------  IN: 240 mL / OUT: 0 mL / NET: 240 mL        Telemetry past 24h: svt likely afl, 140 conv pause to sr/sb 2.7s    PHYSICAL EXAM:    Constitutional: well-nourished, well-developed, NAD   HEENT:  MMM, sclerae anicteric, conjunctivae clear, no oral cyanosis.  Pulmonary: Non-labored, mild wheeze bilaterally  Cardiovascular: Regular, S1 and S2.  No murmur.  No rubs, gallops or clicks  Gastrointestinal: Bowel Sounds present, soft, nontender.   Lymph: No peripheral edema.   Neurological: Alert, no focal deficits  Skin: No rashes.  Psych:  Mood & affect appropriate    LABS: All Labs Reviewed:                        11.1   8.9   )-----------( 452      ( 21 Feb 2019 07:03 )             32.1                         10.7   11.63 )-----------( 465      ( 20 Feb 2019 13:10 )             32.7                         10.8   12.8  )-----------( 478      ( 19 Feb 2019 19:00 )             32.7     21 Feb 2019 07:03    133    |  102    |  24     ----------------------------<  115    4.4     |  19     |  1.25   20 Feb 2019 05:33    136    |  104    |  27     ----------------------------<  106    4.6     |  20     |  1.41   19 Feb 2019 19:00    135    |  101    |  30     ----------------------------<  110    4.9     |  22     |  1.76     Ca    8.6        21 Feb 2019 07:03  Ca    8.6        20 Feb 2019 05:33  Ca    8.8        19 Feb 2019 19:00    TPro  5.8    /  Alb  2.5    /  TBili  0.3    /  DBili  0.1    /  AST  20     /  ALT  33     /  AlkPhos  87     20 Feb 2019 05:33  TPro  7.2    /  Alb  2.5    /  TBili  0.6    /  DBili  x      /  AST  24     /  ALT  45     /  AlkPhos  119    19 Feb 2019 15:47    PTT - ( 21 Feb 2019 07:03 )  PTT:29.0 sec  CARDIAC MARKERS ( 19 Feb 2019 20:04 )  x     / x     / x     / x     / 2.5 ng/mL  CARDIAC MARKERS ( 19 Feb 2019 15:47 )  .522 ng/mL / x     / 41 U/L / x     / x          Blood Culture:         RADIOLOGY:  < from: CT Chest No Cont (02.19.19 @ 23:45) >    EXAM:  CT CHEST                            PROCEDURE DATE:  02/19/2019            INTERPRETATION:  CT CHEST WITHOUT CONTRAST    INDICATION: Lung cancer. Metastatic mucinous adenocarcinoma.    TECHNIQUE: Unenhanced helical images were obtained of the chest. Coronal   and sagittal images were reconstructed. Maximum intensity projection   images were generated.     COMPARISON: CT chest 5/20/2017.    FINDINGS:     TUBES/LINES: Tip of the left Mediport catheter within SVC.    AIRWAYS, LUNGS, PLEURA:Again noted the patient is status post left lower   lobectomy. Moderate amount of secretion and narrowing of the left main   bronchus is again noted. Foci of mucoid impaction within the basilar   right lower lobe.     Small-moderate layering right pleural effusion measuring simple fluid and   partial atelectasis of right lower lobe is new from prior. A trace left   pleural effusion and posttreatment changes within the left lower lung are   unchanged.    Moderate centrilobular emphysema. No pneumothorax.    MEDIASTINUM, LYMPH NODES: 0.8 cm short axis right lower paratracheal   lymph node (series 2 image 51) is new from prior. Multiple subcentimeter   periesophageal lymph nodes are unchanged.    HEART AND VASCULATURE: Normal heart size. Large pericardial effusion   measuring higher than simple fluid, possibly representing hemorrhage is   increased. The thoracic aorta and pulmonary artery are normal in course   and caliber. Mild calcific coronary atherosclerosis.    UPPER ABDOMEN: Small hiatal hernia. Multiple hepatic cysts. Severe left   hydronephrosis is unchanged. Cholelithiasis.    BONES AND SOFT TISSUES: Severe compression fracture deformity of T6 and   T12 vertebral bodies, likely pathology unchanged.    LOWER NECK: Unremarkable.    IMPRESSION:     1.  In comparison with 1/19/2019, large pericardial effusion measuring   higher than simple fluid (possibly hemorrhage) is increased. Recommend   further evaluation with echocardiogram.    2.  New moderate layering right pleural effusion measuring simple fluid   and partial atelectasis of right lower lobe.                    ZIYAD GLASS M.D., ATTENDING RADIOLOGIST  This document has been electronically signed. Feb 20 2019  8:33AM                < end of copied text >    EKG:    Echo:  < from: Transthoracic Echocardiogram (02.20.19 @ 08:22) >    Patient name: DELFINO GONZALEZ  YOB: 1950   Age: 68 (M)   MR#: [id number]  Study Date: 2/20/2019  [facility]  Study quality: Technically good  Referring Physician: Austyn Thomas MD  Blood Pressure: 102/70 mmHg  Height: 180 cm  Weight: 84 kg  BSA: 2 m2  ------------------------------------------------------------------------  PROCEDURE: Transthoracic echocardiogram with 2-D, M-Mode  and complete spectral and color flow Doppler.  INDICATION: Pericardial effusion (noninflammatory) (I31.3)  ------------------------------------------------------------------------  Dimensions:    Normal Values:  LA:     4.4    2.0 - 4.0 cm  Ao:     3.3    2.0 - 3.8 cm  SEPTUM: 1.2    0.6 - 1.2 cm  PWT:    1.3    0.6 - 1.1 cm  LVIDd:  4.8    3.0 - 5.6 cm  LVIDs:  3.9    1.8 - 4.0 cm  Derived variables:  LVMI: 114 g/m2  RWT: 0.54  Fractional short: 19 %  EF (Del Angel Rule): 51 %Doppler Peak Velocity (m/sec):  AoV=1.0  ------------------------------------------------------------------------  Observations:  Mitral Valve: Normal mitral valve.  Aortic Valve/Aorta: Normal trileaflet aortic valve. Peak  transaortic valve gradient equals 4 mm Hg. Peak left  ventricular outflow tract gradient equals 3 mmHg.  Aortic Root: 3.3 cm.  LVOT diameter: 2.2 cm.  Left Atrium: Normal left atrium.  LA volume index = 34  cc/m2.  Left Ventricle: Normal left ventricular systolic function.  No segmental wall motion abnormalities. Normal left  ventricular internal dimensions and wall thicknesses.  Normal diastolic function  Right Heart: Right atrial enlargement. Normal right  ventricular size and function. Normal tricuspid valve.  Mild-moderate tricuspid regurgitation. Normal pulmonic  valve.  Pericardium/Pleura: Thickened pericardium.   Small to  moderate  pericardial effusion with fibrotic changes.   No  echocardiographic evidence of pericardial tamponade.  Hemodynamic: Estimated right atrial pressure is 8 mm Hg.  Estimated right ventricular systolic pressure equals 39 mm  Hg, assuming right atrial pressure equals 8 mm Hg,  consistent with borderline pulmonary hypertension.  ------------------------------------------------------------------------  Conclusions:  1. Right atrial enlargement.  2. Normal tricuspid valve. Mild-moderate tricuspid  regurgitation.  3. Estimated pulmonary artery systolic pressure equals 39  mm Hg, assuming right atrial pressure equals 8 mm Hg,  consistent with borderline pulmonary pressures.  4. Thickened pericardium.   Small to moderate  pericardial  effusion with fibrotic changes.  *** No previous Echo exam.  ------------------------------------------------------------------------  Confirmed on  2/20/2019 - 10:18:23 by Jc Medina M.D.  ------------------------------------------------------------------------    < end of copied text >

## 2019-02-21 NOTE — PROGRESS NOTE ADULT - SUBJECTIVE AND OBJECTIVE BOX
SUBJECTIVE / OVERNIGHT EVENTS: pt denies chest pain, shortness of breath, nausea, vomiting dizziness  pt found to be in rapid flutter overnight started on Cardizem drip    MEDICATIONS  (STANDING):  amiodarone    Tablet   Oral   amiodarone    Tablet 400 milliGRAM(s) Oral every 8 hours  aspirin enteric coated 81 milliGRAM(s) Oral daily  diltiazem Infusion 10 mG/Hr (10 mL/Hr) IV Continuous <Continuous>  heparin  Injectable 5000 Unit(s) SubCutaneous every 8 hours    MEDICATIONS  (PRN):    Vital Signs Last 24 Hrs  T(C): 36.7 (21 Feb 2019 21:07), Max: 36.7 (21 Feb 2019 00:09)  T(F): 98.1 (21 Feb 2019 21:07), Max: 98.1 (21 Feb 2019 21:07)  HR: 73 (21 Feb 2019 21:07) (65 - 141)  BP: 114/75 (21 Feb 2019 21:07) (98/63 - 120/84)  BP(mean): --  RR: 18 (21 Feb 2019 21:07) (18 - 19)  SpO2: 97% (21 Feb 2019 21:07) (91% - 97%)    Constitutional: No fever, fatigue  Skin: No rash.  Eyes: No recent vision problems or eye pain.  ENT: No congestion, ear pain, or sore throat.  Cardiovascular: No chest pain or palpation.  Respiratory: No cough, shortness of breath, congestion, or wheezing.  Gastrointestinal: No abdominal pain, nausea, vomiting, or diarrhea.  Genitourinary: No dysuria.  Musculoskeletal: No joint swelling.  Neurologic: No headache.    PHYSICAL EXAM:  GENERAL: NAD  EYES: EOMI, PERRLA  NECK: Supple, No JVD  CHEST/LUNG: dec breath sounds rt base   HEART:  S1 , S2 +  ABDOMEN: soft , bs+  EXTREMITIES:no edema    NEUROLOGY:alert awake oriented     LABS:  02-21    133<L>  |  102  |  24<H>  ----------------------------<  115<H>  4.4   |  19<L>  |  1.25    Ca    8.6      21 Feb 2019 07:03    TPro  5.8<L>  /  Alb  2.5<L>  /  TBili  0.3  /  DBili  0.1  /  AST  20  /  ALT  33  /  AlkPhos  87  02-20    Creatinine Trend: 1.25 <--, 1.41 <--, 1.76 <--, 2.00 <--                        11.1   8.9   )-----------( 452      ( 21 Feb 2019 07:03 )             32.1     Urine Studies:            LIVER FUNCTIONS - ( 20 Feb 2019 05:33 )  Alb: 2.5 g/dL / Pro: 5.8 g/dL / ALK PHOS: 87 U/L / ALT: 33 U/L / AST: 20 U/L / GGT: x           PTT - ( 21 Feb 2019 07:03 )  PTT:29.0 sec

## 2019-02-21 NOTE — PROGRESS NOTE ADULT - ASSESSMENT
68M h/o lung cancer s/p radiation/ chemo, htn, gerd, atrial flutter transferred from [place] for eval and management of SVT , Pt says he felt dizzy 2 weeks ago while at work , went to [facility] found to be hypotensive was told not to take bp meds , pt went to his cardiologist found to be in svt , sent to [place] er was given Adenosine sent to [facility] , pt denies chest pain, shortness of breath, nausea,v headache, dizziness, weakness numbness in ext , fever , was started on amoxicillin yesterday for productive sputum (19 Feb 2019 22:32)    He now has had sustained tachycardia with heart rates in the 130-140 range, converting back to sr overnight 1245a with a 2/7s pause    -rhythm difficult to characterize on tele, but seems more likely to be atrial flutter  -given the tachycardia and bradycardia, and the pause, I have recalled ep to evaluate for flutter ablation, as medical therapy seems less likely to be appropriate/successful at this time  -echo results noted, with pericardial thickening and effusion with some fibrotic material but no tamponade. May be more chronic.  -check tsh  -no meaningful ischemia, noting trivial hstrop, prob from tachycardia mediated demand  -no volume overload  -monitor lytes and replete prn  -will follow 68M h/o lung cancer s/p radiation/ chemo, htn, gerd, atrial flutter transferred from [place] for eval and management of SVT , Pt says he felt dizzy 2 weeks ago while at work , went to [facility] found to be hypotensive was told not to take bp meds , pt went to his cardiologist found to be in svt , sent to [place] er was given Adenosine sent to [facility] , pt denies chest pain, shortness of breath, nausea,v headache, dizziness, weakness numbness in ext , fever , was started on amoxicillin yesterday for productive sputum (19 Feb 2019 22:32)    He now has had sustained tachycardia with heart rates in the 130-140 range, converting back to sr overnight 1245a with a 2/7s pause    -rhythm difficult to characterize on tele, but seems more likely to be atrial flutter/fib, not avnrt as previously suspected  -have discussed with ep the best approach, and given the possibility of recurrent malignancy, an interventional approach to the arrhythmia seems less appropriate than antiarrhythmic therapy. would amio load 400 tid to 5g  -echo results noted, with pericardial thickening and effusion with some fibrotic material but no tamponade. May be more chronic, though cannot be certain  -although his atrial arrhythmia would by itself warrant ac, the possibility that his effusion is hemorrhagic precludes safe administration of anticoagulation, and the short term risk would exceed the benefit, even if he is hypercoagulable.  will defer for now, and can reconsider pending his clinical course  -plans noted to consider transfer to [facility] for window/bx  -no meaningful ischemia, noting trivial hstrop, prob from tachycardia mediated demand  -no volume overload  -monitor lytes and replete prn  -will follow

## 2019-02-21 NOTE — PROGRESS NOTE ADULT - ASSESSMENT
68 year old male with h/o lung cancer s/p radiation/ chemo, HTN, GERD, Atrial flutter transferred from [place] for evaluation and management of SVT.     Initially started as dizziness 2 weeks ago while at work  Went to [facility] found to be hypotensive and stopped BP meds  Saw his Cardiologist found to be in SVT, sent to [place] ED was given Adenosine and now sent to [facility] for EP evaluation  Started on amoxicillin yesterday for occasional productive cough with scant yellow sputum  Found to have leukocytosis I suspect is reactive - now resolved  Remained afebrile  RASHAWN resolved  CT Head performed for dizziness found to have calvarial lucencies may represent bony metastatic disease  CT Chest with large pericardial effusion measuring high than simple fluid - possibly hemorrhage is increased. New moderate layering right pleural effusion measuring simple fluid  No pneumonia noted on CT chest  Nontoxic  SVT overnight now on cardizem drip    Recommend:  -Continue to monitor off antibiotics  -Will defer to primary team regarding metastatic workup regarding CT head findings     Will sign off. Please call with questions.

## 2019-02-21 NOTE — CHART NOTE - NSCHARTNOTEFT_GEN_A_CORE
Notified about arrhythmia and HR of 130-140'. EKG showed A flutter. Pt denies CP, dyspnea, N/V/D, vital signs reviewed. Pt with HX of Atrial flutter transferred from [place] for evaluation and management of SVT. D/w cardiology , Dr. Panda and Dr. Mane fellow to bedside. CArdizem 10 IVP, Cardizem drip was started at 10, increased to 15. As per  cardio. recommendations, heparin gtt to be started. D/w with Dr. Thomas and will postpone starting IV heparin (following day time  results of CT chest and Echo, high risk of bleeding ) and reevaluate  in AM.     Venita Hall NP, #[id number]

## 2019-02-22 ENCOUNTER — INPATIENT (INPATIENT)
Facility: HOSPITAL | Age: 69
LOS: 7 days | Discharge: HOME | End: 2019-03-02
Attending: INTERNAL MEDICINE | Admitting: INTERNAL MEDICINE
Payer: COMMERCIAL

## 2019-02-22 VITALS
WEIGHT: 183.65 LBS | TEMPERATURE: 98 F | RESPIRATION RATE: 20 BRPM | HEART RATE: 78 BPM | OXYGEN SATURATION: 98 % | HEIGHT: 71 IN | DIASTOLIC BLOOD PRESSURE: 76 MMHG | SYSTOLIC BLOOD PRESSURE: 130 MMHG

## 2019-02-22 VITALS
TEMPERATURE: 98 F | RESPIRATION RATE: 18 BRPM | DIASTOLIC BLOOD PRESSURE: 81 MMHG | SYSTOLIC BLOOD PRESSURE: 28 MMHG | OXYGEN SATURATION: 96 % | HEART RATE: 80 BPM

## 2019-02-22 DIAGNOSIS — Z98.890 OTHER SPECIFIED POSTPROCEDURAL STATES: Chronic | ICD-10-CM

## 2019-02-22 DIAGNOSIS — I31.3 PERICARDIAL EFFUSION (NONINFLAMMATORY): ICD-10-CM

## 2019-02-22 DIAGNOSIS — Z90.2 ACQUIRED ABSENCE OF LUNG [PART OF]: Chronic | ICD-10-CM

## 2019-02-22 DIAGNOSIS — C34.90 MALIGNANT NEOPLASM OF UNSPECIFIED PART OF UNSPECIFIED BRONCHUS OR LUNG: ICD-10-CM

## 2019-02-22 DIAGNOSIS — I48.92 UNSPECIFIED ATRIAL FLUTTER: ICD-10-CM

## 2019-02-22 DIAGNOSIS — Z92.21 PERSONAL HISTORY OF ANTINEOPLASTIC CHEMOTHERAPY: Chronic | ICD-10-CM

## 2019-02-22 LAB
ANION GAP SERPL CALC-SCNC: 11 MMOL/L — SIGNIFICANT CHANGE UP (ref 5–17)
BUN SERPL-MCNC: 20 MG/DL — SIGNIFICANT CHANGE UP (ref 7–23)
CALCIUM SERPL-MCNC: 8.8 MG/DL — SIGNIFICANT CHANGE UP (ref 8.4–10.5)
CHLORIDE SERPL-SCNC: 104 MMOL/L — SIGNIFICANT CHANGE UP (ref 96–108)
CO2 SERPL-SCNC: 21 MMOL/L — LOW (ref 22–31)
CREAT SERPL-MCNC: 1.22 MG/DL — SIGNIFICANT CHANGE UP (ref 0.5–1.3)
GLUCOSE SERPL-MCNC: 124 MG/DL — HIGH (ref 70–99)
HCT VFR BLD CALC: 33.1 % — LOW (ref 39–50)
HGB BLD-MCNC: 11.4 G/DL — LOW (ref 13–17)
MAGNESIUM SERPL-MCNC: 2 MG/DL — SIGNIFICANT CHANGE UP (ref 1.6–2.6)
MCHC RBC-ENTMCNC: 30.5 PG — SIGNIFICANT CHANGE UP (ref 27–34)
MCHC RBC-ENTMCNC: 34.5 GM/DL — SIGNIFICANT CHANGE UP (ref 32–36)
MCV RBC AUTO: 88.4 FL — SIGNIFICANT CHANGE UP (ref 80–100)
PHOSPHATE SERPL-MCNC: 3 MG/DL — SIGNIFICANT CHANGE UP (ref 2.5–4.5)
PLATELET # BLD AUTO: 495 K/UL — HIGH (ref 150–400)
POTASSIUM SERPL-MCNC: 4.1 MMOL/L — SIGNIFICANT CHANGE UP (ref 3.5–5.3)
POTASSIUM SERPL-SCNC: 4.1 MMOL/L — SIGNIFICANT CHANGE UP (ref 3.5–5.3)
RBC # BLD: 3.74 M/UL — LOW (ref 4.2–5.8)
RBC # FLD: 13.9 % — SIGNIFICANT CHANGE UP (ref 10.3–14.5)
SODIUM SERPL-SCNC: 136 MMOL/L — SIGNIFICANT CHANGE UP (ref 135–145)
WBC # BLD: 8.2 K/UL — SIGNIFICANT CHANGE UP (ref 3.8–10.5)
WBC # FLD AUTO: 8.2 K/UL — SIGNIFICANT CHANGE UP (ref 3.8–10.5)

## 2019-02-22 PROCEDURE — 82248 BILIRUBIN DIRECT: CPT

## 2019-02-22 PROCEDURE — 93005 ELECTROCARDIOGRAM TRACING: CPT

## 2019-02-22 PROCEDURE — 84100 ASSAY OF PHOSPHORUS: CPT

## 2019-02-22 PROCEDURE — 99222 1ST HOSP IP/OBS MODERATE 55: CPT

## 2019-02-22 PROCEDURE — 70450 CT HEAD/BRAIN W/O DYE: CPT

## 2019-02-22 PROCEDURE — 84484 ASSAY OF TROPONIN QUANT: CPT

## 2019-02-22 PROCEDURE — 80061 LIPID PANEL: CPT

## 2019-02-22 PROCEDURE — 84443 ASSAY THYROID STIM HORMONE: CPT

## 2019-02-22 PROCEDURE — 99285 EMERGENCY DEPT VISIT HI MDM: CPT | Mod: 25

## 2019-02-22 PROCEDURE — 80048 BASIC METABOLIC PNL TOTAL CA: CPT

## 2019-02-22 PROCEDURE — 85027 COMPLETE CBC AUTOMATED: CPT

## 2019-02-22 PROCEDURE — 83735 ASSAY OF MAGNESIUM: CPT

## 2019-02-22 PROCEDURE — 71250 CT THORAX DX C-: CPT

## 2019-02-22 PROCEDURE — 80053 COMPREHEN METABOLIC PANEL: CPT

## 2019-02-22 PROCEDURE — 85730 THROMBOPLASTIN TIME PARTIAL: CPT

## 2019-02-22 PROCEDURE — 86803 HEPATITIS C AB TEST: CPT

## 2019-02-22 PROCEDURE — 99253 IP/OBS CNSLTJ NEW/EST LOW 45: CPT

## 2019-02-22 PROCEDURE — 99233 SBSQ HOSP IP/OBS HIGH 50: CPT

## 2019-02-22 PROCEDURE — 93306 TTE W/DOPPLER COMPLETE: CPT

## 2019-02-22 PROCEDURE — 82553 CREATINE MB FRACTION: CPT

## 2019-02-22 RX ORDER — METOPROLOL TARTRATE 50 MG
25 TABLET ORAL
Qty: 0 | Refills: 0 | Status: DISCONTINUED | OUTPATIENT
Start: 2019-02-22 | End: 2019-02-24

## 2019-02-22 RX ORDER — SODIUM CHLORIDE 9 MG/ML
3 INJECTION INTRAMUSCULAR; INTRAVENOUS; SUBCUTANEOUS EVERY 8 HOURS
Qty: 0 | Refills: 0 | Status: DISCONTINUED | OUTPATIENT
Start: 2019-02-22 | End: 2019-02-25

## 2019-02-22 RX ORDER — AMIODARONE HYDROCHLORIDE 400 MG/1
400 TABLET ORAL EVERY 8 HOURS
Qty: 0 | Refills: 0 | Status: DISCONTINUED | OUTPATIENT
Start: 2019-02-22 | End: 2019-02-25

## 2019-02-22 RX ORDER — AMIODARONE HYDROCHLORIDE 400 MG/1
TABLET ORAL
Qty: 0 | Refills: 0 | Status: DISCONTINUED | OUTPATIENT
Start: 2019-02-22 | End: 2019-02-25

## 2019-02-22 RX ORDER — AMIODARONE HYDROCHLORIDE 400 MG/1
200 TABLET ORAL DAILY
Qty: 0 | Refills: 0 | Status: DISCONTINUED | OUTPATIENT
Start: 2019-02-25 | End: 2019-02-25

## 2019-02-22 RX ORDER — ASPIRIN/CALCIUM CARB/MAGNESIUM 324 MG
81 TABLET ORAL DAILY
Qty: 0 | Refills: 0 | Status: DISCONTINUED | OUTPATIENT
Start: 2019-02-23 | End: 2019-02-25

## 2019-02-22 RX ORDER — HEPARIN SODIUM 5000 [USP'U]/ML
5000 INJECTION INTRAVENOUS; SUBCUTANEOUS EVERY 8 HOURS
Qty: 0 | Refills: 0 | Status: DISCONTINUED | OUTPATIENT
Start: 2019-02-22 | End: 2019-02-25

## 2019-02-22 RX ADMIN — AMIODARONE HYDROCHLORIDE 400 MILLIGRAM(S): 400 TABLET ORAL at 21:55

## 2019-02-22 RX ADMIN — Medication 81 MILLIGRAM(S): at 08:33

## 2019-02-22 RX ADMIN — HEPARIN SODIUM 5000 UNIT(S): 5000 INJECTION INTRAVENOUS; SUBCUTANEOUS at 21:55

## 2019-02-22 RX ADMIN — HEPARIN SODIUM 5000 UNIT(S): 5000 INJECTION INTRAVENOUS; SUBCUTANEOUS at 13:02

## 2019-02-22 RX ADMIN — SODIUM CHLORIDE 3 MILLILITER(S): 9 INJECTION INTRAMUSCULAR; INTRAVENOUS; SUBCUTANEOUS at 21:58

## 2019-02-22 RX ADMIN — Medication 25 MILLIGRAM(S): at 16:48

## 2019-02-22 RX ADMIN — HEPARIN SODIUM 5000 UNIT(S): 5000 INJECTION INTRAVENOUS; SUBCUTANEOUS at 05:08

## 2019-02-22 RX ADMIN — AMIODARONE HYDROCHLORIDE 400 MILLIGRAM(S): 400 TABLET ORAL at 13:02

## 2019-02-22 RX ADMIN — Medication 5 MG/HR: at 00:10

## 2019-02-22 RX ADMIN — AMIODARONE HYDROCHLORIDE 400 MILLIGRAM(S): 400 TABLET ORAL at 05:08

## 2019-02-22 RX ADMIN — Medication 25 MILLIGRAM(S): at 05:08

## 2019-02-22 NOTE — CONSULT NOTE ADULT - SUBJECTIVE AND OBJECTIVE BOX
HPI:  68M h/o lung cancer s/p radiation/ chemo, htn, gerd, atrial flutter transferred from [place] for eval and management of SVT , Pt says he felt dizzy 2 weeks ago while at work , went to [facility] found to be hypotensive was told not to take bp meds , pt went to his cardiologist found to be in svt , sent to [place] er was given Adenosine sent to [facility] , pt denies chest pain, shortness of breath, n/v, headache, dizziness, weakness numbness in ext , fever , was started on amoxicillin yesterday for productive sputum (2019 22:32)       Past Medical History  Atrial flutter: 10/2017  GERD (gastroesophageal reflux disease)  Lung cancer: s/p radiation and chemotherapy  Lung nodule: left  Smoking history  HTN (hypertension)      Past Surgical History  History of chemotherapy: left chest wall infusaport placement  H/O inguinal hernia repair:   No significant past surgical history      Vital Signs Last 24 Hrs  T(C): 36.5 (19 @ 11:45), Max: 36.7 (19 @ 21:07)  T(F): 97.7 (19 @ 11:45), Max: 98.1 (19 @ 21:07)  HR: 79 (19 @ 11:45) (65 - 79)  BP: 125/79 (19 @ 11:45) (105/68 - 125/79)  RR: 18 (19 @ 11:45) (18 - 18)  SpO2: 95% (19 @ 11:45) (93% - 97%)            19 @ 07:01  -  19 @ 13:06  --------------------------------------------------------  IN: 780 mL / OUT: 0 mL / NET: 780 mL        Daily Weight in k.6 (2019 04:49)                          11.4   8.2   )-----------( 495      ( 2019 05:51 )             33.1       136  |  104  |  20  ----------------------------<  124<H>  4.1   |  21<L>  |  1.22        Review of Systems  GENERAL:  no weakness, fatigue, fevers or chills  NEURO: no dizziness, numbness, tingling or weakness  SKIN: no itching, burning, rashes, or lesions   HEENT: no visual changes;  no headache, no vertigo, no recent colds  RESPIRATORY: no shortness of breath, no cough, sputum, wheezing  CARDIOVASCULAR:  no chest pain,  or palpitations  GI: no abd pain. no N/V/D.  PERIPHERAL VASCULAR: no swelling, no tenderness, no erythema      PHYSICAL EXAM  Neurology: A&Ox3, NAD, no gross deficits  CV : RRR+S1S2  Lungs: Respirations non-labored, B/L BS  Abdomen: Soft, NT/ND, +BSx4Q  Extremities: B/L LE edema, negative calf tenderness, +PP           MEDICATIONS  amiodarone    Tablet   Oral   amiodarone    Tablet 400 milliGRAM(s) Oral every 8 hours  aspirin enteric coated 81 milliGRAM(s) Oral daily  heparin  Injectable 5000 Unit(s) SubCutaneous every 8 hours  metoprolol succinate ER 25 milliGRAM(s) Oral two times a day    < from: Transthoracic Echocardiogram (19 @ 08:22) >  Observations:  Mitral Valve: Normal mitral valve.  Aortic Valve/Aorta: Normal trileaflet aortic valve. Peak  transaortic valve gradient equals 4 mm Hg. Peak left  ventricular outflow tract gradient equals 3 mmHg.  Aortic Root: 3.3 cm.  LVOT diameter: 2.2 cm.  Left Atrium: Normal left atrium.  LA volume index = 34  cc/m2.  Left Ventricle: Normal left ventricular systolic function.  No segmental wall motion abnormalities. Normal left  ventricular internal dimensions and wall thicknesses.  Normal diastolic function  Right Heart: Right atrial enlargement. Normal right  ventricular size and function. Normal tricuspid valve.  Mild-moderate tricuspid regurgitation. Normal pulmonic  valve.  Pericardium/Pleura: Thickened pericardium.   Small to  moderate  pericardial effusion with fibrotic changes.   No  echocardiographic evidence of pericardial tamponade.  Hemodynamic: Estimated right atrial pressure is 8 mm Hg.  Estimated right ventricular systolic pressure equals 39 mm  Hg, assuming right atrial pressure equals 8 mm Hg,  consistent with borderline pulmonary hypertension.      < from: CT Chest No Cont (19 @ 23:45) >  IMPRESSION:     1.  In comparison with 2019, large pericardial effusion measuring   higher than simple fluid (possibly hemorrhage) is increased. Recommend   further evaluation with echocardiogram.    2.  New moderate layering right pleural effusion measuring simple fluid   and partial atelectasis of right lower lobe.

## 2019-02-22 NOTE — H&P ADULT - PSH
H/O inguinal hernia repair  Left side; 1972  History of chemotherapy  left chest wall infusaport placement  Status post lobectomy of lung  LL[facility]

## 2019-02-22 NOTE — PROGRESS NOTE ADULT - ASSESSMENT
68M h/o lung cancer s/p radiation/ chemo, htn, gerd, atrial flutter transferred from [place] for eval and management of SVT , Pt says he felt dizzy 2 weeks ago while at work , went to [facility] found to be hypotensive was told not to take bp meds , pt went to his cardiologist found to be in svt , sent to [place] er was given Adenosine sent to [facility] , pt denies chest pain, shortness of breath, nausea, headache, dizziness, weakness numbness in ext, fever  was started on amoxicillin yesterday for productive sputum (19 Feb 2019 22:32)    He now has had sustained tachycardia with heart rates in the 130-140 range, converting back to sr 2 nights ago with a 2.7s pause.  He is now in SR, off of a cardizem gtt.    -rhythm difficult to characterize on tele, but seems more likely to be atrial flutter/fib, not avnrt as previously suspected  -have discussed with ep the best approach, and given the possibility of recurrent malignancy, an interventional approach to the arrhythmia seems less appropriate than antiarrhythmic therapy. would amio load 400 tid to 5g  -echo results noted, with pericardial thickening and effusion with some fibrotic material but no tamponade. May be more chronic, though cannot be certain  -although his atrial arrhythmia would by itself warrant ac, the possibility that his effusion is hemorrhagic precludes safe administration of anticoagulation, and the short term risk would exceed the benefit, even if he is hypercoagulable.  will defer for now, and can reconsider pending his clinical course  -plans noted to consider transfer to [facility] for window/bx  -no meaningful ischemia, noting trivial hstrop, prob from tachycardia mediated demand  -no volume overload  -monitor lytes and replete prn  -will follow

## 2019-02-22 NOTE — H&P ADULT - NSHPPHYSICALEXAM_GEN_ALL_CORE
General: WN/WD NAD  Neurology: A&Ox3, nonfocal, HUGHES x 4  Eyes: PERRLA/ EOMI, Gross vision intact  ENT/Neck: Neck supple, trachea midline, No JVD, Gross hearing intact  Chest: Left anterior chest infusaport  Respiratory: CTA B/L, No wheezing, rales, rhonchi; slightly decreased breath sounds at B/L bases  CV: RRR, S1S2, no murmurs, rubs or gallops  Abdominal: Soft, NT, ND +BS,   Extremities: No edema, + peripheral pulses  Skin: No Rashes, Hematoma, Ecchymosis

## 2019-02-22 NOTE — CONSULT NOTE ADULT - ASSESSMENT
68M with PMHx lung cancer s/p radiation and chemo, s/p 11/13/17 VATS left lower lobectomy with Dr. Larsen, HTN, GERD, atrial flutter transferred from [place] for eval and management of SVT, found to have large pericardial effusion on CT Chest. TTE showed small to mod pericardial effusion with fibrotic changes, no evidence of tamponade. Pt started on amiodarone load and anticoagulation on hold. Thoracic surgery consulted for pericardial window.

## 2019-02-22 NOTE — PROGRESS NOTE ADULT - ASSESSMENT
69 yo M with PMH significant for lung CA s/p chemo and lobectomy, HTN, who was transferred for evaluation of tachycardia.      #Atrial flutter  CHADSVASC 2.  - continue amiodarone load: 400mg BID x 1 week then 400mg daily x 1 week then 200mg daily  - currently off AC due to concerns for hemorrhagic pericardial effusion, AC per primary team

## 2019-02-22 NOTE — DISCHARGE NOTE ADULT - PATIENT PORTAL LINK FT
You can access the [facility] Patient Portal, offered by [facility], by registering with the following website: [email or web address]

## 2019-02-22 NOTE — H&P ADULT - NSHPLABSRESULTS_GEN_ALL_CORE
11.4   8.2   )-----------( 495      ( 22 Feb 2019 05:51 )             33.1     02-22    136  |  104  |  20  ----------------------------<  124<H>  4.1   |  21<L>  |  1.22    Ca    8.8      22 Feb 2019 05:51  Phos  3.0     02-22  Mg     2.0     02-22      PTT - ( 21 Feb 2019 07:03 )  PTT:29.0 sec    CT chest, Echo: See HPI

## 2019-02-22 NOTE — CHART NOTE - NSCHARTNOTEFT_GEN_A_CORE
68M with PMHx lung cancer s/p radiation and chemo, s/p 11/13/17 VATS left lower lobectomy with Dr. Larsen, HTN, GERD, atrial flutter transferred from [place] to [place]  for eval and management of SVT, found to have large pericardial effusion on CT Chest. TTE showed small to mod pericardial effusion with fibrotic changes, no evidence of tamponade. Pt started on amiodarone load and anticoagulation on hold. Seen by Thoracic Surgery.  Pt will be transferred to [facility] for a Pericardial Window with Dr. Larsen on Monday, February 25, 2019.      Cherrie Valverde DNP, ANP-[facility]

## 2019-02-22 NOTE — CONSULT NOTE ADULT - PROBLEM SELECTOR RECOMMENDATION 9
Please set up transfer to [facility] as soon as possible today if pt hemodynamically stable for planned subxiphoid pericardial window with biopsy on Monday 2/25 with Dr. Larsen.  If pt unstable or unable to transfer, please consult IR for pericardial drain placement.  Care as per primary team. Thoracic Surgery will follow.

## 2019-02-22 NOTE — H&P ADULT - PMH
Atrial flutter  10/2017  GERD (gastroesophageal reflux disease)    HTN (hypertension)    Lung cancer  s/p LLLobectomy, radiation, chemotherapy  Smoking history  Quit 2017

## 2019-02-22 NOTE — H&P ADULT - NSHPREVIEWOFSYSTEMS_GEN_ALL_CORE
REVIEW OF SYSTEMS  General: No Weight change/ Fatigue/ HA/ Dizziness at this time	  Skin/Breast: No Rashes/ Lesions/ Masses  Ophthalmologic: No Blurry vision/ Glaucoma/ Blindness  ENMT: No Hearing loss/ Drainage/ Lesions	  Respiratory and Thorax: Occasional Cough with yellowish sputum/ No Wheezing/ No SOB/ No Hemoptysis/ No OSMAN/ + hx of lung cancer  Cardiovascular: No Chest pain/ Palpitations/ Diaphoresis	  Gastrointestinal: No Nausea/ Vomiting/ Constipation/ Appetite Change	  Genitourinary: No Hematuria/ Dysuria/ Frequency change	  Musculoskeletal: No Pain/ Weakness/ Claudication	  Neurological: No Seizures/ TIA/ CVA/ Paresthesias	  Psychiatric: No Dementia/ Depression/ SI/ HI	  Hematology/Lymphatics: No hx of bleeding/ Edema	  Endocrine: No Hyperglycemia/ Hypoglycemia  Allergic/Immunologic: No Anaphylaxis/ Intolerance/ Recent illnesses

## 2019-02-22 NOTE — DISCHARGE NOTE ADULT - PLAN OF CARE
Transfer to [facility] for pericardial window. Transfer to [facility] for pericardial window with Dr. Larsen on Monday, February 25, 2019.

## 2019-02-22 NOTE — H&P ADULT - HISTORY OF PRESENT ILLNESS
This 68 year old male was transferred from [facility] with a pericardial effusion for a subxiphoid pericardial window.  He had been c/o dizziness and syncope over the past few weeks and his PMD referred him to a cardiologist.  The cardiologist in turn referred him to the ER for tachycardia.  He went to [facility] 3 days ago and was admitted.  He was then transferred to [facility] for further evaluation.  He was treated with Amiodarone and his rhythm which was determined to be rapid A fib/ A flutter converted to NSR.  However a CT scan showed a large pericardial effusion.  This was confirmed by an echocardiogram.  Thoracic surgery was consulted and Dr Larsen transferred to the pt to [facility] for a pericardial window scheduled for 2/25/19.  The pt is without complaints except for an occasional cough with yellowish sputum. He had been treated with antibiotics for that until it was determined to be non-infectious.  He denies CP, SOB, OSMAN.

## 2019-02-22 NOTE — PROGRESS NOTE ADULT - SUBJECTIVE AND OBJECTIVE BOX
Patient seen and examined at bedside.    Overnight Events: NAEO. Patient awaiting transfer to [facility] for thoracic evaluation though he expresses wanting to be discharged.    Review Of Systems: No chest pain, shortness of breath, or palpitations            Current Meds:  amiodarone    Tablet   Oral   amiodarone    Tablet 400 milliGRAM(s) Oral every 8 hours  aspirin enteric coated 81 milliGRAM(s) Oral daily  heparin  Injectable 5000 Unit(s) SubCutaneous every 8 hours  metoprolol succinate ER 25 milliGRAM(s) Oral two times a day      Vitals:  T(F): 98 (02-22), Max: 98.1 (02-21)  HR: 74 (02-22) (65 - 74)  BP: 121/77 (02-22) (105/68 - 121/77)  RR: 18 (02-22)  SpO2: 93% (02-22)  I&O's Summary    21 Feb 2019 07:01  -  22 Feb 2019 07:00  --------------------------------------------------------  IN: 840 mL / OUT: 0 mL / NET: 840 mL    22 Feb 2019 07:01  -  22 Feb 2019 11:18  --------------------------------------------------------  IN: 540 mL / OUT: 0 mL / NET: 540 mL        Physical Exam:  Appearance: No acute distress;  HEENT:  EOMI, sclera anicteric, Normal oral mucosa  Cardiovascular: RRR, S1, S2, no murmurs, rubs, or gallops; no edema; no JVD  Respiratory: decrease bibasilar breath sounds with LLL crackles  Gastrointestinal: soft, non-tender, non-distended with normal bowel sounds  Musculoskeletal: No clubbing; no joint deformity   Neurologic: Non-focal  Lymphatic: No lymphadenopathy  Psychiatry: AAOx3, mood & affect appropriate  Skin: No rashes, ecchymoses, or cyanosis                          11.4   8.2   )-----------( 495      ( 22 Feb 2019 05:51 )             33.1     02-22    136  |  104  |  20  ----------------------------<  124<H>  4.1   |  21<L>  |  1.22    Ca    8.8      22 Feb 2019 05:51  Phos  3.0     02-22  Mg     2.0     02-22      PTT - ( 21 Feb 2019 07:03 )  PTT:29.0 sec  CARDIAC MARKERS ( 20 Feb 2019 10:39 )  302 ng/L / x     / x     / x     / x     / x      CARDIAC MARKERS ( 20 Feb 2019 02:24 )  310 ng/L / x     / x     / x     / x     / x      CARDIAC MARKERS ( 19 Feb 2019 20:04 )  x     / x     / x     / x     / x     / 2.5 ng/mL  CARDIAC MARKERS ( 19 Feb 2019 19:00 )  325 ng/L / x     / x     / x     / x     / x      CARDIAC MARKERS ( 19 Feb 2019 15:47 )  x     / .522 ng/mL / x     / 41 U/L / x     / x          New ECG(s): Personally reviewed    Echo:    Stress Testing:     Cath:    Imaging:    Interpretation of Telemetry:

## 2019-02-22 NOTE — H&P ADULT - PROBLEM SELECTOR PLAN 2
I have reviewed discharge instructions with the patient. The patient verbalized understanding. Patient left ED via Discharge Method: ambulatory to Home with friend/wife. Opportunity for questions and clarification provided. Patient given 1 scripts. To continue your aftercare when you leave the hospital, you may receive an automated call from our care team to check in on how you are doing. This is a free service and part of our promise to provide the best care and service to meet your aftercare needs.  If you have questions, or wish to unsubscribe from this service please call [phone]. Thank you for Choosing our [facility] Emergency Department.
Continue Amiodarone load to 5 gm then change to daily dosing

## 2019-02-22 NOTE — DISCHARGE NOTE ADULT - CARE PLAN
Principal Discharge DX:	Pericardial effusion  Goal:	Transfer to [facility] for pericardial window.  Assessment and plan of treatment:	Transfer to [facility] for pericardial window with Dr. Larsen on Monday, February 25, 2019.

## 2019-02-22 NOTE — PROGRESS NOTE ADULT - PROVIDER SPECIALTY LIST ADULT
Cardiology
Electrophysiology
Electrophysiology
Infectious Disease
Internal Medicine
Internal Medicine
Nephrology
Cardiology

## 2019-02-22 NOTE — H&P ADULT - NSHPSOCIALHISTORY_GEN_ALL_CORE
Lives with girlfriend  Works as a commercial air conditioner repairman  No Tobacco since 2017- he had smoked a ppd for 40 years  No EtOH, No Drugs

## 2019-02-23 PROBLEM — Z87.891 PERSONAL HISTORY OF NICOTINE DEPENDENCE: Chronic | Status: ACTIVE | Noted: 2017-06-26

## 2019-02-23 LAB
ALBUMIN SERPL ELPH-MCNC: 2.4 G/DL — LOW (ref 3.3–5)
ALP SERPL-CCNC: 85 U/L — SIGNIFICANT CHANGE UP (ref 40–120)
ALT FLD-CCNC: 26 U/L — SIGNIFICANT CHANGE UP (ref 4–41)
ANION GAP SERPL CALC-SCNC: 10 MMO/L — SIGNIFICANT CHANGE UP (ref 7–14)
APTT BLD: 32 SEC — SIGNIFICANT CHANGE UP (ref 27.5–36.3)
AST SERPL-CCNC: 16 U/L — SIGNIFICANT CHANGE UP (ref 4–40)
BILIRUB SERPL-MCNC: 0.3 MG/DL — SIGNIFICANT CHANGE UP (ref 0.2–1.2)
BLD GP AB SCN SERPL QL: NEGATIVE — SIGNIFICANT CHANGE UP
BUN SERPL-MCNC: 22 MG/DL — SIGNIFICANT CHANGE UP (ref 7–23)
CALCIUM SERPL-MCNC: 8.5 MG/DL — SIGNIFICANT CHANGE UP (ref 8.4–10.5)
CHLORIDE SERPL-SCNC: 107 MMOL/L — SIGNIFICANT CHANGE UP (ref 98–107)
CO2 SERPL-SCNC: 22 MMOL/L — SIGNIFICANT CHANGE UP (ref 22–31)
CREAT SERPL-MCNC: 1.41 MG/DL — HIGH (ref 0.5–1.3)
GLUCOSE SERPL-MCNC: 109 MG/DL — HIGH (ref 70–99)
HCT VFR BLD CALC: 32.8 % — LOW (ref 39–50)
HGB BLD-MCNC: 10.6 G/DL — LOW (ref 13–17)
INR BLD: 1.37 — HIGH (ref 0.88–1.17)
MCHC RBC-ENTMCNC: 28.4 PG — SIGNIFICANT CHANGE UP (ref 27–34)
MCHC RBC-ENTMCNC: 32.3 % — SIGNIFICANT CHANGE UP (ref 32–36)
MCV RBC AUTO: 87.9 FL — SIGNIFICANT CHANGE UP (ref 80–100)
NRBC # FLD: 0 K/UL — LOW (ref 25–125)
PLATELET # BLD AUTO: 485 K/UL — HIGH (ref 150–400)
PMV BLD: 9.4 FL — SIGNIFICANT CHANGE UP (ref 7–13)
POTASSIUM SERPL-MCNC: 4.4 MMOL/L — SIGNIFICANT CHANGE UP (ref 3.5–5.3)
POTASSIUM SERPL-SCNC: 4.4 MMOL/L — SIGNIFICANT CHANGE UP (ref 3.5–5.3)
PROT SERPL-MCNC: 5.9 G/DL — LOW (ref 6–8.3)
PROTHROM AB SERPL-ACNC: 15.4 SEC — HIGH (ref 9.8–13.1)
RBC # BLD: 3.73 M/UL — LOW (ref 4.2–5.8)
RBC # FLD: 15.5 % — HIGH (ref 10.3–14.5)
RH IG SCN BLD-IMP: NEGATIVE — SIGNIFICANT CHANGE UP
SODIUM SERPL-SCNC: 139 MMOL/L — SIGNIFICANT CHANGE UP (ref 135–145)
WBC # BLD: 8.64 K/UL — SIGNIFICANT CHANGE UP (ref 3.8–10.5)
WBC # FLD AUTO: 8.64 K/UL — SIGNIFICANT CHANGE UP (ref 3.8–10.5)

## 2019-02-23 PROCEDURE — 99233 SBSQ HOSP IP/OBS HIGH 50: CPT

## 2019-02-23 PROCEDURE — 71045 X-RAY EXAM CHEST 1 VIEW: CPT | Mod: 26

## 2019-02-23 RX ADMIN — Medication 81 MILLIGRAM(S): at 13:00

## 2019-02-23 RX ADMIN — HEPARIN SODIUM 5000 UNIT(S): 5000 INJECTION INTRAVENOUS; SUBCUTANEOUS at 13:00

## 2019-02-23 RX ADMIN — AMIODARONE HYDROCHLORIDE 400 MILLIGRAM(S): 400 TABLET ORAL at 13:00

## 2019-02-23 RX ADMIN — SODIUM CHLORIDE 3 MILLILITER(S): 9 INJECTION INTRAMUSCULAR; INTRAVENOUS; SUBCUTANEOUS at 05:56

## 2019-02-23 RX ADMIN — Medication 25 MILLIGRAM(S): at 17:23

## 2019-02-23 RX ADMIN — Medication 25 MILLIGRAM(S): at 05:55

## 2019-02-23 RX ADMIN — SODIUM CHLORIDE 3 MILLILITER(S): 9 INJECTION INTRAMUSCULAR; INTRAVENOUS; SUBCUTANEOUS at 21:40

## 2019-02-23 RX ADMIN — HEPARIN SODIUM 5000 UNIT(S): 5000 INJECTION INTRAVENOUS; SUBCUTANEOUS at 05:56

## 2019-02-23 RX ADMIN — AMIODARONE HYDROCHLORIDE 400 MILLIGRAM(S): 400 TABLET ORAL at 21:49

## 2019-02-23 RX ADMIN — AMIODARONE HYDROCHLORIDE 400 MILLIGRAM(S): 400 TABLET ORAL at 05:55

## 2019-02-23 RX ADMIN — SODIUM CHLORIDE 3 MILLILITER(S): 9 INJECTION INTRAMUSCULAR; INTRAVENOUS; SUBCUTANEOUS at 13:01

## 2019-02-23 NOTE — PROGRESS NOTE ADULT - SUBJECTIVE AND OBJECTIVE BOX
SUBJECTIVE / OVERNIGHT EVENTS: pt denies chest pain, shortness of breath, nausea, vomiting dizziness    MEDICATIONS  (STANDING):  amiodarone    Tablet   Oral   amiodarone    Tablet 400 milliGRAM(s) Oral every 8 hours  aspirin enteric coated 81 milliGRAM(s) Oral daily  heparin  Injectable 5000 Unit(s) SubCutaneous every 8 hours  metoprolol tartrate 25 milliGRAM(s) Oral every 12 hours  sodium chloride 0.9% lock flush 3 milliLiter(s) IV Push every 8 hours    MEDICATIONS  (PRN):      Constitutional: No fever, fatigue  Skin: No rash.  Eyes: No recent vision problems or eye pain.  ENT: No congestion, ear pain, or sore throat.  Cardiovascular: No chest pain or palpation.  Respiratory: No cough, shortness of breath, congestion, or wheezing.  Gastrointestinal: No abdominal pain, nausea, vomiting, or diarrhea.  Genitourinary: No dysuria.  Musculoskeletal: No joint swelling.  Neurologic: No headache.    PHYSICAL EXAM:  GENERAL: NAD  EYES: EOMI, PERRLA  NECK: Supple, No JVD  CHEST/LUNG: dec breath sounds rt base   HEART:  S1 , S2 +  ABDOMEN: soft , bs+  EXTREMITIES:no edema    NEUROLOGY:alert awake oriented     LABS:  02-23    139  |  107  |  22  ----------------------------<  109<H>  4.4   |  22  |  1.41<H>    Ca    8.5      23 Feb 2019 04:56    TPro  5.9<L>  /  Alb  2.4<L>  /  TBili  0.3  /  DBili      /  AST  16  /  ALT  26  /  AlkPhos  85  02-23    Creatinine Trend: 1.41 <--, 1.22 <--, 1.25 <--, 1.41 <--, 1.76 <--, 2.00 <--                        10.6   8.64  )-----------( 485      ( 23 Feb 2019 04:56 )             32.8     Urine Studies:            LIVER FUNCTIONS - ( 23 Feb 2019 04:56 )  Alb: 2.4 g/dL / Pro: 5.9 g/dL / ALK PHOS: 85 u/L / ALT: 26 u/L / AST: 16 u/L / GGT: x           PT/INR - ( 23 Feb 2019 04:56 )   PT: 15.4 SEC;   INR: 1.37          PTT - ( 23 Feb 2019 04:56 )  PTT:32.0 SEC

## 2019-02-23 NOTE — PROGRESS NOTE ADULT - ASSESSMENT
68 year old male with h/o lung cancer s/p radiation/ chemo, HTN, GERD, Atrial flutter transferred from [place] for evaluation and management of SVT.     - evaluated by cards- ct chest with pericardial effusion / rt pleural effusion, echo with moderate effusion, thoracic sx evaluated pt - poss pericardiocentesis   - EP evaluated pt , cont toprol 25 + amio

## 2019-02-23 NOTE — PROGRESS NOTE ADULT - SUBJECTIVE AND OBJECTIVE BOX
Subjective: "I feel pretty good" Denies HA, dizziness, SOB, chest pain. Ambulating without difficulty. No complaints    Vital Signs:  Vital Signs Last 24 Hrs  T(C): 36.7 (02-23-19 @ 16:17), Max: 36.7 (02-23-19 @ 05:54)  T(F): 98 (02-23-19 @ 16:17), Max: 98.1 (02-23-19 @ 12:10)  HR: 79 (02-23-19 @ 16:17) (74 - 84)  BP: 128/76 (02-23-19 @ 16:17) (28/81 - 130/76)  RR: 20 (02-23-19 @ 16:17) (16 - 20)  SpO2: 99% (02-23-19 @ 16:17) (94% - 99%) on (O2)    Telemetry/Alarms: tele SR  General: WN/WD NAD  Neurology: Awake, nonfocal, HUGHES x 4  Eyes: Scleras clear, PERRLA/ EOMI, Gross vision intact  ENT:Gross hearing intact, grossly patent pharynx, no stridor  Neck: Neck supple, trachea midline, No JVD,   Respiratory: CTA B/L, No wheezing, rales, rhonchi  CV: RRR, S1S2, no murmurs, rubs or gallops  Abdominal: Soft, NT, ND +BS, no BM  Extremities: No edema, + peripheral pulses  Skin: No Rashes, Hematoma, Ecchymosis  Lymphatic: No Neck, axilla, groin LAD  Psych: Oriented x 3, normal affect    Relevant labs, radiology and Medications reviewed                        10.6   8.64  )-----------( 485      ( 23 Feb 2019 04:56 )             32.8     02-23    139  |  107  |  22  ----------------------------<  109<H>  4.4   |  22  |  1.41<H>    Ca    8.5      23 Feb 2019 04:56  Phos  3.0     02-22  Mg     2.0     02-22    TPro  5.9<L>  /  Alb  2.4<L>  /  TBili  0.3  /  DBili  x   /  AST  16  /  ALT  26  /  AlkPhos  85  02-23    PT/INR - ( 23 Feb 2019 04:56 )   PT: 15.4 SEC;   INR: 1.37          PTT - ( 23 Feb 2019 04:56 )  PTT:32.0 SEC  MEDICATIONS  (STANDING):  amiodarone    Tablet   Oral   amiodarone    Tablet 400 milliGRAM(s) Oral every 8 hours  aspirin enteric coated 81 milliGRAM(s) Oral daily  heparin  Injectable 5000 Unit(s) SubCutaneous every 8 hours  metoprolol succinate ER 25 milliGRAM(s) Oral two times a day  sodium chloride 0.9% lock flush 3 milliLiter(s) IV Push every 8 hours    MEDICATIONS  (PRN):    Pertinent Physical Exam  I&O's Summary    22 Feb 2019 07:01  -  23 Feb 2019 07:00  --------------------------------------------------------  IN: 240 mL / OUT: 0 mL / NET: 240 mL    Social: Lives w girlfriend  Works  Former smoker  no ETOH    Assessment  68y Male  w/ PAST MEDICAL & SURGICAL HISTORY:  Atrial flutter: 10/2017  GERD (gastroesophageal reflux disease)  Lung cancer: s/p LLLobectomy, radiation, chemotherapy  Smoking history: Quit 2017  HTN (hypertension)  Status post lobectomy of lung: LLLobectomy at [facility]  History of chemotherapy: left chest wall infusaport placement  H/O inguinal hernia repair: Left side; 1972  admitted with complaints of Patient is a 68y old  Male who presents with a chief complaint of Pericardial effusion (22 Feb 2019 19:42)  This 68 year old male was transferred from [facility] with a pericardial effusion for a subxiphoid pericardial window.  He had been c/o dizziness and syncope over the past few weeks and his PMD referred him to a cardiologist.  The cardiologist in turn referred him to the ER for tachycardia.  He went to [facility] 3 days ago and was admitted.  He was then transferred to [facility] for further evaluation.  He was treated with Amiodarone and his rhythm which was determined to be rapid A fib/ A flutter converted to NSR.  However a CT scan showed a large pericardial effusion.  This was confirmed by an echocardiogram.  Thoracic surgery was consulted and Dr Larsen transferred to the pt to [facility] for a pericardial window scheduled for 2/25/19.  The pt is without complaints except for an occasional cough with yellowish sputum. He had been treated with antibiotics for that until it was determined to be non-infectious.  He denies CP, SOB, OSMAN.        PLAN  Neuro: Pain management  Pulm: Encourage coughing, deep breathing and use of incentive spirometry.    Cardio: Monitor telemetry/alarms. cont Amio load and Toprol. Pt in SR currently. Holding a/c per Dr. Larsen.   GI: Tolerating diet. Continue stool softeners.  Renal: monitor urine output, supplement electrolytes as needed  Vasc: Heparin SC/SCDs for DVT prophylaxis  Heme: Stable H/H. .   ID: Off antibiotics. Stable.  Therapy: OOB/ambulate  Disposition: Plan for PEricardial window on Monday  Discussed with Cardiothoracic Team at AM rounds.

## 2019-02-24 ENCOUNTER — TRANSCRIPTION ENCOUNTER (OUTPATIENT)
Age: 69
End: 2019-02-24

## 2019-02-24 LAB
BLD GP AB SCN SERPL QL: NEGATIVE — SIGNIFICANT CHANGE UP
RH IG SCN BLD-IMP: NEGATIVE — SIGNIFICANT CHANGE UP

## 2019-02-24 PROCEDURE — 99233 SBSQ HOSP IP/OBS HIGH 50: CPT

## 2019-02-24 RX ORDER — METOPROLOL TARTRATE 50 MG
25 TABLET ORAL EVERY 12 HOURS
Qty: 0 | Refills: 0 | Status: DISCONTINUED | OUTPATIENT
Start: 2019-02-24 | End: 2019-02-25

## 2019-02-24 RX ADMIN — AMIODARONE HYDROCHLORIDE 400 MILLIGRAM(S): 400 TABLET ORAL at 13:03

## 2019-02-24 RX ADMIN — Medication 81 MILLIGRAM(S): at 13:03

## 2019-02-24 RX ADMIN — AMIODARONE HYDROCHLORIDE 400 MILLIGRAM(S): 400 TABLET ORAL at 22:09

## 2019-02-24 RX ADMIN — AMIODARONE HYDROCHLORIDE 400 MILLIGRAM(S): 400 TABLET ORAL at 05:36

## 2019-02-24 RX ADMIN — SODIUM CHLORIDE 3 MILLILITER(S): 9 INJECTION INTRAMUSCULAR; INTRAVENOUS; SUBCUTANEOUS at 22:10

## 2019-02-24 RX ADMIN — SODIUM CHLORIDE 3 MILLILITER(S): 9 INJECTION INTRAMUSCULAR; INTRAVENOUS; SUBCUTANEOUS at 13:02

## 2019-02-24 RX ADMIN — Medication 25 MILLIGRAM(S): at 05:36

## 2019-02-24 RX ADMIN — SODIUM CHLORIDE 3 MILLILITER(S): 9 INJECTION INTRAMUSCULAR; INTRAVENOUS; SUBCUTANEOUS at 05:17

## 2019-02-24 RX ADMIN — Medication 25 MILLIGRAM(S): at 17:07

## 2019-02-24 RX ADMIN — HEPARIN SODIUM 5000 UNIT(S): 5000 INJECTION INTRAVENOUS; SUBCUTANEOUS at 22:09

## 2019-02-24 NOTE — PROGRESS NOTE ADULT - SUBJECTIVE AND OBJECTIVE BOX
Subjective:    Vital Signs:  Vital Signs Last 24 Hrs  T(C): 36.4 (02-24-19 @ 12:40), Max: 36.8 (02-23-19 @ 20:27)  T(F): 97.6 (02-24-19 @ 12:40), Max: 98.2 (02-23-19 @ 20:27)  HR: 71 (02-24-19 @ 12:40) (71 - 79)  BP: 116/69 (02-24-19 @ 12:40) (104/70 - 128/76)  RR: 17 (02-24-19 @ 12:40) (17 - 20)  SpO2: 98% (02-24-19 @ 12:40) (95% - 100%) on (O2)    Telemetry/Alarms:  General: WN/WD NAD  Neurology: Awake, nonfocal, HUGHES x 4  Eyes: Scleras clear, PERRLA/ EOMI, Gross vision intact  ENT:Gross hearing intact, grossly patent pharynx, no stridor  Neck: Neck supple, trachea midline, No JVD,   Respiratory: CTA B/L, No wheezing, rales, rhonchi  CV: RRR, S1S2, no murmurs, rubs or gallops  Abdominal: Soft, NT, ND +BS,   Extremities: No edema, + peripheral pulses  Skin: No Rashes, Hematoma, Ecchymosis  Lymphatic: No Neck, axilla, groin LAD  Psych: Oriented x 3, normal affect  Incisions:   Tubes:  Relevant labs, radiology and Medications reviewed                        10.6   8.64  )-----------( 485      ( 23 Feb 2019 04:56 )             32.8     02-23    139  |  107  |  22  ----------------------------<  109<H>  4.4   |  22  |  1.41<H>    Ca    8.5      23 Feb 2019 04:56    TPro  5.9<L>  /  Alb  2.4<L>  /  TBili  0.3  /  DBili  x   /  AST  16  /  ALT  26  /  AlkPhos  85  02-23    PT/INR - ( 23 Feb 2019 04:56 )   PT: 15.4 SEC;   INR: 1.37          PTT - ( 23 Feb 2019 04:56 )  PTT:32.0 SEC  MEDICATIONS  (STANDING):  amiodarone    Tablet   Oral   amiodarone    Tablet 400 milliGRAM(s) Oral every 8 hours  aspirin enteric coated 81 milliGRAM(s) Oral daily  heparin  Injectable 5000 Unit(s) SubCutaneous every 8 hours  metoprolol tartrate 25 milliGRAM(s) Oral every 12 hours  sodium chloride 0.9% lock flush 3 milliLiter(s) IV Push every 8 hours    MEDICATIONS  (PRN):    Pertinent Physical Exam  I&O's Summary    23 Feb 2019 07:01  -  24 Feb 2019 07:00  --------------------------------------------------------  IN: 0 mL / OUT: 1000 mL / NET: -1000 mL    24 Feb 2019 07:01  -  24 Feb 2019 12:52  --------------------------------------------------------  IN: 236 mL / OUT: 0 mL / NET: 236 mL        Assessment  This 68 year old male was transferred from [facility] with a pericardial effusion for a subxiphoid pericardial window.  He had been c/o dizziness and syncope over the past few weeks and his PMD referred him to a cardiologist.  The cardiologist in turn referred him to the ER for tachycardia.  He went to [facility] 3 days ago and was admitted.  He was then transferred to [facility] for further evaluation.  He was treated with Amiodarone and his rhythm which was determined to be rapid A fib/ A flutter converted to NSR.  However a CT scan showed a large pericardial effusion.  This was confirmed by an echocardiogram.  Thoracic surgery was consulted and Dr Larsen transferred to the pt to [facility] for a pericardial window scheduled for 2/25/19.  Anticoagulation for Afib on holdThe pt is without complaints except for an occasional cough with yellowish sputum. He had been treated with antibiotics for that until it was determined to be non-infectious.  He denies CP, SOB, OSMAN.        PLAN  Neuro: Pain management  Pulm: Encourage coughing, deep breathing and use of incentive spirometry.    Cardio: Monitor telemetry/alarms. cont Amio load and Toprol. Pt in SR currently. Holding a/c per Dr. Larsen. ? EP consult for postop AC recs  GI: Tolerating diet. Continue stool softeners.  Renal: monitor urine output, supplement electrolytes as needed  Vasc: Heparin SC/SCDs for DVT prophylaxis  Heme: Stable H/H.   ID: Off antibiotics. Stable.  Therapy: OOB/ambulate  Disposition: Plan for PEricardial window on Monday  Discussed with Cardiothoracic Team at AM rounds.

## 2019-02-24 NOTE — PROGRESS NOTE ADULT - SUBJECTIVE AND OBJECTIVE BOX
SUBJECTIVE / OVERNIGHT EVENTS: pt denies chest pain, shortness of breath, nausea, vomiting dizziness    MEDICATIONS  (STANDING):  amiodarone    Tablet   Oral   amiodarone    Tablet 400 milliGRAM(s) Oral every 8 hours  aspirin enteric coated 81 milliGRAM(s) Oral daily  heparin  Injectable 5000 Unit(s) SubCutaneous every 8 hours  metoprolol tartrate 25 milliGRAM(s) Oral every 12 hours  sodium chloride 0.9% lock flush 3 milliLiter(s) IV Push every 8 hours    MEDICATIONS  (PRN):    Vital Signs Last 24 Hrs  T(C): 36.4 (24 Feb 2019 12:40), Max: 36.8 (23 Feb 2019 20:27)  T(F): 97.6 (24 Feb 2019 12:40), Max: 98.2 (23 Feb 2019 20:27)  HR: 71 (24 Feb 2019 12:40) (71 - 79)  BP: 116/69 (24 Feb 2019 12:40) (104/70 - 128/76)  BP(mean): --  RR: 17 (24 Feb 2019 12:40) (17 - 20)  SpO2: 98% (24 Feb 2019 12:40) (95% - 100%)  Constitutional: No fever, fatigue  Skin: No rash.  Eyes: No recent vision problems or eye pain.  ENT: No congestion, ear pain, or sore throat.  Cardiovascular: No chest pain or palpation.  Respiratory: No cough, shortness of breath, congestion, or wheezing.  Gastrointestinal: No abdominal pain, nausea, vomiting, or diarrhea.  Genitourinary: No dysuria.  Musculoskeletal: No joint swelling.  Neurologic: No headache.    PHYSICAL EXAM:  GENERAL: NAD  EYES: EOMI, PERRLA  NECK: Supple, No JVD  CHEST/LUNG: dec breath sounds rt base   HEART:  S1 , S2 +  ABDOMEN: soft , bs+  EXTREMITIES:no edema    NEUROLOGY:alert awake oriented     LABS:  02-23    139  |  107  |  22  ----------------------------<  109<H>  4.4   |  22  |  1.41<H>    Ca    8.5      23 Feb 2019 04:56    TPro  5.9<L>  /  Alb  2.4<L>  /  TBili  0.3  /  DBili      /  AST  16  /  ALT  26  /  AlkPhos  85  02-23    Creatinine Trend: 1.41 <--, 1.22 <--, 1.25 <--, 1.41 <--, 1.76 <--, 2.00 <--                        10.6   8.64  )-----------( 485      ( 23 Feb 2019 04:56 )             32.8     Urine Studies:            LIVER FUNCTIONS - ( 23 Feb 2019 04:56 )  Alb: 2.4 g/dL / Pro: 5.9 g/dL / ALK PHOS: 85 u/L / ALT: 26 u/L / AST: 16 u/L / GGT: x           PT/INR - ( 23 Feb 2019 04:56 )   PT: 15.4 SEC;   INR: 1.37          PTT - ( 23 Feb 2019 04:56 )  PTT:32.0 SEC

## 2019-02-24 NOTE — PROGRESS NOTE ADULT - ASSESSMENT
68 year old male with h/o lung cancer s/p radiation/ chemo, HTN, GERD, Atrial flutter transferred from [place] for evaluation and management of SVT.     - evaluated by cards- ct chest with pericardial effusion / rt pleural effusion, echo with moderate effusion, thoracic sx evaluated pt - poss pericardiocentesis   - EP evaluated pt , cont toprol 25 + amio

## 2019-02-25 ENCOUNTER — RESULT REVIEW (OUTPATIENT)
Age: 69
End: 2019-02-25

## 2019-02-25 LAB
ANION GAP SERPL CALC-SCNC: 12 MMO/L — SIGNIFICANT CHANGE UP (ref 7–14)
APTT BLD: 32.1 SEC — SIGNIFICANT CHANGE UP (ref 27.5–36.3)
BUN SERPL-MCNC: 22 MG/DL — SIGNIFICANT CHANGE UP (ref 7–23)
CALCIUM SERPL-MCNC: 8.6 MG/DL — SIGNIFICANT CHANGE UP (ref 8.4–10.5)
CHLORIDE SERPL-SCNC: 104 MMOL/L — SIGNIFICANT CHANGE UP (ref 98–107)
CO2 SERPL-SCNC: 20 MMOL/L — LOW (ref 22–31)
CREAT SERPL-MCNC: 1.44 MG/DL — HIGH (ref 0.5–1.3)
GLUCOSE SERPL-MCNC: 120 MG/DL — HIGH (ref 70–99)
GRAM STN WND: SIGNIFICANT CHANGE UP
GRAM STN WND: SIGNIFICANT CHANGE UP
HCT VFR BLD CALC: 35.1 % — LOW (ref 39–50)
HGB BLD-MCNC: 11.3 G/DL — LOW (ref 13–17)
INR BLD: 1.3 — HIGH (ref 0.88–1.17)
MAGNESIUM SERPL-MCNC: 1.9 MG/DL — SIGNIFICANT CHANGE UP (ref 1.6–2.6)
MCHC RBC-ENTMCNC: 28.3 PG — SIGNIFICANT CHANGE UP (ref 27–34)
MCHC RBC-ENTMCNC: 32.2 % — SIGNIFICANT CHANGE UP (ref 32–36)
MCV RBC AUTO: 88 FL — SIGNIFICANT CHANGE UP (ref 80–100)
NRBC # FLD: 0 K/UL — LOW (ref 25–125)
PLATELET # BLD AUTO: 506 K/UL — HIGH (ref 150–400)
PMV BLD: 9.2 FL — SIGNIFICANT CHANGE UP (ref 7–13)
POTASSIUM SERPL-MCNC: 4.2 MMOL/L — SIGNIFICANT CHANGE UP (ref 3.5–5.3)
POTASSIUM SERPL-SCNC: 4.2 MMOL/L — SIGNIFICANT CHANGE UP (ref 3.5–5.3)
PROTHROM AB SERPL-ACNC: 14.6 SEC — HIGH (ref 9.8–13.1)
RBC # BLD: 3.99 M/UL — LOW (ref 4.2–5.8)
RBC # FLD: 15.4 % — HIGH (ref 10.3–14.5)
SODIUM SERPL-SCNC: 136 MMOL/L — SIGNIFICANT CHANGE UP (ref 135–145)
SPECIMEN SOURCE: SIGNIFICANT CHANGE UP
SPECIMEN SOURCE: SIGNIFICANT CHANGE UP
WBC # BLD: 10.42 K/UL — SIGNIFICANT CHANGE UP (ref 3.8–10.5)
WBC # FLD AUTO: 10.42 K/UL — SIGNIFICANT CHANGE UP (ref 3.8–10.5)

## 2019-02-25 PROCEDURE — 33030 PARTIAL REMOVAL OF HEART SAC: CPT

## 2019-02-25 PROCEDURE — 71045 X-RAY EXAM CHEST 1 VIEW: CPT | Mod: 26

## 2019-02-25 PROCEDURE — 88305 TISSUE EXAM BY PATHOLOGIST: CPT | Mod: 26

## 2019-02-25 PROCEDURE — 99233 SBSQ HOSP IP/OBS HIGH 50: CPT

## 2019-02-25 RX ORDER — SODIUM CHLORIDE 9 MG/ML
1000 INJECTION, SOLUTION INTRAVENOUS
Qty: 0 | Refills: 0 | Status: DISCONTINUED | OUTPATIENT
Start: 2019-02-25 | End: 2019-02-25

## 2019-02-25 RX ORDER — FENTANYL CITRATE 50 UG/ML
50 INJECTION INTRAVENOUS
Qty: 0 | Refills: 0 | Status: DISCONTINUED | OUTPATIENT
Start: 2019-02-25 | End: 2019-02-25

## 2019-02-25 RX ORDER — ONDANSETRON 8 MG/1
4 TABLET, FILM COATED ORAL ONCE
Qty: 0 | Refills: 0 | Status: DISCONTINUED | OUTPATIENT
Start: 2019-02-25 | End: 2019-03-02

## 2019-02-25 RX ORDER — OXYCODONE HYDROCHLORIDE 5 MG/1
10 TABLET ORAL EVERY 6 HOURS
Qty: 0 | Refills: 0 | Status: DISCONTINUED | OUTPATIENT
Start: 2019-02-25 | End: 2019-02-26

## 2019-02-25 RX ORDER — DOCUSATE SODIUM 100 MG
100 CAPSULE ORAL THREE TIMES A DAY
Qty: 0 | Refills: 0 | Status: DISCONTINUED | OUTPATIENT
Start: 2019-02-25 | End: 2019-03-02

## 2019-02-25 RX ORDER — HEPARIN SODIUM 5000 [USP'U]/ML
5000 INJECTION INTRAVENOUS; SUBCUTANEOUS EVERY 8 HOURS
Qty: 0 | Refills: 0 | Status: DISCONTINUED | OUTPATIENT
Start: 2019-02-25 | End: 2019-03-01

## 2019-02-25 RX ORDER — SODIUM CHLORIDE 9 MG/ML
1000 INJECTION, SOLUTION INTRAVENOUS
Qty: 0 | Refills: 0 | Status: DISCONTINUED | OUTPATIENT
Start: 2019-02-25 | End: 2019-02-26

## 2019-02-25 RX ORDER — OXYCODONE HYDROCHLORIDE 5 MG/1
5 TABLET ORAL EVERY 6 HOURS
Qty: 0 | Refills: 0 | Status: DISCONTINUED | OUTPATIENT
Start: 2019-02-25 | End: 2019-02-26

## 2019-02-25 RX ORDER — FENTANYL CITRATE 50 UG/ML
25 INJECTION INTRAVENOUS
Qty: 0 | Refills: 0 | Status: DISCONTINUED | OUTPATIENT
Start: 2019-02-25 | End: 2019-02-25

## 2019-02-25 RX ADMIN — OXYCODONE HYDROCHLORIDE 5 MILLIGRAM(S): 5 TABLET ORAL at 23:30

## 2019-02-25 RX ADMIN — HEPARIN SODIUM 5000 UNIT(S): 5000 INJECTION INTRAVENOUS; SUBCUTANEOUS at 23:06

## 2019-02-25 RX ADMIN — Medication 25 MILLIGRAM(S): at 05:39

## 2019-02-25 RX ADMIN — FENTANYL CITRATE 50 MICROGRAM(S): 50 INJECTION INTRAVENOUS at 14:00

## 2019-02-25 RX ADMIN — FENTANYL CITRATE 50 MICROGRAM(S): 50 INJECTION INTRAVENOUS at 14:15

## 2019-02-25 RX ADMIN — SODIUM CHLORIDE 30 MILLILITER(S): 9 INJECTION, SOLUTION INTRAVENOUS at 13:30

## 2019-02-25 RX ADMIN — HEPARIN SODIUM 5000 UNIT(S): 5000 INJECTION INTRAVENOUS; SUBCUTANEOUS at 13:30

## 2019-02-25 RX ADMIN — SODIUM CHLORIDE 3 MILLILITER(S): 9 INJECTION INTRAMUSCULAR; INTRAVENOUS; SUBCUTANEOUS at 05:39

## 2019-02-25 RX ADMIN — SODIUM CHLORIDE 30 MILLILITER(S): 9 INJECTION, SOLUTION INTRAVENOUS at 14:04

## 2019-02-25 RX ADMIN — AMIODARONE HYDROCHLORIDE 400 MILLIGRAM(S): 400 TABLET ORAL at 05:39

## 2019-02-25 RX ADMIN — FENTANYL CITRATE 50 MICROGRAM(S): 50 INJECTION INTRAVENOUS at 14:30

## 2019-02-25 NOTE — PROGRESS NOTE ADULT - SUBJECTIVE AND OBJECTIVE BOX
Patient is seen and examined. Denies chest pain, SOB, palpitations or dizziness    PAST MEDICAL & SURGICAL HISTORY:  Atrial flutter: 10/2017  GERD (gastroesophageal reflux disease)  Lung cancer: s/p LLLobectomy, radiation, chemotherapy  Lung nodule: left  Smoking history: Quit 2017  HTN (hypertension)  Status post lobectomy of lung: [facility]obectomy at [facility]  History of chemotherapy: left chest wall infusaport placement  H/O inguinal hernia repair: Left side; 1972  No significant past surgical history      MEDICATIONS  (STANDING):  docusate sodium 100 milliGRAM(s) Oral three times a day  heparin  Injectable 5000 Unit(s) SubCutaneous every 8 hours  lactated ringers. 1000 milliLiter(s) (30 mL/Hr) IV Continuous <Continuous>    MEDICATIONS  (PRN):  fentaNYL    Injectable 25 MICROGram(s) IV Push every 5 minutes PRN Moderate Pain (4 - 6)  fentaNYL    Injectable 50 MICROGram(s) IV Push every 5 minutes PRN Severe Pain (7 - 10)  ondansetron Injectable 4 milliGRAM(s) IV Push once PRN Nausea and/or Vomiting    Vital Signs Last 24 Hrs  T(C): 36.4 (25 Feb 2019 16:03), Max: 36.6 (24 Feb 2019 21:12)  T(F): 97.5 (25 Feb 2019 16:03), Max: 97.9 (25 Feb 2019 13:00)  HR: 80 (25 Feb 2019 16:03) (60 - 80)  BP: 140/90 (25 Feb 2019 16:03) (110/67 - 149/89)  BP(mean): 81 (25 Feb 2019 15:00) (80 - 96)  RR: 18 (25 Feb 2019 16:03) (16 - 22)  SpO2: 99% (25 Feb 2019 16:03) (94% - 100%)    INTERPRETATION OF TELEMETRY: Sinus rhythm with HR 60s-70s    LABS:                        11.3   10.42 )-----------( 506      ( 25 Feb 2019 05:44 )             35.1     02-25    136  |  104  |  22  ----------------------------<  120<H>  4.2   |  20<L>  |  1.44<H>    Ca    8.6      25 Feb 2019 05:44  Mg     1.9     02-25          PT/INR - ( 25 Feb 2019 05:44 )   PT: 14.6 SEC;   INR: 1.30          PTT - ( 25 Feb 2019 05:44 )  PTT:32.1 SEC    I&O's Summary    24 Feb 2019 07:01  -  25 Feb 2019 07:00  --------------------------------------------------------  IN: 236 mL / OUT: 600 mL / NET: -364 mL    25 Feb 2019 07:01  -  25 Feb 2019 18:50  --------------------------------------------------------  IN: 530 mL / OUT: 25 mL / NET: 505 mL          PHYSICAL EXAM:    GENERAL: In no apparent distress, well nourished, and hydrated.  HEAD:  Atraumatic, Normocephalic  HEART: Regular rate and rhythm; No murmurs, rubs, or gallops.  PULMONARY: Clear to auscultation and percussion.  s/p pericardial window  ABDOMEN: Soft, Nontender, Nondistended; Bowel sounds present  EXTREMITIES:  2+ Peripheral Pulses, No clubbing, cyanosis, or edema

## 2019-02-25 NOTE — BRIEF OPERATIVE NOTE - POST-OP DX
Pericardial effusion  02/25/2019    Samson Warner  Pericarditis, unspecified chronicity, unspecified type  02/25/2019    Active  Samson Christensen

## 2019-02-25 NOTE — PROGRESS NOTE ADULT - SUBJECTIVE AND OBJECTIVE BOX
SUBJECTIVE / OVERNIGHT EVENTS: pt denies chest pain, shortness of breath, nausea, vomiting dizziness    MEDICATIONS  (STANDING):  docusate sodium 100 milliGRAM(s) Oral three times a day  heparin  Injectable 5000 Unit(s) SubCutaneous every 8 hours  lactated ringers. 1000 milliLiter(s) (30 mL/Hr) IV Continuous <Continuous>    MEDICATIONS  (PRN):  fentaNYL    Injectable 25 MICROGram(s) IV Push every 5 minutes PRN Moderate Pain (4 - 6)  fentaNYL    Injectable 50 MICROGram(s) IV Push every 5 minutes PRN Severe Pain (7 - 10)  ondansetron Injectable 4 milliGRAM(s) IV Push once PRN Nausea and/or Vomiting    Vital Signs Last 24 Hrs  T(C): 36.6 (25 Feb 2019 20:25), Max: 36.6 (25 Feb 2019 05:37)  T(F): 97.9 (25 Feb 2019 20:25), Max: 97.9 (25 Feb 2019 13:00)  HR: 80 (25 Feb 2019 20:25) (60 - 80)  BP: 125/77 (25 Feb 2019 20:25) (110/67 - 149/89)  BP(mean): 81 (25 Feb 2019 15:00) (80 - 96)  RR: 18 (25 Feb 2019 20:25) (16 - 22)  SpO2: 99% (25 Feb 2019 20:25) (94% - 100%)    Constitutional: No fever, fatigue  Skin: No rash.  Eyes: No recent vision problems or eye pain.  ENT: No congestion, ear pain, or sore throat.  Cardiovascular: No chest pain or palpation.  Respiratory: No cough, shortness of breath, congestion, or wheezing.  Gastrointestinal: No abdominal pain, nausea, vomiting, or diarrhea.  Genitourinary: No dysuria.  Musculoskeletal: No joint swelling.  Neurologic: No headache.    PHYSICAL EXAM:  GENERAL: NAD  EYES: EOMI, PERRLA  NECK: Supple, No JVD  CHEST/LUNG: dec breath sounds rt base   HEART:  S1 , S2 +  ABDOMEN: soft , bs+  EXTREMITIES:no edema    NEUROLOGY:alert awake oriented     LABS:  02-25    136  |  104  |  22  ----------------------------<  120<H>  4.2   |  20<L>  |  1.44<H>    Ca    8.6      25 Feb 2019 05:44  Mg     1.9     02-25      Creatinine Trend: 1.44 <--, 1.41 <--, 1.22 <--, 1.25 <--, 1.41 <--, 1.76 <--, 2.00 <--                        11.3   10.42 )-----------( 506      ( 25 Feb 2019 05:44 )             35.1     Urine Studies:              PT/INR - ( 25 Feb 2019 05:44 )   PT: 14.6 SEC;   INR: 1.30          PTT - ( 25 Feb 2019 05:44 )  PTT:32.1 SEC

## 2019-02-25 NOTE — CHART NOTE - NSCHARTNOTEFT_GEN_A_CORE
Patient is a 68y old  Male who presents with a chief complaint of Pericardial effusion (25 Feb 2019 18:50) Pt is s/p drainage of pericardial effusion subxiphoid window on 2/25/19. Pt feels well, states pain is 1-2/10. Pt had not voided yet, encouraged PO intake       Vital Signs Last 24 Hrs  T(C): 36.9 (26 Feb 2019 00:32), Max: 36.9 (26 Feb 2019 00:32)  T(F): 98.4 (26 Feb 2019 00:32), Max: 98.4 (26 Feb 2019 00:32)  HR: 91 (26 Feb 2019 00:32) (60 - 91)  BP: 147/87 (26 Feb 2019 00:32) (113/79 - 149/89)  BP(mean): 81 (25 Feb 2019 15:00) (80 - 96)  RR: 18 (26 Feb 2019 00:32) (16 - 22)  SpO2: 99% (26 Feb 2019 00:32) (94% - 100%)    General: WN/WD NAD  Neurology: A&Ox3, nonfocal, HUGHES x 4  Respiratory: CTA B/L  CV: RRR, S1S2, no murmurs, rubs or gallops  Abdominal: Soft, NT, ND +BS, Last BM  Extremities: No edema, + peripheral pulses  Incisions:   Tubes:                          11.3   10.42 )-----------( 506      ( 25 Feb 2019 05:44 )             35.1     02-25    136  |  104  |  22  ----------------------------<  120<H>  4.2   |  20<L>  |  1.44<H>    Ca    8.6      25 Feb 2019 05:44  Mg     1.9     02-25      PT/INR - ( 25 Feb 2019 05:44 )   PT: 14.6 SEC;   INR: 1.30          PTT - ( 25 Feb 2019 05:44 )  PTT:32.1 SEC    CXR    MEDICATIONS  (STANDING):  docusate sodium 100 milliGRAM(s) Oral three times a day  heparin  Injectable 5000 Unit(s) SubCutaneous every 8 hours  lactated ringers. 1000 milliLiter(s) (30 mL/Hr) IV Continuous <Continuous>    MEDICATIONS  (PRN):  ondansetron Injectable 4 milliGRAM(s) IV Push once PRN Nausea and/or Vomiting  oxyCODONE    IR 5 milliGRAM(s) Oral every 6 hours PRN Moderate Pain (4 - 6)  oxyCODONE    IR 10 milliGRAM(s) Oral every 6 hours PRN Severe Pain (7 - 10)      PLAN  -c/w Austin to sxn  - Will restart BB  - Due to void now. Will give patient a couple more hours and then bladder scan    Jose Jeong APC

## 2019-02-25 NOTE — BRIEF OPERATIVE NOTE - PROCEDURE
<<-----Click on this checkbox to enter Procedure Pericardial window operation  02/25/2019    Active  [facility]  Pericardiectomy, without cardiopulmonary bypass  02/25/2019    Active  [facility]

## 2019-02-25 NOTE — PROGRESS NOTE ADULT - ASSESSMENT
68 year old male with h/o lung cancer s/p radiation/ chemo, HTN, GERD, Atrial flutter transferred from [place] for evaluation and management of SVT.     - evaluated by cards- ct chest with pericardial effusion / rt pleural effusion, echo with moderate effusion, thoracic sx evaluated pt - poss pericardiocentesis today  - EP evaluated pt , cont toprol 25 + amio

## 2019-02-25 NOTE — PROGRESS NOTE ADULT - ASSESSMENT
68 year old male was transferred from [facility] with a pericardial effusion for a subxiphoid pericardial window.  He had been c/o dizziness and syncope over the past few weeks and his PMD referred him to a cardiologist.  The cardiologist in turn referred him to the ER for tachycardia. He went to [facility] 6 days ago and was admitted.  He was then transferred to [facility] for further evaluation.  He was treated with Amiodarone and his rhythm which was determined to be rapid A flutter converted to NSR.  However a CT scan showed a large pericardial effusion.  This was confirmed by an echocardiogram.  Thoracic surgery was consulted and Dr Larsen transferred to the pt to [facility] for a pericardial window an dis now s/p pericardial window today. Currently off AC 2/2 surgical intervention. Patient is currently off all AV monique blockers and Amiodarone  #Atrial flutter  CHADSVASC 2. 68 year old male was transferred from [facility] with a pericardial effusion for a subxiphoid pericardial window.  He had been c/o dizziness and syncope over the past few weeks and his PMD referred him to a cardiologist.  The cardiologist in turn referred him to the ER for tachycardia. He went to [facility] 6 days ago and was admitted.  He was then transferred to [facility] for further evaluation.  He was treated with Amiodarone and his rhythm which was determined to be rapid A flutter converted to NSR.  However a CT scan showed a large pericardial effusion.  This was confirmed by an echocardiogram.  Thoracic surgery was consulted and Dr Larsen transferred to the pt to [facility] for a pericardial window an dis now s/p pericardial window today. Currently off AC 2/2 surgical intervention. Patient is currently off all AV monique blockers and Amiodarone. Discussed with Dr. Patel  #Atrial flutter  CHADSVASC 2.  Restart metoprolol for rate control  Restart AC when cleared by thoracic surgery  F/U with Dr. Patel in 4 weeks

## 2019-02-26 LAB
ANION GAP SERPL CALC-SCNC: 15 MMO/L — HIGH (ref 7–14)
BUN SERPL-MCNC: 18 MG/DL — SIGNIFICANT CHANGE UP (ref 7–23)
CALCIUM SERPL-MCNC: 8.8 MG/DL — SIGNIFICANT CHANGE UP (ref 8.4–10.5)
CHLORIDE SERPL-SCNC: 98 MMOL/L — SIGNIFICANT CHANGE UP (ref 98–107)
CO2 SERPL-SCNC: 21 MMOL/L — LOW (ref 22–31)
CREAT SERPL-MCNC: 1.45 MG/DL — HIGH (ref 0.5–1.3)
CULTURE - ACID FAST SMEAR CONCENTRATED: SIGNIFICANT CHANGE UP
CULTURE - ACID FAST SMEAR CONCENTRATED: SIGNIFICANT CHANGE UP
GLUCOSE SERPL-MCNC: 150 MG/DL — HIGH (ref 70–99)
HCT VFR BLD CALC: 39 % — SIGNIFICANT CHANGE UP (ref 39–50)
HGB BLD-MCNC: 12.1 G/DL — LOW (ref 13–17)
MCHC RBC-ENTMCNC: 27.9 PG — SIGNIFICANT CHANGE UP (ref 27–34)
MCHC RBC-ENTMCNC: 31 % — LOW (ref 32–36)
MCV RBC AUTO: 90.1 FL — SIGNIFICANT CHANGE UP (ref 80–100)
NRBC # FLD: 0 K/UL — LOW (ref 25–125)
PLATELET # BLD AUTO: 511 K/UL — HIGH (ref 150–400)
PMV BLD: 9.1 FL — SIGNIFICANT CHANGE UP (ref 7–13)
POTASSIUM SERPL-MCNC: 4.4 MMOL/L — SIGNIFICANT CHANGE UP (ref 3.5–5.3)
POTASSIUM SERPL-SCNC: 4.4 MMOL/L — SIGNIFICANT CHANGE UP (ref 3.5–5.3)
RBC # BLD: 4.33 M/UL — SIGNIFICANT CHANGE UP (ref 4.2–5.8)
RBC # FLD: 15.5 % — HIGH (ref 10.3–14.5)
SODIUM SERPL-SCNC: 134 MMOL/L — LOW (ref 135–145)
SPECIMEN SOURCE: SIGNIFICANT CHANGE UP
TSH SERPL-MCNC: 2.8 UIU/ML — SIGNIFICANT CHANGE UP (ref 0.27–4.2)
WBC # BLD: 14.71 K/UL — HIGH (ref 3.8–10.5)
WBC # FLD AUTO: 14.71 K/UL — HIGH (ref 3.8–10.5)

## 2019-02-26 PROCEDURE — 71045 X-RAY EXAM CHEST 1 VIEW: CPT | Mod: 26

## 2019-02-26 PROCEDURE — 99024 POSTOP FOLLOW-UP VISIT: CPT

## 2019-02-26 PROCEDURE — 99232 SBSQ HOSP IP/OBS MODERATE 35: CPT

## 2019-02-26 RX ORDER — TAMSULOSIN HYDROCHLORIDE 0.4 MG/1
0.4 CAPSULE ORAL AT BEDTIME
Qty: 0 | Refills: 0 | Status: DISCONTINUED | OUTPATIENT
Start: 2019-02-26 | End: 2019-03-02

## 2019-02-26 RX ORDER — OXYCODONE HYDROCHLORIDE 5 MG/1
5 TABLET ORAL EVERY 4 HOURS
Qty: 0 | Refills: 0 | Status: DISCONTINUED | OUTPATIENT
Start: 2019-02-26 | End: 2019-03-02

## 2019-02-26 RX ORDER — METOPROLOL TARTRATE 50 MG
25 TABLET ORAL
Qty: 0 | Refills: 0 | Status: DISCONTINUED | OUTPATIENT
Start: 2019-02-26 | End: 2019-03-02

## 2019-02-26 RX ORDER — OXYCODONE HYDROCHLORIDE 5 MG/1
10 TABLET ORAL EVERY 4 HOURS
Qty: 0 | Refills: 0 | Status: DISCONTINUED | OUTPATIENT
Start: 2019-02-26 | End: 2019-03-02

## 2019-02-26 RX ADMIN — OXYCODONE HYDROCHLORIDE 5 MILLIGRAM(S): 5 TABLET ORAL at 00:20

## 2019-02-26 RX ADMIN — Medication 25 MILLIGRAM(S): at 05:25

## 2019-02-26 RX ADMIN — OXYCODONE HYDROCHLORIDE 10 MILLIGRAM(S): 5 TABLET ORAL at 23:00

## 2019-02-26 RX ADMIN — HEPARIN SODIUM 5000 UNIT(S): 5000 INJECTION INTRAVENOUS; SUBCUTANEOUS at 22:16

## 2019-02-26 RX ADMIN — HEPARIN SODIUM 5000 UNIT(S): 5000 INJECTION INTRAVENOUS; SUBCUTANEOUS at 14:02

## 2019-02-26 RX ADMIN — OXYCODONE HYDROCHLORIDE 10 MILLIGRAM(S): 5 TABLET ORAL at 18:00

## 2019-02-26 RX ADMIN — HEPARIN SODIUM 5000 UNIT(S): 5000 INJECTION INTRAVENOUS; SUBCUTANEOUS at 05:25

## 2019-02-26 RX ADMIN — OXYCODONE HYDROCHLORIDE 10 MILLIGRAM(S): 5 TABLET ORAL at 05:25

## 2019-02-26 RX ADMIN — OXYCODONE HYDROCHLORIDE 10 MILLIGRAM(S): 5 TABLET ORAL at 22:18

## 2019-02-26 RX ADMIN — OXYCODONE HYDROCHLORIDE 10 MILLIGRAM(S): 5 TABLET ORAL at 17:45

## 2019-02-26 RX ADMIN — Medication 25 MILLIGRAM(S): at 17:45

## 2019-02-26 RX ADMIN — OXYCODONE HYDROCHLORIDE 10 MILLIGRAM(S): 5 TABLET ORAL at 11:30

## 2019-02-26 RX ADMIN — OXYCODONE HYDROCHLORIDE 10 MILLIGRAM(S): 5 TABLET ORAL at 10:13

## 2019-02-26 RX ADMIN — TAMSULOSIN HYDROCHLORIDE 0.4 MILLIGRAM(S): 0.4 CAPSULE ORAL at 17:44

## 2019-02-26 RX ADMIN — OXYCODONE HYDROCHLORIDE 10 MILLIGRAM(S): 5 TABLET ORAL at 06:20

## 2019-02-26 NOTE — PROGRESS NOTE ADULT - SUBJECTIVE AND OBJECTIVE BOX
Subjective: Martin placed overnight for retention (700cc). Patient c/o muscle spasm type subxyphoid chest pain. Denies SOB and palpitations. HR controlled overnight.      Vital Signs:  Vital Signs Last 24 Hrs  T(C): 36.6 (02-26-19 @ 08:43), Max: 36.9 (02-26-19 @ 00:32)  T(F): 97.9 (02-26-19 @ 08:43), Max: 98.4 (02-26-19 @ 00:32)  HR: 85 (02-26-19 @ 08:43) (67 - 91)  BP: 134/79 (02-26-19 @ 08:43) (113/79 - 147/87)  RR: 18 (02-26-19 @ 08:43) (16 - 22)  SpO2: 92% (02-26-19 @ 08:43) (92% - 100%) on (O2)    I&O's Detail    25 Feb 2019 07:01  -  26 Feb 2019 07:00  --------------------------------------------------------  IN:    lactated ringers.: 90 mL    Oral Fluid: 440 mL  Total IN: 530 mL    OUT:    Chest Tube: 45 mL    Indwelling Catheter - Urethral: 775 mL  Total OUT: 820 mL    Total NET: -290 mL      Exam:  General: WN/WD NAD  Neurology: Awake, nonfocal, HUGHES x 4  Eyes: Scleras clear, PERRLA/ EOMI, Gross vision intact  ENT: Gross hearing intact, grossly patent pharynx, no stridor  Neck: Neck supple, trachea midline, No JVD  Respiratory: CTA B/L, No wheezing, rales, rhonchi  CV: RRR, S1S2, no murmurs, rubs or gallops  Abdominal: Soft, NT, ND   Extremities: No edema, + peripheral pulses  Skin: No Rashes, Hematoma, Ecchymosis  Lymphatic: No Neck, axilla, groin LAD  Psych: Oriented x 3, normal affect  Incisions: Dressings C/D/I  Tubes: Austin to sxn, drained 45 overnight    Relevant labs, radiology and Medications reviewed                        12.1   14.71 )-----------( 511      ( 26 Feb 2019 10:20 )             39.0     02-26    134<L>  |  98  |  18  ----------------------------<  150<H>  4.4   |  21<L>  |  1.45<H>    Ca    8.8      26 Feb 2019 10:20  Mg     1.9     02-25      PT/INR - ( 25 Feb 2019 05:44 )   PT: 14.6 SEC;   INR: 1.30          PTT - ( 25 Feb 2019 05:44 )  PTT:32.1 SEC  MEDICATIONS  (STANDING):  docusate sodium 100 milliGRAM(s) Oral three times a day  heparin  Injectable 5000 Unit(s) SubCutaneous every 8 hours  metoprolol tartrate 25 milliGRAM(s) Oral two times a day  tamsulosin 0.4 milliGRAM(s) Oral at bedtime    MEDICATIONS  (PRN):  ondansetron Injectable 4 milliGRAM(s) IV Push once PRN Nausea and/or Vomiting  oxyCODONE    IR 5 milliGRAM(s) Oral every 4 hours PRN Moderate Pain (4 - 6)  oxyCODONE    IR 10 milliGRAM(s) Oral every 4 hours PRN Severe Pain (7 - 10)    Pertinent Physical Exam  I&O's Summary    25 Feb 2019 07:01  -  26 Feb 2019 07:00  --------------------------------------------------------  IN: 530 mL / OUT: 820 mL / NET: -290 mL        Assessment  68y Male  w/ PAST MEDICAL & SURGICAL HISTORY:  Atrial flutter: 10/2017  GERD (gastroesophageal reflux disease)  Lung cancer: s/p LLLobectomy, radiation, chemotherapy  Smoking history: Quit 2017  HTN (hypertension)  Status post lobectomy of lung: [facility]obectomy at [facility]  History of chemotherapy: left chest wall infusaport placement  H/O inguinal hernia repair: Left side; 1972 Subjective: Martin placed overnight for retention (700cc). Patient c/o muscle spasm type subxyphoid chest pain. Denies SOB and palpitations. HR controlled overnight.      Vital Signs:  Vital Signs Last 24 Hrs  T(C): 36.6 (02-26-19 @ 08:43), Max: 36.9 (02-26-19 @ 00:32)  T(F): 97.9 (02-26-19 @ 08:43), Max: 98.4 (02-26-19 @ 00:32)  HR: 85 (02-26-19 @ 08:43) (67 - 91)  BP: 134/79 (02-26-19 @ 08:43) (113/79 - 147/87)  RR: 18 (02-26-19 @ 08:43) (16 - 22)  SpO2: 92% (02-26-19 @ 08:43) (92% - 100%) on (O2)    I&O's Detail    25 Feb 2019 07:01  -  26 Feb 2019 07:00  --------------------------------------------------------  IN:    lactated ringers.: 90 mL    Oral Fluid: 440 mL  Total IN: 530 mL    OUT:    Chest Tube: 45 mL    Indwelling Catheter - Urethral: 775 mL  Total OUT: 820 mL    Total NET: -290 mL      Exam:  General: WN/WD NAD  Neurology: Awake, nonfocal, HUGHES x 4  Eyes: Scleras clear, PERRLA/ EOMI, Gross vision intact  ENT: Gross hearing intact, grossly patent pharynx, no stridor  Neck: Neck supple, trachea midline, No JVD  Respiratory: CTA B/L, No wheezing, rales, rhonchi  CV: RRR, S1S2, no murmurs, rubs or gallops  Abdominal: Soft, NT, ND   Extremities: No edema, + peripheral pulses  Skin: No Rashes, Hematoma, Ecchymosis  Lymphatic: No Neck, axilla, groin LAD  Psych: Oriented x 3, normal affect  Incisions: Dressings C/D/I  Tubes: Austin to sxn, drained 45 overnight    Relevant labs, radiology and Medications reviewed                        12.1   14.71 )-----------( 511      ( 26 Feb 2019 10:20 )             39.0     02-26    134<L>  |  98  |  18  ----------------------------<  150<H>  4.4   |  21<L>  |  1.45<H>    Ca    8.8      26 Feb 2019 10:20  Mg     1.9     02-25      PT/INR - ( 25 Feb 2019 05:44 )   PT: 14.6 SEC;   INR: 1.30          PTT - ( 25 Feb 2019 05:44 )  PTT:32.1 SEC  MEDICATIONS  (STANDING):  docusate sodium 100 milliGRAM(s) Oral three times a day  heparin  Injectable 5000 Unit(s) SubCutaneous every 8 hours  metoprolol tartrate 25 milliGRAM(s) Oral two times a day  tamsulosin 0.4 milliGRAM(s) Oral at bedtime    MEDICATIONS  (PRN):  ondansetron Injectable 4 milliGRAM(s) IV Push once PRN Nausea and/or Vomiting  oxyCODONE    IR 5 milliGRAM(s) Oral every 4 hours PRN Moderate Pain (4 - 6)  oxyCODONE    IR 10 milliGRAM(s) Oral every 4 hours PRN Severe Pain (7 - 10)      Assessment  68y Male  w/ PAST MEDICAL & SURGICAL HISTORY:  Atrial flutter: 10/2017  GERD (gastroesophageal reflux disease)  Lung cancer: s/p LLLobectomy, radiation, chemotherapy  Smoking history: Quit 2017  HTN (hypertension)  Status post lobectomy of lung: LLLobectomy at [facility]  History of chemotherapy: left chest wall infusaport placement  H/O inguinal hernia repair: Left side; 1972

## 2019-02-26 NOTE — PROGRESS NOTE ADULT - SUBJECTIVE AND OBJECTIVE BOX
SUBJECTIVE / OVERNIGHT EVENTS: s/p subxyphoid pericardial window with anterior pericardiectomy , pt denies chest pain, shortness of breath, nausea, vomiting dizziness  darden was placed earlier for retention     MEDICATIONS  (STANDING):  docusate sodium 100 milliGRAM(s) Oral three times a day  heparin  Injectable 5000 Unit(s) SubCutaneous every 8 hours  metoprolol tartrate 25 milliGRAM(s) Oral two times a day  tamsulosin 0.4 milliGRAM(s) Oral at bedtime    MEDICATIONS  (PRN):  ondansetron Injectable 4 milliGRAM(s) IV Push once PRN Nausea and/or Vomiting  oxyCODONE    IR 5 milliGRAM(s) Oral every 4 hours PRN Moderate Pain (4 - 6)  oxyCODONE    IR 10 milliGRAM(s) Oral every 4 hours PRN Severe Pain (7 - 10)    Vital Signs Last 24 Hrs  T(C): 36.7 (26 Feb 2019 20:03), Max: 36.9 (26 Feb 2019 00:32)  T(F): 98.1 (26 Feb 2019 20:03), Max: 98.4 (26 Feb 2019 00:32)  HR: 79 (26 Feb 2019 20:03) (79 - 91)  BP: 134/79 (26 Feb 2019 20:03) (118/75 - 147/87)  BP(mean): 86 (26 Feb 2019 14:22) (86 - 92)  RR: 18 (26 Feb 2019 20:03) (17 - 18)  SpO2: 91% (26 Feb 2019 20:03) (91% - 99%)    Constitutional: No fever, fatigue  Skin: No rash.  Eyes: No recent vision problems or eye pain.  ENT: No congestion, ear pain, or sore throat.  Cardiovascular: No chest pain or palpation.  Respiratory: No cough, shortness of breath, congestion, or wheezing.  Gastrointestinal: No abdominal pain, nausea, vomiting, or diarrhea.  Genitourinary: No dysuria.  Musculoskeletal: No joint swelling.  Neurologic: No headache.    PHYSICAL EXAM:  GENERAL: NAD  EYES: EOMI, PERRLA  NECK: Supple, No JVD  CHEST/LUNG: dec breath sounds rt base   HEART:  S1 , S2 +  ABDOMEN: soft , bs+  EXTREMITIES:no edema    NEUROLOGY:alert awake oriented     LABS:  02-26    134<L>  |  98  |  18  ----------------------------<  150<H>  4.4   |  21<L>  |  1.45<H>    Ca    8.8      26 Feb 2019 10:20  Mg     1.9     02-25      Creatinine Trend: 1.45 <--, 1.44 <--, 1.41 <--, 1.22 <--, 1.25 <--, 1.41 <--                        12.1   14.71 )-----------( 511      ( 26 Feb 2019 10:20 )             39.0     Urine Studies:              PT/INR - ( 25 Feb 2019 05:44 )   PT: 14.6 SEC;   INR: 1.30          PTT - ( 25 Feb 2019 05:44 )  PTT:32.1 SEC      PT/INR - ( 25 Feb 2019 05:44 )   PT: 14.6 SEC;   INR: 1.30          PTT - ( 25 Feb 2019 05:44 )  PTT:32.1 SEC

## 2019-02-26 NOTE — PROGRESS NOTE ADULT - ASSESSMENT
PLAN  Neuro: Pain management  Pulm: Encourage coughing, deep breathing and use of incentive spirometry. Wean off supplemental oxygen as able. Daily CXR.   Cardio: Monitor telemetry/alarms  GI: Tolerating diet. Continue stool softeners.  Renal: monitor urine output, supplement electrolytes as needed  Vasc: Heparin SC/SCDs for DVT prophylaxis  Heme: Stable H/H. .   ID: Off antibiotics. Stable.  Therapy: OOB/ambulate  Tubes: Monitor Chest tube output  Disposition: Aim to D/C to home on  Discussed with Cardiothoracic Team at AM rounds. 68 year old male was transferred from [facility] with a pericardial effusion for a subxiphoid pericardial window.  He had been c/o dizziness and syncope over the past few weeks and his PMD referred him to a cardiologist.  The cardiologist in turn referred him to the ER for tachycardia.  He went to [facility] 3 days ago and was admitted.  He was then transferred to [facility] for further evaluation.  He was treated with Amiodarone and his rhythm which was determined to be rapid A fib/ A flutter converted to NSR.  However a CT scan showed a large pericardial effusion.  This was confirmed by an echocardiogram.  Thoracic surgery was consulted and Dr Larsen transferred to the pt to [facility] for a pericardial window scheduled for 2/25/19.  Anticoagulation for Afib on hold. Now s/p subxyphoid pericardial window with anterior pericardiectomy 2/25/19.        PLAN  Neuro: Pain management  Pulm: Encourage coughing, deep breathing and use of incentive spirometry. Wean off supplemental oxygen as able. Daily CXR.   Cardio: Monitor telemetry/alarms. C/W BB for rate control. AC on hold. EP following.   GI: Tolerating diet. Continue stool softeners.  Renal: monitor urine output, supplement electrolytes as needed. Maintain darden for now Flomax started.   Vasc: Heparin SC/SCDs for DVT prophylaxis  Heme: Stable H/H.   ID: Off antibiotics. Stable.  Therapy: OOB/ambulate  Tubes: Monitor drain output on sxn    Discussed with Cardiothoracic Team at AM rounds.    Sharmin PAYNE  Thoracic Surgery  #[id number]

## 2019-02-26 NOTE — PROGRESS NOTE ADULT - ASSESSMENT
68 year old male with h/o lung cancer s/p radiation/ chemo, HTN, GERD, Atrial flutter transferred from [place] for evaluation and management of SVT then transferred to [facility] for pericardial effusion management s/p pericardial window      - pericardial drain care as per thoracic , pain control, incentive spirometry   - darden cath care    -cont bb for artrial flutter , ac after thoracic sx  clearance

## 2019-02-26 NOTE — PROGRESS NOTE ADULT - ASSESSMENT
67 yo M with a PMH of lung CA, s/p chemoradiation HTN, and GERD.  Recently with c/o of dizziness and syncope; found to be in an AFlutter with RVR at [place], transferred to [facility] where he was successfully treated with Amiodarone, and subsequently transferred to [facility] for management of a moderate pericardial effusion; now POD#1 from a pericardial window with Austin drain, CHA2 DS2 Vasc, 2:    - resume AC when cleared by thoracic surgery  - continue metoprolol for rate control  - Keep K+ 4.0 - 4.5 & Mg 2.0 - 2.5  - F/U with Dr. Patel in 4 weeks

## 2019-02-26 NOTE — PROGRESS NOTE ADULT - SUBJECTIVE AND OBJECTIVE BOX
Patient seen and evaluated today. Reports having a difficult night; darden inserted for urinary retention and incisional pain ultimately controlled with analgesics.  Otherwise, feels generally well.  Telemetry review demonstrates NSR HR: 85 (02-26-19 @ 08:43) (67 - 91). 2/25/2019 wound cultures NGTD.    MEDICATIONS:  docusate sodium 100 milliGRAM(s) Oral three times a day  heparin  Injectable 5000 Unit(s) SubCutaneous every 8 hours  metoprolol tartrate 25 milliGRAM(s) Oral two times a day  ondansetron Injectable 4 milliGRAM(s) IV Push once PRN  oxyCODONE    IR 5 milliGRAM(s) Oral every 4 hours PRN  oxyCODONE    IR 10 milliGRAM(s) Oral every 4 hours PRN  tamsulosin 0.4 milliGRAM(s) Oral at bedtime    REVIEW OF SYSTEMS:  CONSTITUTIONAL: No fever, weight loss, or fatigue  NECK: No pain or stiffness  RESPIRATORY: No cough, wheezing, chills or hemoptysis; No Shortness of Breath  CARDIOVASCULAR: No chest pain, palpitations, passing out, dizziness, or leg swelling  GASTROINTESTINAL: No abdominal or epigastric pain. No nausea, vomiting, or hematemesis; No diarrhea or constipation. No melena or hematochezia.  NEUROLOGICAL: No headaches, memory loss, loss of strength, numbness, or tremors  SKIN: No itching, burning, rashes, or lesions   PSYCHIATRIC: No depression, anxiety, mood swings, or difficulty sleeping  HEME/LYMPH: No easy bruising, or bleeding gums  ALLERGY AND IMMUNOLOGIC: No hives or eczema	  All others negative  	  PHYSICAL EXAM:  T(C): 36.6 (02-26-19 @ 08:43), Max: 36.9 (02-26-19 @ 00:32)  HR: 85 (02-26-19 @ 08:43) (67 - 91)  BP: 134/79 (02-26-19 @ 08:43) (113/79 - 147/87)  RR: 18 (02-26-19 @ 08:43) (16 - 22)  SpO2: 92% (02-26-19 @ 08:43) (92% - 100%)  Wt(kg): --  I&O's Summary    25 Feb 2019 07:01  -  26 Feb 2019 07:00  --------------------------------------------------------  IN: 530 mL / OUT: 820 mL / NET: -290 mL    Appearance: Normal	  HEENT:   Normal oral mucosa, PERRL, EOMI	  Lymphatic: No lymphadenopathy  Cardiovascular: Normal S1 S2, No JVD, No murmurs, No edema  Chest: anterior minoo drain to suction with serosang drainage; left chest port in situ  Respiratory: Lungs clear to auscultation	  Psychiatry: A & O x 3, Mood & affect appropriate  Gastrointestinal:  Soft, Non-tender, + BS	  Skin: No rashes, No ecchymoses, No cyanosis	  Neurologic: Non-focal  : Darden in situ  Extremities: Normal range of motion, No clubbing, cyanosis or edema  Vascular: Peripheral pulses palpable 2+ bilaterally    DIAGNOSTICS:  TELEMETRY: 	  as above  LABS:	 	                          12.1   14.71 )-----------( 511      ( 26 Feb 2019 10:20 )             39.0     02-26    134<L>  |  98  |  18  ----------------------------<  150<H>  4.4   |  21<L>  |  1.45<H>    Ca    8.8      26 Feb 2019 10:20  Mg     1.9     02-25      proBNP:

## 2019-02-27 LAB
ANION GAP SERPL CALC-SCNC: 14 MMO/L — SIGNIFICANT CHANGE UP (ref 7–14)
BUN SERPL-MCNC: 19 MG/DL — SIGNIFICANT CHANGE UP (ref 7–23)
CALCIUM SERPL-MCNC: 9.3 MG/DL — SIGNIFICANT CHANGE UP (ref 8.4–10.5)
CHLORIDE SERPL-SCNC: 98 MMOL/L — SIGNIFICANT CHANGE UP (ref 98–107)
CO2 SERPL-SCNC: 23 MMOL/L — SIGNIFICANT CHANGE UP (ref 22–31)
CREAT SERPL-MCNC: 1.53 MG/DL — HIGH (ref 0.5–1.3)
GLUCOSE SERPL-MCNC: 107 MG/DL — HIGH (ref 70–99)
HCT VFR BLD CALC: 39.6 % — SIGNIFICANT CHANGE UP (ref 39–50)
HGB BLD-MCNC: 12.3 G/DL — LOW (ref 13–17)
MCHC RBC-ENTMCNC: 28.1 PG — SIGNIFICANT CHANGE UP (ref 27–34)
MCHC RBC-ENTMCNC: 31.1 % — LOW (ref 32–36)
MCV RBC AUTO: 90.6 FL — SIGNIFICANT CHANGE UP (ref 80–100)
NRBC # FLD: 0 K/UL — LOW (ref 25–125)
PLATELET # BLD AUTO: 484 K/UL — HIGH (ref 150–400)
PMV BLD: 9.4 FL — SIGNIFICANT CHANGE UP (ref 7–13)
POTASSIUM SERPL-MCNC: 4.2 MMOL/L — SIGNIFICANT CHANGE UP (ref 3.5–5.3)
POTASSIUM SERPL-SCNC: 4.2 MMOL/L — SIGNIFICANT CHANGE UP (ref 3.5–5.3)
RBC # BLD: 4.37 M/UL — SIGNIFICANT CHANGE UP (ref 4.2–5.8)
RBC # FLD: 15.8 % — HIGH (ref 10.3–14.5)
SODIUM SERPL-SCNC: 135 MMOL/L — SIGNIFICANT CHANGE UP (ref 135–145)
WBC # BLD: 10.48 K/UL — SIGNIFICANT CHANGE UP (ref 3.8–10.5)
WBC # FLD AUTO: 10.48 K/UL — SIGNIFICANT CHANGE UP (ref 3.8–10.5)

## 2019-02-27 PROCEDURE — 99232 SBSQ HOSP IP/OBS MODERATE 35: CPT

## 2019-02-27 PROCEDURE — 93971 EXTREMITY STUDY: CPT | Mod: 26

## 2019-02-27 PROCEDURE — 71045 X-RAY EXAM CHEST 1 VIEW: CPT | Mod: 26

## 2019-02-27 RX ORDER — ACETAMINOPHEN 500 MG
650 TABLET ORAL EVERY 6 HOURS
Qty: 0 | Refills: 0 | Status: DISCONTINUED | OUTPATIENT
Start: 2019-02-27 | End: 2019-03-02

## 2019-02-27 RX ADMIN — Medication 650 MILLIGRAM(S): at 19:00

## 2019-02-27 RX ADMIN — HEPARIN SODIUM 5000 UNIT(S): 5000 INJECTION INTRAVENOUS; SUBCUTANEOUS at 21:17

## 2019-02-27 RX ADMIN — Medication 25 MILLIGRAM(S): at 05:27

## 2019-02-27 RX ADMIN — Medication 25 MILLIGRAM(S): at 17:27

## 2019-02-27 RX ADMIN — TAMSULOSIN HYDROCHLORIDE 0.4 MILLIGRAM(S): 0.4 CAPSULE ORAL at 21:17

## 2019-02-27 RX ADMIN — OXYCODONE HYDROCHLORIDE 5 MILLIGRAM(S): 5 TABLET ORAL at 22:05

## 2019-02-27 RX ADMIN — OXYCODONE HYDROCHLORIDE 5 MILLIGRAM(S): 5 TABLET ORAL at 21:17

## 2019-02-27 RX ADMIN — Medication 650 MILLIGRAM(S): at 17:27

## 2019-02-27 RX ADMIN — OXYCODONE HYDROCHLORIDE 10 MILLIGRAM(S): 5 TABLET ORAL at 03:45

## 2019-02-27 RX ADMIN — HEPARIN SODIUM 5000 UNIT(S): 5000 INJECTION INTRAVENOUS; SUBCUTANEOUS at 14:09

## 2019-02-27 RX ADMIN — OXYCODONE HYDROCHLORIDE 10 MILLIGRAM(S): 5 TABLET ORAL at 02:54

## 2019-02-27 RX ADMIN — HEPARIN SODIUM 5000 UNIT(S): 5000 INJECTION INTRAVENOUS; SUBCUTANEOUS at 05:27

## 2019-02-27 NOTE — PROGRESS NOTE ADULT - ASSESSMENT
67 yo M with a PMH of lung CA, s/p chemoradiation HTN, and GERD.  Recently with c/o of dizziness and syncope; found to be in an AFlutter with RVR at [place], transferred to [facility] where he was successfully treated with Amiodarone, and subsequently transferred to [facility] for management of a moderate pericardial effusion; now POD#2 from a pericardial window with Austin drain, CHA2 DS2 Vasc, 2:    - possible ILR implant prior to dc  - would resume AC when cleared by thoracic surgery  - continue metoprolol for rate control, doing well and maintaining NSR  - Keep K+ 4.0 - 4.5 & Mg 2.0 - 2.5  - outpatient F/U date with Dr. Patel pending patient's final decision

## 2019-02-27 NOTE — PROGRESS NOTE ADULT - SUBJECTIVE AND OBJECTIVE BOX
Patient seen and evaluated today.  No significant events overnight.  Feels generally well; pain much improved.  Telemetry review demonstrates NSR HR: 88 (02-27-19 @ 12:00) (79 - 88).  Yesterday evening, Dr. Patel discussed with patient the indication for an ILR (immediately prior to dc) to evaluate the burden of Aflutter and whether there is an indication for longterm systemic AC; the patient would like to think about it -- reading/educational material left with patient, today.    MEDICATIONS:  docusate sodium 100 milliGRAM(s) Oral three times a day  heparin  Injectable 5000 Unit(s) SubCutaneous every 8 hours  metoprolol tartrate 25 milliGRAM(s) Oral two times a day  ondansetron Injectable 4 milliGRAM(s) IV Push once PRN  oxyCODONE    IR 5 milliGRAM(s) Oral every 4 hours PRN  oxyCODONE    IR 10 milliGRAM(s) Oral every 4 hours PRN  tamsulosin 0.4 milliGRAM(s) Oral at bedtime    REVIEW OF SYSTEMS:  CONSTITUTIONAL: No fever, weight loss, or fatigue  NECK: No pain or stiffness  RESPIRATORY: No cough, wheezing, chills or hemoptysis; No Shortness of Breath  CARDIOVASCULAR: No chest pain, palpitations, passing out, dizziness, or leg swelling  GASTROINTESTINAL: No abdominal or epigastric pain. No nausea, vomiting, or hematemesis; No diarrhea or constipation. No melena or hematochezia.  NEUROLOGICAL: No headaches, memory loss, loss of strength, numbness, or tremors  SKIN: No itching, burning, rashes, or lesions   PSYCHIATRIC: No depression, anxiety, mood swings, or difficulty sleeping  HEME/LYMPH: No easy bruising, or bleeding gums  ALLERGY AND IMMUNOLOGIC: No hives or eczema	  All others negative  	  PHYSICAL EXAM:  T(C): 36.9 (02-27-19 @ 12:00), Max: 36.9 (02-27-19 @ 00:20)  HR: 88 (02-27-19 @ 12:00) (79 - 88)  BP: 123/71 (02-27-19 @ 12:00) (114/68 - 137/80)  RR: 24 (02-27-19 @ 12:00) (18 - 24)  SpO2: 92% (02-27-19 @ 12:00) (91% - 93%)  Wt(kg): --  I&O's Summary    26 Feb 2019 07:01  -  27 Feb 2019 07:00  --------------------------------------------------------  IN: 0 mL / OUT: 895 mL / NET: -895 mL       Appearance: Normal	  HEENT:   Normal oral mucosa, PERRL, EOMI	  Lymphatic: No lymphadenopathy  Cardiovascular: Normal S1 S2, No JVD, No murmurs, No edema  Chest: anterior Austin drain to suction  Respiratory: Lungs clear to auscultation	  Psychiatry: A & O x 3, Mood & affect appropriate  Gastrointestinal:  Soft, Non-tender, + BS	  : Martin in situ  Skin: No rashes, No ecchymoses, No cyanosis	  Neurologic: Non-focal  Extremities: Normal range of motion, No clubbing, cyanosis or edema  Vascular: Peripheral pulses palpable 2+ bilaterally    DIAGNOSTICS:  TELEMETRY: 	  as above  LABS:	 	                        12.3   10.48 )-----------( 484      ( 27 Feb 2019 06:21 )             39.6     02-27    135  |  98  |  19  ----------------------------<  107<H>  4.2   |  23  |  1.53<H>    Ca    9.3      27 Feb 2019 06:21      proBNP:

## 2019-02-27 NOTE — PROGRESS NOTE ADULT - SUBJECTIVE AND OBJECTIVE BOX
SUBJECTIVE / OVERNIGHT EVENTS: s/p subxyphoid pericardial window with anterior pericardiectomy , pt denies chest pain, shortness of breath, nausea, vomiting dizziness     MEDICATIONS  (STANDING):  docusate sodium 100 milliGRAM(s) Oral three times a day  heparin  Injectable 5000 Unit(s) SubCutaneous every 8 hours  metoprolol tartrate 25 milliGRAM(s) Oral two times a day  tamsulosin 0.4 milliGRAM(s) Oral at bedtime    MEDICATIONS  (PRN):  acetaminophen   Tablet .. 650 milliGRAM(s) Oral every 6 hours PRN Mild Pain (1 - 3), Moderate Pain (4 - 6)  ondansetron Injectable 4 milliGRAM(s) IV Push once PRN Nausea and/or Vomiting  oxyCODONE    IR 5 milliGRAM(s) Oral every 4 hours PRN Moderate Pain (4 - 6)  oxyCODONE    IR 10 milliGRAM(s) Oral every 4 hours PRN Severe Pain (7 - 10)    Vital Signs Last 24 Hrs  T(C): 36.8 (27 Feb 2019 20:16), Max: 36.9 (27 Feb 2019 00:20)  T(F): 98.2 (27 Feb 2019 20:16), Max: 98.5 (27 Feb 2019 12:00)  HR: 84 (27 Feb 2019 20:16) (81 - 99)  BP: 113/68 (27 Feb 2019 20:16) (113/68 - 137/80)  BP(mean): --  RR: 20 (27 Feb 2019 20:16) (18 - 24)  SpO2: 91% (27 Feb 2019 20:16) (91% - 96%)    Constitutional: No fever, fatigue  Skin: No rash.  Eyes: No recent vision problems or eye pain.  ENT: No congestion, ear pain, or sore throat.  Cardiovascular: No chest pain or palpation.  Respiratory: No cough, shortness of breath, congestion, or wheezing.  Gastrointestinal: No abdominal pain, nausea, vomiting, or diarrhea.  Genitourinary: No dysuria.  Musculoskeletal: No joint swelling.  Neurologic: No headache.    PHYSICAL EXAM:  GENERAL: NAD  EYES: EOMI, PERRLA  NECK: Supple, No JVD  CHEST/LUNG: dec breath sounds rt base   HEART:  S1 , S2 +  ABDOMEN: soft , bs+  EXTREMITIES:no edema    NEUROLOGY:alert awake oriented   darden+    LABS:  02-27    135  |  98  |  19  ----------------------------<  107<H>  4.2   |  23  |  1.53<H>    Ca    9.3      27 Feb 2019 06:21      Creatinine Trend: 1.53 <--, 1.45 <--, 1.44 <--, 1.41 <--, 1.22 <--, 1.25 <--                        12.3   10.48 )-----------( 484      ( 27 Feb 2019 06:21 )             39.6     Urine Studies:

## 2019-02-27 NOTE — PROGRESS NOTE ADULT - SUBJECTIVE AND OBJECTIVE BOX
Subjective: "I feel better today, less pain" Pt denies CP ro SOB. Still SR onTele. AMbulating without issues.     Vital Signs:  Vital Signs Last 24 Hrs  T(C): 36.9 (02-27-19 @ 12:00), Max: 36.9 (02-27-19 @ 00:20)  T(F): 98.5 (02-27-19 @ 12:00), Max: 98.5 (02-27-19 @ 12:00)  HR: 88 (02-27-19 @ 12:00) (79 - 88)  BP: 123/71 (02-27-19 @ 12:00) (114/68 - 137/80)  RR: 24 (02-27-19 @ 12:00) (18 - 24)  SpO2: 92% (02-27-19 @ 12:00) (91% - 93%) on (O2)    Telemetry/Alarms:  General: WN/WD NAD  Neurology: Awake, nonfocal, HUGHES x 4  Eyes: Scleras clear, PERRLA/ EOMI, Gross vision intact  ENT:Gross hearing intact, grossly patent pharynx, no stridor  Neck: Neck supple, trachea midline, No JVD,   Respiratory: CTA B/L, No wheezing, rales, rhonchi  CV: RRR, S1S2, no murmurs, rubs or gallops  Abdominal: Soft, NT, ND +BS, no BM. Darden in place.   Extremities: No LE edema, + peripheral pulses. Left arm w significant swelling upper arm to hand. No pain. Strong radial pulse. Has full ROM  Skin: No Rashes, Hematoma, Ecchymosis  Lymphatic: No Neck, axilla, groin LAD  Psych: Oriented x 3, normal affect  Incisions: Subxyphoid incision c/d/i  Tubes: Minoo 45cc/24hrs on suction, placed to WS  Relevant labs, radiology and Medications reviewed                        12.3   10.48 )-----------( 484      ( 27 Feb 2019 06:21 )             39.6     02-27    135  |  98  |  19  ----------------------------<  107<H>  4.2   |  23  |  1.53<H>    Ca    9.3      27 Feb 2019 06:21        MEDICATIONS  (STANDING):  docusate sodium 100 milliGRAM(s) Oral three times a day  heparin  Injectable 5000 Unit(s) SubCutaneous every 8 hours  metoprolol tartrate 25 milliGRAM(s) Oral two times a day  tamsulosin 0.4 milliGRAM(s) Oral at bedtime    MEDICATIONS  (PRN):  ondansetron Injectable 4 milliGRAM(s) IV Push once PRN Nausea and/or Vomiting  oxyCODONE    IR 5 milliGRAM(s) Oral every 4 hours PRN Moderate Pain (4 - 6)  oxyCODONE    IR 10 milliGRAM(s) Oral every 4 hours PRN Severe Pain (7 - 10)    Pertinent Physical Exam  I&O's Summary    26 Feb 2019 07:01  -  27 Feb 2019 07:00  --------------------------------------------------------  IN: 0 mL / OUT: 895 mL / NET: -895 mL        Assessment  68y Male  w/ PAST MEDICAL & SURGICAL HISTORY:  Atrial flutter: 10/2017  GERD (gastroesophageal reflux disease)  Lung cancer: s/p LLLobectomy, radiation, chemotherapy  Smoking history: Quit 2017  HTN (hypertension)  Status post lobectomy of lung: LLLobectomy at [facility]  History of chemotherapy: left chest wall infusaport placement  H/O inguinal hernia repair: Left side; 1972  admitted with complaints of Patient is a 68y old  Male who presents with a chief complaint of Pericardial effusion (27 Feb 2019 14:17)    Assessment and Plan:   · Assessment		   68 year old male was transferred from [facility] with a pericardial effusion for a subxiphoid pericardial window.  He had been c/o dizziness and syncope over the past few weeks and his PMD referred him to a cardiologist.  The cardiologist in turn referred him to the ER for tachycardia.  He went to [facility] 3 days ago and was admitted.  He was then transferred to [facility] for further evaluation.  He was treated with Amiodarone and his rhythm which was determined to be rapid A fib/ A flutter converted to NSR.  However a CT scan showed a large pericardial effusion.  This was confirmed by an echocardiogram.  Thoracic surgery was consulted and Dr Larsen transferred to the pt to [facility] for a pericardial window scheduled for 2/25/19.  Anticoagulation for Afib on hold. Now s/p subxyphoid pericardial window with anterior pericardiectomy 2/25/19.  Post op had urinary retention, Darden inserted and started on Flomax. Follow by EPS, continued on BB, no AMio. Remains SR.       PLAN  Neuro: Pain management  Pulm: Encourage coughing, deep breathing and use of incentive spirometry. Wean off supplemental oxygen as able.  Cardio: Monitor telemetry/alarms. Cont beta blockers. Off Amio. Will start DOAC once Minoo removed. Possible ILR prior to d/c per EPS  GI: Tolerating diet. Continue stool softeners.  Renal: monitor urine output, supplement electrolytes as needed : Will d/c darden at 12mn, TOV  Vasc: Heparin SC/SCDs for DVT prophylaxis  Heme: Stable H/H. .   ID: Off antibiotics. Stable.  Therapy: OOB/ambulate  Tubes: Monitor minoo output, will d/c when less than 30cc/24hr then d/c home  Disposition: Aim to D/C to home once drain removed.   Discussed with Cardiothoracic Team at AM rounds.

## 2019-02-27 NOTE — PROGRESS NOTE ADULT - ASSESSMENT
68 year old male with h/o lung cancer s/p radiation/ chemo, HTN, GERD, Atrial flutter transferred from [place] for evaluation and management of SVT then transferred to [facility] for pericardial effusion management s/p pericardial window      - pericardial drain care as per thoracic , pain control, incentive spirometry   - darden cath care    -cont bb for artrial flutter , ac after thoracic sx  clearance

## 2019-02-28 LAB
ANION GAP SERPL CALC-SCNC: 13 MMO/L — SIGNIFICANT CHANGE UP (ref 7–14)
APTT BLD: 29.3 SEC — SIGNIFICANT CHANGE UP (ref 27.5–36.3)
BUN SERPL-MCNC: 20 MG/DL — SIGNIFICANT CHANGE UP (ref 7–23)
CALCIUM SERPL-MCNC: 8.8 MG/DL — SIGNIFICANT CHANGE UP (ref 8.4–10.5)
CHLORIDE SERPL-SCNC: 101 MMOL/L — SIGNIFICANT CHANGE UP (ref 98–107)
CO2 SERPL-SCNC: 20 MMOL/L — LOW (ref 22–31)
CREAT SERPL-MCNC: 1.52 MG/DL — HIGH (ref 0.5–1.3)
GLUCOSE SERPL-MCNC: 103 MG/DL — HIGH (ref 70–99)
INR BLD: 1.34 — HIGH (ref 0.88–1.17)
POTASSIUM SERPL-MCNC: 4.2 MMOL/L — SIGNIFICANT CHANGE UP (ref 3.5–5.3)
POTASSIUM SERPL-SCNC: 4.2 MMOL/L — SIGNIFICANT CHANGE UP (ref 3.5–5.3)
PROTHROM AB SERPL-ACNC: 15.4 SEC — HIGH (ref 9.8–13.1)
SODIUM SERPL-SCNC: 134 MMOL/L — LOW (ref 135–145)

## 2019-02-28 PROCEDURE — 99024 POSTOP FOLLOW-UP VISIT: CPT

## 2019-02-28 PROCEDURE — 71045 X-RAY EXAM CHEST 1 VIEW: CPT | Mod: 26

## 2019-02-28 PROCEDURE — 99232 SBSQ HOSP IP/OBS MODERATE 35: CPT

## 2019-02-28 RX ADMIN — HEPARIN SODIUM 5000 UNIT(S): 5000 INJECTION INTRAVENOUS; SUBCUTANEOUS at 05:21

## 2019-02-28 RX ADMIN — HEPARIN SODIUM 5000 UNIT(S): 5000 INJECTION INTRAVENOUS; SUBCUTANEOUS at 13:43

## 2019-02-28 RX ADMIN — HEPARIN SODIUM 5000 UNIT(S): 5000 INJECTION INTRAVENOUS; SUBCUTANEOUS at 21:16

## 2019-02-28 RX ADMIN — OXYCODONE HYDROCHLORIDE 5 MILLIGRAM(S): 5 TABLET ORAL at 11:20

## 2019-02-28 RX ADMIN — TAMSULOSIN HYDROCHLORIDE 0.4 MILLIGRAM(S): 0.4 CAPSULE ORAL at 21:16

## 2019-02-28 RX ADMIN — Medication 25 MILLIGRAM(S): at 17:54

## 2019-02-28 RX ADMIN — OXYCODONE HYDROCHLORIDE 5 MILLIGRAM(S): 5 TABLET ORAL at 06:00

## 2019-02-28 RX ADMIN — OXYCODONE HYDROCHLORIDE 5 MILLIGRAM(S): 5 TABLET ORAL at 10:45

## 2019-02-28 RX ADMIN — OXYCODONE HYDROCHLORIDE 5 MILLIGRAM(S): 5 TABLET ORAL at 05:21

## 2019-02-28 RX ADMIN — Medication 25 MILLIGRAM(S): at 05:21

## 2019-02-28 NOTE — CHART NOTE - NSCHARTNOTEFT_GEN_A_CORE
68 year old male was transferred from [facility] with a pericardial effusion for a subxiphoid pericardial window.  He had been c/o dizziness and syncope over the past few weeks and his PMD referred him to a cardiologist.  The cardiologist in turn referred him to the ER for tachycardia.  He went to [facility] 3 days ago and was admitted.  He was then transferred to [facility] for further evaluation. He was treated with Amiodarone and his rhythm which was determined to be rapid A fib/ A flutter converted to NSR.  However a CT scan showed a large pericardial effusion.  This was confirmed by an echocardiogram.  Thoracic surgery was consulted and Dr Larsen transferred to the pt to [facility] for a pericardial window scheduled for 2/25/19.  Anticoagulation for Afib on hold. Now s/p subxyphoid pericardial window with anterior pericardiectomy 2/25/19.  Post op had urinary retention, Martin inserted and started on Flomax. Follow by EPS, continued on BB, no Amio. Remains SR 2/28 pericardial minoo tube to be removed today. Martin d/c'ed today and patient passed trial of void.    Plan:   Anticoagulation for atrial flutter now that pericardial drain is removed defer to primary team  EP recommended loop recorder but patient denied 68 year old male was transferred from [facility] with a pericardial effusion for a subxiphoid pericardial window.  He had been c/o dizziness and syncope over the past few weeks and his PMD referred him to a cardiologist.  The cardiologist in turn referred him to the ER for tachycardia.  He went to [facility] 3 days ago and was admitted.  He was then transferred to [facility] for further evaluation. He was treated with Amiodarone and his rhythm which was determined to be rapid A fib/ A flutter converted to NSR.  However a CT scan showed a large pericardial effusion.  This was confirmed by an echocardiogram.  Thoracic surgery was consulted and Dr Larsen transferred to the pt to [facility] for a pericardial window scheduled for 2/25/19.  Anticoagulation for Afib on hold. Now s/p subxyphoid pericardial window with anterior pericardiectomy 2/25/19.  Post op had urinary retention, Martin inserted and started on Flomax. Follow by EPS, continued on BB, no Amio. Remains SR 2/28 pericardial minoo tube to be removed today. Martin d/c'ed today and patient passed trial of void.    Plan:   Anticoagulation for atrial flutter now that pericardial drain is removed defer to primary team  EP recommended loop recorder but patient denied  Patient transferred to Medicine under Dr. Thomas; Report given to Tele PA 2/28/19 @6:30pm

## 2019-02-28 NOTE — PROGRESS NOTE ADULT - ASSESSMENT
68 year old male with h/o lung cancer s/p radiation/ chemo, HTN, GERD, Atrial flutter transferred from [place] for evaluation and management of SVT then transferred to [facility] for pericardial effusion management s/p pericardial window      - pericardial drain care as per thoracic , pain control, incentive spirometry , poss dc drain today  - pt passed void trial   -cont bb for artrial flutter , ac after thoracic sx  clearance , ep f/u

## 2019-02-28 NOTE — CHART NOTE - NSCHARTNOTEFT_GEN_A_CORE
Telemetry shows sinus rhythm with HR 70s-80s. Discussed with patient regarding ILR implant. Patient refusing ILR at this time after understanding risks/benefits and alternatives

## 2019-02-28 NOTE — PROGRESS NOTE ADULT - SUBJECTIVE AND OBJECTIVE BOX
Subjective: 69 y/o male found sitting up in chair in NAD. states "I feel okay".     Vital Signs:  Vital Signs Last 24 Hrs  T(C): 36.9 (02-28-19 @ 11:32), Max: 37 (02-28-19 @ 05:17)  T(F): 98.5 (02-28-19 @ 11:32), Max: 98.6 (02-28-19 @ 05:17)  HR: 92 (02-28-19 @ 11:32) (84 - 99)  BP: 129/78 (02-28-19 @ 11:32) (110/66 - 129/78)  RR: 18 (02-28-19 @ 11:32) (18 - 20)  SpO2: 94% (02-28-19 @ 11:32) (90% - 96%) on (O2)    Pertinent Physical Exam:  Telemetry/Alarms: Sinus  General: WN/WD NAD  Neurology: Awake, nonfocal, HUGHES x 4  Eyes: Scleras clear, PERRLA/ EOMI, Gross vision intact  ENT:Gross hearing intact, grossly patent pharynx, no stridor  Neck: Neck supple, trachea midline, No JVD,   Respiratory: CTA B/L, No wheezing, rales, rhonchi  CV: RRR, S1S2, no murmurs, rubs or gallops  Abdominal: Soft, NT, ND +BS,   Extremities: No edema, + peripheral pulses  Skin: No Rashes, Hematoma, Ecchymosis  Incisions: c/d/i  Tubes: Pericardial Drain in palce 5cc output over 12 hours      I&O's Summary    27 Feb 2019 07:01  -  28 Feb 2019 07:00  --------------------------------------------------------  IN: 0 mL / OUT: 405 mL / NET: -405 mL    28 Feb 2019 07:01  -  28 Feb 2019 12:33  --------------------------------------------------------  IN: 0 mL / OUT: 390 mL / NET: -390 mL        Relevant labs, radiology and Medications reviewed                        12.3   10.48 )-----------( 484      ( 27 Feb 2019 06:21 )             39.6     02-28    134<L>  |  101  |  20  ----------------------------<  103<H>  4.2   |  20<L>  |  1.52<H>    Ca    8.8      28 Feb 2019 06:09      PT/INR - ( 28 Feb 2019 08:00 )   PT: 15.4 SEC;   INR: 1.34          PTT - ( 28 Feb 2019 08:00 )  PTT:29.3 SEC  MEDICATIONS  (STANDING):  docusate sodium 100 milliGRAM(s) Oral three times a day  heparin  Injectable 5000 Unit(s) SubCutaneous every 8 hours  metoprolol tartrate 25 milliGRAM(s) Oral two times a day  tamsulosin 0.4 milliGRAM(s) Oral at bedtime    MEDICATIONS  (PRN):  acetaminophen   Tablet .. 650 milliGRAM(s) Oral every 6 hours PRN Mild Pain (1 - 3), Moderate Pain (4 - 6)  ondansetron Injectable 4 milliGRAM(s) IV Push once PRN Nausea and/or Vomiting  oxyCODONE    IR 5 milliGRAM(s) Oral every 4 hours PRN Moderate Pain (4 - 6)  oxyCODONE    IR 10 milliGRAM(s) Oral every 4 hours PRN Severe Pain (7 - 10)      Assessment  68y Male  w/ PAST MEDICAL & SURGICAL HISTORY:  Atrial flutter: 10/2017  GERD (gastroesophageal reflux disease)  Lung cancer: s/p LLLobectomy, radiation, chemotherapy  Smoking history: Quit 2017  HTN (hypertension)  Status post lobectomy of lung: LLLobectomy at [facility]  History of chemotherapy: left chest wall infusaport placement  H/O inguinal hernia repair: Left side; 1972  admitted with complaints of Patient is a 68y old  Male who presents with a chief complaint of Pericardial effusion (27 Feb 2019 14:38)    68 year old male was transferred from [facility] with a pericardial effusion for a subxiphoid pericardial window.  He had been c/o dizziness and syncope over the past few weeks and his PMD referred him to a cardiologist.  The cardiologist in turn referred him to the ER for tachycardia.  He went to [facility] 3 days ago and was admitted.  He was then transferred to [facility] for further evaluation.  He was treated with Amiodarone and his rhythm which was determined to be rapid A fib/ A flutter converted to NSR.  However a CT scan showed a large pericardial effusion.  This was confirmed by an echocardiogram.  Thoracic surgery was consulted and Dr Larsen transferred to the  to [facility] for a pericardial window scheduled for 2/25/19.  Anticoagulation for Afib on hold. Now s/p subxyphoid pericardial window with anterior pericardiectomy 2/25/19.  Post op had urinary retention, Martin inserted and started on Flomax. Follow by EPS, continued on BB, no AMio. Remains SR 2/28 pericardial minoo tube to be removed today. Martin d/c'ed today and patient passed trial of void.       PLAN  Neuro: Pain management  Pulm: Encourage coughing, deep breathing and use of incentive spirometry. Daily CXR.   Cardio: Monitor telemetry/alarms, Sinus rhythm  GI: Tolerating diet. Continue stool softeners.  Renal: monitor urine output, supplement electrolytes as needed  Vasc: Heparin SC/SCDs for DVT prophylaxis  Heme: Stable H/H.   ID: Off antibiotics. Stable.  Therapy: OOB/ambulate  Tubes: Monitor Chest tube output 5cc will discharge  Disposition: Aim to transfer to medicine service for medical managment  Discussed with Cardiothoracic Team at AM rounds.

## 2019-03-01 LAB
ANION GAP SERPL CALC-SCNC: 13 MMO/L — SIGNIFICANT CHANGE UP (ref 7–14)
APTT BLD: 30.2 SEC — SIGNIFICANT CHANGE UP (ref 27.5–36.3)
BASOPHILS # BLD AUTO: 0.03 K/UL — SIGNIFICANT CHANGE UP (ref 0–0.2)
BASOPHILS NFR BLD AUTO: 0.3 % — SIGNIFICANT CHANGE UP (ref 0–2)
BLD GP AB SCN SERPL QL: NEGATIVE — SIGNIFICANT CHANGE UP
BUN SERPL-MCNC: 18 MG/DL — SIGNIFICANT CHANGE UP (ref 7–23)
CALCIUM SERPL-MCNC: 8.5 MG/DL — SIGNIFICANT CHANGE UP (ref 8.4–10.5)
CHLORIDE SERPL-SCNC: 99 MMOL/L — SIGNIFICANT CHANGE UP (ref 98–107)
CO2 SERPL-SCNC: 21 MMOL/L — LOW (ref 22–31)
CREAT SERPL-MCNC: 1.37 MG/DL — HIGH (ref 0.5–1.3)
EOSINOPHIL # BLD AUTO: 0.12 K/UL — SIGNIFICANT CHANGE UP (ref 0–0.5)
EOSINOPHIL NFR BLD AUTO: 1.3 % — SIGNIFICANT CHANGE UP (ref 0–6)
GLUCOSE SERPL-MCNC: 91 MG/DL — SIGNIFICANT CHANGE UP (ref 70–99)
HCT VFR BLD CALC: 32.3 % — LOW (ref 39–50)
HCT VFR BLD CALC: 35.7 % — LOW (ref 39–50)
HGB BLD-MCNC: 10.3 G/DL — LOW (ref 13–17)
HGB BLD-MCNC: 11.3 G/DL — LOW (ref 13–17)
IMM GRANULOCYTES NFR BLD AUTO: 0.4 % — SIGNIFICANT CHANGE UP (ref 0–1.5)
INR BLD: 1.49 — HIGH (ref 0.88–1.17)
LYMPHOCYTES # BLD AUTO: 0.56 K/UL — LOW (ref 1–3.3)
LYMPHOCYTES # BLD AUTO: 6.2 % — LOW (ref 13–44)
MCHC RBC-ENTMCNC: 28 PG — SIGNIFICANT CHANGE UP (ref 27–34)
MCHC RBC-ENTMCNC: 28.2 PG — SIGNIFICANT CHANGE UP (ref 27–34)
MCHC RBC-ENTMCNC: 31.7 % — LOW (ref 32–36)
MCHC RBC-ENTMCNC: 31.9 % — LOW (ref 32–36)
MCV RBC AUTO: 88.4 FL — SIGNIFICANT CHANGE UP (ref 80–100)
MCV RBC AUTO: 88.5 FL — SIGNIFICANT CHANGE UP (ref 80–100)
MONOCYTES # BLD AUTO: 0.83 K/UL — SIGNIFICANT CHANGE UP (ref 0–0.9)
MONOCYTES NFR BLD AUTO: 9.2 % — SIGNIFICANT CHANGE UP (ref 2–14)
NEUTROPHILS # BLD AUTO: 7.4 K/UL — SIGNIFICANT CHANGE UP (ref 1.8–7.4)
NEUTROPHILS NFR BLD AUTO: 82.6 % — HIGH (ref 43–77)
NRBC # FLD: 0 K/UL — LOW (ref 25–125)
NRBC # FLD: 0 K/UL — LOW (ref 25–125)
PLATELET # BLD AUTO: 336 K/UL — SIGNIFICANT CHANGE UP (ref 150–400)
PLATELET # BLD AUTO: 372 K/UL — SIGNIFICANT CHANGE UP (ref 150–400)
PMV BLD: 9.4 FL — SIGNIFICANT CHANGE UP (ref 7–13)
PMV BLD: 9.9 FL — SIGNIFICANT CHANGE UP (ref 7–13)
POTASSIUM SERPL-MCNC: 3.7 MMOL/L — SIGNIFICANT CHANGE UP (ref 3.5–5.3)
POTASSIUM SERPL-SCNC: 3.7 MMOL/L — SIGNIFICANT CHANGE UP (ref 3.5–5.3)
PROTHROM AB SERPL-ACNC: 16.8 SEC — HIGH (ref 9.8–13.1)
RBC # BLD: 3.65 M/UL — LOW (ref 4.2–5.8)
RBC # BLD: 4.04 M/UL — LOW (ref 4.2–5.8)
RBC # FLD: 15.1 % — HIGH (ref 10.3–14.5)
RBC # FLD: 15.3 % — HIGH (ref 10.3–14.5)
RH IG SCN BLD-IMP: NEGATIVE — SIGNIFICANT CHANGE UP
SODIUM SERPL-SCNC: 133 MMOL/L — LOW (ref 135–145)
WBC # BLD: 8.98 K/UL — SIGNIFICANT CHANGE UP (ref 3.8–10.5)
WBC # BLD: 9.35 K/UL — SIGNIFICANT CHANGE UP (ref 3.8–10.5)
WBC # FLD AUTO: 8.98 K/UL — SIGNIFICANT CHANGE UP (ref 3.8–10.5)
WBC # FLD AUTO: 9.35 K/UL — SIGNIFICANT CHANGE UP (ref 3.8–10.5)

## 2019-03-01 PROCEDURE — 71045 X-RAY EXAM CHEST 1 VIEW: CPT | Mod: 26

## 2019-03-01 PROCEDURE — 99232 SBSQ HOSP IP/OBS MODERATE 35: CPT

## 2019-03-01 RX ORDER — APIXABAN 2.5 MG/1
1 TABLET, FILM COATED ORAL
Qty: 60 | Refills: 0
Start: 2019-03-01 | End: 2019-03-30

## 2019-03-01 RX ORDER — APIXABAN 2.5 MG/1
5 TABLET, FILM COATED ORAL EVERY 12 HOURS
Qty: 0 | Refills: 0 | Status: DISCONTINUED | OUTPATIENT
Start: 2019-03-01 | End: 2019-03-02

## 2019-03-01 RX ADMIN — Medication 25 MILLIGRAM(S): at 05:26

## 2019-03-01 RX ADMIN — HEPARIN SODIUM 5000 UNIT(S): 5000 INJECTION INTRAVENOUS; SUBCUTANEOUS at 05:26

## 2019-03-01 RX ADMIN — TAMSULOSIN HYDROCHLORIDE 0.4 MILLIGRAM(S): 0.4 CAPSULE ORAL at 22:46

## 2019-03-01 RX ADMIN — Medication 25 MILLIGRAM(S): at 18:33

## 2019-03-01 RX ADMIN — APIXABAN 5 MILLIGRAM(S): 2.5 TABLET, FILM COATED ORAL at 14:21

## 2019-03-01 NOTE — CHART NOTE - NSCHARTNOTEFT_GEN_A_CORE
spoke with CTS cleared for discharge, AC per primary team. Spoke with EP pt should be on AC, pt refused loop so they will sign off.     Pt started on eliquis 5 BID, prior auth obtained case number [id number], per pharmacy copay of 30 for 3 weeks, and rest should be mail order. Spoke with pt who agreed and aware to obtain continue Rx from his provider for mail order.    per Dr Thomas if pt remain stable and H/H stable tomorrow on AC will discharge pt

## 2019-03-01 NOTE — PROGRESS NOTE ADULT - SUBJECTIVE AND OBJECTIVE BOX
SUBJECTIVE / OVERNIGHT EVENTS: s/p subxyphoid pericardial window with anterior pericardiectomy , pt denies chest pain, shortness of breath, nausea, vomiting dizziness     MEDICATIONS  (STANDING):  apixaban 5 milliGRAM(s) Oral every 12 hours  docusate sodium 100 milliGRAM(s) Oral three times a day  metoprolol tartrate 25 milliGRAM(s) Oral two times a day  tamsulosin 0.4 milliGRAM(s) Oral at bedtime    MEDICATIONS  (PRN):  acetaminophen   Tablet .. 650 milliGRAM(s) Oral every 6 hours PRN Mild Pain (1 - 3), Moderate Pain (4 - 6)  ondansetron Injectable 4 milliGRAM(s) IV Push once PRN Nausea and/or Vomiting  oxyCODONE    IR 5 milliGRAM(s) Oral every 4 hours PRN Moderate Pain (4 - 6)  oxyCODONE    IR 10 milliGRAM(s) Oral every 4 hours PRN Severe Pain (7 - 10)    Vital Signs Last 24 Hrs  T(C): 36.3 (01 Mar 2019 09:07), Max: 37.7 (28 Feb 2019 20:39)  T(F): 97.4 (01 Mar 2019 09:07), Max: 99.8 (28 Feb 2019 20:39)  HR: 88 (01 Mar 2019 09:07) (88 - 99)  BP: 124/74 (01 Mar 2019 09:07) (107/67 - 129/71)  BP(mean): --  RR: 18 (01 Mar 2019 09:07) (18 - 18)  SpO2: 96% (01 Mar 2019 09:07) (91% - 96%)    Constitutional: No fever, fatigue  Skin: No rash.  Eyes: No recent vision problems or eye pain.  ENT: No congestion, ear pain, or sore throat.  Cardiovascular: No chest pain or palpation.  Respiratory: No cough, shortness of breath, congestion, or wheezing.  Gastrointestinal: No abdominal pain, nausea, vomiting, or diarrhea.  Genitourinary: No dysuria.  Musculoskeletal: No joint swelling.  Neurologic: No headache.    PHYSICAL EXAM:  GENERAL: NAD  EYES: EOMI, PERRLA  NECK: Supple, No JVD  CHEST/LUNG: dec breath sounds rt base   HEART:  S1 , S2 +  ABDOMEN: soft , bs+  EXTREMITIES:no edema    NEUROLOGY:alert awake oriented     LABS:  03-01    133<L>  |  99  |  18  ----------------------------<  91  3.7   |  21<L>  |  1.37<H>    Ca    8.5      01 Mar 2019 05:57      Creatinine Trend: 1.37 <--, 1.52 <--, 1.53 <--, 1.45 <--, 1.44 <--, 1.41 <--                        11.3   8.98  )-----------( 372      ( 01 Mar 2019 09:37 )             35.7     Urine Studies:              PT/INR - ( 01 Mar 2019 05:57 )   PT: 16.8 SEC;   INR: 1.49          PTT - ( 01 Mar 2019 05:57 )  PTT:30.2 SEC

## 2019-03-01 NOTE — PROGRESS NOTE ADULT - REASON FOR ADMISSION
Pericardial effusion

## 2019-03-01 NOTE — PROGRESS NOTE ADULT - PROVIDER SPECIALTY LIST ADULT
CT Surgery
Electrophysiology
Internal Medicine
Thoracic Surgery
Thoracic Surgery
Internal Medicine
Internal Medicine

## 2019-03-01 NOTE — PROGRESS NOTE ADULT - ASSESSMENT
68 year old male with h/o lung cancer s/p radiation/ chemo, HTN, GERD, Atrial flutter transferred from [place] for evaluation and management of SVT then transferred to [facility] for pericardial effusion management s/p pericardial window     - pericardial drain care as per thoracic s/p drain removal    - Atrial flutter - rate control, evaluated by EP  recommended loop recorder , pt declined , thoracic and EP cleared pt for ac , will start Eliquis and monitor for active signs of active bleeding     - pt risk and benefits of AC, agreed of ac

## 2019-03-02 ENCOUNTER — TRANSCRIPTION ENCOUNTER (OUTPATIENT)
Age: 69
End: 2019-03-02

## 2019-03-02 VITALS
OXYGEN SATURATION: 96 % | HEART RATE: 82 BPM | DIASTOLIC BLOOD PRESSURE: 73 MMHG | TEMPERATURE: 98 F | SYSTOLIC BLOOD PRESSURE: 112 MMHG | RESPIRATION RATE: 20 BRPM

## 2019-03-02 LAB
ANION GAP SERPL CALC-SCNC: 13 MMO/L — SIGNIFICANT CHANGE UP (ref 7–14)
APTT BLD: 31.5 SEC — SIGNIFICANT CHANGE UP (ref 27.5–36.3)
BUN SERPL-MCNC: 20 MG/DL — SIGNIFICANT CHANGE UP (ref 7–23)
CALCIUM SERPL-MCNC: 8.6 MG/DL — SIGNIFICANT CHANGE UP (ref 8.4–10.5)
CHLORIDE SERPL-SCNC: 101 MMOL/L — SIGNIFICANT CHANGE UP (ref 98–107)
CO2 SERPL-SCNC: 22 MMOL/L — SIGNIFICANT CHANGE UP (ref 22–31)
CREAT SERPL-MCNC: 1.4 MG/DL — HIGH (ref 0.5–1.3)
GLUCOSE SERPL-MCNC: 105 MG/DL — HIGH (ref 70–99)
HCT VFR BLD CALC: 32.9 % — LOW (ref 39–50)
HGB BLD-MCNC: 10.7 G/DL — LOW (ref 13–17)
INR BLD: 1.45 — HIGH (ref 0.88–1.17)
MCHC RBC-ENTMCNC: 27.8 PG — SIGNIFICANT CHANGE UP (ref 27–34)
MCHC RBC-ENTMCNC: 32.5 % — SIGNIFICANT CHANGE UP (ref 32–36)
MCV RBC AUTO: 85.5 FL — SIGNIFICANT CHANGE UP (ref 80–100)
NRBC # FLD: 0 K/UL — LOW (ref 25–125)
PLATELET # BLD AUTO: 358 K/UL — SIGNIFICANT CHANGE UP (ref 150–400)
PMV BLD: 9.8 FL — SIGNIFICANT CHANGE UP (ref 7–13)
POTASSIUM SERPL-MCNC: 3.7 MMOL/L — SIGNIFICANT CHANGE UP (ref 3.5–5.3)
POTASSIUM SERPL-SCNC: 3.7 MMOL/L — SIGNIFICANT CHANGE UP (ref 3.5–5.3)
PROTHROM AB SERPL-ACNC: 16.7 SEC — HIGH (ref 9.8–13.1)
RBC # BLD: 3.85 M/UL — LOW (ref 4.2–5.8)
RBC # FLD: 15.2 % — HIGH (ref 10.3–14.5)
SODIUM SERPL-SCNC: 136 MMOL/L — SIGNIFICANT CHANGE UP (ref 135–145)
WBC # BLD: 9.54 K/UL — SIGNIFICANT CHANGE UP (ref 3.8–10.5)
WBC # FLD AUTO: 9.54 K/UL — SIGNIFICANT CHANGE UP (ref 3.8–10.5)

## 2019-03-02 RX ORDER — FLUTICASONE FUROATE, UMECLIDINIUM BROMIDE AND VILANTEROL TRIFENATATE 200; 62.5; 25 UG/1; UG/1; UG/1
1 POWDER RESPIRATORY (INHALATION)
Qty: 0 | Refills: 0 | COMMUNITY

## 2019-03-02 RX ORDER — METOPROLOL TARTRATE 50 MG
1 TABLET ORAL
Qty: 60 | Refills: 0
Start: 2019-03-02 | End: 2019-03-31

## 2019-03-02 RX ORDER — TAMSULOSIN HYDROCHLORIDE 0.4 MG/1
1 CAPSULE ORAL
Qty: 0 | Refills: 0 | DISCHARGE
Start: 2019-03-02 | End: 2019-03-31

## 2019-03-02 RX ORDER — ASPIRIN/CALCIUM CARB/MAGNESIUM 324 MG
1 TABLET ORAL
Qty: 0 | Refills: 0 | COMMUNITY

## 2019-03-02 RX ORDER — METOPROLOL TARTRATE 50 MG
1 TABLET ORAL
Qty: 0 | Refills: 0 | DISCHARGE
Start: 2019-03-02 | End: 2019-03-31

## 2019-03-02 RX ORDER — TAMSULOSIN HYDROCHLORIDE 0.4 MG/1
1 CAPSULE ORAL
Qty: 30 | Refills: 0
Start: 2019-03-02 | End: 2019-03-31

## 2019-03-02 RX ORDER — DOCUSATE SODIUM 100 MG
1 CAPSULE ORAL
Qty: 90 | Refills: 0
Start: 2019-03-02 | End: 2019-03-31

## 2019-03-02 RX ORDER — METOPROLOL TARTRATE 50 MG
0.5 TABLET ORAL
Qty: 0 | Refills: 0 | DISCHARGE
Start: 2019-03-02 | End: 2019-03-31

## 2019-03-02 RX ADMIN — Medication 25 MILLIGRAM(S): at 05:31

## 2019-03-02 RX ADMIN — APIXABAN 5 MILLIGRAM(S): 2.5 TABLET, FILM COATED ORAL at 05:31

## 2019-03-02 NOTE — DISCHARGE NOTE ADULT - HOSPITAL COURSE
68 year old male was transferred from [facility] with a pericardial effusion for a subxiphoid pericardial window on 2/25/19     [facility]: SVT/ MARK/ Aflutter; Amio 5g load started by EP DR PAPPAS**  2/25: Subxiphoid window w/anterior pericardiectomy.   2/26- Amio load d/c'ed by pt transfer 400mg short of full load. Per EP restart BB, nothing about amio. Failed TOV, darden inserted. Flomax started. Can do DOAC.  2/27-Left arm swollen ->doppler no dvt, +superificial vein thromboses.   2/28 [place] d/c'c. Needs Anticaog. EP rec jake recorder.   2/28: Transfer from [facility]   + Pericardial effusion- s/p drain removal  + A flutter now in SR- on eliquis, metoprolol.  + EP recommends loop recorder but patient refused   + will need f/u with Dr Larsen in 2 weeks   + Eliquis 30 dollar pt agreed and will need rest of Rx mail order by his provider      2/28 CXR: s/p pericardial window  Right mid and lower lung platelike atelectasis. Slight increase in small layering right pleural effusion with likely associated passive atelectasis.  Trace left pleural effusion, less pronounced. Continued left lateral basilar opacity which could be due to atelectasis and/or pneumonia.  3/1 CXR: Unchanged left basilar opacity which may represent combination of  effusion and atelectasis. Pulmonary edema, unchanged Decreasing right pleural effusion.    3/2: Patient stable for discharge as per Dr. Thomas

## 2019-03-02 NOTE — DISCHARGE NOTE ADULT - CARE PLAN
Principal Discharge DX:	Pericardial effusion  Goal:	monitor for bleeding  Assessment and plan of treatment:	Anticoagulation for atrial flutter now that pericardial drain is removed defer to primary team  EP recommended loop recorder but patient denied  Secondary Diagnosis:	Atrial flutter  Assessment and plan of treatment:	Continue Eliquis

## 2019-03-02 NOTE — DISCHARGE NOTE ADULT - MEDICATION SUMMARY - MEDICATIONS TO STOP TAKING
I will STOP taking the medications listed below when I get home from the hospital:    aspirin 81 mg oral tablet  -- 1 tab(s) by mouth once a day.    Augmentin 875 mg-125 mg oral tablet  -- 1 tab(s) by mouth 2 times a day (for 10 days)    irbesartan-hydrochlorothiazide 300mg-12.5mg oral tablet  -- 1 tab(s) by mouth once a day    Note: last dose 2 weeks ago    Trelegy Ellipta inhalation powder  -- 1 puff(s) inhaled once a day

## 2019-03-02 NOTE — DISCHARGE NOTE ADULT - PLAN OF CARE
monitor for bleeding Anticoagulation for atrial flutter now that pericardial drain is removed defer to primary team  EP recommended loop recorder but patient denied Continue Eliquis

## 2019-03-02 NOTE — DISCHARGE NOTE ADULT - INSTRUCTIONS
Take all medications as prescribed, keep all follow up appointments. If symptoms persist seek medical attention.

## 2019-03-02 NOTE — DISCHARGE NOTE ADULT - MEDICATION SUMMARY - MEDICATIONS TO TAKE
I will START or STAY ON the medications listed below when I get home from the hospital:    tamsulosin 0.4 mg oral capsule  -- 1 cap(s) by mouth once a day (at bedtime)  -- Indication: For BPH    Eliquis 5 mg oral tablet  -- 1 tab(s) by mouth 2 times a day   -- Check with your doctor before becoming pregnant.  It is very important that you take or use this exactly as directed.  Do not skip doses or discontinue unless directed by your doctor.  Obtain medical advice before taking any non-prescription drugs as some may affect the action of this medication.    -- Indication: For Atrial flutter    metoprolol tartrate 25 mg oral tablet  -- 1 tab(s) by mouth 2 times a day  -- Indication: For Atrial flutter    docusate sodium 100 mg oral capsule  -- 1 cap(s) by mouth 3 times a day  -- Indication: For Constipation

## 2019-03-02 NOTE — DISCHARGE NOTE ADULT - PATIENT PORTAL LINK FT
You can access the [facility] Patient Portal, offered by [facility], by registering with the following website: [email or web address]

## 2019-03-02 NOTE — DISCHARGE NOTE ADULT - CARE PROVIDER_API CALL
Kamaljit Larsen)  Surgery; Thoracic Surgery  [address] [place] [address], [place] [address]  Phone: [phone]  Fax: [phone]  Follow Up Time:

## 2019-03-03 LAB
BACTERIA SKIN AEROBE CULT: SIGNIFICANT CHANGE UP
BACTERIA SKIN AEROBE CULT: SIGNIFICANT CHANGE UP

## 2019-03-04 ENCOUNTER — OTHER (OUTPATIENT)
Age: 69
End: 2019-03-04

## 2019-03-04 LAB
SPECIMEN SOURCE: SIGNIFICANT CHANGE UP
SPECIMEN SOURCE: SIGNIFICANT CHANGE UP
SURGICAL PATHOLOGY STUDY: SIGNIFICANT CHANGE UP

## 2019-03-12 ENCOUNTER — APPOINTMENT (OUTPATIENT)
Dept: THORACIC SURGERY | Facility: CLINIC | Age: 69
End: 2019-03-12
Payer: COMMERCIAL

## 2019-03-12 VITALS
BODY MASS INDEX: 25.9 KG/M2 | WEIGHT: 185 LBS | HEART RATE: 74 BPM | OXYGEN SATURATION: 98 % | RESPIRATION RATE: 17 BRPM | HEIGHT: 71 IN | DIASTOLIC BLOOD PRESSURE: 81 MMHG | SYSTOLIC BLOOD PRESSURE: 118 MMHG

## 2019-03-12 PROCEDURE — 99024 POSTOP FOLLOW-UP VISIT: CPT

## 2019-03-13 RX ORDER — TAMSULOSIN HYDROCHLORIDE 0.4 MG/1
0.4 CAPSULE ORAL
Refills: 0 | Status: ACTIVE | COMMUNITY

## 2019-03-13 RX ORDER — ASPIRIN 81 MG
81 TABLET, DELAYED RELEASE (ENTERIC COATED) ORAL
Refills: 0 | Status: DISCONTINUED | COMMUNITY
End: 2019-03-13

## 2019-03-15 NOTE — CONSULT LETTER
[Dear  ___] : Dear  [unfilled], [Courtesy Letter:] : I had the pleasure of seeing your patient, [unfilled], in my office today. [Please see my note below.] : Please see my note below. [Sincerely,] : Sincerely, [FreeTextEntry2] : Dr. Nam Andrea (Pulm/ref)\par Dr. العراقي (Onc)\par Dr. Mccormick (PCP) \par Dr. Houston (Cardio)\par  [FreeTextEntry3] : \par \par \par Kamaljit Larsen MD, FACS \par Chief, Division of Thoracic Surgery \par Director, Minimally Invasive Thoracic Surgery \par Department of Cardiovascular and Thoracic Surgery \par [facility] \par , Cardiovascular and Thoracic Surgery

## 2019-03-15 NOTE — HISTORY OF PRESENT ILLNESS
[FreeTextEntry1] : Mr. Gleason is a 68 year old male who presents today for follow up.  He reports multiple dizzy spells last month which prompted him to see cardiology on 2/19/19 where he was found to be in SVT/MARK/Aflutter and subsequently hospitalized.  CT Chest revealed pericardiac effusion and is now s/p subxiphoid anterior pericardiectomy, cardiac decortication on 2/25/19.  Postop he was started on Eliquis for Aflutter.\par \par In June 2017 he underwent EBUS with biopsy which revealed Level 7 and 10 lymph node positive for metastatic adenocarcinoma.  He received neoadjuvant chemo/RT. He is s/p Left VATS, LLL Lobectomy, MLND, hilar node dissection on 11/5/2017, pathology revealed Mucinous adenocarcinoma T2N0Mx. \par \par CT Chest on 2/19/19 revealed:\par - Large pericardial effusion measuring higher than simple fluid (possibly hemorrhage), increased in size\par - New moderate layering right pleural effusion measuring simple fluid and partial atelectasis of RLL\par \par Since hospital discharge, he reports feeling well and he denies any fever, chills, cough, shortness of breath, chest pain, hemoptysis, or recent illness.

## 2019-03-15 NOTE — PHYSICAL EXAM
[Sclera] : the sclera and conjunctiva were normal [PERRL With Normal Accommodation] : pupils were equal in size, round, and reactive to light [Neck Appearance] : the appearance of the neck was normal [] : no respiratory distress [Respiration, Rhythm And Depth] : normal respiratory rhythm and effort [Auscultation Breath Sounds / Voice Sounds] : lungs were clear to auscultation bilaterally [Heart Sounds] : normal S1 and S2 [Murmurs] : no murmurs [Abdomen Soft] : soft [Abdomen Tenderness] : non-tender [Cervical Lymph Nodes Enlarged Posterior Bilaterally] : posterior cervical [Cervical Lymph Nodes Enlarged Anterior Bilaterally] : anterior cervical [Supraclavicular Lymph Nodes Enlarged Bilaterally] : supraclavicular [Abnormal Walk] : normal gait [Skin Color & Pigmentation] : normal skin color and pigmentation [Skin Turgor] : normal skin turgor [No Focal Deficits] : no focal deficits [Oriented To Time, Place, And Person] : oriented to person, place, and time [Affect] : the affect was normal [Mood] : the mood was normal [FreeTextEntry1] : subxiphoid anterior pericardiectomy site healing well without any redness, drainage, or swelling

## 2019-03-15 NOTE — ASSESSMENT
[FreeTextEntry1] : Mr. Gleason is a 68 year old male who presents today for follow up.  He reports multiple dizzy spells last month which prompted him to see cardiology on 2/19/19 where he was found to be in SVT/MARK/Aflutter and subsequently hospitalized.  CT Chest revealed pericardiac effusion and is now s/p subxiphoid anterior pericardiectomy, cardiac decortication on 2/25/19.  Postop he was started on Eliquis for Aflutter.\par \par In June 2017 he underwent EBUS with biopsy which revealed Level 7 and 10 lymph node positive for metastatic adenocarcinoma.  He received neoadjuvant chemo/RT. He is s/p Left VATS, LLL Lobectomy, MLND, hilar node dissection on 11/5/2017, pathology revealed Mucinous adenocarcinoma T2N0Mx. \par \par CT Chest on 2/19/19 revealed:\par - Large pericardial effusion measuring higher than simple fluid (possibly hemorrhage), increased in size\par - New moderate layering right pleural effusion measuring simple fluid and partial atelectasis of RLL\par \par I have reviewed the patient's medical records and diagnostic images during the time of this office visit, and I have made the following recommendation:\par 1.  Return to office for follow up with CT Chest in 3 months\par 2.  He may return to work in 4-6 weeks.\par \par \par Written by Pauline Olmos NP, acting as a scribe for Kamaljit Goddard MD.\par \par The documentation recorded by the scribe accurately reflects the service I personally performed and the decisions made by me. KAMALJIT GODDARD MD

## 2019-03-17 NOTE — PHYSICAL EXAM
[General Appearance - Well Developed] : well developed [Normal Appearance] : normal appearance [Well Groomed] : well groomed [General Appearance - Well Nourished] : well nourished [No Deformities] : no deformities [General Appearance - In No Acute Distress] : no acute distress [Normal Conjunctiva] : the conjunctiva exhibited no abnormalities [Normal Oral Mucosa] : normal oral mucosa [Normal Jugular Venous A Waves Present] : normal jugular venous A waves present [Normal Jugular Venous V Waves Present] : normal jugular venous V waves present [No Jugular Venous Henderson A Waves] : no jugular venous henderson A waves [Precordial Heave Left] : a precordial heave was noted at the left lower parasternal line [Tachycardia] : tachycardic [Normal S1] : normal S1 [Normal S2] : normal S2 [No Murmur] : no murmurs heard [1+] : left 1+ [No Abnormalities] : the abdominal aorta was not enlarged and no bruit was heard [No Pitting Edema] : no pitting edema present [Respiration, Rhythm And Depth] : normal respiratory rhythm and effort [Exaggerated Use Of Accessory Muscles For Inspiration] : no accessory muscle use [Auscultation Breath Sounds / Voice Sounds] : lungs were clear to auscultation bilaterally [Bowel Sounds] : normal bowel sounds [Abdomen Soft] : soft [Abdomen Tenderness] : non-tender [Abnormal Walk] : normal gait [Gait - Sufficient For Exercise Testing] : the gait was sufficient for exercise testing [Nail Clubbing] : no clubbing of the fingernails [Cyanosis, Localized] : no localized cyanosis [Skin Color & Pigmentation] : normal skin color and pigmentation [Skin Turgor] : normal skin turgor [] : no rash [Oriented To Time, Place, And Person] : oriented to person, place, and time [Impaired Insight] : insight and judgment were intact [No Anxiety] : not feeling anxious [S3] : no S3 [S4] : no S4 [Right Carotid Bruit] : no bruit heard over the right carotid [Left Carotid Bruit] : no bruit heard over the left carotid [Left Femoral Bruit] : no bruit heard over the left femoral artery [Right Femoral Bruit] : no bruit heard over the right femoral artery [FreeTextEntry1] : Scattered expiratory rhonchi

## 2019-03-17 NOTE — DISCUSSION/SUMMARY
[FreeTextEntry1] : This is a 68-year-old white male, former smoker with hypertension who is status post chemotherapy, radiation, and surgery for lung CA. He had been doing well. 2 weeks ago he had an episode of hypotension of unclear etiology. Blood pressure medicine was stopped. He presents today with an episode of paroxysmal supraventricular tachycardia associated with a blood pressure 100 systolic without symptoms. It is possible that his initial episode 2 weeks ago was caused by arrhythmia. This present event is not giving him any symptoms but he is off his blood pressure medication which may have contributed to the event.\par \par He is otherwise hemodynamically stable. He has no orthostatic hypotension, and no shortness of breath. He has no chest pain.\par \par He is going to the emergency room at [facility] for evaluation and treatment.\par \par After this is has resolved we'll decide what further evaluation and treatment would be appropriate. Probably will come to need an ablation.\par \par This was discussed with the patient and I answered his questions

## 2019-03-17 NOTE — HISTORY OF PRESENT ILLNESS
[FreeTextEntry1] : I saw Nam Gleason in the office today for cardiac evaluation. He is a 68-year-old white male who underwent left lower lobe lobectomy for cancer in 2017. He was pretreated with chemotherapy and radiation  treatment and has done quite well. He had a preop cardiac evaluation consisting of an echo that was normal. He did poorly on the stress test because of the chemotherapy and radiation therapy but for what he could do the stress test was normal. He has no known history of heart disease.\par \par He was a smoker until his surgery. He has hypertension. He denies any hyperlipidemia, diabetes, or family history of heart disease.\par \par 2 weeks ago while he was working he was reaching up and suddenly felt very lightheaded and woozy. He sat down and tried to do this 2 more times and each time he still had the same symptoms. No palpitations, chest pain, shortness of breath. He went to urgent care where it was found his blood pressure was only 90 and he stopped his blood pressure medication which consisted of irbesartan hydrochlorothiazide. Since that time has been asymptomatic. Has no symptoms with physical activity.\par \par His initial vital signs demonstrated a heart rate of 68 beats per minute and regular blood pressure 110/78. ECG taken demonstrated a regular supraventricular tachycardia at 176. During his exam the heart rate remained rapid with a blood pressure of 100/70, increasing to 110/70 standing up. The arrhythmia did not break with Valsalva or carotid sinus massage. The patient denied any chest pain, shortness of breath, lightheadedness, or palpitation.

## 2019-03-17 NOTE — REVIEW OF SYSTEMS
[Feeling Fatigued] : feeling fatigued [Dyspnea on exertion] : dyspnea during exertion [Cough] : cough [Wheezing] : wheezing [Negative] : Genitourinary [Fever] : no fever [Headache] : no headache [Chills] : no chills [Blurry Vision] : no blurred vision [Seeing Double (Diplopia)] : no diplopia [Chest Pain] : no chest pain [Palpitations] : no palpitations [Coughing Up Blood] : no hemoptysis [Joint Pain] : no joint pain [Skin: A Rash] : no rash: [Dizziness] : no dizziness [Depression] : no depression [Anxiety] : no anxiety [Excessive Thirst] : no polydipsia [Easy Bleeding] : no tendency for easy bleeding [Easy Bruising] : no tendency for easy bruising

## 2019-03-17 NOTE — REASON FOR VISIT
[Dizziness] : dizziness [Hypertension] : hypertension [Supraventricular Tachycardia] : supraventricular tachycardia [Initial Evaluation] : an initial evaluation of

## 2019-03-18 ENCOUNTER — APPOINTMENT (OUTPATIENT)
Dept: CARDIOLOGY | Facility: CLINIC | Age: 69
End: 2019-03-18
Payer: COMMERCIAL

## 2019-03-18 VITALS
DIASTOLIC BLOOD PRESSURE: 82 MMHG | HEIGHT: 71 IN | BODY MASS INDEX: 25.9 KG/M2 | HEART RATE: 71 BPM | SYSTOLIC BLOOD PRESSURE: 124 MMHG | OXYGEN SATURATION: 97 % | WEIGHT: 185 LBS

## 2019-03-18 PROCEDURE — 99215 OFFICE O/P EST HI 40 MIN: CPT

## 2019-03-18 NOTE — REASON FOR VISIT
[Follow-Up - Clinic] : a clinic follow-up of [FreeTextEntry1] : Atrial Flutter, Pericardial Effusiob, Lung Cancer

## 2019-03-18 NOTE — PHYSICAL EXAM
[General Appearance - Well Developed] : well developed [Normal Appearance] : normal appearance [Well Groomed] : well groomed [General Appearance - Well Nourished] : well nourished [No Deformities] : no deformities [Normal Conjunctiva] : the conjunctiva exhibited no abnormalities [General Appearance - In No Acute Distress] : no acute distress [Normal Oral Mucosa] : normal oral mucosa [Normal Jugular Venous A Waves Present] : normal jugular venous A waves present [Normal Jugular Venous V Waves Present] : normal jugular venous V waves present [No Jugular Venous Henderson A Waves] : no jugular venous henderson A waves [Exaggerated Use Of Accessory Muscles For Inspiration] : no accessory muscle use [Respiration, Rhythm And Depth] : normal respiratory rhythm and effort [Auscultation Breath Sounds / Voice Sounds] : lungs were clear to auscultation bilaterally [Bowel Sounds] : normal bowel sounds [Abdomen Soft] : soft [Abdomen Tenderness] : non-tender [Abnormal Walk] : normal gait [Gait - Sufficient For Exercise Testing] : the gait was sufficient for exercise testing [Nail Clubbing] : no clubbing of the fingernails [Cyanosis, Localized] : no localized cyanosis [Skin Color & Pigmentation] : normal skin color and pigmentation [] : no rash [Skin Turgor] : normal skin turgor [Oriented To Time, Place, And Person] : oriented to person, place, and time [Impaired Insight] : insight and judgment were intact [No Anxiety] : not feeling anxious [Precordial Heave Left] : a precordial heave was noted at the left lower parasternal line [Normal S1] : normal S1 [Normal S2] : normal S2 [No Murmur] : no murmurs heard [1+] : left 1+ [No Abnormalities] : the abdominal aorta was not enlarged and no bruit was heard [Normal Rate] : normal [___ +] : bilateral [unfilled]U+ pretibial pitting edema [FreeTextEntry1] : Scattered expiratory rhonchi [S4] : no S4 [Right Carotid Bruit] : no bruit heard over the right carotid [S3] : no S3 [Left Carotid Bruit] : no bruit heard over the left carotid [Right Femoral Bruit] : no bruit heard over the right femoral artery [Left Femoral Bruit] : no bruit heard over the left femoral artery

## 2019-03-18 NOTE — REVIEW OF SYSTEMS
[Feeling Fatigued] : feeling fatigued [Dyspnea on exertion] : dyspnea during exertion [Cough] : cough [Wheezing] : wheezing [Negative] : Genitourinary [Fever] : no fever [Headache] : no headache [Chills] : no chills [Blurry Vision] : no blurred vision [Chest Pain] : no chest pain [Seeing Double (Diplopia)] : no diplopia [Coughing Up Blood] : no hemoptysis [Palpitations] : no palpitations [Joint Pain] : no joint pain [Skin: A Rash] : no rash: [Depression] : no depression [Dizziness] : no dizziness [Anxiety] : no anxiety [Excessive Thirst] : no polydipsia [Easy Bruising] : no tendency for easy bruising [Easy Bleeding] : no tendency for easy bleeding

## 2019-03-18 NOTE — HISTORY OF PRESENT ILLNESS
[FreeTextEntry1] : I saw Nam Gleason in the office today for cardiac follow up. He is a 68-year-old white male who underwent left lower lobe lobectomy for cancer in 2017. He was pretreated with chemotherapy and radiation  treatment and has done quite well. He had a preop cardiac evaluation consisting of an echo that was normal. He did poorly on the stress test because of the chemotherapy and radiation therapy but for what he could do the stress test was normal. He has no known history of heart disease.\par \par He was a smoker until his surgery. He has hypertension. He denies any hyperlipidemia, diabetes, or family history of heart disease.\par \par Last month while he was working he was reaching up and suddenly felt very lightheaded and woozy. He sat down and tried to do this 2 more times and each time he still had the same symptoms. No palpitations, chest pain, shortness of breath. He went to urgent care where it was found his blood pressure was only 90 and he stopped his blood pressure medication which consisted of irbesartan hydrochlorothiazide. Since that time he had been asymptomatic. Has no symptoms with physical activity.\par \par On his initial office visit he exhibited a regular SVT without symptoms.W He was sent to the hospital and was found to have a large pericardial effusion that was treated with catheter drainage. He converted to RSR and discharged on lopressor 25mg BID and eliquis.Pathology shows fibrinous pericarditis.\par \par He has developed bilateral lower leg edema without any pain. He is now on Lasix 20 mg once a day. The leg edema is no better in the morning when he first wakes up.Otherwise he feels well.\par \par

## 2019-03-18 NOTE — DISCUSSION/SUMMARY
[FreeTextEntry1] : The patient overall is doing better. Has no symptoms of shortness of breath, lightheadedness, palpitations, or chest discomfort. 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 He has bilateral peripheral edema which is new.\par \par I would like to get venous Dopplers to rule out any DVT. He is on and requested having no bleeding complications.\par \par Reviewed his hospitalization with him in detail.\par \par If he starts to feel any shortness of breath, lightheadedness, or palpitations he would call me. He'll go for baseline echo in several weeks. I will see him in one month.\par \par At least in the short run he'll start Eliquis. and metoprolol. If his leg edema gets worse he will call me.We discussed the importance of a low salt diet, leg elevation, and walking. I also gave her prescription for support stockings.\par \par This was all discussed in detail with the patient and I answered his questions.

## 2019-03-27 LAB
FUNGUS SPEC QL CULT: SIGNIFICANT CHANGE UP
FUNGUS SPEC QL CULT: SIGNIFICANT CHANGE UP

## 2019-04-04 ENCOUNTER — APPOINTMENT (OUTPATIENT)
Dept: CARDIOLOGY | Facility: CLINIC | Age: 69
End: 2019-04-04
Payer: COMMERCIAL

## 2019-04-04 PROCEDURE — 93306 TTE W/DOPPLER COMPLETE: CPT

## 2019-04-08 LAB
ACID FAST STN SPEC: SIGNIFICANT CHANGE UP
ACID FAST STN SPEC: SIGNIFICANT CHANGE UP

## 2019-04-16 ENCOUNTER — TRANSCRIPTION ENCOUNTER (OUTPATIENT)
Age: 69
End: 2019-04-16

## 2019-04-18 ENCOUNTER — APPOINTMENT (OUTPATIENT)
Dept: CARDIOLOGY | Facility: CLINIC | Age: 69
End: 2019-04-18
Payer: COMMERCIAL

## 2019-04-18 ENCOUNTER — NON-APPOINTMENT (OUTPATIENT)
Age: 69
End: 2019-04-18

## 2019-04-18 VITALS
BODY MASS INDEX: 26.04 KG/M2 | OXYGEN SATURATION: 99 % | HEART RATE: 69 BPM | DIASTOLIC BLOOD PRESSURE: 87 MMHG | WEIGHT: 186 LBS | SYSTOLIC BLOOD PRESSURE: 133 MMHG | HEIGHT: 71 IN

## 2019-04-18 DIAGNOSIS — I48.92 UNSPECIFIED ATRIAL FLUTTER: ICD-10-CM

## 2019-04-18 PROBLEM — R94.31 ABNORMAL ELECTROCARDIOGRAM: Status: ACTIVE | Noted: 2019-04-18

## 2019-04-18 PROCEDURE — 93000 ELECTROCARDIOGRAM COMPLETE: CPT

## 2019-04-18 PROCEDURE — 99214 OFFICE O/P EST MOD 30 MIN: CPT

## 2019-04-18 NOTE — DISCUSSION/SUMMARY
[FreeTextEntry1] : The patient is doing well without any signs or symptoms of active heart disease. I'm going to get his presurgical testing from Dr. Kamaljit Johnson. The patient may require a repeat stress test.\par \par He'll wear a 30 day patch monitor. If he has no atrial flutter or fibrillation we will discontinue the request. Most likely arrhythmia was related to the fibrinous pericarditis.\par \par I would see the patient 3 months but if he has any problems he will call me.

## 2019-04-18 NOTE — PHYSICAL EXAM
[General Appearance - Well Developed] : well developed [Normal Appearance] : normal appearance [General Appearance - Well Nourished] : well nourished [Well Groomed] : well groomed [No Deformities] : no deformities [General Appearance - In No Acute Distress] : no acute distress [Normal Conjunctiva] : the conjunctiva exhibited no abnormalities [Normal Oral Mucosa] : normal oral mucosa [Normal Jugular Venous V Waves Present] : normal jugular venous V waves present [Normal Jugular Venous A Waves Present] : normal jugular venous A waves present [No Jugular Venous Henderson A Waves] : no jugular venous henderson A waves [Respiration, Rhythm And Depth] : normal respiratory rhythm and effort [Auscultation Breath Sounds / Voice Sounds] : lungs were clear to auscultation bilaterally [Exaggerated Use Of Accessory Muscles For Inspiration] : no accessory muscle use [Bowel Sounds] : normal bowel sounds [Abdomen Soft] : soft [Abdomen Tenderness] : non-tender [Abnormal Walk] : normal gait [Gait - Sufficient For Exercise Testing] : the gait was sufficient for exercise testing [Nail Clubbing] : no clubbing of the fingernails [Cyanosis, Localized] : no localized cyanosis [Skin Color & Pigmentation] : normal skin color and pigmentation [Skin Turgor] : normal skin turgor [] : no rash [Oriented To Time, Place, And Person] : oriented to person, place, and time [No Anxiety] : not feeling anxious [Impaired Insight] : insight and judgment were intact [Precordial Heave Left] : a precordial heave was noted at the left lower parasternal line [Normal Rate] : normal [Normal S1] : normal S1 [Normal S2] : normal S2 [No Murmur] : no murmurs heard [1+] : left 1+ [No Abnormalities] : the abdominal aorta was not enlarged and no bruit was heard [___ +] : bilateral [unfilled]U+ pretibial pitting edema [FreeTextEntry1] : Scattered expiratory rhonchi [S3] : no S3 [S4] : no S4 [Right Carotid Bruit] : no bruit heard over the right carotid [Left Carotid Bruit] : no bruit heard over the left carotid [Right Femoral Bruit] : no bruit heard over the right femoral artery [Left Femoral Bruit] : no bruit heard over the left femoral artery

## 2019-04-18 NOTE — REVIEW OF SYSTEMS
[Feeling Fatigued] : feeling fatigued [Dyspnea on exertion] : dyspnea during exertion [Cough] : cough [Wheezing] : wheezing [Negative] : Genitourinary [Fever] : no fever [Headache] : no headache [Chills] : no chills [Blurry Vision] : no blurred vision [Seeing Double (Diplopia)] : no diplopia [Chest Pain] : no chest pain [Coughing Up Blood] : no hemoptysis [Palpitations] : no palpitations [Skin: A Rash] : no rash: [Joint Pain] : no joint pain [Dizziness] : no dizziness [Depression] : no depression [Anxiety] : no anxiety [Excessive Thirst] : no polydipsia [Easy Bruising] : no tendency for easy bruising [Easy Bleeding] : no tendency for easy bleeding

## 2019-04-18 NOTE — HISTORY OF PRESENT ILLNESS
[FreeTextEntry1] : I saw Nam Gleason in the office today for cardiac follow up. He is a 68-year-old white male who underwent left lower lobe lobectomy for cancer in 2017. He was pretreated with chemotherapy and radiation  treatment and has done quite well. He had a preop cardiac evaluation consisting of an echo that was normal. He did poorly on the stress test because of the chemotherapy and radiation therapy but for what he could do the stress test was normal. He has no known history of heart disease.\par \par He was a smoker until his surgery. He has hypertension. He denies any hyperlipidemia, diabetes, or family history of heart disease.\par \par Last month while he was working he was reaching up and suddenly felt very lightheaded and woozy. He sat down and tried to do this 2 more times and each time he still had the same symptoms. No palpitations, chest pain, shortness of breath. He went to urgent care where it was found his blood pressure was only 90 and he stopped his blood pressure medication which consisted of irbesartan hydrochlorothiazide. Since that time he had been asymptomatic. Has no symptoms with physical activity.\par \par On his initial office visit he exhibited a regular SVT without symptoms.W He was sent to the hospital and was found to have a large pericardial effusion that was treated with catheter drainage. He converted to RSR and discharged on lopressor 25mg BID and eliquis.Pathology shows fibrinous pericarditis.\par \par He has developed bilateral lower leg edema without any pain. He is now on Lasix 20 mg once a day. The leg edema is no better in the morning when he first wakes up.Otherwise he feels well.\par \par Echocardiogram performed 4/19 demonstrates an ejection fraction of 50% with hypocontractility of inferolateral wall. There is mild MR and AI with mild to moderate TR. There is no significant pulmonary hypertension. Review of an ECG from the hospital dated 2/20/19 demonstrates sinus rhythm. They are borderline Q waves in leads 3 and aVF which are new compared to tracings from 2017.ECG taken today looks similar to the hospital tracing. I suspect that the tracing from 2017 the limb leads were misplaced. I will get a copy of his pre-surgical cardiac evaluation from Dr. Kamaljit Johnson.\par \par Patient did have a stress test 2 years ago prior to surgery that was apparently normal\par \par

## 2019-04-23 ENCOUNTER — APPOINTMENT (OUTPATIENT)
Dept: THORACIC SURGERY | Facility: CLINIC | Age: 69
End: 2019-04-23

## 2019-05-08 ENCOUNTER — APPOINTMENT (OUTPATIENT)
Dept: CARDIOLOGY | Facility: CLINIC | Age: 69
End: 2019-05-08

## 2019-05-14 DIAGNOSIS — I47.2 VENTRICULAR TACHYCARDIA: ICD-10-CM

## 2019-06-05 ENCOUNTER — APPOINTMENT (OUTPATIENT)
Dept: CARDIOLOGY | Facility: CLINIC | Age: 69
End: 2019-06-05
Payer: COMMERCIAL

## 2019-06-05 VITALS
WEIGHT: 189 LBS | OXYGEN SATURATION: 96 % | SYSTOLIC BLOOD PRESSURE: 158 MMHG | HEIGHT: 71 IN | HEART RATE: 80 BPM | BODY MASS INDEX: 26.46 KG/M2 | DIASTOLIC BLOOD PRESSURE: 91 MMHG

## 2019-06-05 PROCEDURE — 99215 OFFICE O/P EST HI 40 MIN: CPT

## 2019-06-25 ENCOUNTER — APPOINTMENT (OUTPATIENT)
Dept: THORACIC SURGERY | Facility: CLINIC | Age: 69
End: 2019-06-25
Payer: COMMERCIAL

## 2019-06-25 VITALS
SYSTOLIC BLOOD PRESSURE: 140 MMHG | BODY MASS INDEX: 25.9 KG/M2 | HEIGHT: 71 IN | DIASTOLIC BLOOD PRESSURE: 98 MMHG | WEIGHT: 185 LBS | OXYGEN SATURATION: 96 % | RESPIRATION RATE: 17 BRPM | HEART RATE: 80 BPM

## 2019-06-25 PROCEDURE — 99214 OFFICE O/P EST MOD 30 MIN: CPT

## 2019-06-25 NOTE — PHYSICAL EXAM
Relafen  Last Written Prescription Date:  2/27/2018  Last Fill Quantity: 60,   # refills: 1  Last Office Visit : 2/16/2018  Future Office visit:  None    Routing refill request to provider for review/approval because:  Protocol labs due ( last done 8/25/2016 , requested lab and .appt NO show on 4/23/2018)           [PERRL With Normal Accommodation] : pupils were equal in size, round, and reactive to light [Sclera] : the sclera and conjunctiva were normal [] : no respiratory distress [Respiration, Rhythm And Depth] : normal respiratory rhythm and effort [Neck Appearance] : the appearance of the neck was normal [Murmurs] : no murmurs [Heart Sounds] : normal S1 and S2 [Auscultation Breath Sounds / Voice Sounds] : lungs were clear to auscultation bilaterally [Abdomen Soft] : soft [Examination Of The Chest] : the chest was normal in appearance [Abdomen Tenderness] : non-tender [Cervical Lymph Nodes Enlarged Anterior Bilaterally] : anterior cervical [Supraclavicular Lymph Nodes Enlarged Bilaterally] : supraclavicular [Cervical Lymph Nodes Enlarged Posterior Bilaterally] : posterior cervical [Abnormal Walk] : normal gait [Skin Turgor] : normal skin turgor [Skin Color & Pigmentation] : normal skin color and pigmentation [No Focal Deficits] : no focal deficits [Oriented To Time, Place, And Person] : oriented to person, place, and time [Affect] : the affect was normal [Mood] : the mood was normal

## 2019-07-01 NOTE — ASSESSMENT
[FreeTextEntry1] : 68 year old male, follow up, s/p EBUS with biopsy in June 2017 which revealed Level 7 +10 lymph node positive for metastatic adenocarcinoma, s/p neoadjuvant chemo/RT, s/p Left VATS, LLL Lobectomy, MLND, hilar node dissection on 11/5/2017, pathology revealed mucinous adenocarcinoma, T2N0Mx. Hospitalized for management of  SVT/AFib/Aflutter in February 2019, workup revealed pericardial effusion and is now s/p subxiphoid anterior pericardiectomy, cardiac decortication on 2/25/19.\par \par CT Chest on 6/18/19 reveals:\par - Resolution of pericardial effusion\par - Stable small pleural effusion/thickening around left lower lobe\par - Persistent patchy peribronchial densities and areas of interstitial thickening in left lung, no change\par - No suspicious new lesions\par \par I have reviewed the patient's medical records and diagnostic images during the time of this office visit, and I have made the following recommendation:\par 1.  Return to office for follow up with CT Chest in 3 months\par \par \par Written by Pauline Olmos NP, acting as a scribe for Kamaljit Goddard MD.\par \par The documentation recorded by the scribe accurately reflects the service I personally performed and the decisions made by me. KAMALJIT GODDARD MD

## 2019-07-01 NOTE — CONSULT LETTER
[Courtesy Letter:] : I had the pleasure of seeing your patient, [unfilled], in my office today. [( Thank you for referring [unfilled] for consultation for _____ )] : Thank you for referring [unfilled] for consultation for [unfilled] [Please see my note below.] : Please see my note below. [Sincerely,] : Sincerely, [FreeTextEntry2] : Dr. Nam Andrea (Pulm/ref)\par Dr. العراقي (Onc)\par Dr. Mccormick (PCP) \par Dr. Shane Houston (Cardio)\par \par \par  [FreeTextEntry3] : Kamaljit Larsen MD, FACS \par Chief, Division of Thoracic Surgery \par Director, Minimally Invasive Thoracic Surgery \par Department of Cardiovascular and Thoracic Surgery \par [facility] \par , Cardiovascular and Thoracic Surgery\par \par

## 2019-07-01 NOTE — HISTORY OF PRESENT ILLNESS
[FreeTextEntry1] : Mr. Gleason is a 68 year old male who presents today for follow up. He is s/p EBUS with biopsy which revealed Level 7 +10 lymph node positive for metastatic adenocarcinoma in June 2017. He received neoadjuvant chemo/RT. He is s/p Left VATS, LLL Lobectomy, MLND, hilar node dissection on 11/5/2017, pathology revealed mucinous adenocarcinoma, T2N0Mx. He was hospitalized for management of  SVT/AFib/Aflutter in February 2019 when workup revealed pericardial effusion and is now s/p subxiphoid anterior pericardiectomy, cardiac decortication on 2/25/19. He was started on Eliquis for A.Flutter.\par \par CT Chest on 6/18/19 reveals:\par - Resolution of pericardial effusion\par - Stable small pleural effusion/thickening around left lower lobe\par - Persistent patchy peribronchial densities and areas of interstitial thickening in left lung, no change\par - No suspicious new lesions\par \par He feels well and he denies any fever, chills, cough, shortness of breath, chest pain, hemoptysis, or recent illness. Of note, he states recent one month heart monitor detected no arrhythmia and he stopped anticoagulation due to risks of bleeding (at work) outweighing benefits.

## 2019-07-17 ENCOUNTER — APPOINTMENT (OUTPATIENT)
Dept: CARDIOLOGY | Facility: CLINIC | Age: 69
End: 2019-07-17
Payer: COMMERCIAL

## 2019-07-17 VITALS
OXYGEN SATURATION: 96 % | SYSTOLIC BLOOD PRESSURE: 155 MMHG | DIASTOLIC BLOOD PRESSURE: 94 MMHG | HEIGHT: 71 IN | HEART RATE: 78 BPM | WEIGHT: 184 LBS | BODY MASS INDEX: 25.76 KG/M2

## 2019-07-17 PROCEDURE — 99214 OFFICE O/P EST MOD 30 MIN: CPT

## 2019-07-17 NOTE — HISTORY OF PRESENT ILLNESS
[FreeTextEntry1] : I saw Nam Gleason in the office today for cardiac follow up. He is a 68-year-old white male who underwent left lower lobe lobectomy for cancer in 2017. He was pretreated with chemotherapy and radiation  treatment and has done quite well. He had a preop cardiac evaluation consisting of an echo that was normal. He did poorly on the stress test because of the chemotherapy and radiation therapy but for what he could do the stress test was normal. He has no known history of heart disease.\par \par He was a smoker until his surgery. He has hypertension. He denies any hyperlipidemia, diabetes, or family history of heart disease.\par \par Last month while he was working he was reaching up and suddenly felt very lightheaded and woozy. He sat down and tried to do this 2 more times and each time he still had the same symptoms. No palpitations, chest pain, shortness of breath. He went to urgent care where it was found his blood pressure was only 90 and he stopped his blood pressure medication which consisted of irbesartan hydrochlorothiazide. Since that time he had been asymptomatic. Has no symptoms with physical activity.\par \par On his initial office visit he exhibited a regular SVT without symptoms.W He was sent to the hospital and was found to have a large pericardial effusion that was treated with catheter drainage. He converted to RSR and discharged on lopressor 25mg BID and eliquis.Pathology shows fibrinous pericarditis.\par \par He has developed bilateral lower leg edema without any pain. He is now on Lasix 20 mg once a day. The leg edema is no better in the morning when he first wakes up.Otherwise he feels well.\par \par Echocardiogram performed 4/19 demonstrates an ejection fraction of 50% with hypocontractility of inferolateral wall. There is mild MR and AI with mild to moderate TR. There is no significant pulmonary hypertension. Review of an ECG from the hospital dated 2/20/19 demonstrates sinus rhythm. There are borderline Q waves in leads 3 and aVF which are new compared to tracings from 2017.ECG  4/19 looks similar to the hospital tracing. I suspect that the tracing from 2/19 the limb leads were misplaced.\par \par Patient did have a stress test 2 years ago prior to surgery that was apparently normal\par \par The patient is in the process of wearing a 30 day event monitor to detect any arrhythmia. 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 He was out of breath but had no symptoms of palpitation. On his own he stopped his Eliquis because he is exposed to a lot of sharp objects in the course of his work was afraid he could bleed severely. He has no risk of falling..\par \par He restarted Eliquis 6/19 because of episodes of asymptomatic PAF.He has not been taking the Eliquis.

## 2019-07-17 NOTE — REVIEW OF SYSTEMS
[Feeling Fatigued] : feeling fatigued [Dyspnea on exertion] : dyspnea during exertion [Cough] : cough [Wheezing] : wheezing [Negative] : Gastrointestinal [Fever] : no fever [Headache] : no headache [Chills] : no chills [Blurry Vision] : no blurred vision [Seeing Double (Diplopia)] : no diplopia [Chest Pain] : no chest pain [Palpitations] : no palpitations [Coughing Up Blood] : no hemoptysis [Joint Pain] : no joint pain [Skin: A Rash] : no rash: [Dizziness] : no dizziness [Depression] : no depression [Anxiety] : no anxiety [Excessive Thirst] : no polydipsia [Easy Bleeding] : no tendency for easy bleeding [Easy Bruising] : no tendency for easy bruising

## 2019-07-17 NOTE — PHYSICAL EXAM
[General Appearance - Well Developed] : well developed [Normal Appearance] : normal appearance [Well Groomed] : well groomed [General Appearance - Well Nourished] : well nourished [No Deformities] : no deformities [General Appearance - In No Acute Distress] : no acute distress [Normal Conjunctiva] : the conjunctiva exhibited no abnormalities [Normal Oral Mucosa] : normal oral mucosa [Normal Jugular Venous A Waves Present] : normal jugular venous A waves present [Normal Jugular Venous V Waves Present] : normal jugular venous V waves present [No Jugular Venous Henderson A Waves] : no jugular venous henderson A waves [Respiration, Rhythm And Depth] : normal respiratory rhythm and effort [Exaggerated Use Of Accessory Muscles For Inspiration] : no accessory muscle use [Auscultation Breath Sounds / Voice Sounds] : lungs were clear to auscultation bilaterally [Bowel Sounds] : normal bowel sounds [Abdomen Soft] : soft [Abdomen Tenderness] : non-tender [Abnormal Walk] : normal gait [Gait - Sufficient For Exercise Testing] : the gait was sufficient for exercise testing [Nail Clubbing] : no clubbing of the fingernails [Cyanosis, Localized] : no localized cyanosis [Skin Color & Pigmentation] : normal skin color and pigmentation [Skin Turgor] : normal skin turgor [] : no rash [Oriented To Time, Place, And Person] : oriented to person, place, and time [Impaired Insight] : insight and judgment were intact [No Anxiety] : not feeling anxious [Precordial Heave Left] : a precordial heave was noted at the left lower parasternal line [Normal Rate] : normal [Normal S1] : normal S1 [Normal S2] : normal S2 [No Murmur] : no murmurs heard [1+] : left 1+ [No Abnormalities] : the abdominal aorta was not enlarged and no bruit was heard [___ +] : bilateral [unfilled]U+ pretibial pitting edema [FreeTextEntry1] : Scattered expiratory rhonchi [S3] : no S3 [S4] : no S4 [Right Carotid Bruit] : no bruit heard over the right carotid [Left Carotid Bruit] : no bruit heard over the left carotid [Right Femoral Bruit] : no bruit heard over the right femoral artery [Left Femoral Bruit] : no bruit heard over the left femoral artery

## 2019-07-17 NOTE — DISCUSSION/SUMMARY
[FreeTextEntry1] : The patient has been feeling well. His been no recurrence of his lung cancer. He has no chest pain, shortness of breath, lightheadedness, palpitation.\par \par We discussed the importance of taking Eliquis to prevent a stroke if he goes into atrial fibrillation. He will restart taking it. He'll continue his other medications as prescribed. I would appreciate send a copy of any recent blood work, especially cholesterol.\par \par If all is well seen the patient for months. He knows to call if any problems arise.

## 2019-07-17 NOTE — PHYSICAL EXAM
[General Appearance - Well Developed] : well developed [Normal Appearance] : normal appearance [General Appearance - Well Nourished] : well nourished [Well Groomed] : well groomed [General Appearance - In No Acute Distress] : no acute distress [Normal Conjunctiva] : the conjunctiva exhibited no abnormalities [Normal Oral Mucosa] : normal oral mucosa [No Deformities] : no deformities [Normal Jugular Venous A Waves Present] : normal jugular venous A waves present [Normal Jugular Venous V Waves Present] : normal jugular venous V waves present [No Jugular Venous Henderson A Waves] : no jugular venous henderson A waves [Exaggerated Use Of Accessory Muscles For Inspiration] : no accessory muscle use [Auscultation Breath Sounds / Voice Sounds] : lungs were clear to auscultation bilaterally [Respiration, Rhythm And Depth] : normal respiratory rhythm and effort [Abdomen Soft] : soft [Bowel Sounds] : normal bowel sounds [Abdomen Tenderness] : non-tender [Abnormal Walk] : normal gait [Nail Clubbing] : no clubbing of the fingernails [Cyanosis, Localized] : no localized cyanosis [Gait - Sufficient For Exercise Testing] : the gait was sufficient for exercise testing [Oriented To Time, Place, And Person] : oriented to person, place, and time [Skin Turgor] : normal skin turgor [Skin Color & Pigmentation] : normal skin color and pigmentation [] : no rash [Precordial Heave Left] : a precordial heave was noted at the left lower parasternal line [No Anxiety] : not feeling anxious [Impaired Insight] : insight and judgment were intact [Normal S1] : normal S1 [Normal Rate] : normal [Normal S2] : normal S2 [No Murmur] : no murmurs heard [No Abnormalities] : the abdominal aorta was not enlarged and no bruit was heard [1+] : right 1+ [___ +] : bilateral [unfilled]U+ pretibial pitting edema [FreeTextEntry1] : Scattered expiratory rhonchi [S3] : no S3 [S4] : no S4 [Right Carotid Bruit] : no bruit heard over the right carotid [Left Carotid Bruit] : no bruit heard over the left carotid [Right Femoral Bruit] : no bruit heard over the right femoral artery [Left Femoral Bruit] : no bruit heard over the left femoral artery

## 2019-07-17 NOTE — HISTORY OF PRESENT ILLNESS
[FreeTextEntry1] : I saw Nam Gleason in the office today for cardiac follow up. He is a 68-year-old white male who underwent left lower lobe lobectomy for cancer in 2017. He was pretreated with chemotherapy and radiation  treatment and has done quite well. He had a preop cardiac evaluation consisting of an echo that was normal. He did poorly on the stress test because of the chemotherapy and radiation therapy but for what he could do the stress test was normal. He has no known history of heart disease.\par \par He was a smoker until his surgery. He has hypertension. He denies any hyperlipidemia, diabetes, or family history of heart disease.\par \par Last month while he was working he was reaching up and suddenly felt very lightheaded and woozy. He sat down and tried to do this 2 more times and each time he still had the same symptoms. No palpitations, chest pain, shortness of breath. He went to urgent care where it was found his blood pressure was only 90 and he stopped his blood pressure medication which consisted of irbesartan hydrochlorothiazide. Since that time he had been asymptomatic. Has no symptoms with physical activity.\par \par On his initial office visit he exhibited a regular SVT without symptoms.W He was sent to the hospital and was found to have a large pericardial effusion that was treated with catheter drainage. He converted to RSR and discharged on lopressor 25mg BID and eliquis.Pathology shows fibrinous pericarditis.\par \par He has developed bilateral lower leg edema without any pain. He is now on Lasix 20 mg once a day. The leg edema is no better in the morning when he first wakes up.Otherwise he feels well.\par \par Echocardiogram performed 4/19 demonstrates an ejection fraction of 50% with hypocontractility of inferolateral wall. There is mild MR and AI with mild to moderate TR. There is no significant pulmonary hypertension. Review of an ECG from the hospital dated 2/20/19 demonstrates sinus rhythm. There are borderline Q waves in leads 3 and aVF which are new compared to tracings from 2017.ECG  4/19 looks similar to the hospital tracing. I suspect that the tracing from 2/19 the limb leads were misplaced.\par \par Patient did have a stress test 2 years ago prior to surgery that was apparently normal\par \par The patient is in the process of wearing a 30 day event monitor to detect any arrhythmia. 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 He was out of breath but had no symptoms of palpitation. On his own he stopped his Eliquis because he is exposed to a lot of sharp objects in the course of his work was afraid he could bleed severely. He has no risk of falling..

## 2019-07-17 NOTE — DISCUSSION/SUMMARY
[FreeTextEntry1] : We've discussed in detail the risk of Eliquis versus no anticoagulation. 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 Since the episodes of A. fib do not occur often and are asymptomatic I would not at this time recommend ablation. We could put him on an antiarrhythmic in place of metoprolol hoping that this would work to prevent the atrial fibrillation.. The patient is going to go back on Eliquis and see how he does.\par \par If the A. fib becomes a problem and he cannot tolerate the Eliquis we could consider ablation, antiarrhythmic therapy, or possibly watchman procedure.\par \par At the present time he will go back on Eliquis and see how he does. If he does have trouble he would call me.  He will schedule a followup echocardiogram\par \par I will get his blood work from the oncologist.\par \par If all is well I will see him in 3 months.

## 2019-07-29 NOTE — PACU DISCHARGE NOTE - NSPTMEETSDISCHCRITERIADT_GEN_A_CORE
LMOVM please return call. I received the DOT form, once signed I will fax and call patient. 28-Jun-2017 11:38

## 2019-09-24 ENCOUNTER — APPOINTMENT (OUTPATIENT)
Dept: THORACIC SURGERY | Facility: CLINIC | Age: 69
End: 2019-09-24
Payer: COMMERCIAL

## 2019-09-24 VITALS
OXYGEN SATURATION: 97 % | WEIGHT: 188 LBS | RESPIRATION RATE: 16 BRPM | DIASTOLIC BLOOD PRESSURE: 93 MMHG | TEMPERATURE: 97.7 F | SYSTOLIC BLOOD PRESSURE: 153 MMHG | HEIGHT: 71 IN | HEART RATE: 68 BPM | BODY MASS INDEX: 26.32 KG/M2

## 2019-09-24 PROCEDURE — 99214 OFFICE O/P EST MOD 30 MIN: CPT

## 2019-09-24 RX ORDER — APIXABAN 5 MG/1
5 TABLET, FILM COATED ORAL
Qty: 60 | Refills: 3 | Status: DISCONTINUED | COMMUNITY
End: 2019-09-24

## 2019-09-24 RX ORDER — FUROSEMIDE 20 MG/1
20 TABLET ORAL
Qty: 21 | Refills: 0 | Status: DISCONTINUED | COMMUNITY
Start: 2019-03-13 | End: 2019-09-24

## 2019-09-24 NOTE — PHYSICAL EXAM
[Sclera] : the sclera and conjunctiva were normal [Neck Appearance] : the appearance of the neck was normal [Extraocular Movements] : extraocular movements were intact [Neck Cervical Mass (___cm)] : no neck mass was observed [Jugular Venous Distention Increased] : there was no jugular-venous distention [] : no respiratory distress [Respiration, Rhythm And Depth] : normal respiratory rhythm and effort [Exaggerated Use Of Accessory Muscles For Inspiration] : no accessory muscle use [Auscultation Breath Sounds / Voice Sounds] : lungs were clear to auscultation bilaterally [Diminished Respiratory Excursion] : normal chest expansion [Heart Rate And Rhythm] : heart rate was normal and rhythm regular [Bowel Sounds] : normal bowel sounds [Abdomen Soft] : soft [Cervical Lymph Nodes Enlarged Posterior Bilaterally] : posterior cervical [Abdomen Tenderness] : non-tender [Supraclavicular Lymph Nodes Enlarged Bilaterally] : supraclavicular [Cervical Lymph Nodes Enlarged Anterior Bilaterally] : anterior cervical [Skin Color & Pigmentation] : normal skin color and pigmentation [Abnormal Walk] : normal gait [No Focal Deficits] : no focal deficits [Oriented To Time, Place, And Person] : oriented to person, place, and time [Impaired Insight] : insight and judgment were intact [Affect] : the affect was normal [Mood] : the mood was normal

## 2019-10-02 NOTE — HISTORY OF PRESENT ILLNESS
[FreeTextEntry1] : 68 year old male who presents today for follow up. He is s/p EBUS with biopsy which revealed Level 7 +10 lymph node positive for metastatic adenocarcinoma in June 2017. He received neoadjuvant chemo/RT. He is s/p Left VATS, LLL Lobectomy, MLND, hilar node dissection on 11/5/2017, pathology revealed mucinous adenocarcinoma, T2N0Mx.\par \par He was hospitalized for management of SVT/AFib/Aflutter in February 2019 when workup revealed pericardial effusion and is now s/p subxiphoid anterior pericardiectomy, cardiac decortication on 2/25/19. He was started on Eliquis for A.Flutter.\par \par CT chest scan 9/19/19:\par -New 1.0 x 0.6cm and 0.6cm nodules in the posterolateral right lung base. \par \par CT Chest on 6/18/19 reveals:\par - Resolution of pericardial effusion\par - Stable small pleural effusion/thickening around left lower lobe\par - Persistent patchy peribronchial densities and areas of interstitial thickening in left lung, no change\par - No suspicious new lesions\par \par The patient denies shortness of breath, fever, chills, chest pain, dysphagia or hemoptysis. \par

## 2019-10-02 NOTE — CONSULT LETTER
[FreeTextEntry2] : Dr. Nam Andrea (Pulm/ref)\par Dr. العراقي (Onc)\par Dr. Mccormick (PCP) \par Dr. Shane Houston (Cardio)\par  [FreeTextEntry3] : Kamaljit Larsen MD, FACS \par Chief, Division of Thoracic Surgery \par Director, Minimally Invasive Thoracic Surgery \par Department of Cardiovascular and Thoracic Surgery \par [facility] \par , Cardiovascular and Thoracic Surgery \par [facility] \par

## 2019-10-02 NOTE — ASSESSMENT
[FreeTextEntry1] : 68 year old male who presents today for follow up. He is s/p EBUS with biopsy which revealed Level 7 +10 lymph node positive for metastatic adenocarcinoma in June 2017. He received neoadjuvant chemo/RT. He is s/p Left VATS, LLL Lobectomy, MLND, hilar node dissection on 11/5/2017, pathology revealed mucinous adenocarcinoma, T2N0Mx.\par \par He was hospitalized for management of SVT/AFib/Aflutter in February 2019 when workup revealed pericardial effusion and is now s/p subxiphoid anterior pericardiectomy, cardiac decortication on 2/25/19. He was started on Eliquis for A.Flutter.\par \par CT chest scan 9/19/19:\par -New 1.0 x 0.6cm and 0.6cm nodules in the posterolateral right lung base. \par \par I have reviewed the images with the patient and made the following recommendations. I discussed the findings with . I have recommended he follow up with Dr. Andrea to discuss biopsy vs surveillance of new lung nodules. The patient will complete PFTs at time of office with Dr Andrea in the event that he requires surgical intervention. \par \par Written by Janey Hernandez NP, acting as a scribe for Kamaljit Larsen MD.\par The documentation recorded by the scribe accurately reflects the service I personally performed and the decisions made by me. Kamaljit Larsen MD\par \par

## 2019-10-30 ENCOUNTER — RX RENEWAL (OUTPATIENT)
Age: 69
End: 2019-10-30

## 2019-10-31 ENCOUNTER — RX RENEWAL (OUTPATIENT)
Age: 69
End: 2019-10-31

## 2019-11-22 NOTE — ED ADULT NURSE NOTE - NSFALLRSKASSESSDT_ED_ALL_ED
Patient: Toño Mccartney Date: 2019   : 1957    62 year old male      INPATIENT WOUND CARE PROGRESS NOTE    Supervising Wound Care / Hyperbaric Medicine Physician: Dr. Glenn Reza  Consulting Provider:  ALEX Mcdonald  Date of Consultation/Last Comprehensive Exam:  19  Referring  Provider:  ALEX Swenson    SUBJECTIVE:    Chief Complaint:  Surgical wound, S/P penile / scrotal I&D      Wound/Ulcer Present:    Non-healing surgical wound    Additional Wound Category:  None     Maximum Baseline Ambulatory Status:  Unable to assess    History of Present Illness:  This is a 62 year old male PMHx significant for JAVAN, CAD s/p CABG, aortic stenosis s/p bioprosthetic AVR, HTN, HLD, DMII and CKDIII. Admitted for SOB and UTI.    S/P I&D of penile, scrotal, retropubic and suprapubic abscess with additional debridement of necrotic penile abscess tissue on 19 per Dr Holloway (urology) Intra operative reports do not indicate necrotizing infection     Wound care consulted for recommendations of above surgical wound.     Interval HPI:   Pathology reviewed and c/w abscess.   Per chart review antibiotics have been stopped.   Patient reports discomfort to the area. Denies fever or chills.      Current Treatment Regimen:  Dressing:  Dakins gauze   Frequency:  Daily   Changed by:  Staff/bedside nurse    Review of Systems:  Pertinent items are noted in HPI (history of present illness).    Past Medical History:   Diagnosis Date   • Arthritis    • Bronchitis    • Chronic kidney disease    • Chronic pain    • Congestive cardiac failure (CMS/HCC)    • Essential hypertension, benign    • Gastroesophageal reflux disease    • High cholesterol    • Myocardial infarction (CMS/HCC)    • Obesity, unspecified    • Personal history of colonic polyps 3/16/2007    dr light, hyperplastic, f/u 8-10 years   • Pneumonia    • Proteinuria 2008   • RAD (reactive airway disease)    • Sinusitis, chronic    •  Type II or unspecified type diabetes mellitus without mention of complication, not stated as uncontrolled    • Unspecified pulmonary tuberculosis, confirmation unspecified     Tuberculosis, was on INH for a year   • Unspecified sleep apnea    • Urinary tract infection      Past Surgical History:   Procedure Laterality Date   • Cardiac catherization     • Cardiac surgery  2019    quadruple bypass   • Colonoscopy w biopsy  07    hyperplastic x1,rpt 8-10 yrs, Dr. Pinzon   • Eye surgery     • Knee scope,diagnostic  03    lt knee arthroscopy   • Shoulder surg proc unlisted  04    Lt shoulder manipulation under anesthesia & Lt shoulder intrarticular cortisone injection - [place]   • Upper arm/elbow surgery unlisted      Unspecified upper arm/elbow procedure, cyst removed from R elbow   • Vascular surgery Right 2016    right leg vascular surgery: on Plavix      Social History     Socioeconomic History   • Marital status: /Civil Union     Spouse name: Not on file   • Number of children: 2   • Years of education: Not on file   • Highest education level: Not on file   Occupational History   • Occupation: environmental services     Employer: [employer]   Social Needs   • Financial resource strain: Not on file   • Food insecurity:     Worry: Not on file     Inability: Not on file   • Transportation needs:     Medical: Not on file     Non-medical: Not on file   Tobacco Use   • Smoking status: Former Smoker     Packs/day: 0.00     Last attempt to quit: 1990     Years since quittin.9   • Smokeless tobacco: Never Used   Substance and Sexual Activity   • Alcohol use: No     Frequency: Never     Drinks per session: 1 or 2     Binge frequency: Never   • Drug use: No   • Sexual activity: Yes     Partners: Female   Lifestyle   • Physical activity:     Days per week: Not on file     Minutes per session: Not on file   • Stress: Not on file   Relationships   • Social connections:      Talks on phone: Not on file     Gets together: Not on file     Attends Sabianism service: Not on file     Active member of club or organization: Not on file     Attends meetings of clubs or organizations: Not on file     Relationship status: Not on file   • Intimate partner violence:     Fear of current or ex partner: Not on file     Emotionally abused: Not on file     Physically abused: Not on file     Forced sexual activity: Not on file   Other Topics Concern   •  Service No   • Blood Transfusions No   • Caffeine Concern Not Asked   • Occupational Exposure Not Asked   • Hobby Hazards Not Asked   • Sleep Concern Not Asked   • Stress Concern Not Asked   • Weight Concern Not Asked   • Special Diet Yes     Comment: low cholesterol, diabetic   • Back Care Not Asked   • Exercise Yes     Comment: just work   • Bike Helmet Not Asked   • Seat Belt Yes   • Self-Exams Not Asked   Social History Narrative   • Not on file     Family History   Problem Relation Age of Onset   • Diabetes Sister    • Diabetes Brother    • Heart Mother         MI   • Kidney disease Mother    • Heart Brother         2 with bypass   • Hypertension Sister    • Diabetes Son    • Cancer Brother         prostate        Current Facility-Administered Medications   Medication   • sodium citrate anticoagulant 4 % flush 3 mL   • sodium chloride (NORMAL SALINE) 0.9 % bolus 100-200 mL   • heparin (porcine) injection 1,000 Units   • albumin human (SPA) 25 % injection 12.5 g   • insulin regular (human) (HumuLIN R) 100 units in sodium chloride 0.9% 100 mL infusion   • insulin lispro (HumaLOG) injection 4 Units   • dextrose 50 % injection 25 g   • dextrose 50 % injection 12.5 g   • dextrose 5 % infusion   • glucagon (GLUCAGEN) injection 1 mg   • dextrose (GLUTOSE) 40 % gel 15 g   • dextrose (GLUTOSE) 40 % gel 30 g   • lidocaine viscous 2 % oral solution 15 mL   • traMADol (ULTRAM) tablet 50 mg   • fentaNYL (SUBLIMAZE) injection 25 mcg   •  insulin glargine (LANTUS) injection 35 Units   • heparin (porcine) injection 5,000 Units   • sevelamer carbonate (RENVELA) tablet 1,600 mg   • alteplase (CATHFLO ACTIVASE) injection 2 mg   • metoPROLOL succinate (TOPROL-XL) ER tablet 12.5 mg   • sodium bicarbonate tablet 650 mg   • sodium hypochlorite (DAKIN'S) 0.125 % (1/4 strength) irrigation solution   • tamsulosin (FLOMAX) capsule 0.4 mg   • docusate sodium-sennosides (SENOKOT S) 50-8.6 MG 1 tablet   • naLOXone (NARCAN) injection 0.4 mg   • magnesium hydroxide (MILK OF MAGNESIA) 400 MG/5ML suspension 30 mL   • sodium chloride (PF) 0.9 % injection 10 mL   • sodium chloride (PF) 0.9 % injection 10 mL   • sodium chloride 0.9 % flush bag 500 mL   • potassium CHLORIDE (KLOR-CON M) david ER tablet 20 mEq   • potassium CHLORIDE (KLOR-CON) packet 20 mEq   • potassium CHLORIDE 20 mEq/100mL IVPB premix   • potassium CHLORIDE (KLOR-CON M) david ER tablet 40 mEq   • potassium CHLORIDE (KLOR-CON) packet 40 mEq   • potassium CHLORIDE 20 mEq/100mL IVPB premix   • polyethylene glycol (GLYCOLAX, MIRALAX) packet 17 g   • lidocaine (UROJET) 2 % jelly 10 mL   • albuterol inhaler 2 puff   • allopurinol (ZYLOPRIM) tablet 100 mg   • AMIODarone (PACERONE,CORDARONE) tablet 200 mg   • aspirin (ECOTRIN) enteric coated tablet 81 mg   • atorvastatin (LIPITOR) tablet 80 mg   • famotidine (PEPCID) tablet 20 mg   • sodium chloride 0.9 % flush bag 25 mL   • sodium chloride (PF) 0.9 % injection 2 mL   • magnesium sulfate 1 g in dextrose 5% 100 mL IVPB premix   • magnesium sulfate 2 g in 50 mL premix IVPB   • magnesium sulfate 2 g in 50 mL premix IVPB   • acetaminophen (TYLENOL) tablet 650 mg        ALLERGIES:  Dye [contrast media]    OBJECTIVE:  Vital Signs:    Visit Vitals  /56 (BP Location: RUE - Right upper extremity, Patient Position: Semi-Yeager's)   Pulse 78   Temp 97.7 °F (36.5 °C) (Oral)   Resp 16   Ht 5' 11\" (1.803 m)   Wt 126.4 kg   SpO2 98%   BMI 38.87 kg/m²         Physical  Exam:  General appearance: Alert, obese, in no distress and cooperative  Pulmonary: normal respiratory effort  Musculoskeletal: generalized weaknesss     Penis with extensive to right scrotum with open surgical wound.  There is necrotic tissue at edges with stringy slough. Much different than photo from 11/14.   There is superior undermining. No acute purulence. Mild malodor. Dolores-wound without erythema, warmth, induration or crepitus. Mild tenderness.             11/14/2019        Wound Bed Quality:  as above      [place]ores-wound Quality:    as above    Additional Descriptors:  as above    Wound Measurements Per Flowsheet:       Wound Coccyx Pressure Injury (Active)   Wound Care Team Consult Date 11/13/19 11/13/2019  1:00 PM   Wound Length (cm) 0.6 cm 11/19/2019  9:30 AM   Wound Width (cm) 1.3 cm 11/19/2019  9:30 AM   Wound Depth (cm) 0.1 cm 11/19/2019  9:30 AM   Wound Surface Area (cm^2) 0.78 cm^2 11/19/2019  9:30 AM   Wound Volume (cm^3) 0.08 cm^3 11/19/2019  9:30 AM   Wound Volume Change (Initial) 0.06 cm3 11/19/2019  9:30 AM   Wound Volume % Change (Initial) 290 % 11/19/2019  9:30 AM   Number of days: 10       Wound Penis Anterior  (Active)   Number of days: 10       Wound Penis Right Incision (Active)   Wound Length (cm) 3.5 cm 11/19/2019  9:30 AM   Wound Width (cm) 7 cm 11/19/2019  9:30 AM   Wound Depth (cm) 3.7 cm 11/19/2019  9:30 AM   Wound Surface Area (cm^2) 24.5 cm^2 11/19/2019  9:30 AM   Wound Volume (cm^3) 90.65 cm^3 11/19/2019  9:30 AM   Number of days: 9       Wound Chest Right Upper Puncture (Active)   Number of days: 0       PROCEDURE:  None performed    Procedure was Performed by:  Not applicable    Laboratory assessments reviewed:  No results found for: PAB   Albumin (g/dL)   Date Value   11/19/2019 3.5 (L)   11/18/2019 3.3 (L)   11/17/2019 3.7      Recent Labs   Lab 11/22/19  0713 11/22/19  0929 11/22/19  1114   GLUCOSE BEDSIDE 154* 159* 177*       Lab Results   Component Value Date    WBC 10.7  11/20/2019    GLUCOSE 259 (H) 11/20/2019    HGBA1C 7.3 (H) 07/20/2019    CRP 28.0 (H) 11/09/2019    CREATININE 5.52 (H) 11/20/2019    GFRA 12 11/20/2019    GFRNA 10 11/20/2019        Culture results:  Specimen Description (no units)   Date Value   11/13/2019 TISSUE B ABSC PENILE   11/13/2019 TISSUE B ABSC PENILE   11/13/2019 ABSCESS PENIS   11/13/2019 ABSCESS PENIS    CULTURE (no units)   Date Value   11/13/2019 FEW CITROBACTER KOSERI (P)   11/13/2019 RARE CANDIDA ALBICANS (P)   11/13/2019 VERY RARE STAPHYLOCOCCUS, COAGULASE NEGATIVE (P)   11/13/2019 NO ANAEROBES ISOLATED   11/13/2019 MODERATE ALEX ALBICANS (P)          Pathologic Diagnosis :     Soft tissue, penis, excision:     - Benign fibroconnective tissue with extensive acute inflammation and     necrosis, consistent with abscess formation.       Diagnostic Assessments Reviewed:  CT Scan      CT ABDOMEN PELVIS WO CONTRAST 11/6/2019     IMPRESSION:     Incomplete visualization of a indeterminate fluid collection adjacent to  the base of the penis. This possibly demonstrates small foci of air which  is suspicious for an abscess, infection, or possibly hematoma. This was not  visualized on recent testicular ultrasound, however was likely not in the  region of focus. Further evaluation with ultrasound is recommended      Nutritional Assessment:  Prealbumin and/or Albumin reviewed    Wound treatment goals are palliative:  No    DIAGNOSES:  Non-healing surgical wound penis / scrotum    Medical Decision Making:     Penile Abscess s/p I&D with urology on 11/13.    Wound has been reported to be clean and granular thus far however on exam today it appears to have necrosis at the edges and stringy slough. No active purulence but definitely does not appear as clean as prior.   - updated urology NP about concerns.     Will increase dressing change to bid.     He has L buttocks wound being followed by nursing team.     Local wound care   - Dakins moist dressing change -  increase to bid today.     Off-loading   - discussed with patient importance of shifting weight and changing position.   He is anxious to move.   RN reports PT has started and is following.   Offloading of wound, specialty offloading ICU mattress, turn at least every two hours    Diabetes   BS recently elevated.   Patient would benefit from tighter control for wound healing.     Nutrition   RD consulted.   Encourage increased protein intake to assist wound healing    Surgery  S/P I&D of penile, scrotal, retropubic and suprapubic abscess with additional debridement of necrotic penile abscess tissue  on 11/13/19 per Dr Holloway (urology) Intra operative reports do not indicate necrotizing infection     Imaging  CT pelvis (11/12/19) revealed penile soft tissue swelling and soft tissue gas. Ill-defined right scrotal fluid collection.    Infectious Disease   - off antibiotics.   No white count or fever    HBO:   Mild progressive necrosis noted today. Will have MD see at next visit. So far has not been deemed a necrotizing infection but if this changes HBO would be warranted.    Will follow.     Plan of Care:  Advanced Wound Care Recommendations:  NA  Percent Wound Closure from consult:  NA  Care plan to augment wound closure:  Not applicable.  consult 11/14/19        ALEX Pinto  [facility]  Inpatient Wound Care pager [phone]          19-Feb-2019 18:53

## 2019-11-26 NOTE — ASU PREOP CHECKLIST - DNR CLARIFICATION FORM COMPLETED
[n/a

## 2020-01-01 NOTE — CONSULT NOTE ADULT - CONSULT REASON
Subjective   Patient ID: Sarah is a 3 day old female who is accompanied by:mother and father    Well Child Assessment:  History was provided by the mother and father. Sarah lives with her mother and father.   Nutrition  Types of milk consumed include breast feeding and formula. Breast Feeding - Feedings occur 9-12 times per 24 hours. Formula - Types of formula consumed include cow's milk based.   Elimination  Urination occurs more than 6 times per 24 hours. Bowel movements occur more than 6 times per 24 hours. Stools have a loose and seedy consistency.   Sleep  The patient sleeps in her bassinet. Sleep positions include supine.   Safety  Home is child-proofed? yes. There is no smoking in the home. Home has working smoke alarms? yes. Home has working carbon monoxide alarms? yes. There is an appropriate car seat in use.   Screening  Immunizations are up-to-date.   Social  The caregiver enjoys the child. Childcare is provided at child's home. The childcare provider is a parent.       HPI  Additional concerns today: None     Review of Systems   All other systems reviewed and are negative.      Patient's medications, allergies, past medical, surgical, social and family histories were reviewed and updated as appropriate.    Objective   Vitals: Pulse 160   Temp 98.1 °F (36.7 °C) (Temporal)   Resp 32   Ht 19.5\" (49.5 cm)   Wt 2.722 kg (6 lb)   HC 34 cm (13.39\")   BMI 11.09 kg/m²   BSA 0.19 m²   Growth parameters are noted and are appropriate for age.  Physical Exam  Vitals signs reviewed.   Constitutional:       General: She is active.      Appearance: Normal appearance. She is well-developed.   HENT:      Head: Normocephalic and atraumatic. No cranial deformity. Anterior fontanelle is flat.      Right Ear: External ear normal.      Left Ear: External ear normal.      Nose: Nose normal.      Mouth/Throat:      Mouth: Mucous membranes are moist.   Eyes:      General: Red reflex is present bilaterally.       Conjunctiva/sclera: Conjunctivae normal.      Pupils: Pupils are equal, round, and reactive to light.   Neck:      Musculoskeletal: Normal range of motion and neck supple.   Cardiovascular:      Rate and Rhythm: Normal rate and regular rhythm.      Heart sounds: Normal heart sounds, S1 normal and S2 normal. No murmur.   Pulmonary:      Effort: Pulmonary effort is normal.      Breath sounds: Normal breath sounds.   Abdominal:      General: Bowel sounds are normal.      Palpations: Abdomen is soft. There is no mass.      Hernia: No hernia is present.   Genitourinary:     Comments: Normal female  Musculoskeletal: Normal range of motion.         General: No deformity.   Skin:     General: Skin is warm and moist.      Coloration: Skin is not jaundiced.      Findings: No rash.   Neurological:      General: No focal deficit present.      Mental Status: She is alert.      Motor: No abnormal muscle tone.      Primitive Reflexes: Suck normal.         Assessment   Problem List Items Addressed This Visit     None      Visit Diagnoses     Well child visit,  under 8 days old    -  Primary        Despite risk factors for jaundice - none present and bili at 48 hours was low risk.  Advised parents on watching for jaundice or symptoms of anemia.    Screening tests:   State  metabolic screen: Not available  Hearing screen (OAE, ABR): Pass  Congenital heart screen: Pass  Developmental milestones were reviewed and were: normal based on age    Immunizations given today: No    Discussed  topics of SIDS prevention, smoke exposure, vitamin D recommendations, encouraged flu and Tdap vaccines for family, germ exposures, as well as feeding plan and expectations for the coming days.  Address parent questions/concerns.    Follow-up at next well child visit, or sooner as needed.   Lung Cancer C34.32

## 2020-01-23 NOTE — H&P ADULT - REASON FOR ADMISSION
"Subjective:       Patient ID: Hua Sorensen is a 49 y.o. male.    Chief Complaint: Follow-up    Patient presents for his annual wellness visit.  He states he is feeling very well.  He has history of hypertension, but states that the medication made him feel very bad.  He stopped taking it about 8 months ago and has been following his blood pressure.  He states it is usually 123/80.  He denies any chest pain shortness of breath palpitations etc.  Also with history of hemorrhoids.  He uses the hydrocortisone suppositories once a month or every 2 months.  He denies any problems with this.  He denies any side effects.    Review of Systems   Constitutional: Negative for chills, fatigue, fever and unexpected weight change.   Eyes: Negative for visual disturbance.   Respiratory: Negative for shortness of breath.    Cardiovascular: Negative for chest pain, palpitations and leg swelling.   Gastrointestinal: Negative for abdominal pain, blood in stool, constipation, diarrhea, nausea and vomiting.   Genitourinary: Negative for dysuria and hematuria.   Musculoskeletal: Negative for arthralgias and back pain.   Skin: Negative for rash and wound.   Neurological: Negative for dizziness, tremors, syncope, weakness, light-headedness, numbness and headaches.   Hematological: Negative for adenopathy. Does not bruise/bleed easily.   Psychiatric/Behavioral: Negative for dysphoric mood. The patient is not nervous/anxious.      Medication List with Changes/Refills   New Medications    HYDROCORTISONE (ANUSOL-HC) 25 MG SUPPOSITORY    Place 1 suppository (25 mg total) rectally 2 (two) times daily. for 10 days   Current Medications    LISINOPRIL-HYDROCHLOROTHIAZIDE (PRINZIDE,ZESTORETIC) 20-12.5 MG PER TABLET        TADALAFIL (CIALIS) 5 MG TABLET    Cialis 5 mg tablet         Objective:      /86   Pulse 64   Resp 14   Ht 5' 11" (1.803 m)   Wt 83.9 kg (185 lb)   SpO2 99%   BMI 25.80 kg/m²   Estimated body mass index is 25.8 " "kg/m² as calculated from the following:    Height as of this encounter: 5' 11" (1.803 m).    Weight as of this encounter: 83.9 kg (185 lb).  Physical Exam   Constitutional: He appears well-developed and well-nourished.   HENT:   Head: Normocephalic and atraumatic.   Eyes: Conjunctivae and EOM are normal.   Neck: Neck supple. No thyromegaly present.   Cardiovascular: Normal rate, regular rhythm, normal heart sounds and intact distal pulses.   No murmur heard.  Pulmonary/Chest: Effort normal and breath sounds normal.   Abdominal: Soft. Bowel sounds are normal. He exhibits no mass. There is no tenderness. There is no rebound and no guarding.   Musculoskeletal: Normal range of motion.   Neurological: He is alert.   Skin: Skin is dry.   Vitals reviewed.      Assessment:       Problem List Items Addressed This Visit        Cardiac/Vascular    Hypertensive disorder      Other Visit Diagnoses     Wellness examination    -  Primary    Relevant Medications    hydrocortisone (ANUSOL-HC) 25 mg suppository    Other Relevant Orders    CBC auto differential    Comprehensive metabolic panel    Lipid panel    TSH            Plan:       Wellness examination  -     hydrocortisone (ANUSOL-HC) 25 mg suppository; Place 1 suppository (25 mg total) rectally 2 (two) times daily. for 10 days  Dispense: 20 suppository; Refill: 0  -     CBC auto differential; Future; Expected date: 01/23/2020  -     Comprehensive metabolic panel; Future; Expected date: 01/23/2020  -     Lipid panel; Future; Expected date: 01/23/2020  -     TSH; Future; Expected date: 01/23/2020    Hypertension, unspecified type              DISCUSSION:  Diet and exercise.  Include more fiber to help with the hemorrhoids.  Warm soaks in bathtub.  Risk of these suppositories discussed.  Use very sparingly.  Get screening blood work.  Continue to not take the blood pressure medication.  " dizziness

## 2020-02-11 ENCOUNTER — APPOINTMENT (OUTPATIENT)
Dept: CARDIOLOGY | Facility: CLINIC | Age: 70
End: 2020-02-11
Payer: MEDICARE

## 2020-02-11 VITALS
BODY MASS INDEX: 26.74 KG/M2 | WEIGHT: 191 LBS | SYSTOLIC BLOOD PRESSURE: 124 MMHG | DIASTOLIC BLOOD PRESSURE: 87 MMHG | HEIGHT: 71 IN | HEART RATE: 93 BPM | OXYGEN SATURATION: 88 %

## 2020-02-11 PROCEDURE — 99214 OFFICE O/P EST MOD 30 MIN: CPT

## 2020-02-11 NOTE — HISTORY OF PRESENT ILLNESS
[FreeTextEntry1] : I saw Nam Gleason in the office today for cardiac follow up. He is a 69-year-old white male who underwent left lower lobe lobectomy for cancer in 2017. He was pretreated with chemotherapy and radiation  treatment and has done quite well. He had a preop cardiac evaluation consisting of an echo that was normal. He did poorly on the stress test because of the chemotherapy and radiation therapy but for what he could do the stress test was normal. He has no known history of heart disease.\par \par He was a smoker until his surgery. He has hypertension. He denies any hyperlipidemia, diabetes, or family history of heart disease.\par \par Last year the patient was feeling lightheaded and woozy. He sat down and tried to do this 2 more times and each time he still had the same symptoms. No palpitations, chest pain, shortness of breath. He went to urgent care where it was found his blood pressure was only 90 and he stopped his blood pressure medication which consisted of irbesartan hydrochlorothiazide. Since that time he had been asymptomatic. Has no symptoms with physical activity.\par \par On his initial office visit he exhibited a regular SVT without symptoms.W He was sent to the hospital and was found to have a large pericardial effusion that was treated with catheter drainage. He converted to RSR and discharged on lopressor 25mg BID and eliquis.Pathology shows fibrinous pericarditis.\par \par He has developed bilateral lower leg edema without any pain. He is now on Lasix 20 mg once a day. The leg edema is no better in the morning when he first wakes up.Otherwise he feels well.\par \par Echocardiogram performed 4/19 demonstrates an ejection fraction of 50% with hypocontractility of inferolateral wall. There is mild MR and AI with mild to moderate TR. There is no significant pulmonary hypertension. Review of an ECG from the hospital dated 2/20/19 demonstrates sinus rhythm. There are borderline Q waves in leads 3 and aVF which are new compared to tracings from 2017.ECG  4/19 looks similar to the hospital tracing. I suspect that the tracing from 2/19 the limb leads were misplaced.\par \par Patient did have a stress test 2 years ago prior to surgery that was apparently normal\par \par The patient wore a 30 day event monitor to detect any arrhythmia. He had an episode of atrial fibrillation on 5/31/19 at 1:16 PM  that spontaneously converted to sinus rhythm.\par \par That day the patient did carry a very heavy ladder up on a roof. He was out of breath but had no symptoms of palpitation. On his own he stopped his Eliquis because he is exposed to a lot of sharp objects in the course of his work was afraid he could bleed severely. He has no risk of falling..\par \par He restarted Eliquis 6/19 because of episodes of asymptomatic PAF. He lost his coverage for the Eliquis and after discussion with you he is now taking low-dose aspirin instead. He is not smoking and feels well without any specific symptoms.\par \par His scans have been stable without recurrence of his cancer.

## 2020-02-11 NOTE — PHYSICAL EXAM
[General Appearance - Well Developed] : well developed [Normal Appearance] : normal appearance [Well Groomed] : well groomed [General Appearance - Well Nourished] : well nourished [No Deformities] : no deformities [General Appearance - In No Acute Distress] : no acute distress [Normal Conjunctiva] : the conjunctiva exhibited no abnormalities [Normal Oral Mucosa] : normal oral mucosa [Normal Jugular Venous A Waves Present] : normal jugular venous A waves present [Normal Jugular Venous V Waves Present] : normal jugular venous V waves present [No Jugular Venous Henderson A Waves] : no jugular venous henderson A waves [Respiration, Rhythm And Depth] : normal respiratory rhythm and effort [Exaggerated Use Of Accessory Muscles For Inspiration] : no accessory muscle use [Auscultation Breath Sounds / Voice Sounds] : lungs were clear to auscultation bilaterally [Bowel Sounds] : normal bowel sounds [Abdomen Soft] : soft [Abdomen Tenderness] : non-tender [Abnormal Walk] : normal gait [Gait - Sufficient For Exercise Testing] : the gait was sufficient for exercise testing [Nail Clubbing] : no clubbing of the fingernails [Cyanosis, Localized] : no localized cyanosis [Skin Color & Pigmentation] : normal skin color and pigmentation [Skin Turgor] : normal skin turgor [] : no rash [Oriented To Time, Place, And Person] : oriented to person, place, and time [Impaired Insight] : insight and judgment were intact [No Anxiety] : not feeling anxious [Precordial Heave Left] : a precordial heave was noted at the left lower parasternal line [Normal Rate] : normal [Normal S1] : normal S1 [Normal S2] : normal S2 [No Murmur] : no murmurs heard [No Abnormalities] : the abdominal aorta was not enlarged and no bruit was heard [1+] : left 1+ [___ +] : bilateral [unfilled]U+ pretibial pitting edema [S3] : no S3 [FreeTextEntry1] : Scattered expiratory rhonchi [S4] : no S4 [Right Carotid Bruit] : no bruit heard over the right carotid [Left Femoral Bruit] : no bruit heard over the left femoral artery [Right Femoral Bruit] : no bruit heard over the right femoral artery [Left Carotid Bruit] : no bruit heard over the left carotid

## 2020-02-11 NOTE — REVIEW OF SYSTEMS
[Feeling Fatigued] : feeling fatigued [Dyspnea on exertion] : dyspnea during exertion [Cough] : cough [Wheezing] : wheezing [Negative] : Genitourinary [Fever] : no fever [Blurry Vision] : no blurred vision [Headache] : no headache [Chills] : no chills [Chest Pain] : no chest pain [Palpitations] : no palpitations [Seeing Double (Diplopia)] : no diplopia [Coughing Up Blood] : no hemoptysis [Skin: A Rash] : no rash: [Joint Pain] : no joint pain [Dizziness] : no dizziness [Depression] : no depression [Anxiety] : no anxiety [Excessive Thirst] : no polydipsia [Easy Bleeding] : no tendency for easy bleeding [Easy Bruising] : no tendency for easy bruising

## 2020-02-11 NOTE — DISCUSSION/SUMMARY
[FreeTextEntry1] : Clinically the patient is doing well without any signs or symptoms of active heart disease. Blood pressure is well controlled and he has no signs or symptoms of volume overload.\par \par We did discuss the importance of anticoagulation. Does understand that if he does have atrial fibrillation and doesn't know it, that he is at risk for stroke. We have had this discussion before. He does not want to take Coumadin which he could afford.\par \par He'll increase his aspirin to 325 once a day. I showed him how to monitor his pulse and he will check his pulse first thing in the morning and at night. If he detects any irregularity or rapid beats he would call me.\par \par Assuming all is well I would see him in approximately 6 months. I would appreciate your sending a copy of any blood work that should do in this patient for my review.

## 2020-03-11 NOTE — ASU PREOP CHECKLIST - INTERNAL PROSTHESES
----- Message from Isaac Mcdonald sent at 3/11/2020 11:25 AM CDT -----  Contact: mom @ [phone]  Calling to get results of MRI, states she called week but hasn't gotten a response back   mediport left upper chest

## 2020-07-30 NOTE — H&P PST ADULT - PRO PAIN EXPRESSION
Normal vision: sees adequately in most situations; can see medication labels, newsprint
verbalization

## 2020-10-08 NOTE — PATIENT PROFILE ADULT. - SPIRITUAL CULTURAL, RELIGIOUS PRACTICES/VALUES, PROFILE
Patient needs to have FLP, CMP, TSH, CBC     Updated the wife. She will inform the patient and will have him call back to schedule a fasting lab appointment.   
none

## 2020-10-19 ENCOUNTER — APPOINTMENT (OUTPATIENT)
Dept: SURGERY | Facility: CLINIC | Age: 70
End: 2020-10-19
Payer: MEDICARE

## 2020-10-19 PROCEDURE — 99204 OFFICE O/P NEW MOD 45 MIN: CPT

## 2020-11-20 ENCOUNTER — OUTPATIENT (OUTPATIENT)
Dept: OUTPATIENT SERVICES | Facility: HOSPITAL | Age: 70
LOS: 1 days | End: 2020-11-20
Payer: MEDICARE

## 2020-11-20 VITALS
SYSTOLIC BLOOD PRESSURE: 126 MMHG | DIASTOLIC BLOOD PRESSURE: 86 MMHG | RESPIRATION RATE: 16 BRPM | HEART RATE: 70 BPM | TEMPERATURE: 97 F | OXYGEN SATURATION: 98 % | WEIGHT: 195.99 LBS | HEIGHT: 71 IN

## 2020-11-20 DIAGNOSIS — Z98.890 OTHER SPECIFIED POSTPROCEDURAL STATES: Chronic | ICD-10-CM

## 2020-11-20 DIAGNOSIS — Z90.2 ACQUIRED ABSENCE OF LUNG [PART OF]: Chronic | ICD-10-CM

## 2020-11-20 DIAGNOSIS — Z01.818 ENCOUNTER FOR OTHER PREPROCEDURAL EXAMINATION: ICD-10-CM

## 2020-11-20 DIAGNOSIS — K43.6 OTHER AND UNSPECIFIED VENTRAL HERNIA WITH OBSTRUCTION, WITHOUT GANGRENE: ICD-10-CM

## 2020-11-20 DIAGNOSIS — Z92.21 PERSONAL HISTORY OF ANTINEOPLASTIC CHEMOTHERAPY: Chronic | ICD-10-CM

## 2020-11-20 LAB
ANION GAP SERPL CALC-SCNC: 5 MMOL/L — SIGNIFICANT CHANGE UP (ref 5–17)
BUN SERPL-MCNC: 28 MG/DL — HIGH (ref 7–23)
CALCIUM SERPL-MCNC: 8.8 MG/DL — SIGNIFICANT CHANGE UP (ref 8.5–10.1)
CHLORIDE SERPL-SCNC: 105 MMOL/L — SIGNIFICANT CHANGE UP (ref 96–108)
CO2 SERPL-SCNC: 27 MMOL/L — SIGNIFICANT CHANGE UP (ref 22–31)
CREAT SERPL-MCNC: 1.6 MG/DL — HIGH (ref 0.5–1.3)
GLUCOSE SERPL-MCNC: 105 MG/DL — HIGH (ref 70–99)
HCT VFR BLD CALC: 46.8 % — SIGNIFICANT CHANGE UP (ref 39–50)
HGB BLD-MCNC: 16.4 G/DL — SIGNIFICANT CHANGE UP (ref 13–17)
MCHC RBC-ENTMCNC: 30.7 PG — SIGNIFICANT CHANGE UP (ref 27–34)
MCHC RBC-ENTMCNC: 35 GM/DL — SIGNIFICANT CHANGE UP (ref 32–36)
MCV RBC AUTO: 87.5 FL — SIGNIFICANT CHANGE UP (ref 80–100)
NRBC # BLD: 0 /100 WBCS — SIGNIFICANT CHANGE UP (ref 0–0)
PLATELET # BLD AUTO: 242 K/UL — SIGNIFICANT CHANGE UP (ref 150–400)
POTASSIUM SERPL-MCNC: 4.4 MMOL/L — SIGNIFICANT CHANGE UP (ref 3.5–5.3)
POTASSIUM SERPL-SCNC: 4.4 MMOL/L — SIGNIFICANT CHANGE UP (ref 3.5–5.3)
RBC # BLD: 5.35 M/UL — SIGNIFICANT CHANGE UP (ref 4.2–5.8)
RBC # FLD: 14.9 % — HIGH (ref 10.3–14.5)
SODIUM SERPL-SCNC: 137 MMOL/L — SIGNIFICANT CHANGE UP (ref 135–145)
WBC # BLD: 8.16 K/UL — SIGNIFICANT CHANGE UP (ref 3.8–10.5)
WBC # FLD AUTO: 8.16 K/UL — SIGNIFICANT CHANGE UP (ref 3.8–10.5)

## 2020-11-20 PROCEDURE — 36415 COLL VENOUS BLD VENIPUNCTURE: CPT

## 2020-11-20 PROCEDURE — 93005 ELECTROCARDIOGRAM TRACING: CPT

## 2020-11-20 PROCEDURE — 85027 COMPLETE CBC AUTOMATED: CPT

## 2020-11-20 PROCEDURE — G0463: CPT

## 2020-11-20 PROCEDURE — 80048 BASIC METABOLIC PNL TOTAL CA: CPT

## 2020-11-20 PROCEDURE — 93010 ELECTROCARDIOGRAM REPORT: CPT

## 2020-11-20 NOTE — H&P PST ADULT - NSICDXPASTSURGICALHX_GEN_ALL_CORE_FT
PAST SURGICAL HISTORY:  H/O inguinal hernia repair Bilateral in the 1970's    History of chemotherapy left chest wall infusaport placement    S/P pericardial surgery For an effusion in 2019    Status post lobectomy of lung LLL lobectomy at [facility]

## 2020-11-20 NOTE — H&P PST ADULT - NSICDXPROBLEM_GEN_ALL_CORE_FT
PROBLEM DIAGNOSES  Problem: Other and unspecified ventral hernia with obstruction, without gangrene  Assessment and Plan: scheduled for a repair ventral hernia with mesh on 12/4 with Dr. Crain.    Problem: Pre-op evaluation  Assessment and Plan: Labs - CBC, BMP and EKG. To be swabbed for COVID. Appt TBD.   CC with Dr. Houston. Pulm with Dr. Andrea  Pre op and Hibiclens instructions reviewed and given. Take routine am meds DOS with sip of water. Instructed to hold and/or avoid NSAIDs and OTC supplements. Verbalized understanding

## 2020-11-20 NOTE — H&P PST ADULT - NSICDXPASTMEDICALHX_GEN_ALL_CORE_FT
PAST MEDICAL HISTORY:  Atrial flutter 10/2017 off Eliquis (MD aware)    GERD (gastroesophageal reflux disease)     HTN (hypertension)     Lung cancer s/p LLL Lobectomy, radiation, chemotherapy    Other and unspecified ventral hernia with obstruction, without gangrene     Smoking history Quit 2017

## 2020-11-20 NOTE — H&P PST ADULT - HISTORY OF PRESENT ILLNESS
68 yo male with HTN and emphysema with pmh of lung ca s/p LLL lobectomy, treated with chemo and radiation in 2017, presents to [facility] scheduled for a repair ventral hernia with mesh on 12/4 with Dr. Crain. Reports noticing an abdominal bulging 8  months ago. Denies abd pain, N/V and bowel changes.

## 2020-11-23 ENCOUNTER — NON-APPOINTMENT (OUTPATIENT)
Age: 70
End: 2020-11-23

## 2020-11-23 ENCOUNTER — APPOINTMENT (OUTPATIENT)
Dept: CARDIOLOGY | Facility: CLINIC | Age: 70
End: 2020-11-23
Payer: MEDICARE

## 2020-11-23 VITALS
BODY MASS INDEX: 27.58 KG/M2 | HEART RATE: 71 BPM | OXYGEN SATURATION: 96 % | WEIGHT: 197 LBS | SYSTOLIC BLOOD PRESSURE: 138 MMHG | DIASTOLIC BLOOD PRESSURE: 88 MMHG | HEIGHT: 71 IN

## 2020-11-23 PROCEDURE — 93000 ELECTROCARDIOGRAM COMPLETE: CPT

## 2020-11-23 PROCEDURE — 99214 OFFICE O/P EST MOD 30 MIN: CPT

## 2020-11-23 NOTE — DISCUSSION/SUMMARY
[FreeTextEntry1] : This is 69-year-old white male with good functional status. He is being treated for hypertension, and has chronic renal insufficiency. He had lung resection for a cancer and this has been stable for more than 3 years without recurrence\par \par Does have a history of paroxysmal atrial fibrillation and has decided to be on aspirin therapy rather than anticoagulation.\par \par His cardiac status is stable and there are no cardiac contraindications to the planned ventral hernia repair surgery. No special precautions other routine hemodynamic monitoring should required. His presurgical testing is stable including his chronic renal disease.

## 2020-11-23 NOTE — HISTORY OF PRESENT ILLNESS
[FreeTextEntry1] : I saw Nam Gleason in the office today for cardiac Evaluation, in anticipation of ventral hernia repair.. He is a 69-year-old white male who underwent left lower lobe lobectomy for cancer in 2017. He was pretreated with chemotherapy and radiation  treatment and has done quite well. He had a preop cardiac evaluation consisting of an echo that was normal. He did poorly on the stress test because of the chemotherapy and radiation therapy but for what he could do the stress test was normal. He has no known history of heart disease.\par \par He was a smoker until his surgery. He has hypertension. He denies any hyperlipidemia, diabetes, or family history of heart disease.\par \par On his initial office visit to my office he exhibited a regular SVT without symptoms.W He was sent to the hospital and was found to have a large pericardial effusion that was treated with catheter drainage. He converted to RSR and discharged on lopressor 25mg BID and eliquis.Pathology shows fibrinous pericarditis.\par \par He  developed bilateral lower leg edema without any pain. He was placed on Lasix 20 mg once a day. The edema has improved and is no longer on diuretic therapy. He does have chronic kidney disease.\par \par Echocardiogram performed 4/19 demonstrates an ejection fraction of 50% with hypocontractility of inferolateral wall. There iwas mild MR and AI with mild to moderate TR. There is no significant pulmonary hypertension. No pericardial effusion. 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 He can walk 4 miles a day without symptoms\par \par The patient wore a 30 day event monitor to detect any arrhythmia. He had an episode of atrial fibrillation on 5/31/19 at 1:16 PM  that spontaneously converted to sinus rhythm.\par \par He restarted Eliquis 6/19 because of episodes of asymptomatic PAF. He lost his coverage for the Eliquis and  he is has been taking low-dose aspirin instead. We have discussed the risk of blood clot since he is a CHADsvasc 2.. He is not smoking and feels well without any specific symptoms. There's been no reoccurrence of the lung cancer\par \par ECG demonstrates sinus rhythm with a right IVCD. There are no acute changes. Presurgical testing demonstrates a creatinine of 1.6 with a BUN of 28. This is stable kidney function.

## 2020-12-01 PROBLEM — K43.6 OTHER AND UNSPECIFIED VENTRAL HERNIA WITH OBSTRUCTION, WITHOUT GANGRENE: Chronic | Status: ACTIVE | Noted: 2020-11-20

## 2020-12-01 PROBLEM — I48.92 UNSPECIFIED ATRIAL FLUTTER: Chronic | Status: ACTIVE | Noted: 2017-11-03

## 2020-12-01 PROBLEM — C34.90 MALIGNANT NEOPLASM OF UNSPECIFIED PART OF UNSPECIFIED BRONCHUS OR LUNG: Chronic | Status: ACTIVE | Noted: 2017-11-03

## 2020-12-02 ENCOUNTER — OUTPATIENT (OUTPATIENT)
Dept: OUTPATIENT SERVICES | Facility: HOSPITAL | Age: 70
LOS: 1 days | End: 2020-12-02
Payer: MEDICARE

## 2020-12-02 DIAGNOSIS — Z90.2 ACQUIRED ABSENCE OF LUNG [PART OF]: Chronic | ICD-10-CM

## 2020-12-02 DIAGNOSIS — Z98.890 OTHER SPECIFIED POSTPROCEDURAL STATES: Chronic | ICD-10-CM

## 2020-12-02 DIAGNOSIS — Z11.59 ENCOUNTER FOR SCREENING FOR OTHER VIRAL DISEASES: ICD-10-CM

## 2020-12-02 DIAGNOSIS — Z92.21 PERSONAL HISTORY OF ANTINEOPLASTIC CHEMOTHERAPY: Chronic | ICD-10-CM

## 2020-12-02 LAB — SARS-COV-2 RNA SPEC QL NAA+PROBE: SIGNIFICANT CHANGE UP

## 2020-12-02 PROCEDURE — U0003: CPT

## 2020-12-03 ENCOUNTER — TRANSCRIPTION ENCOUNTER (OUTPATIENT)
Age: 70
End: 2020-12-03

## 2020-12-03 NOTE — ASU PATIENT PROFILE, ADULT - PMH
Atrial flutter  10/2017 off Eliquis (MD aware)  GERD (gastroesophageal reflux disease)    HTN (hypertension)    Lung cancer  s/p LLL Lobectomy, radiation, chemotherapy  Other and unspecified ventral hernia with obstruction, without gangrene    Smoking history  Quit 2017

## 2020-12-03 NOTE — ASU PATIENT PROFILE, ADULT - PSH
H/O inguinal hernia repair  Bilateral in the 1970's  History of chemotherapy  left chest wall infusaport placement  S/P pericardial surgery  For an effusion in 2019  Status post lobectomy of lung  LLL lobectomy at [facility]

## 2020-12-04 ENCOUNTER — OUTPATIENT (OUTPATIENT)
Dept: OUTPATIENT SERVICES | Facility: HOSPITAL | Age: 70
LOS: 1 days | End: 2020-12-04
Payer: MEDICARE

## 2020-12-04 ENCOUNTER — APPOINTMENT (OUTPATIENT)
Dept: SURGERY | Facility: HOSPITAL | Age: 70
End: 2020-12-04
Payer: MEDICARE

## 2020-12-04 ENCOUNTER — RESULT REVIEW (OUTPATIENT)
Age: 70
End: 2020-12-04

## 2020-12-04 VITALS
RESPIRATION RATE: 15 BRPM | DIASTOLIC BLOOD PRESSURE: 62 MMHG | HEART RATE: 64 BPM | SYSTOLIC BLOOD PRESSURE: 92 MMHG | OXYGEN SATURATION: 95 % | TEMPERATURE: 98 F | WEIGHT: 195.99 LBS | HEIGHT: 71 IN

## 2020-12-04 VITALS
SYSTOLIC BLOOD PRESSURE: 96 MMHG | OXYGEN SATURATION: 95 % | HEART RATE: 84 BPM | RESPIRATION RATE: 16 BRPM | DIASTOLIC BLOOD PRESSURE: 60 MMHG

## 2020-12-04 DIAGNOSIS — Z98.890 OTHER SPECIFIED POSTPROCEDURAL STATES: Chronic | ICD-10-CM

## 2020-12-04 DIAGNOSIS — Z92.21 PERSONAL HISTORY OF ANTINEOPLASTIC CHEMOTHERAPY: Chronic | ICD-10-CM

## 2020-12-04 DIAGNOSIS — K43.6 OTHER AND UNSPECIFIED VENTRAL HERNIA WITH OBSTRUCTION, WITHOUT GANGRENE: ICD-10-CM

## 2020-12-04 DIAGNOSIS — Z90.2 ACQUIRED ABSENCE OF LUNG [PART OF]: Chronic | ICD-10-CM

## 2020-12-04 PROCEDURE — C1781: CPT

## 2020-12-04 PROCEDURE — 88302 TISSUE EXAM BY PATHOLOGIST: CPT | Mod: 26

## 2020-12-04 PROCEDURE — 88302 TISSUE EXAM BY PATHOLOGIST: CPT

## 2020-12-04 PROCEDURE — 49568: CPT

## 2020-12-04 PROCEDURE — 49561: CPT

## 2020-12-04 RX ORDER — SODIUM CHLORIDE 9 MG/ML
1000 INJECTION, SOLUTION INTRAVENOUS
Refills: 0 | Status: DISCONTINUED | OUTPATIENT
Start: 2020-12-04 | End: 2020-12-04

## 2020-12-04 RX ORDER — HYDROMORPHONE HYDROCHLORIDE 2 MG/ML
0.5 INJECTION INTRAMUSCULAR; INTRAVENOUS; SUBCUTANEOUS
Refills: 0 | Status: DISCONTINUED | OUTPATIENT
Start: 2020-12-04 | End: 2020-12-04

## 2020-12-04 RX ORDER — SODIUM CHLORIDE 9 MG/ML
1000 INJECTION, SOLUTION INTRAVENOUS
Refills: 0 | Status: DISCONTINUED | OUTPATIENT
Start: 2020-12-04 | End: 2020-12-18

## 2020-12-04 RX ORDER — OXYCODONE HYDROCHLORIDE 5 MG/1
5 TABLET ORAL ONCE
Refills: 0 | Status: DISCONTINUED | OUTPATIENT
Start: 2020-12-04 | End: 2020-12-04

## 2020-12-04 RX ORDER — ONDANSETRON 8 MG/1
4 TABLET, FILM COATED ORAL ONCE
Refills: 0 | Status: DISCONTINUED | OUTPATIENT
Start: 2020-12-04 | End: 2020-12-18

## 2020-12-04 RX ORDER — CEFAZOLIN SODIUM 1 G
1000 VIAL (EA) INJECTION ONCE
Refills: 0 | Status: COMPLETED | OUTPATIENT
Start: 2020-12-04 | End: 2020-12-04

## 2020-12-04 RX ADMIN — HYDROMORPHONE HYDROCHLORIDE 0.5 MILLIGRAM(S): 2 INJECTION INTRAMUSCULAR; INTRAVENOUS; SUBCUTANEOUS at 11:53

## 2020-12-04 RX ADMIN — HYDROMORPHONE HYDROCHLORIDE 0.5 MILLIGRAM(S): 2 INJECTION INTRAMUSCULAR; INTRAVENOUS; SUBCUTANEOUS at 11:25

## 2020-12-04 RX ADMIN — OXYCODONE HYDROCHLORIDE 5 MILLIGRAM(S): 5 TABLET ORAL at 12:03

## 2020-12-04 RX ADMIN — SODIUM CHLORIDE 60 MILLILITER(S): 9 INJECTION, SOLUTION INTRAVENOUS at 08:16

## 2020-12-04 NOTE — ASU DISCHARGE PLAN (ADULT/PEDIATRIC) - CALL YOUR DOCTOR IF YOU HAVE ANY OF THE FOLLOWING:
Fever greater than (need to indicate Fahrenheit or Celsius)/Bleeding that does not stop/Pain not relieved by Medications/Wound/Surgical Site with redness, or foul smelling discharge or pus

## 2020-12-04 NOTE — ASU DISCHARGE PLAN (ADULT/PEDIATRIC) - CARE PROVIDER_API CALL
Nam Crain (DO)  Surgery  [address] [facility], [address]  [place], [place] [id number]  Phone: [phone]  Fax: [phone]  Follow Up Time:

## 2020-12-07 LAB — SURGICAL PATHOLOGY STUDY: SIGNIFICANT CHANGE UP

## 2020-12-21 ENCOUNTER — APPOINTMENT (OUTPATIENT)
Dept: SURGERY | Facility: CLINIC | Age: 70
End: 2020-12-21

## 2021-01-28 ENCOUNTER — APPOINTMENT (OUTPATIENT)
Dept: SURGERY | Facility: CLINIC | Age: 71
End: 2021-01-28

## 2021-03-24 NOTE — PATIENT PROFILE ADULT. - BLOOD AVOIDANCE/RESTRICTIONS, PROFILE
none Rhomboid Transposition Flap Text: The defect edges were debeveled with a #15 scalpel blade.  Given the location of the defect and the proximity to free margins a rhomboid transposition flap was deemed most appropriate.  Using a sterile surgical marker, an appropriate rhomboid flap was drawn incorporating the defect.    The area thus outlined was incised deep to adipose tissue with a #15 scalpel blade.  The skin margins were undermined to an appropriate distance in all directions utilizing iris scissors.

## 2021-07-12 NOTE — H&P ADULT - NSHPLABSRESULTS_GEN_ALL_CORE
11.2   8.6   )-----------( 292      ( 23 Oct 2017 09:56 )             34.3       10-23    137  |  98  |  24<H>  ----------------------------<  109<H>  3.5   |  23  |  1.75<H>    Ca    9.5      23 Oct 2017 09:56    TPro  7.2  /  Alb  3.2<L>  /  TBili  0.3  /  DBili  x   /  AST  22  /  ALT  17  /  AlkPhos  75  10-23                  PT/INR - ( 23 Oct 2017 09:56 )   PT: 12.1 sec;   INR: 1.11 ratio         PTT - ( 23 Oct 2017 09:56 )  PTT:29.0 sec    Lactate Trend      CARDIAC MARKERS ( 23 Oct 2017 09:56 )  .013 ng/mL / x     / x     / x     / 1.6 ng/mL        CAPILLARY BLOOD GLUCOSE
1

## 2021-08-27 ENCOUNTER — NON-APPOINTMENT (OUTPATIENT)
Age: 71
End: 2021-08-27

## 2021-08-31 ENCOUNTER — APPOINTMENT (OUTPATIENT)
Dept: THORACIC SURGERY | Facility: CLINIC | Age: 71
End: 2021-08-31
Payer: MEDICARE

## 2021-08-31 VITALS
OXYGEN SATURATION: 97 % | RESPIRATION RATE: 18 BRPM | BODY MASS INDEX: 27.3 KG/M2 | DIASTOLIC BLOOD PRESSURE: 90 MMHG | SYSTOLIC BLOOD PRESSURE: 137 MMHG | HEIGHT: 71 IN | HEART RATE: 81 BPM | TEMPERATURE: 98.2 F | WEIGHT: 195 LBS

## 2021-08-31 PROCEDURE — 99214 OFFICE O/P EST MOD 30 MIN: CPT

## 2021-08-31 RX ORDER — ASPIRIN 325 MG/1
325 TABLET, COATED ORAL
Refills: 0 | Status: DISCONTINUED | COMMUNITY
End: 2021-08-31

## 2021-09-02 NOTE — ASSESSMENT
[FreeTextEntry1] : 70 year old male, s/p EBUS with biopsy which revealed Level 7 +10 lymph node positive for metastatic adenocarcinoma in June 2017. He received neoadjuvant chemo/RT. He is s/p Left VATS, LLL Lobectomy, MLND, hilar node dissection on 11/5/2017, pathology revealed mucinous adenocarcinoma, T2N0Mx.\par \par He was hospitalized for management of SVT/AFib/Aflutter in February 2019 when workup revealed pericardial effusion and is now s/p subxiphoid anterior pericardiectomy, cardiac decortication on 2/25/19. He was started on Eliquis for A.Flutter.\par \par CT chest scan 9/19/19:\par -New 1.0 x 0.6 cm and 0.6 cm nodules in the posterolateral right lung base. \par \par CT chest 8/4/21:\par - Further interval growth of the spiculated cavitary RUL nodule, currently measuring 11 mm, compared with 9 mm on 5/3/21 (9:110)\par - Adjacent 7 mm solid nodule is stable (9:112)\par - New solid 5 mm nodule in RLL slightly below lida (9:144)\par - B/l renal cysts. Gallstones. Hepatic cysts\par \par PET/CT on 8/20/21:\par - 1.1 cm RUL cavitary nodule, SUV 1.2 (4:169)\par - 0.5 cm posterior RUL nodule identified on prior study has resolved.\par - B/l maxillary sinus disease with near complete opacification of the right maxillary sinus \par \par I have independently reviewed the patient's medical records and diagnostic images at time of this office consultation and have made the following recommendation:\par 1. Return to office to discuss next steps with PFT, VQ scan and Cardiac clearance.\par \par I personally performed the services described in the documentation, reviewed the documentation recorded by the scribe in my presence and it accurately and completely records my words and actions.\par \par I, WILLY Heaton, am scribing for and in the presence of MADELEINE Allen, the following sections HISTORY OF PRESENT ILLNESS, PAST MEDICAL/FAMILY/SOCIAL HISTORY; REVIEW OF SYSTEMS; VITAL SIGNS; PHYSICAL EXAM; DISPOSITION.\par   \par \par \par  \par

## 2021-09-02 NOTE — CONSULT LETTER
[Dear  ___] : Dear  [unfilled], [Courtesy Letter:] : I had the pleasure of seeing your patient, [unfilled], in my office today. [Please see my note below.] : Please see my note below. [Sincerely,] : Sincerely, [FreeTextEntry2] : Dr. Nam Andrea (Pulm/ref)\par Dr. العراقي (Onc)\par Dr. Mccormick (PCP) \par Dr. Maier (heme/onc)\par Dr. Shane Houston (Cardio)\par  [FreeTextEntry3] : Kamaljit Larsen MD, FACS \par Chief, Division of Thoracic Surgery \par Director, Minimally Invasive Thoracic Surgery \par Department of Cardiovascular and Thoracic Surgery \par [facility] \par , Cardiovascular and Thoracic Surgery \par [facility] [employer] [facility] \par

## 2021-09-02 NOTE — HISTORY OF PRESENT ILLNESS
[FreeTextEntry1] : 70 year old male, s/p EBUS with biopsy which revealed Level 7 +10 lymph node positive for metastatic adenocarcinoma in June 2017. He received neoadjuvant chemo/RT. He is s/p Left VATS, LLL Lobectomy, MLND, hilar node dissection on 11/5/2017, pathology revealed mucinous adenocarcinoma, T2N0Mx.\par \par He was hospitalized for management of SVT/AFib/Aflutter in February 2019 when workup revealed pericardial effusion and is now s/p subxiphoid anterior pericardiectomy, cardiac decortication on 2/25/19. He was started on Eliquis for A.Flutter.\par \par CT chest scan 9/19/19:\par -New 1.0 x 0.6 cm and 0.6 cm nodules in the posterolateral right lung base. \par \par CT chest 8/4/21:\par - Further interval growth of the spiculated cavitary RUL nodule, currently measuring 11 mm, compared with 9 mm on 5/3/21 (9:110)\par - Adjacent 7 mm solid nodule is stable (9:112)\par - New solid 5 mm nodule in RLL slightly below lida (9:144)\par - B/l renal cysts. Gallstones. Hepatic cysts\par \par PET/CT on 8/20/21:\par - 1.1 cm RUL cavitary nodule, SUV 1.2 (4:169)\par - 0.5 cm posterior RUL nodule identified on prior study has resolved.\par - B/l maxillary sinus disease with near complete opacification of the right maxillary sinus \par \par Patient is here today for a follow up, last evaluated  in office 9/2019 w/ recommendation to follow up with Dr. Andrea to discuss biopsy vs surveillance of new lung nodules. Referred back to office by Dr. Maier. Patient reported no follow up with Dr. Andrea. In the interim he underwent ventral hernia repair 12/2020. \par \par \par

## 2021-09-02 NOTE — PHYSICAL EXAM
[Respiration, Rhythm And Depth] : normal respiratory rhythm and effort [Auscultation Breath Sounds / Voice Sounds] : lungs were clear to auscultation bilaterally [Heart Sounds] : normal S1 and S2 [Examination Of The Chest] : the chest was normal in appearance

## 2021-09-08 ENCOUNTER — APPOINTMENT (OUTPATIENT)
Dept: CARDIOLOGY | Facility: CLINIC | Age: 71
End: 2021-09-08
Payer: MEDICARE

## 2021-09-08 ENCOUNTER — NON-APPOINTMENT (OUTPATIENT)
Age: 71
End: 2021-09-08

## 2021-09-08 VITALS
OXYGEN SATURATION: 96 % | BODY MASS INDEX: 27.3 KG/M2 | WEIGHT: 195 LBS | SYSTOLIC BLOOD PRESSURE: 124 MMHG | DIASTOLIC BLOOD PRESSURE: 85 MMHG | HEIGHT: 71 IN | HEART RATE: 72 BPM

## 2021-09-08 DIAGNOSIS — Z01.818 ENCOUNTER FOR OTHER PREPROCEDURAL EXAMINATION: ICD-10-CM

## 2021-09-08 DIAGNOSIS — I31.0: ICD-10-CM

## 2021-09-08 DIAGNOSIS — I25.10 ATHEROSCLEROTIC HEART DISEASE OF NATIVE CORONARY ARTERY W/OUT ANGINA PECTORIS: ICD-10-CM

## 2021-09-08 DIAGNOSIS — I25.84 ATHEROSCLEROTIC HEART DISEASE OF NATIVE CORONARY ARTERY W/OUT ANGINA PECTORIS: ICD-10-CM

## 2021-09-08 PROCEDURE — 93000 ELECTROCARDIOGRAM COMPLETE: CPT

## 2021-09-08 PROCEDURE — 99214 OFFICE O/P EST MOD 30 MIN: CPT

## 2021-09-08 NOTE — PHYSICAL EXAM
[General Appearance - Well Developed] : well developed [Normal Appearance] : normal appearance [Well Groomed] : well groomed [General Appearance - Well Nourished] : well nourished [No Deformities] : no deformities [General Appearance - In No Acute Distress] : no acute distress [Normal Conjunctiva] : the conjunctiva exhibited no abnormalities [Normal Oral Mucosa] : normal oral mucosa [Normal Jugular Venous A Waves Present] : normal jugular venous A waves present [Normal Jugular Venous V Waves Present] : normal jugular venous V waves present [No Jugular Venous Henderson A Waves] : no jugular venous henderson A waves [Respiration, Rhythm And Depth] : normal respiratory rhythm and effort [Auscultation Breath Sounds / Voice Sounds] : lungs were clear to auscultation bilaterally [Exaggerated Use Of Accessory Muscles For Inspiration] : no accessory muscle use [Bowel Sounds] : normal bowel sounds [Abdomen Soft] : soft [Abdomen Tenderness] : non-tender [Abnormal Walk] : normal gait [Gait - Sufficient For Exercise Testing] : the gait was sufficient for exercise testing [Cyanosis, Localized] : no localized cyanosis [Nail Clubbing] : no clubbing of the fingernails [Skin Color & Pigmentation] : normal skin color and pigmentation [] : no rash [Skin Turgor] : normal skin turgor [Oriented To Time, Place, And Person] : oriented to person, place, and time [Impaired Insight] : insight and judgment were intact [No Anxiety] : not feeling anxious [Precordial Heave Left] : a precordial heave was noted at the left lower parasternal line [Normal Rate] : normal [Normal S1] : normal S1 [Normal S2] : normal S2 [No Murmur] : no murmurs heard [1+] : left 1+ [No Abnormalities] : the abdominal aorta was not enlarged and no bruit was heard [___ +] : bilateral [unfilled]U+ pretibial pitting edema [FreeTextEntry1] : Scattered expiratory rhonchi [S3] : no S3 [S4] : no S4 [Right Carotid Bruit] : no bruit heard over the right carotid [Left Carotid Bruit] : no bruit heard over the left carotid [Right Femoral Bruit] : no bruit heard over the right femoral artery [Left Femoral Bruit] : no bruit heard over the left femoral artery

## 2021-09-08 NOTE — DISCUSSION/SUMMARY
[FreeTextEntry1] : Clinically the patient is doing well.  He has good functional status and has no chest pain, shortness of breath, palpitation.  There is no evidence for significant volume overload.\par \par By CAT scan he does have calcification of his coronary arteries and will undergo a regular stress test.  Assuming the stress test is okay, his cardiac status appears stable and he represents a satisfactory candidate for the planned surgical biopsy of the lung nodules, and possible wedge resection.  No special precautions other than routine hemodynamic monitoring should be required.

## 2021-09-08 NOTE — HISTORY OF PRESENT ILLNESS
[FreeTextEntry1] : I saw Nam Gleason in the office today for cardiac Evaluation, in anticipation of surgical biopsy of an increase in growth in the right upper lobe nodule and a new solid nodule at the right base... He is a 70-year-old white male who underwent left lower lobe lobectomy for cancer in 2017. He was pretreated with chemotherapy and radiation  treatment and has done quite well. He had a preop cardiac evaluation consisting of an echo that was normal. He did poorly on the stress test because of the chemotherapy and radiation therapy but for what he could do the stress test was normal. He has no known history of heart disease.\par \par He was a smoker until his surgery. He has hypertension. He denies any hyperlipidemia, diabetes, or family history of heart disease.\par \par On his initial office visit to my office 2019 he exhibited a regular SVT without symptoms.W He was sent to the hospital and was found to have a large pericardial effusion that was treated with catheter drainage. 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 He was placed on Lasix 20 mg once a day. The edema has improved and is no longer on diuretic therapy. He does have chronic kidney disease.\par \par Echocardiogram performed 4/19 demonstrates an ejection fraction of 50%. There was mild MR and AI with mild to moderate TR. There is no significant pulmonary hypertension. No pericardial effusion. \par \par He can walk 4 miles a day without symptoms\par \par The patient wore a 30 day event monitor to detect any arrhythmia. He had an episode of atrial fibrillation on 5/31/19 at 1:16 PM  that spontaneously converted to sinus rhythm.\par \par He restarted Eliquis 6/19 because of episodes of asymptomatic PAF. He lost his coverage for the Eliquis and  he is has been taking low-dose aspirin instead. We have discussed the risk of blood clot since he is a CHADsvasc 2.. He is not smoking and feels well without any specific symptoms.  Clinically he has had no recurrence of arrhythmia\par \par Recent CT scan performed 8/21, demonstrated no recurrence of pericardial or pleural effusion.  He does have calcification of the coronary arteries..\par \par ECG demonstrates sinus rhythm with a right IVCD. There are no acute changes.

## 2021-09-09 ENCOUNTER — OUTPATIENT (OUTPATIENT)
Dept: OUTPATIENT SERVICES | Facility: HOSPITAL | Age: 71
LOS: 1 days | End: 2021-09-09

## 2021-09-09 ENCOUNTER — APPOINTMENT (OUTPATIENT)
Dept: NUCLEAR MEDICINE | Facility: HOSPITAL | Age: 71
End: 2021-09-09
Payer: MEDICARE

## 2021-09-09 DIAGNOSIS — R91.1 SOLITARY PULMONARY NODULE: ICD-10-CM

## 2021-09-09 DIAGNOSIS — Z92.21 PERSONAL HISTORY OF ANTINEOPLASTIC CHEMOTHERAPY: Chronic | ICD-10-CM

## 2021-09-09 DIAGNOSIS — Z90.2 ACQUIRED ABSENCE OF LUNG [PART OF]: Chronic | ICD-10-CM

## 2021-09-09 DIAGNOSIS — Z98.890 OTHER SPECIFIED POSTPROCEDURAL STATES: Chronic | ICD-10-CM

## 2021-09-09 PROCEDURE — 78597 LUNG PERFUSION DIFFERENTIAL: CPT | Mod: 26

## 2021-09-21 ENCOUNTER — APPOINTMENT (OUTPATIENT)
Dept: THORACIC SURGERY | Facility: CLINIC | Age: 71
End: 2021-09-21
Payer: MEDICARE

## 2021-09-21 VITALS
HEIGHT: 71 IN | BODY MASS INDEX: 27.3 KG/M2 | SYSTOLIC BLOOD PRESSURE: 110 MMHG | DIASTOLIC BLOOD PRESSURE: 74 MMHG | OXYGEN SATURATION: 96 % | WEIGHT: 195 LBS | TEMPERATURE: 98.2 F | HEART RATE: 84 BPM | RESPIRATION RATE: 18 BRPM

## 2021-09-21 PROCEDURE — 99214 OFFICE O/P EST MOD 30 MIN: CPT

## 2021-09-21 NOTE — PHYSICAL EXAM
[Respiration, Rhythm And Depth] : normal respiratory rhythm and effort [Exaggerated Use Of Accessory Muscles For Inspiration] : no accessory muscle use [Auscultation Breath Sounds / Voice Sounds] : lungs were clear to auscultation bilaterally [Heart Rate And Rhythm] : heart rate was normal and rhythm regular [Examination Of The Chest] : the chest was normal in appearance [Chest Visual Inspection Thoracic Asymmetry] : no chest asymmetry [Diminished Respiratory Excursion] : normal chest expansion [Breast Appearance] : normal in appearance [Breast Palpation Mass] : no palpable masses [Abnormal Walk] : normal gait [Nail Clubbing] : no clubbing  or cyanosis of the fingernails [Involuntary Movements] : no involuntary movements were seen [Motor Tone] : muscle strength and tone were normal [Musculoskeletal - Swelling] : no joint swelling seen [Skin Color & Pigmentation] : normal skin color and pigmentation [Skin Turgor] : normal skin turgor [] : no rash [Oriented To Time, Place, And Person] : oriented to person, place, and time [Skin Lesions] : no skin lesions

## 2021-09-21 NOTE — HISTORY OF PRESENT ILLNESS
[FreeTextEntry1] : 70 year old male, s/p EBUS , which revealed Level 7 and level 10 lymph nodes positive for metastatic adenocarcinoma (cT1N2; Clinical Stage IIIA). He received neoadjuvant chemo/RT and subsequently underwent Left VATS, LLL Lobectomy, MLND, hilar node dissection on 2017; Pathology revealed mucinous adenocarcinoma, ypT2N0. \par \par He was hospitalized for management of SVT/AFib/Aflutter in 2019 when workup revealed pericardial effusion and is now s/p subxiphoid anterior pericardiectomy, cardiac decortication on 19. He was started on Eliquis for AFlutter.\par \par CT chest scan 19:\par -New 1.0 x 0.6 cm and 0.6 cm nodules in the posterolateral right lung base. \par \par CT chest 21:\par - Further interval growth of the spiculated cavitary RUL nodule, currently measuring 11 mm, compared with 9 mm on 5/3/21 (9:110)\par - Adjacent 7 mm solid nodule is stable (9:112)\par - New solid 5 mm nodule in RLL slightly below lida (9:144)\par - B/l renal cysts. Gallstones. Hepatic cysts\par \par PET/CT on 2021:\par - 1.1 cm RUL cavitary nodule, SUV 1.2 (4:169)\par - 0.5 cm posterior RUL nodule identified on prior study has resolved.\par - B/l maxillary sinus disease with near complete opacification of the right maxillary sinus \par \par PFTs on 2021: (Pre- Bronchodilator) FVC: 3.73 (96%); FEV1: 2.12 (69%); DLCO: 14.11 (54%)\par \par Pulm Quant perfusion on 2021:Right lun% Left lun%\par - Right upper lun% Left upper lun%\par - Right mid lun% Left mid lun%\par - Right lower lun% Left lower lun%\par \par Upon last visit, was recommended to undergo additional testing to evaluate for surgical candidacy. Here today to discuss results. Today, patient denies worsening SOB, chest pain, cough, hemoptysis, fever, chills, night sweats, lightheadedness or dizziness.\par \par \par

## 2021-09-21 NOTE — ASSESSMENT
[FreeTextEntry1] : 70 year old male, s/p EBUS June, 2017, which revealed Level 7 and level 10 lymph nodes positive for metastatic adenocarcinoma (cT1N2; Clinical Stage IIIA). He received neoadjuvant chemo/RT and subsequently underwent Left VATS, LLL Lobectomy, MLND, hilar node dissection on 11/5/2017; Pathology revealed mucinous adenocarcinoma, ypT2N0. \par \par He was hospitalized for management of SVT/AFib/Aflutter in February 2019 when workup revealed pericardial effusion and is now s/p subxiphoid anterior pericardiectomy, cardiac decortication on 2/25/19. He was started on Eliquis for AFlutter.\par \par Now with enlarging, spiculated cavitary RUL nodule; SUV 1.2\par \par I have independently reviewed the medical records and imaging at the time of this office consultation, and discussed the following interpretations and recommendations with the patient:\par - Flex Bronch, Right VATS lung resection with IR marking. Risks, benefits and alternatives explained to patient; All questions answered and patient agrees to proceed with surgery. \par - Cardiac clearance required prior to surgery; Stress test scheduled for 10/1/21. \par \par I personally performed the services described in the documentation, reviewed the documentation recorded by the scribe in my presence and it accurately and completely records my words and actions.\par \par I, Ruchi Hidalgo, ANP-C, am scribing for and the presence of Dr. GODDARD [facility], the following sections HISTORY OF PRESENT ILLNESS, PAST MEDICAL/FAMILY/SOCIAL HISTORY; REVIEW OF SYSTEMS; VITAL SIGNS; PHYSICAL EXAM; DISPOSITION.\par \par \par \par \par  \par

## 2021-09-21 NOTE — CONSULT LETTER
[Dear  ___] : Dear  [unfilled], [Courtesy Letter:] : I had the pleasure of seeing your patient, [unfilled], in my office today. [Please see my note below.] : Please see my note below. [Sincerely,] : Sincerely, [FreeTextEntry2] : Dr. Nam Andrea (Pulm/ref)\par Dr. العراقي (Onc)\par Dr. Mccormick (PCP) \par Dr. Maier (heme/onc)\par Dr. Shane Houston (Cardio)\par  [FreeTextEntry3] : Kamaljit Larsen MD, FACS \par Chief, Division of Thoracic Surgery \par Director, Minimally Invasive Thoracic Surgery \par Department of Cardiovascular and Thoracic Surgery \par [facility] \par , Cardiovascular and Thoracic Surgery \par [employer] [facility] \par

## 2021-09-21 NOTE — DATA REVIEWED
[FreeTextEntry1] : Independently reviewed the following imaging:\par - PET/CT on 8/20/2021\par - PFTs on 9/7/2021\par - Pulm Quant perfusion on 9/7/2021

## 2021-10-01 ENCOUNTER — APPOINTMENT (OUTPATIENT)
Dept: CARDIOLOGY | Facility: CLINIC | Age: 71
End: 2021-10-01
Payer: MEDICARE

## 2021-10-01 PROCEDURE — 93015 CV STRESS TEST SUPVJ I&R: CPT

## 2021-10-04 DIAGNOSIS — R94.39 ABNORMAL RESULT OF OTHER CARDIOVASCULAR FUNCTION STUDY: ICD-10-CM

## 2021-10-05 ENCOUNTER — NON-APPOINTMENT (OUTPATIENT)
Age: 71
End: 2021-10-05

## 2021-10-06 ENCOUNTER — LABORATORY RESULT (OUTPATIENT)
Age: 71
End: 2021-10-06

## 2021-10-06 ENCOUNTER — APPOINTMENT (OUTPATIENT)
Dept: DISASTER EMERGENCY | Facility: CLINIC | Age: 71
End: 2021-10-06

## 2021-10-06 ENCOUNTER — APPOINTMENT (OUTPATIENT)
Dept: CARDIOLOGY | Facility: CLINIC | Age: 71
End: 2021-10-06
Payer: MEDICARE

## 2021-10-06 DIAGNOSIS — R94.39 ABNORMAL RESULT OF OTHER CARDIOVASCULAR FUNCTION STUDY: ICD-10-CM

## 2021-10-06 PROCEDURE — 93015 CV STRESS TEST SUPVJ I&R: CPT | Mod: PD

## 2021-10-06 PROCEDURE — 78452 HT MUSCLE IMAGE SPECT MULT: CPT

## 2021-10-06 PROCEDURE — A9500: CPT | Mod: PD

## 2021-10-08 ENCOUNTER — INPATIENT (INPATIENT)
Facility: HOSPITAL | Age: 71
LOS: 3 days | Discharge: HOME | DRG: 217 | End: 2021-10-12
Attending: INTERNAL MEDICINE | Admitting: INTERNAL MEDICINE
Payer: MEDICARE

## 2021-10-08 VITALS
WEIGHT: 184.97 LBS | SYSTOLIC BLOOD PRESSURE: 143 MMHG | DIASTOLIC BLOOD PRESSURE: 78 MMHG | OXYGEN SATURATION: 97 % | HEIGHT: 71 IN | HEART RATE: 63 BPM | TEMPERATURE: 98 F | RESPIRATION RATE: 12 BRPM

## 2021-10-08 DIAGNOSIS — I48.0 PAROXYSMAL ATRIAL FIBRILLATION: ICD-10-CM

## 2021-10-08 DIAGNOSIS — R94.39 ABNORMAL RESULT OF OTHER CARDIOVASCULAR FUNCTION STUDY: ICD-10-CM

## 2021-10-08 DIAGNOSIS — Z29.9 ENCOUNTER FOR PROPHYLACTIC MEASURES, UNSPECIFIED: ICD-10-CM

## 2021-10-08 DIAGNOSIS — Z98.890 OTHER SPECIFIED POSTPROCEDURAL STATES: Chronic | ICD-10-CM

## 2021-10-08 DIAGNOSIS — R91.8 OTHER NONSPECIFIC ABNORMAL FINDING OF LUNG FIELD: ICD-10-CM

## 2021-10-08 DIAGNOSIS — N18.9 CHRONIC KIDNEY DISEASE, UNSPECIFIED: ICD-10-CM

## 2021-10-08 DIAGNOSIS — Z90.2 ACQUIRED ABSENCE OF LUNG [PART OF]: Chronic | ICD-10-CM

## 2021-10-08 DIAGNOSIS — I10 ESSENTIAL (PRIMARY) HYPERTENSION: ICD-10-CM

## 2021-10-08 DIAGNOSIS — Z92.21 PERSONAL HISTORY OF ANTINEOPLASTIC CHEMOTHERAPY: Chronic | ICD-10-CM

## 2021-10-08 DIAGNOSIS — I25.10 ATHEROSCLEROTIC HEART DISEASE OF NATIVE CORONARY ARTERY WITHOUT ANGINA PECTORIS: ICD-10-CM

## 2021-10-08 LAB
ANION GAP SERPL CALC-SCNC: 14 MMOL/L — SIGNIFICANT CHANGE UP (ref 5–17)
BUN SERPL-MCNC: 23 MG/DL — SIGNIFICANT CHANGE UP (ref 7–23)
CALCIUM SERPL-MCNC: 9 MG/DL — SIGNIFICANT CHANGE UP (ref 8.4–10.5)
CHLORIDE SERPL-SCNC: 102 MMOL/L — SIGNIFICANT CHANGE UP (ref 96–108)
CO2 SERPL-SCNC: 21 MMOL/L — LOW (ref 22–31)
CREAT SERPL-MCNC: 1.53 MG/DL — HIGH (ref 0.5–1.3)
GLUCOSE SERPL-MCNC: 108 MG/DL — HIGH (ref 70–99)
HCT VFR BLD CALC: 49.7 % — SIGNIFICANT CHANGE UP (ref 39–50)
HGB BLD-MCNC: 15.9 G/DL — SIGNIFICANT CHANGE UP (ref 13–17)
MCHC RBC-ENTMCNC: 29.1 PG — SIGNIFICANT CHANGE UP (ref 27–34)
MCHC RBC-ENTMCNC: 32 GM/DL — SIGNIFICANT CHANGE UP (ref 32–36)
MCV RBC AUTO: 90.9 FL — SIGNIFICANT CHANGE UP (ref 80–100)
NRBC # BLD: 0 /100 WBCS — SIGNIFICANT CHANGE UP (ref 0–0)
PLATELET # BLD AUTO: 257 K/UL — SIGNIFICANT CHANGE UP (ref 150–400)
POTASSIUM SERPL-MCNC: 4.8 MMOL/L — SIGNIFICANT CHANGE UP (ref 3.5–5.3)
POTASSIUM SERPL-SCNC: 4.8 MMOL/L — SIGNIFICANT CHANGE UP (ref 3.5–5.3)
RBC # BLD: 5.47 M/UL — SIGNIFICANT CHANGE UP (ref 4.2–5.8)
RBC # FLD: 15.2 % — HIGH (ref 10.3–14.5)
SODIUM SERPL-SCNC: 137 MMOL/L — SIGNIFICANT CHANGE UP (ref 135–145)
WBC # BLD: 8.06 K/UL — SIGNIFICANT CHANGE UP (ref 3.8–10.5)
WBC # FLD AUTO: 8.06 K/UL — SIGNIFICANT CHANGE UP (ref 3.8–10.5)

## 2021-10-08 PROCEDURE — 99222 1ST HOSP IP/OBS MODERATE 55: CPT

## 2021-10-08 PROCEDURE — 93306 TTE W/DOPPLER COMPLETE: CPT | Mod: 26

## 2021-10-08 PROCEDURE — 99152 MOD SED SAME PHYS/QHP 5/>YRS: CPT

## 2021-10-08 PROCEDURE — 93458 L HRT ARTERY/VENTRICLE ANGIO: CPT | Mod: 26

## 2021-10-08 PROCEDURE — 93010 ELECTROCARDIOGRAM REPORT: CPT

## 2021-10-08 RX ORDER — LOSARTAN POTASSIUM 100 MG/1
100 TABLET, FILM COATED ORAL DAILY
Refills: 0 | Status: DISCONTINUED | OUTPATIENT
Start: 2021-10-08 | End: 2021-10-09

## 2021-10-08 RX ORDER — CLOPIDOGREL BISULFATE 75 MG/1
600 TABLET, FILM COATED ORAL ONCE
Refills: 0 | Status: COMPLETED | OUTPATIENT
Start: 2021-10-08 | End: 2021-10-08

## 2021-10-08 RX ORDER — ATORVASTATIN CALCIUM 80 MG/1
40 TABLET, FILM COATED ORAL AT BEDTIME
Refills: 0 | Status: DISCONTINUED | OUTPATIENT
Start: 2021-10-08 | End: 2021-10-12

## 2021-10-08 RX ORDER — METOPROLOL TARTRATE 50 MG
25 TABLET ORAL
Refills: 0 | Status: DISCONTINUED | OUTPATIENT
Start: 2021-10-08 | End: 2021-10-12

## 2021-10-08 RX ORDER — TAMSULOSIN HYDROCHLORIDE 0.4 MG/1
0.4 CAPSULE ORAL AT BEDTIME
Refills: 0 | Status: DISCONTINUED | OUTPATIENT
Start: 2021-10-08 | End: 2021-10-12

## 2021-10-08 RX ORDER — CLOPIDOGREL BISULFATE 75 MG/1
75 TABLET, FILM COATED ORAL DAILY
Refills: 0 | Status: DISCONTINUED | OUTPATIENT
Start: 2021-10-09 | End: 2021-10-12

## 2021-10-08 RX ORDER — ACETAMINOPHEN 500 MG
650 TABLET ORAL EVERY 6 HOURS
Refills: 0 | Status: DISCONTINUED | OUTPATIENT
Start: 2021-10-08 | End: 2021-10-12

## 2021-10-08 RX ORDER — SODIUM CHLORIDE 9 MG/ML
500 INJECTION, SOLUTION INTRAVENOUS
Refills: 0 | Status: DISCONTINUED | OUTPATIENT
Start: 2021-10-08 | End: 2021-10-11

## 2021-10-08 RX ORDER — ASPIRIN/CALCIUM CARB/MAGNESIUM 324 MG
81 TABLET ORAL DAILY
Refills: 0 | Status: DISCONTINUED | OUTPATIENT
Start: 2021-10-08 | End: 2021-10-12

## 2021-10-08 RX ORDER — SODIUM CHLORIDE 9 MG/ML
3 INJECTION INTRAMUSCULAR; INTRAVENOUS; SUBCUTANEOUS EVERY 8 HOURS
Refills: 0 | Status: DISCONTINUED | OUTPATIENT
Start: 2021-10-08 | End: 2021-10-12

## 2021-10-08 RX ADMIN — Medication 25 MILLIGRAM(S): at 17:36

## 2021-10-08 RX ADMIN — TAMSULOSIN HYDROCHLORIDE 0.4 MILLIGRAM(S): 0.4 CAPSULE ORAL at 21:43

## 2021-10-08 RX ADMIN — SODIUM CHLORIDE 3 MILLILITER(S): 9 INJECTION INTRAMUSCULAR; INTRAVENOUS; SUBCUTANEOUS at 14:00

## 2021-10-08 RX ADMIN — LOSARTAN POTASSIUM 100 MILLIGRAM(S): 100 TABLET, FILM COATED ORAL at 17:36

## 2021-10-08 RX ADMIN — SODIUM CHLORIDE 3 MILLILITER(S): 9 INJECTION INTRAMUSCULAR; INTRAVENOUS; SUBCUTANEOUS at 23:07

## 2021-10-08 RX ADMIN — ATORVASTATIN CALCIUM 40 MILLIGRAM(S): 80 TABLET, FILM COATED ORAL at 21:43

## 2021-10-08 RX ADMIN — SODIUM CHLORIDE 250 MILLILITER(S): 9 INJECTION, SOLUTION INTRAVENOUS at 17:41

## 2021-10-08 RX ADMIN — Medication 81 MILLIGRAM(S): at 17:36

## 2021-10-08 RX ADMIN — CLOPIDOGREL BISULFATE 600 MILLIGRAM(S): 75 TABLET, FILM COATED ORAL at 13:22

## 2021-10-08 NOTE — H&P ADULT - PROBLEM SELECTOR PLAN 2
-Based on Cr in our system, patient slightly above his "baseline". Likely underlying CKD.   -Trend Cr.   -Will give 500cc of LR post-cath today.   -Hold ARB prior to cath (would hold starting from Sunday).   -Consider gentle IVF's prior to cath to prevent LEANA. (cautiously with reduced EF).   -C/w home tamsulosin for BPH history.   -F/u urine studies. Consider renal/bladder US.

## 2021-10-08 NOTE — H&P ADULT - NSICDXPASTSURGICALHX_GEN_ALL_CORE_FT
PAST SURGICAL HISTORY:  H/O inguinal hernia repair Bilateral in the 1970's    History of chemotherapy left chest wall infusaport placement    S/P pericardial surgery For an effusion in 2019    Status post lobectomy of lung LLL lobectomy at [facility]

## 2021-10-08 NOTE — H&P ADULT - ASSESSMENT
71 yo male PMH HTN, paroxysmal SVT, pAF (no AC), COPD, lung CA s/p LLL lobectomy and right sided lung nodules presents today for cardiac angiogram. Patient reports right lung nodule detected on annual assessment- requires cardiac clearance prior to surgery. Evaluated by Dr Shane Houston. Had a NST completed on 10/1/21 revealing 1- 1.5 mm horizontal ST segment depression in V3- V6 at peak exercise which returned to baseline at the end of recovery. Patient recommended to have cardiac angiogram for further ischemic evaluation.   -S/p Angiogram showed severe pLAD stenosis and OM  (large territory subtended) EF 24% LVEDP 19mmHg.

## 2021-10-08 NOTE — H&P ADULT - NSHPPHYSICALEXAM_GEN_ALL_CORE
PHYSICAL EXAM:  Vital Signs Last 24 Hrs  T(C): 36.7 (10-08-21 @ 12:50)  T(F): 98 (10-08-21 @ 12:50), Max: 98 (10-08-21 @ 12:50)  HR: 74 (10-08-21 @ 15:50) (63 - 77)  BP: 121/74 (10-08-21 @ 15:20)  BP(mean): 99 (10-08-21 @ 09:32) (99 - 99)  RR: 14 (10-08-21 @ 15:50) (12 - 18)  SpO2: 95% (10-08-21 @ 15:50) (94% - 100%)  Wt(kg): --    10-08 @ 07:01  -  10-08 @ 16:19  --------------------------------------------------------  IN: 0 mL / OUT: 200 mL / NET: -200 mL      Constitutional: NAD  EYES: EOMI  ENT:  Normal Hearing, no tonsillar exudates   Neck: Soft and supple   Respiratory: Breath sounds are clear bilaterally, No wheezing, rales or rhonchi  Cardiovascular: S1 and S2 normal, regular rate and rhythm, no Murmurs, gallops or rubs  Gastrointestinal: Bowel Sounds present, soft, nontender, nondistended, no guarding, no rebound  Extremities: No cyanosis or clubbing or edema; warm to touch. Right wrist cath site intact.   Neurological: A/O x 3, no focal deficits  Musculoskeletal: 5/5 strength b/l upper and lower extremities  Skin: No rashes  Psych: No depression or anhedonia  Heme: No bruises, no nose bleeds

## 2021-10-08 NOTE — H&P CARDIOLOGY - NSICDXPASTSURGICALHX_GEN_ALL_CORE_FT
PAST SURGICAL HISTORY:  H/O inguinal hernia repair Bilateral in the 1970's    History of chemotherapy left chest wall infusaport placement    S/P pericardial surgery For an effusion in 2019    Status post lobectomy of lung LLL lobectomy at [facility]

## 2021-10-08 NOTE — H&P ADULT - PROBLEM SELECTOR PLAN 1
-S/p cardiac cath on 10/8 which showed severe pLAD stenosis and OM  (large territory subtended) EF 24% LVEDP 19mmHg.  -Planned for Impella assisted PCI on Monday.   -C/w plavix (loaded) and ASA.   -Will start with atorvastatin 40mg qhs and uptitrate as tolerated to 80mg qhs.   -C/w ARB and metoprolol.  -Telemetry.   -Echo with Definity pending to eval for thrombus.   -F/u cards recs.  -Routine monitoring of right wrist cath site.

## 2021-10-08 NOTE — H&P ADULT - NSHPADDITIONALINFOADULT_GEN_ALL_CORE
Brodie Lenz MD  Division of [facility] Medicine  Cell: [phone]  Pager: [phone]  Office: [phone]

## 2021-10-08 NOTE — H&P ADULT - HISTORY OF PRESENT ILLNESS
69 yo male PMH HTN, paroxysmal SVT, pAF (no AC), COPD, lung CA s/p LLL lobectomy and right sided lung nodules presents today for cardiac angiogram. Patient reports right lung nodule detected on annual assessment- requires cardiac clearance prior to surgery. Evaluated by Dr Shane Houston. Had a NST completed on 10/1/21 revealing 1- 1.5 mm horizontal ST segment depression in V3- V6 at peak exercise which returned to baseline at the end of recovery. Patient recommended to have cardiac angiogram for further ischemic evaluation.   -Patient denies CP or SOB.   -S/p Angiogram showed severe pLAD stenosis and OM  (large territory subtended) EF 24% LVEDP 19mmHg.

## 2021-10-08 NOTE — PROGRESS NOTE ADULT - ASSESSMENT
Chuyita (Cardiology CHIP/ Fellow)  [phone]    Angiogram showed severe pLAD stenosis and OM  (large territory subtended)  EF 24% LVEDP 19mmHg  D/W referring Cardiology (Dr Houston) and with patient - for PCI under Dr Barrios on Monday    Plan  - admit over weekend  - commence ASA/clopidogrel/statin  - w/h nephrotoxics on Sunday (irbesartan) Chuyita (Cardiology CHIP/ Fellow)  [phone]    Angiogram showed severe pLAD stenosis and OM  (large territory subtended)  EF 24% LVEDP 19mmHg  D/W referring Cardiology (Dr Houston) and with patient - for PCI under Dr Barrios on Monday    Plan  - admit over weekend  - needs echo with Definity contrast before Monday to exclude LV thrombus  - commence ASA/clopidogrel/statin  - w/h nephrotoxics on Sunday (irbesartan)

## 2021-10-08 NOTE — H&P ADULT - NSHPLABSRESULTS_GEN_ALL_CORE
LABS:                        15.9   8.06  )-----------( 257      ( 08 Oct 2021 09:35 )             49.7       10-08    137  |  102  |  23  ----------------------------<  108<H>  4.8   |  21<L>  |  1.53<H>    Ca    9.0      08 Oct 2021 09:35          CAPILLARY BLOOD GLUCOSE    Lactate Trend

## 2021-10-08 NOTE — H&P CARDIOLOGY - HISTORY OF PRESENT ILLNESS
71 yo male PMH  69 yo male PMH HTN, paroxysmal SVT, pAF COPD, lung CA and lung nodule presents today for cardiac angiogram. patient reports  Evaluated by Dr Shane Houston. Had a NST completed on 10/1/21 revealing 1- 1.5 mm horizontal ST segment depression in V3- V6 at peak exercise which returned to baseline at the end of recovery. Patient recommended to have cardiac angiogram for further ischemic evaluation.  71 yo male PMH HTN, paroxysmal SVT, pAF (no AC), COPD, lung CA s/p LL lobectomy and lung nodule presents today for cardiac angiogram. patient reports right lung nodule detected on annual assessment- requires cardiac clearance. Evaluated by Dr Shane Houston. Had a NST completed on 10/1/21 revealing 1- 1.5 mm horizontal ST segment depression in V3- V6 at peak exercise which returned to baseline at the end of recovery. Patient recommended to have cardiac angiogram for further ischemic evaluation.  69 yo male PMH HTN, paroxysmal SVT, pAF (no AC), COPD, lung CA s/p LLL lobectomy and right sided lung nodules presents today for cardiac angiogram. Patient reports right lung nodule detected on annual assessment- requires cardiac clearance prior to surgery. Evaluated by Dr Shane Houston. Had a NST completed on 10/1/21 revealing 1- 1.5 mm horizontal ST segment depression in V3- V6 at peak exercise which returned to baseline at the end of recovery. Patient recommended to have cardiac angiogram for further ischemic evaluation.

## 2021-10-09 LAB
ANION GAP SERPL CALC-SCNC: 12 MMOL/L — SIGNIFICANT CHANGE UP (ref 5–17)
APPEARANCE UR: CLEAR — SIGNIFICANT CHANGE UP
BILIRUB UR-MCNC: NEGATIVE — SIGNIFICANT CHANGE UP
BUN SERPL-MCNC: 20 MG/DL — SIGNIFICANT CHANGE UP (ref 7–23)
CALCIUM SERPL-MCNC: 8.8 MG/DL — SIGNIFICANT CHANGE UP (ref 8.4–10.5)
CHLORIDE SERPL-SCNC: 105 MMOL/L — SIGNIFICANT CHANGE UP (ref 96–108)
CO2 SERPL-SCNC: 19 MMOL/L — LOW (ref 22–31)
COLOR SPEC: SIGNIFICANT CHANGE UP
COVID-19 SPIKE DOMAIN AB INTERP: POSITIVE
COVID-19 SPIKE DOMAIN ANTIBODY RESULT: >250 U/ML — HIGH
CREAT ?TM UR-MCNC: 86 MG/DL — SIGNIFICANT CHANGE UP
CREAT SERPL-MCNC: 1.3 MG/DL — SIGNIFICANT CHANGE UP (ref 0.5–1.3)
DIFF PNL FLD: NEGATIVE — SIGNIFICANT CHANGE UP
GLUCOSE SERPL-MCNC: 104 MG/DL — HIGH (ref 70–99)
GLUCOSE UR QL: NEGATIVE — SIGNIFICANT CHANGE UP
KETONES UR-MCNC: NEGATIVE — SIGNIFICANT CHANGE UP
LEUKOCYTE ESTERASE UR-ACNC: NEGATIVE — SIGNIFICANT CHANGE UP
NITRITE UR-MCNC: NEGATIVE — SIGNIFICANT CHANGE UP
PH UR: 6.5 — SIGNIFICANT CHANGE UP (ref 5–8)
POTASSIUM SERPL-MCNC: 4.3 MMOL/L — SIGNIFICANT CHANGE UP (ref 3.5–5.3)
POTASSIUM SERPL-SCNC: 4.3 MMOL/L — SIGNIFICANT CHANGE UP (ref 3.5–5.3)
PROT UR-MCNC: NEGATIVE — SIGNIFICANT CHANGE UP
SARS-COV-2 IGG+IGM SERPL QL IA: >250 U/ML — HIGH
SARS-COV-2 IGG+IGM SERPL QL IA: POSITIVE
SODIUM SERPL-SCNC: 136 MMOL/L — SIGNIFICANT CHANGE UP (ref 135–145)
SODIUM UR-SCNC: 85 MMOL/L — SIGNIFICANT CHANGE UP
SP GR SPEC: 1.01 — SIGNIFICANT CHANGE UP (ref 1.01–1.02)
UROBILINOGEN FLD QL: NEGATIVE — SIGNIFICANT CHANGE UP

## 2021-10-09 PROCEDURE — 99232 SBSQ HOSP IP/OBS MODERATE 35: CPT

## 2021-10-09 PROCEDURE — 99232 SBSQ HOSP IP/OBS MODERATE 35: CPT | Mod: GC

## 2021-10-09 PROCEDURE — 93010 ELECTROCARDIOGRAM REPORT: CPT

## 2021-10-09 RX ADMIN — SODIUM CHLORIDE 3 MILLILITER(S): 9 INJECTION INTRAMUSCULAR; INTRAVENOUS; SUBCUTANEOUS at 20:00

## 2021-10-09 RX ADMIN — LOSARTAN POTASSIUM 100 MILLIGRAM(S): 100 TABLET, FILM COATED ORAL at 05:58

## 2021-10-09 RX ADMIN — SODIUM CHLORIDE 3 MILLILITER(S): 9 INJECTION INTRAMUSCULAR; INTRAVENOUS; SUBCUTANEOUS at 13:54

## 2021-10-09 RX ADMIN — ATORVASTATIN CALCIUM 40 MILLIGRAM(S): 80 TABLET, FILM COATED ORAL at 21:45

## 2021-10-09 RX ADMIN — SODIUM CHLORIDE 3 MILLILITER(S): 9 INJECTION INTRAMUSCULAR; INTRAVENOUS; SUBCUTANEOUS at 04:58

## 2021-10-09 RX ADMIN — Medication 25 MILLIGRAM(S): at 05:58

## 2021-10-09 RX ADMIN — CLOPIDOGREL BISULFATE 75 MILLIGRAM(S): 75 TABLET, FILM COATED ORAL at 12:41

## 2021-10-09 RX ADMIN — TAMSULOSIN HYDROCHLORIDE 0.4 MILLIGRAM(S): 0.4 CAPSULE ORAL at 21:45

## 2021-10-09 RX ADMIN — Medication 25 MILLIGRAM(S): at 17:20

## 2021-10-09 RX ADMIN — Medication 81 MILLIGRAM(S): at 12:40

## 2021-10-09 NOTE — PROGRESS NOTE ADULT - ASSESSMENT
70 w/ HTN, paroxysmal SVT, pAF (no AC), COPD, lung CA s/p LLL lobectomy and right sided lung nodules presented for angiogram prior to surgery for lung nodule. Found to have severe pLAD stenosis and OM  (large territory subtended), EF 24%, LVEDP 19mmHg. Currently, HDS, euvolemic and asymptomatic.    Recs:  - obtain echo w/ definity to rule out LV thrombus  - con't ASA, plavix, statin, BB and ARB  - PCI on Monday with Dr. Denis Jeong MD  Cardiology Fellow PGY-5  [facility] [place] [facility]    Notes are not final until signed by attending  For all consults and questions:  [email or web address]   Login: [email or web address]

## 2021-10-09 NOTE — PROGRESS NOTE ADULT - ATTENDING COMMENTS
71 y/o man with HTN, paroxysmal SVT, pAF (no AC), COPD, lung CA s/p LLL lobectomy and right sided lung nodules presented for angiogram prior to surgery for lung nodule. Found to have severe pLAD stenosis and OM .  --Plan for staged PCI of OM  next week with Dr. Barrios.  --Check TTE with Definity.  --Monitor on telemetry.  --Will follow.

## 2021-10-10 LAB
ANION GAP SERPL CALC-SCNC: 11 MMOL/L — SIGNIFICANT CHANGE UP (ref 5–17)
BUN SERPL-MCNC: 22 MG/DL — SIGNIFICANT CHANGE UP (ref 7–23)
CALCIUM SERPL-MCNC: 8.9 MG/DL — SIGNIFICANT CHANGE UP (ref 8.4–10.5)
CHLORIDE SERPL-SCNC: 104 MMOL/L — SIGNIFICANT CHANGE UP (ref 96–108)
CO2 SERPL-SCNC: 20 MMOL/L — LOW (ref 22–31)
CREAT SERPL-MCNC: 1.39 MG/DL — HIGH (ref 0.5–1.3)
GLUCOSE SERPL-MCNC: 94 MG/DL — SIGNIFICANT CHANGE UP (ref 70–99)
POTASSIUM SERPL-MCNC: 4.7 MMOL/L — SIGNIFICANT CHANGE UP (ref 3.5–5.3)
POTASSIUM SERPL-SCNC: 4.7 MMOL/L — SIGNIFICANT CHANGE UP (ref 3.5–5.3)
SARS-COV-2 RNA SPEC QL NAA+PROBE: SIGNIFICANT CHANGE UP
SODIUM SERPL-SCNC: 135 MMOL/L — SIGNIFICANT CHANGE UP (ref 135–145)
UUN UR-MCNC: 482 MG/DL — SIGNIFICANT CHANGE UP

## 2021-10-10 PROCEDURE — 99232 SBSQ HOSP IP/OBS MODERATE 35: CPT

## 2021-10-10 RX ORDER — SODIUM CHLORIDE 9 MG/ML
500 INJECTION INTRAMUSCULAR; INTRAVENOUS; SUBCUTANEOUS
Refills: 0 | Status: DISCONTINUED | OUTPATIENT
Start: 2021-10-10 | End: 2021-10-12

## 2021-10-10 RX ADMIN — Medication 25 MILLIGRAM(S): at 06:01

## 2021-10-10 RX ADMIN — CLOPIDOGREL BISULFATE 75 MILLIGRAM(S): 75 TABLET, FILM COATED ORAL at 11:20

## 2021-10-10 RX ADMIN — ATORVASTATIN CALCIUM 40 MILLIGRAM(S): 80 TABLET, FILM COATED ORAL at 21:04

## 2021-10-10 RX ADMIN — Medication 81 MILLIGRAM(S): at 11:20

## 2021-10-10 RX ADMIN — SODIUM CHLORIDE 3 MILLILITER(S): 9 INJECTION INTRAMUSCULAR; INTRAVENOUS; SUBCUTANEOUS at 20:46

## 2021-10-10 RX ADMIN — SODIUM CHLORIDE 3 MILLILITER(S): 9 INJECTION INTRAMUSCULAR; INTRAVENOUS; SUBCUTANEOUS at 06:32

## 2021-10-10 RX ADMIN — Medication 25 MILLIGRAM(S): at 17:10

## 2021-10-10 RX ADMIN — SODIUM CHLORIDE 3 MILLILITER(S): 9 INJECTION INTRAMUSCULAR; INTRAVENOUS; SUBCUTANEOUS at 13:03

## 2021-10-10 RX ADMIN — TAMSULOSIN HYDROCHLORIDE 0.4 MILLIGRAM(S): 0.4 CAPSULE ORAL at 21:04

## 2021-10-10 NOTE — PROVIDER CONTACT NOTE (OTHER) - SITUATION
pt refused BMP ordered, states "I had blood taken this morning", no bloodwork ordered from this morning

## 2021-10-10 NOTE — PROVIDER CONTACT NOTE (OTHER) - ACTION/TREATMENT ORDERED:
RN advised patient that no bloodwork was done this morning, but patient adamant on not getting blood drawn, NP aware

## 2021-10-10 NOTE — PROGRESS NOTE ADULT - ASSESSMENT
69 yo male PMH HTN, paroxysmal SVT, pAF (no AC), COPD, lung CA s/p LLL lobectomy and right sided lung nodules presents today for cardiac angiogram. Patient reports right lung nodule detected on annual assessment- requires cardiac clearance prior to surgery. Evaluated by Dr Shane Houston. Had a NST completed on 10/1/21 revealing 1- 1.5 mm horizontal ST segment depression in V3- V6 at peak exercise which returned to baseline at the end of recovery. Patient recommended to have cardiac angiogram for further ischemic evaluation.   -S/p Angiogram showed severe pLAD stenosis and OM  (large territory subtended) EF 24% LVEDP 19mmHg.

## 2021-10-11 ENCOUNTER — TRANSCRIPTION ENCOUNTER (OUTPATIENT)
Age: 71
End: 2021-10-11

## 2021-10-11 DIAGNOSIS — Z02.9 ENCOUNTER FOR ADMINISTRATIVE EXAMINATIONS, UNSPECIFIED: ICD-10-CM

## 2021-10-11 LAB
ANION GAP SERPL CALC-SCNC: 10 MMOL/L — SIGNIFICANT CHANGE UP (ref 5–17)
BUN SERPL-MCNC: 23 MG/DL — SIGNIFICANT CHANGE UP (ref 7–23)
CALCIUM SERPL-MCNC: 7.7 MG/DL — LOW (ref 8.4–10.5)
CHLORIDE SERPL-SCNC: 109 MMOL/L — HIGH (ref 96–108)
CO2 SERPL-SCNC: 19 MMOL/L — LOW (ref 22–31)
CREAT SERPL-MCNC: 1.25 MG/DL — SIGNIFICANT CHANGE UP (ref 0.5–1.3)
GLUCOSE SERPL-MCNC: 106 MG/DL — HIGH (ref 70–99)
POTASSIUM SERPL-MCNC: 3.7 MMOL/L — SIGNIFICANT CHANGE UP (ref 3.5–5.3)
POTASSIUM SERPL-SCNC: 3.7 MMOL/L — SIGNIFICANT CHANGE UP (ref 3.5–5.3)
SODIUM SERPL-SCNC: 138 MMOL/L — SIGNIFICANT CHANGE UP (ref 135–145)

## 2021-10-11 PROCEDURE — 93010 ELECTROCARDIOGRAM REPORT: CPT

## 2021-10-11 PROCEDURE — 92979 ENDOLUMINL IVUS OCT C EA: CPT | Mod: 26,LC

## 2021-10-11 PROCEDURE — 92929: CPT | Mod: LC

## 2021-10-11 PROCEDURE — 99152 MOD SED SAME PHYS/QHP 5/>YRS: CPT

## 2021-10-11 PROCEDURE — 92978 ENDOLUMINL IVUS OCT C 1ST: CPT | Mod: 26,LD

## 2021-10-11 PROCEDURE — 92928 PRQ TCAT PLMT NTRAC ST 1 LES: CPT | Mod: LC

## 2021-10-11 PROCEDURE — 99232 SBSQ HOSP IP/OBS MODERATE 35: CPT

## 2021-10-11 PROCEDURE — 33990 INSJ PERQ VAD L HRT ARTERIAL: CPT

## 2021-10-11 PROCEDURE — 93308 TTE F-UP OR LMTD: CPT | Mod: 26,77

## 2021-10-11 PROCEDURE — 93308 TTE F-UP OR LMTD: CPT | Mod: 26

## 2021-10-11 PROCEDURE — 93321 DOPPLER ECHO F-UP/LMTD STD: CPT | Mod: 26

## 2021-10-11 PROCEDURE — 93321 DOPPLER ECHO F-UP/LMTD STD: CPT | Mod: 26,77

## 2021-10-11 PROCEDURE — 92941 PRQ TRLML REVSC TOT OCCL AMI: CPT | Mod: LD

## 2021-10-11 RX ORDER — ATORVASTATIN CALCIUM 80 MG/1
1 TABLET, FILM COATED ORAL
Qty: 30 | Refills: 0
Start: 2021-10-11 | End: 2021-11-09

## 2021-10-11 RX ORDER — ASPIRIN/CALCIUM CARB/MAGNESIUM 324 MG
1 TABLET ORAL
Qty: 0 | Refills: 0 | DISCHARGE
Start: 2021-10-11

## 2021-10-11 RX ORDER — ACETAMINOPHEN 500 MG
2 TABLET ORAL
Qty: 0 | Refills: 0 | DISCHARGE
Start: 2021-10-11

## 2021-10-11 RX ORDER — CLOPIDOGREL BISULFATE 75 MG/1
1 TABLET, FILM COATED ORAL
Qty: 90 | Refills: 3
Start: 2021-10-11 | End: 2022-10-05

## 2021-10-11 RX ADMIN — TAMSULOSIN HYDROCHLORIDE 0.4 MILLIGRAM(S): 0.4 CAPSULE ORAL at 21:13

## 2021-10-11 RX ADMIN — Medication 25 MILLIGRAM(S): at 17:25

## 2021-10-11 RX ADMIN — SODIUM CHLORIDE 3 MILLILITER(S): 9 INJECTION INTRAMUSCULAR; INTRAVENOUS; SUBCUTANEOUS at 05:54

## 2021-10-11 RX ADMIN — Medication 25 MILLIGRAM(S): at 05:43

## 2021-10-11 RX ADMIN — CLOPIDOGREL BISULFATE 75 MILLIGRAM(S): 75 TABLET, FILM COATED ORAL at 11:02

## 2021-10-11 RX ADMIN — ATORVASTATIN CALCIUM 40 MILLIGRAM(S): 80 TABLET, FILM COATED ORAL at 21:14

## 2021-10-11 RX ADMIN — SODIUM CHLORIDE 3 MILLILITER(S): 9 INJECTION INTRAMUSCULAR; INTRAVENOUS; SUBCUTANEOUS at 21:14

## 2021-10-11 RX ADMIN — Medication 81 MILLIGRAM(S): at 11:01

## 2021-10-11 RX ADMIN — SODIUM CHLORIDE 50 MILLILITER(S): 9 INJECTION INTRAMUSCULAR; INTRAVENOUS; SUBCUTANEOUS at 00:00

## 2021-10-11 RX ADMIN — SODIUM CHLORIDE 3 MILLILITER(S): 9 INJECTION INTRAMUSCULAR; INTRAVENOUS; SUBCUTANEOUS at 11:02

## 2021-10-11 NOTE — DISCHARGE NOTE PROVIDER - CARE PROVIDER_API CALL
Anirudh Barrios)  Cardiology; Internal Medicine; Interventional Cardiology  [facility]- Dept of Cardiology, [address] [place] [address]  Phone: [phone]  Fax: [phone]  Follow Up Time: 2 weeks   Anirudh Barrios)  Cardiology; Internal Medicine; Interventional Cardiology  [facility]- Dept of Cardiology, [address] [place] [id number]  Phone: [phone]  Fax: [phone]  Follow Up Time: 2 weeks    Shane Houston)  Cardiovascular Disease; Internal Medicine  [address] [place] [address]  Phone: [phone]  Fax: [phone]  Follow Up Time: 2 weeks

## 2021-10-11 NOTE — DISCHARGE NOTE PROVIDER - HOSPITAL COURSE
HPI:  69 yo male PMH HTN, paroxysmal SVT, pAF (no AC), COPD, lung CA s/p LLL lobectomy and right sided lung nodules presents today for cardiac angiogram. Patient reports right lung nodule detected on annual assessment- requires cardiac clearance prior to surgery. Evaluated by Dr Shane Houston. Had a NST completed on 10/1/21 revealing 1- 1.5 mm horizontal ST segment depression in V3- V6 at peak exercise which returned to baseline at the end of recovery. Patient recommended to have cardiac angiogram for further ischemic evaluation.   -Patient denies CP or SOB.   -S/p Angiogram showed severe pLAD stenosis and OM  (large territory subtended) EF 24% LVEDP 19mmHg. (08 Oct 2021 16:10)    10/11/2021  S/P Impella assisted PCI via RRA, 1 ROSA x OM, 1 ROSA x LAD, 1 ROSA x Ramus. Continue ASA/Plavix, patient to follow up with Interventional Cardiology in  clinic 2 weeks post discharge.    HPI:  69 yo male PMH HTN, paroxysmal SVT, pAF (no AC), COPD, lung CA s/p LLL lobectomy and right sided lung nodules presents today for cardiac angiogram. Patient reports right lung nodule detected on annual assessment- requires cardiac clearance prior to surgery. Evaluated by Dr Shane Houston. Had a NST completed on 10/1/21 revealing 1- 1.5 mm horizontal ST segment depression in V3- V6 at peak exercise which returned to baseline at the end of recovery. Patient recommended to have cardiac angiogram for further ischemic evaluation.   -Patient denies CP or SOB.   -S/p Angiogram showed severe pLAD stenosis and OM  (large territory subtended) EF 24% LVEDP 19mmHg.    10/11/2021  S/P Impella assisted PCI via RRA, 1 ROSA x OM, 1 ROSA x LAD, 1 ROSA x Ramus. Continue ASA/Plavix, patient to follow up with Interventional Cardiology in  clinic 2 weeks post discharge.

## 2021-10-11 NOTE — DISCHARGE NOTE PROVIDER - PROVIDER TOKENS
PROVIDER:[TOKEN:[[id number]],FOLLOWUP:[2 weeks]] PROVIDER:[TOKEN:[[id number]],FOLLOWUP:[2 weeks]],PROVIDER:[TOKEN:[[id number]],FOLLOWUP:[2 weeks]]

## 2021-10-11 NOTE — DISCHARGE NOTE PROVIDER - NSDCFUSCHEDAPPT_GEN_ALL_CORE_FT
DELFINO GONZALEZ ; 10/11/2021 ; DELFINO CAMPBELL ; 10/19/2021 ; MICHAEL Rad [address] [place] [address]  DELFINO GONZALEZ ; 10/20/2021 ; MICHAEL Thor Surg [address] [place] [address]  DELFINO GONZALEZ ; 10/20/2021 ; JEREMIAS PreAdmits DELFINO GONZALEZ ; 10/19/2021 ; [address] ; 10/20/2021 ; NPP Thor Surg [address]  DELFINO GONZALEZ ; 10/20/2021 ; JOSEF PreAdmits DELFINO GONZALEZ ; 10/17/2021 ; DELFINO CAMPBELL ; 10/19/2021 ; MICHAEL Rad -[address] [place] [address]  DELFINO GONZALEZ ; 10/20/2021 ; MICHAEL Thor Surg [address] [place] [address]  DELFINO GONZALEZ ; 10/20/2021 ; JEREMIAS PreAdmits

## 2021-10-11 NOTE — DISCHARGE NOTE PROVIDER - NSDCCPCAREPLAN_GEN_ALL_CORE_FT
PRINCIPAL DISCHARGE DIAGNOSIS  Diagnosis: CAD (coronary artery disease), native coronary artery  Assessment and Plan of Treatment: Do not stop your aspirin or Plavix unless instructed to do so by your cardiologist, they help keep your stented arteries open.  No heavy lifting, strenuous activity, bending, straining, or unnecessary stair climbing for 2 weeks. No driving for 2 days. You may shower 24 hours following the procedure but avoid baths/swimming for 1 week. Check your groin site for bleeding and/or swelling daily following procedure and call your doctor immediately if it occurs or if you experience increased pain at the site. Follow up with your cardiologist in 1-2 weeks. You may call [facility] if you have any questions/concerns regarding your procedure [phone].      SECONDARY DISCHARGE DIAGNOSES  Diagnosis: HTN (hypertension)  Assessment and Plan of Treatment: Continue with your blood pressure medications; eat a heart healthy diet with low salt diet; exercise regularly (consult with your physician or cardiologist first); maintain a heart healthy weight; if you smoke - quit (A resource to help you stop smoking is the [facility] [employer] – phone number [phone].); include healthy ways to manage stress. Continue to follow with your primary care physician or cardiologist.    Diagnosis: HLD (hyperlipidemia)  Assessment and Plan of Treatment: Continue with your cholesterol medications. Eat a heart healthy diet that is low in saturated fats and salt, and includes whole grains, fruits, vegetables and lean protein; exercise regularly (consult with your physician or cardiologist first); maintain a heart healthy weight; if you smoke - quit (A resource to help you stop smoking is the [facility] [employer] for Tobacco Control – phone number [phone].). Continue to follow with your primary physician or cardiologist.

## 2021-10-11 NOTE — PROGRESS NOTE ADULT - ASSESSMENT
70M PMH HTN, paroxysmal SVT, pAF (no AC), COPD, lung CA s/p LLL lobectomy and right sided lung nodules presented for cardiac angiogram

## 2021-10-11 NOTE — DISCHARGE NOTE PROVIDER - NSDCMRMEDTOKEN_GEN_ALL_CORE_FT
Flomax 0.4 mg oral capsule: 1 cap(s) orally once a day  irbesartan 300 mg oral tablet: 1 tab(s) orally once a day  metoprolol tartrate 25 mg oral tablet: 1 tab(s) orally 2 times a day   acetaminophen 325 mg oral tablet: 2 tab(s) orally every 6 hours, As needed, Mild Pain (1 - 3), Moderate Pain (4 - 6)  aspirin 81 mg oral tablet, chewable: 1 tab(s) orally once a day  atorvastatin 40 mg oral tablet: 1 tab(s) orally once a day (at bedtime)  clopidogrel 75 mg oral tablet: 1 tab(s) orally once a day  Flomax 0.4 mg oral capsule: 1 cap(s) orally once a day  irbesartan 300 mg oral tablet: 1 tab(s) orally once a day  metoprolol tartrate 25 mg oral tablet: 1 tab(s) orally 2 times a day   acetaminophen 325 mg oral tablet: 2 tab(s) orally every 6 hours, As needed, Mild Pain (1 - 3), Moderate Pain (4 - 6)  aspirin 81 mg oral tablet, chewable: 1 tab(s) orally once a day  atorvastatin 40 mg oral tablet: 1 tab(s) orally once a day (at bedtime)  clopidogrel 75 mg oral tablet: 1 tab(s) orally once a day  Flomax 0.4 mg oral capsule: 1 cap(s) orally once a day  irbesartan 300 mg oral tablet: 1 tab(s) orally once a day RESUME WED 10/13  metoprolol tartrate 25 mg oral tablet: 1 tab(s) orally 2 times a day

## 2021-10-11 NOTE — PROGRESS NOTE ADULT - ASSESSMENT
70 w/ HTN, paroxysmal SVT, pAF (no AC), COPD, lung CA s/p LLL lobectomy and right sided lung nodules presented for angiogram prior to surgery for lung nodule. Found to have severe pLAD stenosis and OM  (large territory subtended), EF 24%, LVEDP 19mmHg. Currently, HDS, euvolemic and asymptomatic.    Recs:  - obtain echo w/ definity to rule out LV thrombus given anterior WMA  - con't ASA, plavix, statin, BB   - consider losartan 25 daily once RASHAWN resolves  - PCI today with Dr. Barrios

## 2021-10-11 NOTE — DISCHARGE NOTE PROVIDER - CARE PROVIDERS DIRECT ADDRESSES
[email or web address]

## 2021-10-11 NOTE — DISCHARGE NOTE PROVIDER - NSDCCPTREATMENT_GEN_ALL_CORE_FT
PRINCIPAL PROCEDURE  Procedure: Coronary angiography in cardiac catheterization laboratory  Findings and Treatment: s/p 1DES x LAD, 1DES x OM, 1DES x Ramus via RRA.       PRINCIPAL PROCEDURE  Procedure: Placement of coronary artery stent  Findings and Treatment: stents to the LAD, OM and ramus

## 2021-10-12 ENCOUNTER — TRANSCRIPTION ENCOUNTER (OUTPATIENT)
Age: 71
End: 2021-10-12

## 2021-10-12 VITALS
SYSTOLIC BLOOD PRESSURE: 110 MMHG | HEART RATE: 70 BPM | TEMPERATURE: 98 F | OXYGEN SATURATION: 99 % | RESPIRATION RATE: 17 BRPM | DIASTOLIC BLOOD PRESSURE: 70 MMHG

## 2021-10-12 PROCEDURE — 99239 HOSP IP/OBS DSCHRG MGMT >30: CPT

## 2021-10-12 RX ORDER — IRBESARTAN 75 MG/1
1 TABLET ORAL
Qty: 0 | Refills: 0 | DISCHARGE

## 2021-10-12 RX ORDER — POTASSIUM CHLORIDE 20 MEQ
20 PACKET (EA) ORAL ONCE
Refills: 0 | Status: COMPLETED | OUTPATIENT
Start: 2021-10-12 | End: 2021-10-12

## 2021-10-12 RX ADMIN — Medication 20 MILLIEQUIVALENT(S): at 06:51

## 2021-10-12 RX ADMIN — CLOPIDOGREL BISULFATE 75 MILLIGRAM(S): 75 TABLET, FILM COATED ORAL at 05:43

## 2021-10-12 RX ADMIN — Medication 25 MILLIGRAM(S): at 05:43

## 2021-10-12 RX ADMIN — Medication 81 MILLIGRAM(S): at 05:43

## 2021-10-12 RX ADMIN — SODIUM CHLORIDE 3 MILLILITER(S): 9 INJECTION INTRAMUSCULAR; INTRAVENOUS; SUBCUTANEOUS at 08:15

## 2021-10-12 NOTE — PROGRESS NOTE ADULT - PROBLEM SELECTOR PLAN 5
-Reportedly has refused AC.  -C/w metoprolol.
-Reportedly has refused AC.  -C/w metoprolol.
Reportedly has refused AC  Continue metoprolol
Reportedly has refused AC  Continue metoprolol

## 2021-10-12 NOTE — PROGRESS NOTE ADULT - PROBLEM SELECTOR PLAN 6
D/C home in am
-SCD's for DVT PPx.    7. Discussed plan with ANDRAE Durán.
-SCD's for DVT PPx.    7. Discussed plan with ANDRAE Durán.
D/C home today pending cardiology f/u- patient had 7 beats WCT overnight- possible reperfusion arrhythmia?  40 minutes spent discharging the patient

## 2021-10-12 NOTE — PROGRESS NOTE ADULT - ASSESSMENT
70 w/ HTN, paroxysmal SVT, pAF (no AC), COPD, lung CA s/p LLL lobectomy and right sided lung nodules presented for angiogram prior to surgery for lung nodule. Found to have severe pLAD stenosis and OM  (large territory subtended), EF 24%, LVEDP 19mmHg. Currently, HDS, euvolemic and asymptomatic.    Recs:  - s/p impella assisted PCI yesterday, with ROSA in LAD, OM, Ramus via RFA, access site looks C/D/I this am with palpable distal pulses  - con't ASA, plavix, statin, BB   - consider losartan 25 daily once RASHAWN resolves  - follow up with outpatient cardiology within 1 week of discharge

## 2021-10-12 NOTE — PROGRESS NOTE ADULT - PROBLEM SELECTOR PLAN 1
S/p Impella assisted PCI-ROSA x1 LAD, ROSA x1 OM, ROSA x1 Ramus via RFA   Continue ASA/Plavix.   Continue atorvastatin, ARB and metoprolol.
S/p Impella assisted PCI-ROSA x1 LAD, ROSA x1 OM, ROSA x1 Ramus via RFA   Continue ASA/Plavix.   Continue atorvastatin, ARB and metoprolol.
-S/p cardiac cath on 10/8 which showed severe pLAD stenosis and OM  (large territory subtended) EF 24% LVEDP 19mmHg.  -Planned for Impella assisted PCI on Monday.   -C/w plavix (loaded) and ASA.   -C/w atorvastatin 40mg qhs and uptitrate as tolerated to 80mg qhs.   -C/w ARB and metoprolol.  -Telemetry.   -Echo with Definity pending to eval for thrombus - no thrombus seen, EF 30-35%.   -F/u cards recs.  -Routine monitoring of right wrist cath site. -stable.
-S/p cardiac cath on 10/8 which showed severe pLAD stenosis and OM  (large territory subtended) EF 24% LVEDP 19mmHg.  -Planned for Impella assisted PCI on Monday.   -C/w plavix (loaded) and ASA.   -C/w atorvastatin 40mg qhs and uptitrate as tolerated to 80mg qhs.   -C/w ARB and metoprolol.  -Telemetry.   -Echo with Definity pending to eval for thrombus - no thrombus seen, EF 30-35%.   -F/u cards recs.  -Routine monitoring of right wrist cath site. -stable.

## 2021-10-12 NOTE — PROGRESS NOTE ADULT - PROBLEM SELECTOR PLAN 3
ARB and metoprolol.  DASH diet.
-C/w ARB and metoprolol.  -DASH diet.
-C/w ARB and metoprolol.  -DASH diet.
ARB and metoprolol.  DASH diet.

## 2021-10-12 NOTE — PROGRESS NOTE ADULT - PROBLEM SELECTOR PLAN 2
Stable  ARB held prior to cath
-Based on Cr in our system, patient was slightly above his "baseline" on admission. Likely underlying CKD.   -Trend Cr.   -s/p 500cc of LR post-cath 10/8.  -Hold ARB prior to cath (holding from Sunday).  -Will give gentle IVF's prior to cath to prevent LEANA. (cautiously with reduced EF). -will start NS at 50cc/hr x 10 hours from midnight tonight.   -C/w home tamsulosin for BPH history.   -F/u urine studies. Consider renal/bladder US.
Stable  ARB held prior to cath
-Based on Cr in our system, patient slightly above his "baseline". Likely underlying CKD.   -Trend Cr.   -s/p 500cc of LR post-cath 10/8.  -Hold ARB prior to cath (would hold starting from Sunday). - will dc now.   -Consider gentle IVF's prior to cath to prevent LEANA. (cautiously with reduced EF).   -C/w home tamsulosin for BPH history.   -F/u urine studies. Consider renal/bladder US.

## 2021-10-12 NOTE — PROGRESS NOTE ADULT - SUBJECTIVE AND OBJECTIVE BOX
Patient seen and examined at bedside.    Overnight Events: JUAN MIGUELEON    Review Of Systems: No chest pain, shortness of breath, or palpitations            Current Meds:  acetaminophen   Tablet .. 650 milliGRAM(s) Oral every 6 hours PRN  aspirin  chewable 81 milliGRAM(s) Oral daily  atorvastatin 40 milliGRAM(s) Oral at bedtime  clopidogrel Tablet 75 milliGRAM(s) Oral daily  metoprolol tartrate 25 milliGRAM(s) Oral two times a day  sodium chloride 0.9% lock flush 3 milliLiter(s) IV Push every 8 hours  sodium chloride 0.9%. 500 milliLiter(s) IV Continuous <Continuous>  tamsulosin 0.4 milliGRAM(s) Oral at bedtime      Vitals:  T(F): 97.6 (10-12), Max: 98 (10-11)  HR: 70 (10-12) (64 - 79)  BP: 110/70 (10-12) (99/68 - 138/90)  RR: 17 (10-12)  SpO2: 99% (10-12)  I&O's Summary    11 Oct 2021 07:01  -  12 Oct 2021 07:00  --------------------------------------------------------  IN: 480 mL / OUT: 500 mL / NET: -20 mL    12 Oct 2021 07:01  -  12 Oct 2021 12:21  --------------------------------------------------------  IN: 120 mL / OUT: 0 mL / NET: 120 mL        Physical Exam:  Appearance: No acute distress; well appearing  Eyes: PERRL, EOMI, pink conjunctiva  HENT: Normal oral mucosa  Cardiovascular: RRR, S1, S2, no murmurs, rubs, or gallops; no edema; no JVD  Respiratory: Clear to auscultation bilaterally  Gastrointestinal: soft, non-tender, non-distended with normal bowel sounds  Musculoskeletal: No clubbing; no joint deformity   Neurologic: Non-focal  Lymphatic: No lymphadenopathy  Psychiatry: AAOx3, mood & affect appropriate  Skin: No rashes, ecchymoses, or cyanosis      10-11    138  |  109<H>  |  23  ----------------------------<  106<H>  3.7   |  19<L>  |  1.25    Ca    7.7<L>      11 Oct 2021 06:12          New ECG(s): Personally reviewed  < from: TTE with Doppler (w/Cont) (10.08.21 @ 15:09) >  Observations:  MitralValve: Normal mitral valve. Minimal mitral  regurgitation.  Aortic Valve/Aorta: Calcified trileaflet aortic valve with  normal opening. . Minimal aortic regurgitation.  Normal aortic root size.  Left Atrium: Normal left atrium.  Left Ventricle: Endocardial visualization enhanced with  intravenous injection of Ultrasonic Enhancing Agent  (Definity).  Moderate left ventricular enlargement. Estiamted ejection  fraction 30-35%.  Akinesis of the anterior, inferolateral and anterolateral  walls.  Left ventricular filling pressure is normal.  Right Heart: Normal right atrium. Normal right ventricular  size and function.  Normal tricuspid valve. Mild tricuspid regurgitation.  Normal pulmonic valve. Minimal pulmonic regurgitation.  Pericardium/Pleura:Normal pericardium with no pericardial  effusion.  Hemodynamic: No evidence of pulmonary hyeprtension.    < end of copied text >  
Patient seen and examined at bedside.    Overnight Events: no events, issues or complaints    Review of Systems:  REVIEW OF SYSTEMS:  CONSTITUTIONAL: No weakness, fevers or chills  EYES/ENT: No visual changes;  No dysphagia  NECK: No pain or stiffness  RESPIRATORY: No cough, wheezing, hemoptysis; No shortness of breath  CARDIOVASCULAR: No chest pain or palpitations; No lower extremity edema  GASTROINTESTINAL: No abdominal or epigastric pain. No nausea, vomiting, or hematemesis; No diarrhea or constipation. No melena or hematochezia.  BACK: No back pain  GENITOURINARY: No dysuria, frequency or hematuria  NEUROLOGICAL: No numbness or weakness  SKIN: No itching, burning, rashes, or lesions   All other review of systems is negative unless indicated above.    [x ] All other systems negative  [ ] Unable to assess ROS due to    Current Meds:  acetaminophen   Tablet .. 650 milliGRAM(s) Oral every 6 hours PRN  aspirin  chewable 81 milliGRAM(s) Oral daily  atorvastatin 40 milliGRAM(s) Oral at bedtime  clopidogrel Tablet 75 milliGRAM(s) Oral daily  lactated ringers. 500 milliLiter(s) IV Continuous <Continuous>  losartan 100 milliGRAM(s) Oral daily  metoprolol tartrate 25 milliGRAM(s) Oral two times a day  sodium chloride 0.9% lock flush 3 milliLiter(s) IV Push every 8 hours  tamsulosin 0.4 milliGRAM(s) Oral at bedtime      PAST MEDICAL & SURGICAL HISTORY:  HTN (hypertension)    Smoking history  Quit 2017    Lung cancer  s/p LLL Lobectomy, radiation, chemotherapy    GERD (gastroesophageal reflux disease)    Atrial flutter  10/2017 off Eliquis (MD aware)    Other and unspecified ventral hernia with obstruction, without gangrene    H/O inguinal hernia repair  Bilateral in the 1970&#x27;s    History of chemotherapy  left chest wall infusaport placement    Status post lobectomy of lung  LLL lobectomy at [facility]    S/P pericardial surgery  For an effusion in 2019        Vitals:  T(F): 97.7 (10-09), Max: 98 (10-08)  HR: 71 (10-09) (60 - 80)  BP: 127/71 (10-09) (105/76 - 143/78)  RR: 18 (10-09)  SpO2: 97% (10-09)  I&O's Summary    08 Oct 2021 07:01  -  09 Oct 2021 07:00  --------------------------------------------------------  IN: 740 mL / OUT: 800 mL / NET: -60 mL        Physical Exam:  Appearance: No acute distress; well appearing  Eyes: PERRL, EOMI, pink conjunctiva  HENT: Normal oral mucosa  Cardiovascular: RRR, S1, S2, no murmurs, rubs, or gallops; no edema; no JVD  Respiratory: Clear to auscultation bilaterally  Gastrointestinal: soft, non-tender, non-distended with normal bowel sounds  Musculoskeletal: No clubbing; no joint deformity   Neurologic: Non-focal  Lymphatic: No lymphadenopathy  Psychiatry: AAOx3, mood & affect appropriate  Skin: No rashes, ecchymoses, or cyanosis                          15.9   8.06  )-----------( 257      ( 08 Oct 2021 09:35 )             49.7     10-08    137  |  102  |  23  ----------------------------<  108<H>  4.8   |  21<L>  |  1.53<H>    Ca    9.0      08 Oct 2021 09:35        < from: TTE with Doppler (w/Cont) (10.08.21 @ 15:09) >  Observations:  MitralValve: Normal mitral valve. Minimal mitral  regurgitation.  Aortic Valve/Aorta: Calcified trileaflet aortic valve with  normal opening. . Minimal aortic regurgitation.  Normal aortic root size.  Left Atrium: Normal left atrium.  Left Ventricle: Endocardial visualization enhanced with  intravenous injection of Ultrasonic Enhancing Agent  (Definity).  Moderate left ventricular enlargement. Estiamted ejection  fraction 30-35%.  Akinesis of the anterior, inferolateral and anterolateral  walls.  Left ventricular filling pressure is normal.  Right Heart: Normal right atrium. Normal right ventricular  size and function.  Normal tricuspid valve. Mild tricuspid regurgitation.  Normal pulmonic valve. Minimal pulmonic regurgitation.  Pericardium/Pleura:Normal pericardium with no pericardial  effusion.  Hemodynamic: No evidence of pulmonary hyeprtension.  ------------------------------------------------------------------------  Conclusions:  Endocardial visualization enhanced with intravenous  injection of Ultrasonic Enhancing Agent (Definity).  Estiamted ejection fraction 30-35%, regional abnormalities  as described.  ------------------------------------------------------------------------  Confirmed on  10/8/2021 - 17:32:51 by Molina Bradley MD, FASE  ------------------------------------------------------------------------    < end of copied text >                
Patient seen and examined at bedside.    Overnight Events: NAEON    Review Of Systems: No chest pain, shortness of breath, or palpitations            Current Meds:  acetaminophen   Tablet .. 650 milliGRAM(s) Oral every 6 hours PRN  aspirin  chewable 81 milliGRAM(s) Oral daily  atorvastatin 40 milliGRAM(s) Oral at bedtime  clopidogrel Tablet 75 milliGRAM(s) Oral daily  metoprolol tartrate 25 milliGRAM(s) Oral two times a day  sodium chloride 0.9% lock flush 3 milliLiter(s) IV Push every 8 hours  sodium chloride 0.9%. 500 milliLiter(s) IV Continuous <Continuous>  tamsulosin 0.4 milliGRAM(s) Oral at bedtime      Vitals:  T(F): 98.2 (10-11), Max: 98.2 (10-11)  HR: 65 (10-11) (63 - 88)  BP: 120/82 (10-11) (112/93 - 130/88)  RR: 16 (10-11)  SpO2: 96% (10-11)  I&O's Summary    10 Oct 2021 07:01  -  11 Oct 2021 07:00  --------------------------------------------------------  IN: 700 mL / OUT: 300 mL / NET: 400 mL        Physical Exam:  Appearance: No acute distress; well appearing  Eyes: PERRL, EOMI, pink conjunctiva  HENT: Normal oral mucosa  Cardiovascular: RRR, S1, S2, no murmurs, rubs, or gallops; no edema; no JVD  Respiratory: Clear to auscultation bilaterally  Gastrointestinal: soft, non-tender, non-distended with normal bowel sounds  Musculoskeletal: No clubbing; no joint deformity   Neurologic: Non-focal  Lymphatic: No lymphadenopathy  Psychiatry: AAOx3, mood & affect appropriate  Skin: No rashes, ecchymoses, or cyanosis      10-11    138  |  109<H>  |  23  ----------------------------<  106<H>  3.7   |  19<L>  |  1.25    Ca    7.7<L>      11 Oct 2021 06:12                    New ECG(s): Personally reviewed    Echo:    Stress Testing:     Cath:    Imaging:    Interpretation of Telemetry:  
Patient is a 70y old  Male who presents with a chief complaint of S/p cardiac cath with CAD (10 Oct 2021 11:26)        SUBJECTIVE / OVERNIGHT EVENTS: patient denies any cp or sob or any complaints.       MEDICATIONS  (STANDING):  aspirin  chewable 81 milliGRAM(s) Oral daily  atorvastatin 40 milliGRAM(s) Oral at bedtime  clopidogrel Tablet 75 milliGRAM(s) Oral daily  lactated ringers. 500 milliLiter(s) (250 mL/Hr) IV Continuous <Continuous>  metoprolol tartrate 25 milliGRAM(s) Oral two times a day  sodium chloride 0.9% lock flush 3 milliLiter(s) IV Push every 8 hours  sodium chloride 0.9%. 500 milliLiter(s) (50 mL/Hr) IV Continuous <Continuous>  tamsulosin 0.4 milliGRAM(s) Oral at bedtime    MEDICATIONS  (PRN):  acetaminophen   Tablet .. 650 milliGRAM(s) Oral every 6 hours PRN Mild Pain (1 - 3), Moderate Pain (4 - 6)      Vital Signs Last 24 Hrs  T(C): 36.4 (10 Oct 2021 12:00), Max: 36.5 (10 Oct 2021 05:08)  T(F): 97.6 (10 Oct 2021 12:00), Max: 97.7 (10 Oct 2021 05:08)  HR: 74 (10 Oct 2021 17:11) (64 - 74)  BP: 120/78 (10 Oct 2021 17:11) (113/73 - 120/78)  BP(mean): --  RR: 17 (10 Oct 2021 12:00) (17 - 18)  SpO2: 98% (10 Oct 2021 12:00) (96% - 98%)  CAPILLARY BLOOD GLUCOSE        I&O's Summary    09 Oct 2021 07:01  -  10 Oct 2021 07:00  --------------------------------------------------------  IN: 600 mL / OUT: 540 mL / NET: 60 mL    10 Oct 2021 07:01  -  10 Oct 2021 17:29  --------------------------------------------------------  IN: 240 mL / OUT: 0 mL / NET: 240 mL          PHYSICAL EXAM:   GENERAL: NAD, well-developed  HEAD:  Atraumatic, Normocephalic  EYES:   conjunctiva and sclera clear  NECK: Supple,    CHEST/LUNG: Clear to auscultation bilaterally; No wheeze  HEART: S1S2 normal. Regular rate and rhythm; No murmurs, rubs, or gallops  ABDOMEN: Soft, Nontender, Nondistended; Bowel sounds present  EXTREMITIES:    No clubbing, cyanosis, or edema  PSYCH/Neuro: AAOx3. Non-focal.   SKIN: No rashes or lesions      LABS:    10-10    135  |  104  |  22  ----------------------------<  94  4.7   |  20<L>  |  1.39<H>    Ca    8.9      10 Oct 2021 16:09            Urinalysis Basic - ( 09 Oct 2021 18:42 )    Color: Light Yellow / Appearance: Clear / S.012 / pH: x  Gluc: x / Ketone: Negative  / Bili: Negative / Urobili: Negative   Blood: x / Protein: Negative / Nitrite: Negative   Leuk Esterase: Negative / RBC: x / WBC x   Sq Epi: x / Non Sq Epi: x / Bacteria: x          RADIOLOGY & ADDITIONAL TESTS:    Imaging Personally Reviewed:  Consultant(s) Notes Reviewed:    Care Discussed with Consultants/Other Providers:  
Patient is a 70y old  Male who presents with a chief complaint of S/p cardiac cath with CAD (09 Oct 2021 09:46)        SUBJECTIVE / OVERNIGHT EVENTS: patient denies any cp or sob. right wrist ok.       MEDICATIONS  (STANDING):  aspirin  chewable 81 milliGRAM(s) Oral daily  atorvastatin 40 milliGRAM(s) Oral at bedtime  clopidogrel Tablet 75 milliGRAM(s) Oral daily  lactated ringers. 500 milliLiter(s) (250 mL/Hr) IV Continuous <Continuous>  metoprolol tartrate 25 milliGRAM(s) Oral two times a day  sodium chloride 0.9% lock flush 3 milliLiter(s) IV Push every 8 hours  tamsulosin 0.4 milliGRAM(s) Oral at bedtime    MEDICATIONS  (PRN):  acetaminophen   Tablet .. 650 milliGRAM(s) Oral every 6 hours PRN Mild Pain (1 - 3), Moderate Pain (4 - 6)      Vital Signs Last 24 Hrs  T(C): 36.4 (09 Oct 2021 12:29), Max: 36.5 (09 Oct 2021 05:20)  T(F): 97.6 (09 Oct 2021 12:29), Max: 97.7 (09 Oct 2021 05:20)  HR: 89 (09 Oct 2021 12:29) (60 - 89)  BP: 111/67 (09 Oct 2021 12:29) (111/67 - 127/71)  BP(mean): --  RR: 16 (09 Oct 2021 12:29) (16 - 18)  SpO2: 99% (09 Oct 2021 12:29) (97% - 99%)  CAPILLARY BLOOD GLUCOSE        I&O's Summary    08 Oct 2021 07:01  -  09 Oct 2021 07:00  --------------------------------------------------------  IN: 740 mL / OUT: 800 mL / NET: -60 mL    09 Oct 2021 07:01  -  09 Oct 2021 16:35  --------------------------------------------------------  IN: 360 mL / OUT: 300 mL / NET: 60 mL          PHYSICAL EXAM:   GENERAL: NAD, well-developed  HEAD:  Atraumatic, Normocephalic  EYES:   conjunctiva and sclera clear  NECK: Supple,    CHEST/LUNG: Clear to auscultation bilaterally; No wheeze  HEART: S1S2 normal. Regular rate and rhythm; No murmurs, rubs, or gallops  ABDOMEN: Soft, Nontender, Nondistended; Bowel sounds present  EXTREMITIES:   No clubbing, cyanosis, or edema. right wrist cath site ok.   PSYCH/Neuro: AAOx3. Non-focal.   SKIN: No rashes or lesions      LABS:                        15.9   8.06  )-----------( 257      ( 08 Oct 2021 09:35 )             49.7     10-09    136  |  105  |  20  ----------------------------<  104<H>  4.3   |  19<L>  |  1.30    Ca    8.8      09 Oct 2021 07:33      e< from: TTE with Doppler (w/Cont) (10.08.21 @ 15:09) >  Conclusions:  Endocardial visualization enhanced with intravenous  injection of Ultrasonic Enhancing Agent (Definity).  Estiamted ejection fraction 30-35%, regional abnormalities  as described.    < end of copied text >        telemetry reviewed: sinus 50-60's.     RADIOLOGY & ADDITIONAL TESTS:    Imaging Personally Reviewed: echo report.   Consultant(s) Notes Reviewed:  cards  Care Discussed with Consultants/Other Providers:  
Terrance Whitlock MD  Pager [phone]  Spectra [phone]  Cell Phone [phone]    Patient is a 70y old  Male who presents with a chief complaint of S/p cardiac cath with CAD (11 Oct 2021 15:47)        SUBJECTIVE / OVERNIGHT EVENTS: Patient doing well. No complaints  TELEMETRY: SR 70's, 7 bts WCT       MEDICATIONS  (STANDING):  aspirin  chewable 81 milliGRAM(s) Oral daily  atorvastatin 40 milliGRAM(s) Oral at bedtime  clopidogrel Tablet 75 milliGRAM(s) Oral daily  metoprolol tartrate 25 milliGRAM(s) Oral two times a day  sodium chloride 0.9% lock flush 3 milliLiter(s) IV Push every 8 hours  sodium chloride 0.9%. 500 milliLiter(s) (50 mL/Hr) IV Continuous <Continuous>  tamsulosin 0.4 milliGRAM(s) Oral at bedtime    MEDICATIONS  (PRN):  acetaminophen   Tablet .. 650 milliGRAM(s) Oral every 6 hours PRN Mild Pain (1 - 3), Moderate Pain (4 - 6)      Vital Signs Last 24 Hrs  T(C): 36.6 (12 Oct 2021 08:55), Max: 36.8 (11 Oct 2021 11:00)  T(F): 97.9 (12 Oct 2021 08:55), Max: 98.2 (11 Oct 2021 11:00)  HR: 71 (12 Oct 2021 08:55) (64 - 88)  BP: 102/68 (12 Oct 2021 08:55) (99/68 - 142/84)  BP(mean): 79 (12 Oct 2021 08:55) (77 - 119)  RR: 17 (12 Oct 2021 08:55) (16 - 17)  SpO2: 97% (12 Oct 2021 08:55) (94% - 100%)  CAPILLARY BLOOD GLUCOSE        I&O's Summary    11 Oct 2021 07:01  -  12 Oct 2021 07:00  --------------------------------------------------------  IN: 480 mL / OUT: 500 mL / NET: -20 mL    12 Oct 2021 07:01  -  12 Oct 2021 10:23  --------------------------------------------------------  IN: 120 mL / OUT: 0 mL / NET: 120 mL          PHYSICAL EXAM  GENERAL: NAD, well-developed  HEAD:  Atraumatic, normocephalic  EYES: EOMI, PERRLA, conjunctiva and sclera clear  CHEST/LUNG: Clear to auscultation bilaterally; no wheezes  HEART: +S1+S2, regular rate and rhythm  ABDOMEN: Soft, nontender, nondistended; bowel sounds present  GROIN: no hematoma  EXTREMITIES:  2+ peripheral pulses; no clubbing, cyanosis, or edema  PSYCH: AAOx3      LABS:    10-11    138  |  109<H>  |  23  ----------------------------<  106<H>  3.7   |  19<L>  |  1.25    Ca    7.7<L>      11 Oct 2021 06:12                  RADIOLOGY & ADDITIONAL TESTS:    Imaging Personally Reviewed:  Consultant(s) Notes Reviewed:    Care Discussed with Consultants/Other Providers:  
Terrance Whitlock MD  Pager [phone]  Spectra [phone]  Cell Phone [phone]    Patient is a 70y old  Male who presents with a chief complaint of S/p cardiac cath with CAD (11 Oct 2021 14:25)        SUBJECTIVE / OVERNIGHT EVENTS: Seen in [facility] s/p PCI. No complaints      MEDICATIONS  (STANDING):  aspirin  chewable 81 milliGRAM(s) Oral daily  atorvastatin 40 milliGRAM(s) Oral at bedtime  clopidogrel Tablet 75 milliGRAM(s) Oral daily  metoprolol tartrate 25 milliGRAM(s) Oral two times a day  sodium chloride 0.9% lock flush 3 milliLiter(s) IV Push every 8 hours  sodium chloride 0.9%. 500 milliLiter(s) (50 mL/Hr) IV Continuous <Continuous>  tamsulosin 0.4 milliGRAM(s) Oral at bedtime    MEDICATIONS  (PRN):  acetaminophen   Tablet .. 650 milliGRAM(s) Oral every 6 hours PRN Mild Pain (1 - 3), Moderate Pain (4 - 6)      Vital Signs Last 24 Hrs  T(C): 36.8 (11 Oct 2021 11:00), Max: 36.8 (11 Oct 2021 11:00)  T(F): 98.2 (11 Oct 2021 11:00), Max: 98.2 (11 Oct 2021 11:00)  HR: 79 (11 Oct 2021 14:30) (63 - 88)  BP: 122/78 (11 Oct 2021 14:30) (112/93 - 142/84)  BP(mean): 91 (11 Oct 2021 14:30) (86 - 119)  RR: 17 (11 Oct 2021 14:30) (16 - 18)  SpO2: 98% (11 Oct 2021 14:30) (94% - 100%)  CAPILLARY BLOOD GLUCOSE        I&O's Summary    10 Oct 2021 07:  -  11 Oct 2021 07:00  --------------------------------------------------------  IN: 700 mL / OUT: 300 mL / NET: 400 mL    11 Oct 2021 07:01  -  11 Oct 2021 15:47  --------------------------------------------------------  IN: 240 mL / OUT: 200 mL / NET: 40 mL          PHYSICAL EXAM  GENERAL: NAD, well-developed  HEAD:  Atraumatic, normocephalic  EYES: EOMI, PERRLA, conjunctiva and sclera clear  CHEST/LUNG: Clear to auscultation bilaterally; no wheezes  HEART: +S1+S2, regular rate and rhythm  ABDOMEN: Soft, nontender, nondistended; bowel sounds present  GROIN: no hematoma  EXTREMITIES:  2+ peripheral pulses; no clubbing, cyanosis, or edema  PSYCH: AAOx3      LABS:    10-11    138  |  109<H>  |  23  ----------------------------<  106<H>  3.7   |  19<L>  |  1.25    Ca    7.7<L>      11 Oct 2021 06:12            Urinalysis Basic - ( 09 Oct 2021 18:42 )    Color: Light Yellow / Appearance: Clear / S.012 / pH: x  Gluc: x / Ketone: Negative  / Bili: Negative / Urobili: Negative   Blood: x / Protein: Negative / Nitrite: Negative   Leuk Esterase: Negative / RBC: x / WBC x   Sq Epi: x / Non Sq Epi: x / Bacteria: x          RADIOLOGY & ADDITIONAL TESTS:    Imaging Personally Reviewed:  Consultant(s) Notes Reviewed:    Care Discussed with Consultants/Other Providers:

## 2021-10-12 NOTE — PROGRESS NOTE ADULT - PROBLEM SELECTOR PLAN 4
Outpatient CTS follow up for preop work up for resection.
-Was getting preop work up for resection.   -Outpatient CTS follow up.
-Was getting preop work up for resection.   -Outpatient CTS follow up.
Outpatient CTS follow up for preop work up for resection.

## 2021-10-12 NOTE — DISCHARGE NOTE NURSING/CASE MANAGEMENT/SOCIAL WORK - PATIENT PORTAL LINK FT
You can access the FollowMyHealth Patient Portal offered by [facility] by registering at the following website: [email or web address]. By joining [employer]’s FollowMyHealth portal, you will also be able to view your health information using other applications (apps) compatible with our system.

## 2021-10-12 NOTE — PROGRESS NOTE ADULT - PROVIDER SPECIALTY LIST ADULT
Cardiology
Hospitalist
Hospitalist
Intervent Cardiology
Cardiology
Cardiology
Internal Medicine
Internal Medicine

## 2021-10-12 NOTE — PROGRESS NOTE ADULT - REASON FOR ADMISSION
S/p cardiac cath with CAD

## 2021-10-19 ENCOUNTER — APPOINTMENT (OUTPATIENT)
Dept: INTERVENTIONAL RADIOLOGY/VASCULAR | Facility: CLINIC | Age: 71
End: 2021-10-19

## 2021-10-20 ENCOUNTER — APPOINTMENT (OUTPATIENT)
Dept: THORACIC SURGERY | Facility: HOSPITAL | Age: 71
End: 2021-10-20

## 2021-10-26 PROCEDURE — C1760: CPT

## 2021-10-26 PROCEDURE — U0005: CPT

## 2021-10-26 PROCEDURE — 82570 ASSAY OF URINE CREATININE: CPT

## 2021-10-26 PROCEDURE — C1894: CPT

## 2021-10-26 PROCEDURE — 81003 URINALYSIS AUTO W/O SCOPE: CPT

## 2021-10-26 PROCEDURE — 84540 ASSAY OF URINE/UREA-N: CPT

## 2021-10-26 PROCEDURE — 92979 ENDOLUMINL IVUS OCT C EA: CPT | Mod: LC

## 2021-10-26 PROCEDURE — C1725: CPT

## 2021-10-26 PROCEDURE — C1887: CPT

## 2021-10-26 PROCEDURE — 36415 COLL VENOUS BLD VENIPUNCTURE: CPT

## 2021-10-26 PROCEDURE — C1753: CPT

## 2021-10-26 PROCEDURE — C9600: CPT | Mod: LC

## 2021-10-26 PROCEDURE — 93458 L HRT ARTERY/VENTRICLE ANGIO: CPT

## 2021-10-26 PROCEDURE — 99152 MOD SED SAME PHYS/QHP 5/>YRS: CPT

## 2021-10-26 PROCEDURE — C8929: CPT

## 2021-10-26 PROCEDURE — 92978 ENDOLUMINL IVUS OCT C 1ST: CPT | Mod: LD

## 2021-10-26 PROCEDURE — 80048 BASIC METABOLIC PNL TOTAL CA: CPT

## 2021-10-26 PROCEDURE — 93321 DOPPLER ECHO F-UP/LMTD STD: CPT

## 2021-10-26 PROCEDURE — 85027 COMPLETE CBC AUTOMATED: CPT

## 2021-10-26 PROCEDURE — C1874: CPT

## 2021-10-26 PROCEDURE — 93005 ELECTROCARDIOGRAM TRACING: CPT

## 2021-10-26 PROCEDURE — 86769 SARS-COV-2 COVID-19 ANTIBODY: CPT

## 2021-10-26 PROCEDURE — C9607: CPT | Mod: LD

## 2021-10-26 PROCEDURE — 99153 MOD SED SAME PHYS/QHP EA: CPT

## 2021-10-26 PROCEDURE — C1889: CPT

## 2021-10-26 PROCEDURE — C1769: CPT

## 2021-10-26 PROCEDURE — 93308 TTE F-UP OR LMTD: CPT

## 2021-10-26 PROCEDURE — U0003: CPT

## 2021-10-26 PROCEDURE — C9601: CPT | Mod: LC

## 2021-10-26 PROCEDURE — 33990 INSJ PERQ VAD L HRT ARTERIAL: CPT

## 2021-10-26 PROCEDURE — 84300 ASSAY OF URINE SODIUM: CPT

## 2021-11-03 ENCOUNTER — APPOINTMENT (OUTPATIENT)
Dept: CARDIOLOGY | Facility: CLINIC | Age: 71
End: 2021-11-03
Payer: MEDICARE

## 2021-11-03 VITALS
WEIGHT: 190 LBS | SYSTOLIC BLOOD PRESSURE: 114 MMHG | DIASTOLIC BLOOD PRESSURE: 78 MMHG | HEIGHT: 71 IN | BODY MASS INDEX: 26.6 KG/M2 | OXYGEN SATURATION: 97 % | HEART RATE: 90 BPM

## 2021-11-03 PROCEDURE — 99214 OFFICE O/P EST MOD 30 MIN: CPT

## 2021-11-03 NOTE — DISCUSSION/SUMMARY
[FreeTextEntry1] : Clinically the patient is doing well.  He has no chest pain, shortness of breath, or palpitation.  He is now on aspirin and Plavix.  I will speak with cardiology to find out if and when we can at least hold the Plavix to do the lung resection.  Also speak with your office concerning the antiplatelet agents.\par \par Hopefully patient will be able to undergo his lung resection safely.  We also will repeat his echocardiogram to see if the ejection fraction improves with revascularization.  We went over all of his procedures and I answered all of his questions.

## 2021-11-03 NOTE — REASON FOR VISIT
[Follow-Up - Clinic] : a clinic follow-up of [Coronary Artery Disease] : coronary artery disease [FreeTextEntry1] : Atrial Flutter, Pericardial Effusiob, Lung Cancer

## 2021-11-03 NOTE — HISTORY OF PRESENT ILLNESS
[FreeTextEntry1] : I saw Nam Gleason in the office today for cardiac followup... He is a 70-year-old white male who underwent left lower lobe lobectomy for cancer in 2017. He was pretreated with chemotherapy and radiation  treatment and has done quite well. He had a preop cardiac evaluation consisting of an echo that was normal. He did poorly on the stress test because of the chemotherapy and radiation therapy but for what he could do the stress test was normal. He has no known history of heart disease.\par \par He was a smoker until his surgery. He has hypertension. He denies any hyperlipidemia, diabetes, or family history of heart disease.\par \par On his initial office visit to my office 2019 he exhibited a regular SVT without symptoms.W He was sent to the hospital and was found to have a large pericardial effusion that was treated with catheter drainage. 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 He was placed on Lasix 20 mg once a day. The edema has improved and is no longer on diuretic therapy. He does have chronic kidney disease.\par \par Echocardiogram performed 4/19 demonstrates an ejection fraction of 50%. There was mild MR and AI with mild to moderate TR. There is no significant pulmonary hypertension. No pericardial effusion. \par \par He can walk 4 miles a day without symptoms\par \par The patient wore a 30 day event monitor to detect any arrhythmia. He had an episode of atrial fibrillation on 5/31/19 at 1:16 PM  that spontaneously converted to sinus rhythm.\par \par He restarted Eliquis 6/19 because of episodes of asymptomatic PAF. He lost his coverage for the Eliquis and  he is has been taking low-dose aspirin instead. We have discussed the risk of blood clot since he is a CHADsvasc 2.. He is not smoking and feels well without any specific symptoms.  Clinically he has had no recurrence of arrhythmia\par \par He was seen in the office 9/21 in anticipation of surgical biopsy of a right upper lobe nodule.  For preoperative assessment, he underwent a chemical nuclear stress test.  This showed large anterior MI with inferior wall ischemia.  He was sent for cardiac catheterization that showed a 90% LAD lesion, 1% D1, 1% OM1.  He underwent drug-eluting stents to D1, proximal LAD, and OM 1.  Echocardiogram showed ejection fraction of 30 to 35%.. \par \par Patient remains asymptomatic as he was before this evaluation.  Specifically he has no chest pain, shortness of breath, palpitation.  He is very eager to get his surgery done which would involve resection of the right upper lobe.

## 2021-11-18 NOTE — ASU PREOP CHECKLIST - VIA
[facility]  Office: [address], DO, Howard Gomes, DO, Reji Rudi, DO, Leann Sosa Blood, DO, Fernando Lew MD, Autumn Moses MD, Suman Burciaga MD, Wendy Barksdale MD, Gabi Moore MD, Jeana Barton MD, Marlon Beltrán MD, Tomas Mckenzie, DO, Maureen Estes, DO, Aneudy Carolina MD,  Maral Teixeira, DO, Carlton Mejias MD, Tricia Felipe MD, Corie Brink MD, Javier Navarrete MD, Jase Fletcher MD, Redd Crane MD, Licha Poe MD, Lorena Garcia, [facility], [facility], CNP, Rachana Yoder, CNP, Tomasa Vee, CNS, Shaji Navarro, CNP, Nicole Valentin, CNP, Georgiana Dial, CNP, Annabelle Peterson, CNP, Johnny Chen, CNP, Nathan Odell PA-C, Carla Wise, [facility], Geno Hutchison, CNP, Ling Ibarra, CNP, Denise Rodriguez, CNP, Severiano Spalding, CNP, Ronnie Vasquez, CNP, Jay Lora, CNP, [facility], [facility]      Daily Progress Note     Admit Date: 11/3/2021  Bed/Room No.  1102/1102-01  Admitting Physician : Suman Burciaga MD  Code Status :Full Code  Hospital Day:  LOS: 14 days   Chief Complaint:     Chief Complaint   Patient presents with    Abdominal Pain     lower    Nausea    Back Pain    Headache     Principal Problem:    Pneumonia due to COVID-19 virus  Active Problems:    Essential hypertension    Prediabetes    Morbid obesity with BMI of 40.0-44.9, adult ([facility] [address]    Hyperglycemia    Acute respiratory failure with hypoxia ([facility] [address]    Acute urinary retention  Resolved Problems:    * No resolved hospital problems. *    Subjective : Interval History/Significant events :  11/18/21    Patient is currently on BiPAP. She is feeling fatigued, has decreased oral intake. Patient had decreased urine output. She denies nausea, vomiting. She had bowel movement yesterday and denies any constipation, diarrhea. Vitals - Unstable afebrile  Labs -hyponatremia 134, leukocytosis 12.9,    Nursing notes , Consults notes reviewed.  Overnight events and updates discussed with Nursing staff . Background History:         Jaren Feldman is 76 y.o. female  Who was admitted to the hospital on 11/3/2021 for treatment of Pneumonia due to COVID-19 virus. Patient presented to emergency room with headache, abdominal pain, nausea, fever T-max 102. 2. Gloria Tariq Patient tested positive for COVID-19. Chest x-ray showed bibasilar opacities consistent with COVID-19 pneumonia. She has underlying history of obesity, fibromyalgia, chronic pain, hypertension. Patient was not vaccinated against COVID-19. CT chest was negative for PE. Patient had acute respiratory failure was requiring O2 support. Breathing status worsened and required BiPAP with 100% FiO2 and was moved to ICU on 11/10/2021. PMH:  Past Medical History:   Diagnosis Date    Anxiety     Arthritis     Bell's palsy     Chronic neck pain     posterior neck since fall April 2014    Chronic pain     low back and bilateral lower extremities    Fibromyalgia     HA (headache)     Headache     off/on since fall April 2014    HTN (hypertension)     Hypertension       Allergies:    Allergies   Allergen Reactions    Other Hives    Ceclor [Cefaclor]     Food      All melons      Sulfa Antibiotics      Projectile vomiting       Medications :  metoprolol succinate, 25 mg, Oral, Daily  Arformoterol Tartrate, 15 mcg, Nebulization, BID  budesonide, 0.5 mg, Nebulization, BID  pregabalin, 50 mg, Oral, BID  polyethylene glycol, 17 g, Oral, Daily  docusate sodium, 100 mg, Oral, BID  insulin lispro, 0-12 Units, SubCUTAneous, TID WC  insulin lispro, 0-6 Units, SubCUTAneous, Nightly  levoFLOXacin, 750 mg, Oral, Daily  ipratropium-albuterol, 1 ampule, Inhalation, Q4H WA  dexamethasone, 10 mg, IntraVENous, Q12H  miconazole, , Topical, BID  mirtazapine, 7.5 mg, Oral, Nightly  sodium chloride flush, 5-40 mL, IntraVENous, 2 times per day  enoxaparin, 30 mg, SubCUTAneous, BID  Vitamin D, 2,000 Units, Oral, Daily  influenza virus vaccine, 0.5 mL, 11/17/21 1800 94/68 -- -- 93 15 -- --   11/17/21 1730 -- -- -- -- -- 95 % --   11/17/21 1700 (!) 94/58 -- -- 85 19 95 % --   11/17/21 1630 -- -- -- -- -- 93 % --   11/17/21 1600 (!) 96/59 98.6 °F (37 °C) Oral 92 21 96 % --   11/17/21 1534 -- -- -- -- 20 97 % --   11/17/21 1500 (!) 90/54 -- -- 102 26 96 % --   11/17/21 1400 (!) 91/55 -- -- 86 22 96 % --   11/17/21 1300 113/78 -- -- 93 21 94 % --   11/17/21 1230 -- -- -- 78 28 -- --   11/17/21 1210 94/61 97.9 °F (36.6 °C) Oral 82 (!) 39 94 % --   11/17/21 1200 -- -- -- 76 -- -- --   11/17/21 1145 -- -- -- -- 26 -- --   11/17/21 1121 -- -- -- -- 22 92 % --   11/17/21 1105 88/62 -- -- 98 29 -- --   11/17/21 1030 -- -- -- -- -- 94 % --   11/17/21 1000 94/62 -- -- 97 27 92 % --   11/17/21 0930 -- -- -- -- -- 93 % --   11/17/21 0900 105/69 -- -- 75 21 92 % --   11/17/21 0828 99/63 -- -- 71 -- -- --   11/17/21 0800 (!) 101/54 97.8 °F (36.6 °C) Oral 74 25 93 % --   11/17/21 0758 -- -- -- -- 26 93 % --     Intake / output   11/17 0701 - 11/18 0700  In: 1050 [P.O.:1000; I.V.:50]  Out: 300 [Urine:300]  Physical Exam:  Physical Exam  Vitals and nursing note reviewed. Constitutional:       General: She is not in acute distress. Appearance: She is not diaphoretic. Interventions: Nasal cannula in place. Comments: On High Flow    HENT:      Head: Normocephalic and atraumatic. Nose:      Right Sinus: No maxillary sinus tenderness or frontal sinus tenderness. Left Sinus: No maxillary sinus tenderness or frontal sinus tenderness. Mouth/Throat:      Pharynx: No oropharyngeal exudate. Eyes:      General: No scleral icterus. Conjunctiva/sclera: Conjunctivae normal.      Pupils: Pupils are equal, round, and reactive to light. Neck:      Thyroid: No thyromegaly. Vascular: No JVD. Cardiovascular:      Rate and Rhythm: Normal rate and regular rhythm. Pulses:           Dorsalis pedis pulses are 2+ on the right side and 2+ on the left side. Heart sounds: Normal heart sounds. No murmur heard. Pulmonary:      Effort: Pulmonary effort is normal.      Breath sounds: Normal breath sounds. No wheezing or rales. Abdominal:      Palpations: Abdomen is soft. There is no mass. Tenderness: There is no abdominal tenderness. Musculoskeletal:      Cervical back: Full passive range of motion without pain and neck supple. Lymphadenopathy:      Head:      Right side of head: No submandibular adenopathy. Left side of head: No submandibular adenopathy. Cervical: No cervical adenopathy. Skin:     General: Skin is warm. Neurological:      Mental Status: She is alert and oriented to person, place, and time. Motor: No tremor. Psychiatric:         Behavior: Behavior is cooperative. Laboratory findings:    Recent Labs     11/16/21 0458 11/17/21  0501 11/18/21  0620   WBC 12.2* 13.6* 12.9*   HGB 15.1 15.8* 15.7*   HCT 45.5 48.1* 48.3*    327 278     Recent Labs     11/16/21 0458 11/16/21 0458 11/17/21  0501 11/17/21  1825 11/18/21  0620     --  133*  --  134*   K 4.5   < > 5.4* 4.3 5.0   CL 98  --  101  --  98   CO2 25  --  20  --  22   GLUCOSE 158*  --  154*  --  165*   BUN 46*  --  39*  --  42*   CREATININE 0.89  --  0.54  --  0.63   CALCIUM 9.1  --  8.9  --  8.8    < > = values in this interval not displayed.             Specific Gravity, UA   Date Value Ref Range Status   11/10/2021 1.020 1.005 - 1.030 Final     Protein, UA   Date Value Ref Range Status   11/10/2021 NEGATIVE NEGATIVE Final     RBC, UA   Date Value Ref Range Status   11/02/2021 2 TO 5 0 - 2 /HPF Final     Bacteria, UA   Date Value Ref Range Status   11/02/2021 NOT REPORTED None Final     Nitrite, Urine   Date Value Ref Range Status   11/10/2021 NEGATIVE NEGATIVE Final     WBC, UA   Date Value Ref Range Status   11/02/2021 2 TO 5 0 - 5 /HPF Final     Leukocyte Esterase, Urine   Date Value Ref Range Status   11/10/2021 NEGATIVE NEGATIVE Final Imaging / Clinical Data :-   XR CHEST PORTABLE    Result Date: 11/14/2021  Moderate bilateral parenchymal opacities. Minimally increased parenchymal density on the right compared to prior studies. XR CHEST PORTABLE    Result Date: 11/12/2021  Moderate bilateral pneumonia, similar to the previous exam.     XR CHEST PORTABLE    Result Date: 11/11/2021  Findings remain compatible with moderate COVID-19 pneumonitis. No significant interval changes. XR CHEST PORTABLE    Result Date: 11/9/2021  Multifocal pulmonary opacities suggest significantly worsened pulmonary edema and or multifocal pneumonia. Recommend continued follow-up to resolution. CT CHEST PULMONARY EMBOLISM W CONTRAST    Result Date: 11/9/2021  No evidence of pulmonary embolism. Extensive pulmonary abnormalities most compatible with COVID-19 pneumonia; other processes such as influenza pneumonia and organizing pneumonia, as can be seen with drug toxicity and connective tissue disease, can cause a similar imaging pattern. Mild adenopathy, probably reactive. Clinical Course : unchanged  Assessment and Plan  :        1. Acute respiratory failure with hypoxia secondary to COVID-19 pneumonia- on BiPAP/high flow. Continue dexamethasone high-dose, Actemra given on 11/10/2021. Continue bronchodilator therapy. Patient on day 8/8 Levaquin. Lasix as needed. 2. Morbid obesity BMI 41  3. Essential hypertension - patient has low blood pressure and  prinzide held. Decrease Lopressor dose. 4. Prediabetes worsened with steroid-induced hyperglycemia - A1C 5.8. Humalog as needed. -   5. Hyperkalemia - Kayexalate - hold lisinopril . Repeat potassium. Encourage incentive spirometry. PT OT   Monitor in ICU. High risk of worsening. Continue to monitor vitals , Intake / output ,  Cell count , HGB , Kidney function, oxygenation  as indicated . Plan and updates discussed with patient ,  answers  explained to satisfaction.    Plan discussed with Staff Magda DAVIS     (Please note that portions of this note were completed with a voice recognition program. Efforts were made to edit the dictations but occasionally words are mis-transcribed.)      Don Chambers MD  11/18/2021 ambulate

## 2021-11-24 ENCOUNTER — OUTPATIENT (OUTPATIENT)
Dept: OUTPATIENT SERVICES | Facility: HOSPITAL | Age: 71
LOS: 1 days | End: 2021-11-24

## 2021-11-24 VITALS
RESPIRATION RATE: 16 BRPM | OXYGEN SATURATION: 98 % | HEART RATE: 74 BPM | DIASTOLIC BLOOD PRESSURE: 74 MMHG | HEIGHT: 69 IN | TEMPERATURE: 97 F | SYSTOLIC BLOOD PRESSURE: 129 MMHG | WEIGHT: 192.02 LBS

## 2021-11-24 DIAGNOSIS — Z98.890 OTHER SPECIFIED POSTPROCEDURAL STATES: Chronic | ICD-10-CM

## 2021-11-24 DIAGNOSIS — Z90.2 ACQUIRED ABSENCE OF LUNG [PART OF]: Chronic | ICD-10-CM

## 2021-11-24 DIAGNOSIS — I25.10 ATHEROSCLEROTIC HEART DISEASE OF NATIVE CORONARY ARTERY WITHOUT ANGINA PECTORIS: ICD-10-CM

## 2021-11-24 DIAGNOSIS — R91.8 OTHER NONSPECIFIC ABNORMAL FINDING OF LUNG FIELD: ICD-10-CM

## 2021-11-24 DIAGNOSIS — Z92.21 PERSONAL HISTORY OF ANTINEOPLASTIC CHEMOTHERAPY: Chronic | ICD-10-CM

## 2021-11-24 DIAGNOSIS — I10 ESSENTIAL (PRIMARY) HYPERTENSION: ICD-10-CM

## 2021-11-24 DIAGNOSIS — G47.33 OBSTRUCTIVE SLEEP APNEA (ADULT) (PEDIATRIC): ICD-10-CM

## 2021-11-24 LAB
ANION GAP SERPL CALC-SCNC: 7 MMOL/L — SIGNIFICANT CHANGE UP (ref 7–14)
BLD GP AB SCN SERPL QL: NEGATIVE — SIGNIFICANT CHANGE UP
BUN SERPL-MCNC: 24 MG/DL — HIGH (ref 7–23)
CALCIUM SERPL-MCNC: 9.3 MG/DL — SIGNIFICANT CHANGE UP (ref 8.4–10.5)
CHLORIDE SERPL-SCNC: 102 MMOL/L — SIGNIFICANT CHANGE UP (ref 98–107)
CO2 SERPL-SCNC: 27 MMOL/L — SIGNIFICANT CHANGE UP (ref 22–31)
CREAT SERPL-MCNC: 1.4 MG/DL — HIGH (ref 0.5–1.3)
GLUCOSE SERPL-MCNC: 112 MG/DL — HIGH (ref 70–99)
HCT VFR BLD CALC: 43.9 % — SIGNIFICANT CHANGE UP (ref 39–50)
HGB BLD-MCNC: 14.7 G/DL — SIGNIFICANT CHANGE UP (ref 13–17)
MCHC RBC-ENTMCNC: 30.3 PG — SIGNIFICANT CHANGE UP (ref 27–34)
MCHC RBC-ENTMCNC: 33.5 GM/DL — SIGNIFICANT CHANGE UP (ref 32–36)
MCV RBC AUTO: 90.5 FL — SIGNIFICANT CHANGE UP (ref 80–100)
NRBC # BLD: 0 /100 WBCS — SIGNIFICANT CHANGE UP
NRBC # FLD: 0 K/UL — SIGNIFICANT CHANGE UP
PLATELET # BLD AUTO: 284 K/UL — SIGNIFICANT CHANGE UP (ref 150–400)
POTASSIUM SERPL-MCNC: 4.4 MMOL/L — SIGNIFICANT CHANGE UP (ref 3.5–5.3)
POTASSIUM SERPL-SCNC: 4.4 MMOL/L — SIGNIFICANT CHANGE UP (ref 3.5–5.3)
RBC # BLD: 4.85 M/UL — SIGNIFICANT CHANGE UP (ref 4.2–5.8)
RBC # FLD: 16.1 % — HIGH (ref 10.3–14.5)
RH IG SCN BLD-IMP: NEGATIVE — SIGNIFICANT CHANGE UP
SODIUM SERPL-SCNC: 136 MMOL/L — SIGNIFICANT CHANGE UP (ref 135–145)
WBC # BLD: 7.85 K/UL — SIGNIFICANT CHANGE UP (ref 3.8–10.5)
WBC # FLD AUTO: 7.85 K/UL — SIGNIFICANT CHANGE UP (ref 3.8–10.5)

## 2021-11-24 RX ORDER — MAGNESIUM HYDROXIDE 400 MG/1
30 TABLET, CHEWABLE ORAL
Qty: 0 | Refills: 0 | DISCHARGE

## 2021-11-24 RX ORDER — IBUPROFEN 200 MG
1 TABLET ORAL
Qty: 0 | Refills: 0 | DISCHARGE

## 2021-11-24 RX ORDER — SODIUM CHLORIDE 9 MG/ML
1000 INJECTION, SOLUTION INTRAVENOUS
Refills: 0 | Status: DISCONTINUED | OUTPATIENT
Start: 2021-12-08 | End: 2021-12-09

## 2021-11-24 RX ORDER — TAMSULOSIN HYDROCHLORIDE 0.4 MG/1
1 CAPSULE ORAL
Qty: 0 | Refills: 0 | DISCHARGE

## 2021-11-24 RX ORDER — PANTOPRAZOLE SODIUM 20 MG/1
1 TABLET, DELAYED RELEASE ORAL
Qty: 0 | Refills: 0 | DISCHARGE

## 2021-11-24 RX ORDER — ACETAMINOPHEN 500 MG
2 TABLET ORAL
Qty: 0 | Refills: 0 | DISCHARGE

## 2021-11-24 RX ORDER — IRBESARTAN 75 MG/1
1 TABLET ORAL
Qty: 0 | Refills: 0 | DISCHARGE

## 2021-11-24 RX ORDER — FLUTICASONE FUROATE, UMECLIDINIUM BROMIDE AND VILANTEROL TRIFENATATE 200; 62.5; 25 UG/1; UG/1; UG/1
1 POWDER RESPIRATORY (INHALATION)
Qty: 0 | Refills: 0 | DISCHARGE

## 2021-11-24 NOTE — H&P PST ADULT - PROBLEM SELECTOR PLAN 2
Continue Aspirin.  Pending IR ( Dr Barrios) plavix plan. Continue Aspirin.  Pending interventional cardiologist ( Dr Barrios) plavix plan. Patient instructed to take metoprolol and irbesartan with a sip of water on the morning of procedure.

## 2021-11-24 NOTE — H&P PST ADULT - HISTORY OF PRESENT ILLNESS
70 yo male with HTN and emphysema with pmh of lung ca s/p LLL lobectomy, treated with chemo and radiation in 2017, presents.   71 yo male with hx of CAD with recent stent placed x 3 on 10/2021,  HTN , s/p lung ca s/p LLL lobectomy, treated with chemo and radiation in 2017 is diagnosed with other  non specific abnormal finding of lung field and is scheduled for flexible bronchoscopy ,right vdeo assisted thoracoscopy ,lung resection with interventional radiology marking 71 yo male with hx of CAD with recent stent placed x 3 on 10/2021,  HTN ,HLD,  s/p lung ca s/p LLL lobectomy, treated with chemo and radiation in 2017 is diagnosed with other  non specific abnormal finding of lung field and is scheduled for flexible bronchoscopy ,right video  assisted thoracoscopy ,lung resection with interventional radiology marking

## 2021-11-24 NOTE — H&P PST ADULT - PROBLEM SELECTOR PLAN 4
At risk. DASHAWN precautions. Last cardiology note, echo, stress, and cath reports in chart.    Pt with ROSA x3 from 10/2021 on plavix and aspirin. Spoke to interventional cardiologist Dr Barrios who stated that he had a discussion with the cardiologist and surgeon, and OK'ed to stop plavix 5 days prior to procedure if is not elective. Patient instructed to stop plavix on 12/2/21.    Pending updated cardiology evaluation and repeat echo.

## 2021-11-24 NOTE — H&P PST ADULT - PROBLEM SELECTOR PLAN 3
Continue metoprolol and Irbesartan. Patient instructed to take metoprolol and irbesartan with a sip of water on the morning of procedure. At risk. DASHAWN precautions.

## 2021-11-24 NOTE — H&P PST ADULT - MUSCULOSKELETAL
negative No joint pain, swelling or deformity; no limitation of movement detailed exam details… ROM intact/no joint swelling/no calf tenderness

## 2021-11-24 NOTE — H&P PST ADULT - NSICDXPASTSURGICALHX_GEN_ALL_CORE_FT
PAST SURGICAL HISTORY:  H/O inguinal hernia repair Bilateral in the 1970's    History of chemotherapy left chest wall infusaport placement    S/P pericardial surgery For an effusion in 2019    Status post lobectomy of lung LLL lobectomy at [facility]

## 2021-11-24 NOTE — H&P PST ADULT - OTHER CARE PROVIDERS
IR Dr Barrios [phone], Dr Rosemarie Lynn ( cardiologist) [phone] interventional cardiologist Dr Juliet Barrios [phone],                                                                       Dr Rosemarie Lynn ( cardiologist) [phone]

## 2021-11-24 NOTE — H&P PST ADULT - PROBLEM SELECTOR PLAN 1
Patient tentatively scheduled for  flexible bronchoscopy ,right video  assisted thoracoscopy ,lung resection with interventional radiology marking on 12/08/2021.  Pre-op instructions provided. Pt given verbal and written instructions with teach back on chlorhexidine shampoo and pepcid. Pt verbalized understanding with return demonstration. .  Pt strongly advised to follow up with surgeon to discuss COVID testing requirements prior to procedure.     Cardiology evaluation copy in chart  with copy of ECHO, Cath reports, Stress reports and EKG. Patient tentatively scheduled for  flexible bronchoscopy ,right video  assisted thoracoscopy ,lung resection with interventional radiology marking on 12/08/2021.  Pre-op instructions provided. Pt given verbal and written instructions with teach back on chlorhexidine shampoo and pepcid. Pt verbalized understanding with return demonstration. .  Pt strongly advised to follow up with surgeon to discuss COVID testing requirements prior to procedure.     Cardiology note in chart  with copy of ECHO, Cath reports, Stress reports and EKG.    Pending interventional cardiology evaluation and cardiac evaluation due to recent stents 10/2021. Patient is on Plavix and ASA. Pt states interventional cardiologist instructed him to hold plavix 5 days prior to procedure and continue aspirin. Left voicemail with interventional cardiologist to confirm. Surgeon's office notified, spoke to surgical coordinator. Will follow up. Patient tentatively scheduled for  flexible bronchoscopy ,right video  assisted thoracoscopy ,lung resection with interventional radiology marking on 12/08/2021.  Pre-op instructions provided. Pt given verbal and written instructions with teach back on chlorhexidine shampoo and pepcid. Pt verbalized understanding with return demonstration. .  Pt strongly advised to follow up with surgeon to discuss COVID testing requirements prior to procedure.

## 2021-11-24 NOTE — H&P PST ADULT - NSANTHOSAYNRD_GEN_A_CORE
No
No. DASHAWN screening performed.  STOP BANG Legend: 0-2 = LOW Risk; 3-4 = INTERMEDIATE Risk; 5-8 = HIGH Risk

## 2021-11-24 NOTE — H&P PST ADULT - NSICDXPASTMEDICALHX_GEN_ALL_CORE_FT
PAST MEDICAL HISTORY:  Atrial flutter 10/2017 off Eliquis (MD aware)    GERD (gastroesophageal reflux disease)     HTN (hypertension)     Lung cancer s/p LLL Lobectomy, radiation, chemotherapy    Other and unspecified ventral hernia with obstruction, without gangrene     Smoking history Quit 2017     PAST MEDICAL HISTORY:  Atrial flutter 10/2017 off Eliquis (MD aware)    CAD (coronary artery disease) Stents placed x3 10/2021    GERD (gastroesophageal reflux disease)     HTN (hypertension)     Lung cancer s/p LLL Lobectomy, radiation, chemotherapy    Other and unspecified ventral hernia with obstruction, without gangrene     Other nonspecific abnormal finding of lung field     Smoking history Quit 2017

## 2021-12-02 PROBLEM — I25.10 ATHEROSCLEROTIC HEART DISEASE OF NATIVE CORONARY ARTERY WITHOUT ANGINA PECTORIS: Chronic | Status: ACTIVE | Noted: 2021-11-24

## 2021-12-02 PROBLEM — R91.8 OTHER NONSPECIFIC ABNORMAL FINDING OF LUNG FIELD: Chronic | Status: ACTIVE | Noted: 2021-11-24

## 2021-12-06 ENCOUNTER — APPOINTMENT (OUTPATIENT)
Dept: CT IMAGING | Facility: CLINIC | Age: 71
End: 2021-12-06
Payer: MEDICARE

## 2021-12-06 PROCEDURE — 71250 CT THORAX DX C-: CPT | Mod: MG

## 2021-12-06 PROCEDURE — G1004: CPT

## 2021-12-07 ENCOUNTER — TRANSCRIPTION ENCOUNTER (OUTPATIENT)
Age: 71
End: 2021-12-07

## 2021-12-07 ENCOUNTER — APPOINTMENT (OUTPATIENT)
Dept: INTERVENTIONAL RADIOLOGY/VASCULAR | Facility: CLINIC | Age: 71
End: 2021-12-07
Payer: MEDICARE

## 2021-12-07 VITALS — WEIGHT: 195 LBS | BODY MASS INDEX: 27.3 KG/M2 | HEIGHT: 71 IN

## 2021-12-07 PROCEDURE — 99443: CPT | Mod: PD,95

## 2021-12-07 NOTE — ASSESSMENT
[FreeTextEntry1] : 70 years old male with history of lung ca s/p left lower lobectomy with imaging demonstrating a right upper lobe spiculated cavitary nodule (CT 8/4/21 9-110, CT 12/6 303-67). The patient is planned for flex bronch, right VATS lung resection with pre procedure marking.\par \par The full procedure of CT guided right lung marking was discussed with the patient. This included a discussion of the risks of bleeding, infection, pneumothorax, marker migration. Ample time was provided to answer his questions. Consent obtained at the time of consultation.

## 2021-12-07 NOTE — PHYSICAL EXAM
[Alert] : alert [Normal Hearing] : hearing was normal [Oriented x3] : oriented to person, place, and time

## 2021-12-07 NOTE — HISTORY OF PRESENT ILLNESS
[Home] : at home, [unfilled] , at the time of the visit. [Medical Office: ([facility])___] : at the medical office located in  [Verbal consent obtained from patient] : the patient, [unfilled] [FreeTextEntry1] : 70 year old male with history of COPD, HTN, Afib, CAD s/p cardiac stents x3 10/11/21 on Aspirin and Plavix, Lung ca s/p  left lower lobe lobectomy for cancer in 2017. He was pretreated with chemotherapy and radiation treatment in 2017. Patient has been following up with Dr. Chan and had a f/u chest CT done on 08/24/21 which demonstrated, “Further interval growth of the spiculated cavitary RUL nodule, currently measuring 11 mm, compared with 9 mm on 5/3/21 (9:110). Adjacent 7 mm solid nodule is stable (9:112). New solid 5 mm nodule in RLL slightly below lida (9:144).  B/l renal cysts. Gallstones. Hepatic cysts”\par \par Patient then had the PET/Ct 08/20/21 which demonstrated, “1.1 cm RUL cavitary nodule, SUV 1.2 (4:169), 0.5 cm posterior RUL nodule identified on prior study has resolved.B/l maxillary sinus disease with near complete opacification of the right maxillary sinus.” \par \par Patient was seen by Dr. Larsen and plan is for patient to undergo Right VATS lung resection with IR marking.\par \par Patient is now being referred by Dr. Larsen to discuss lung mass marking prior to resection. \par \par Denies any recent SOB, Cp, fever, chills, n/v/d. \par \par \par Patient states his cardiologist Dr. Houston gave clearance to hold Plavix for five days prior to procedure and to continue taking Aspirin 81 mg.  Patient states Dr. Larsen is aware that he is holding Plavix as per the cardiologist and that he is continuing to take the Aspirin as per cardiologist recommendations.

## 2021-12-08 ENCOUNTER — INPATIENT (INPATIENT)
Facility: HOSPITAL | Age: 71
LOS: 5 days | Discharge: HOME HEALTH | End: 2021-12-14
Attending: THORACIC SURGERY (CARDIOTHORACIC VASCULAR SURGERY) | Admitting: THORACIC SURGERY (CARDIOTHORACIC VASCULAR SURGERY)
Payer: MEDICARE

## 2021-12-08 ENCOUNTER — APPOINTMENT (OUTPATIENT)
Dept: THORACIC SURGERY | Facility: HOSPITAL | Age: 71
End: 2021-12-08

## 2021-12-08 ENCOUNTER — RESULT REVIEW (OUTPATIENT)
Age: 71
End: 2021-12-08

## 2021-12-08 VITALS
TEMPERATURE: 98 F | SYSTOLIC BLOOD PRESSURE: 107 MMHG | HEART RATE: 66 BPM | DIASTOLIC BLOOD PRESSURE: 72 MMHG | WEIGHT: 192.02 LBS | OXYGEN SATURATION: 96 % | HEIGHT: 69 IN | RESPIRATION RATE: 16 BRPM

## 2021-12-08 DIAGNOSIS — R91.8 OTHER NONSPECIFIC ABNORMAL FINDING OF LUNG FIELD: ICD-10-CM

## 2021-12-08 DIAGNOSIS — Z90.2 ACQUIRED ABSENCE OF LUNG [PART OF]: Chronic | ICD-10-CM

## 2021-12-08 DIAGNOSIS — Z92.21 PERSONAL HISTORY OF ANTINEOPLASTIC CHEMOTHERAPY: Chronic | ICD-10-CM

## 2021-12-08 DIAGNOSIS — Z98.890 OTHER SPECIFIED POSTPROCEDURAL STATES: Chronic | ICD-10-CM

## 2021-12-08 PROCEDURE — 88305 TISSUE EXAM BY PATHOLOGIST: CPT | Mod: 26

## 2021-12-08 PROCEDURE — 32666 THORACOSCOPY W/WEDGE RESECT: CPT | Mod: RT

## 2021-12-08 PROCEDURE — 32553 INS MARK THOR FOR RT PERQ: CPT

## 2021-12-08 PROCEDURE — 99233 SBSQ HOSP IP/OBS HIGH 50: CPT

## 2021-12-08 PROCEDURE — 71045 X-RAY EXAM CHEST 1 VIEW: CPT | Mod: 26

## 2021-12-08 PROCEDURE — 88307 TISSUE EXAM BY PATHOLOGIST: CPT | Mod: 26

## 2021-12-08 PROCEDURE — 77012 CT SCAN FOR NEEDLE BIOPSY: CPT | Mod: 26

## 2021-12-08 PROCEDURE — 32674 THORACOSCOPY LYMPH NODE EXC: CPT

## 2021-12-08 RX ORDER — ALBUTEROL 90 UG/1
2.5 AEROSOL, METERED ORAL EVERY 8 HOURS
Refills: 0 | Status: DISCONTINUED | OUTPATIENT
Start: 2021-12-08 | End: 2021-12-10

## 2021-12-08 RX ORDER — HEPARIN SODIUM 5000 [USP'U]/ML
5000 INJECTION INTRAVENOUS; SUBCUTANEOUS ONCE
Refills: 0 | Status: COMPLETED | OUTPATIENT
Start: 2021-12-08 | End: 2021-12-08

## 2021-12-08 RX ORDER — HYDROMORPHONE HYDROCHLORIDE 2 MG/ML
0.5 INJECTION INTRAMUSCULAR; INTRAVENOUS; SUBCUTANEOUS
Refills: 0 | Status: DISCONTINUED | OUTPATIENT
Start: 2021-12-08 | End: 2021-12-09

## 2021-12-08 RX ORDER — ACETAMINOPHEN 500 MG
975 TABLET ORAL ONCE
Refills: 0 | Status: COMPLETED | OUTPATIENT
Start: 2021-12-08 | End: 2021-12-08

## 2021-12-08 RX ORDER — ATORVASTATIN CALCIUM 80 MG/1
40 TABLET, FILM COATED ORAL AT BEDTIME
Refills: 0 | Status: DISCONTINUED | OUTPATIENT
Start: 2021-12-08 | End: 2021-12-14

## 2021-12-08 RX ORDER — HYDROMORPHONE HYDROCHLORIDE 2 MG/ML
30 INJECTION INTRAMUSCULAR; INTRAVENOUS; SUBCUTANEOUS
Refills: 0 | Status: DISCONTINUED | OUTPATIENT
Start: 2021-12-08 | End: 2021-12-09

## 2021-12-08 RX ORDER — SENNA PLUS 8.6 MG/1
2 TABLET ORAL AT BEDTIME
Refills: 0 | Status: DISCONTINUED | OUTPATIENT
Start: 2021-12-08 | End: 2021-12-14

## 2021-12-08 RX ORDER — TAMSULOSIN HYDROCHLORIDE 0.4 MG/1
0.4 CAPSULE ORAL AT BEDTIME
Refills: 0 | Status: DISCONTINUED | OUTPATIENT
Start: 2021-12-08 | End: 2021-12-14

## 2021-12-08 RX ORDER — ONDANSETRON 8 MG/1
4 TABLET, FILM COATED ORAL EVERY 6 HOURS
Refills: 0 | Status: DISCONTINUED | OUTPATIENT
Start: 2021-12-08 | End: 2021-12-14

## 2021-12-08 RX ORDER — HEPARIN SODIUM 5000 [USP'U]/ML
5000 INJECTION INTRAVENOUS; SUBCUTANEOUS EVERY 8 HOURS
Refills: 0 | Status: DISCONTINUED | OUTPATIENT
Start: 2021-12-08 | End: 2021-12-14

## 2021-12-08 RX ORDER — POLYETHYLENE GLYCOL 3350 17 G/17G
17 POWDER, FOR SOLUTION ORAL DAILY
Refills: 0 | Status: DISCONTINUED | OUTPATIENT
Start: 2021-12-08 | End: 2021-12-14

## 2021-12-08 RX ORDER — NALOXONE HYDROCHLORIDE 4 MG/.1ML
0.1 SPRAY NASAL
Refills: 0 | Status: DISCONTINUED | OUTPATIENT
Start: 2021-12-08 | End: 2021-12-14

## 2021-12-08 RX ORDER — BUTORPHANOL TARTRATE 2 MG/ML
0.12 INJECTION, SOLUTION INTRAMUSCULAR; INTRAVENOUS EVERY 6 HOURS
Refills: 0 | Status: DISCONTINUED | OUTPATIENT
Start: 2021-12-08 | End: 2021-12-09

## 2021-12-08 RX ORDER — ASPIRIN/CALCIUM CARB/MAGNESIUM 324 MG
81 TABLET ORAL DAILY
Refills: 0 | Status: DISCONTINUED | OUTPATIENT
Start: 2021-12-08 | End: 2021-12-14

## 2021-12-08 RX ADMIN — TAMSULOSIN HYDROCHLORIDE 0.4 MILLIGRAM(S): 0.4 CAPSULE ORAL at 21:18

## 2021-12-08 RX ADMIN — HYDROMORPHONE HYDROCHLORIDE 30 MILLILITER(S): 2 INJECTION INTRAMUSCULAR; INTRAVENOUS; SUBCUTANEOUS at 19:11

## 2021-12-08 RX ADMIN — Medication 81 MILLIGRAM(S): at 21:45

## 2021-12-08 RX ADMIN — HYDROMORPHONE HYDROCHLORIDE 30 MILLILITER(S): 2 INJECTION INTRAMUSCULAR; INTRAVENOUS; SUBCUTANEOUS at 15:11

## 2021-12-08 RX ADMIN — SODIUM CHLORIDE 30 MILLILITER(S): 9 INJECTION, SOLUTION INTRAVENOUS at 20:00

## 2021-12-08 RX ADMIN — Medication 975 MILLIGRAM(S): at 11:26

## 2021-12-08 RX ADMIN — ATORVASTATIN CALCIUM 40 MILLIGRAM(S): 80 TABLET, FILM COATED ORAL at 21:18

## 2021-12-08 RX ADMIN — ALBUTEROL 2.5 MILLIGRAM(S): 90 AEROSOL, METERED ORAL at 22:39

## 2021-12-08 RX ADMIN — SODIUM CHLORIDE 30 MILLILITER(S): 9 INJECTION, SOLUTION INTRAVENOUS at 11:32

## 2021-12-08 RX ADMIN — SENNA PLUS 2 TABLET(S): 8.6 TABLET ORAL at 21:17

## 2021-12-08 RX ADMIN — SODIUM CHLORIDE 30 MILLILITER(S): 9 INJECTION, SOLUTION INTRAVENOUS at 15:11

## 2021-12-08 RX ADMIN — HEPARIN SODIUM 5000 UNIT(S): 5000 INJECTION INTRAVENOUS; SUBCUTANEOUS at 21:18

## 2021-12-08 RX ADMIN — HEPARIN SODIUM 5000 UNIT(S): 5000 INJECTION INTRAVENOUS; SUBCUTANEOUS at 11:26

## 2021-12-08 NOTE — BRIEF OPERATIVE NOTE - NSICDXBRIEFPROCEDURE_GEN_ALL_CORE_FT
PROCEDURES:  Bronchoscopy, flexible, by CT surgery 08-Dec-2021 14:34:22  Robert Hurtado  Bronchoalveolar lavage 08-Dec-2021 14:34:38  Robert Hurtado  Wedge resection of right upper lobe of lung with VATS 08-Dec-2021 14:34:58 Uniportal Robert Hurtado  Pneumonolysis 08-Dec-2021 14:35:08 Right Robert Hurtado  Thoracoscopic biopsy of mediastinal lymph node 08-Dec-2021 14:35:19  Robert Hurtado

## 2021-12-08 NOTE — BRIEF OPERATIVE NOTE - OPERATION/FINDINGS
Extensive adhesions of right middle lobe to mediastinum; Adhesion of the right upper lobe apex to the chest wall; Right lowe rlobe adhesion to posterior chest wall

## 2021-12-08 NOTE — ASU PATIENT PROFILE, ADULT - FALL HARM RISK - UNIVERSAL INTERVENTIONS
Bed in lowest position, wheels locked, appropriate side rails in place/Call bell, personal items and telephone in reach/Instruct patient to call for assistance before getting out of bed or chair/Non-slip footwear when patient is out of bed/[place] to call system/Physically safe environment - no spills, clutter or unnecessary equipment/Purposeful Proactive Rounding/Room/bathroom lighting operational, light cord in reach

## 2021-12-08 NOTE — ASU PATIENT PROFILE, ADULT - NSICDXPASTSURGICALHX_GEN_ALL_CORE_FT
PAST SURGICAL HISTORY:  H/O inguinal hernia repair Bilateral in the 1970's    History of chemotherapy left chest wall infusaport placement    S/P pericardial surgery For an effusion in 2019    Status post lobectomy of lung LLL lobectomy at [facility]

## 2021-12-08 NOTE — ASU PREOP CHECKLIST - SIDE RAILS UP
Local Anesthesia Pre Procedure Assessment    Informed Consent:    Consent Obtained: Yes       Procedure Assessment:    Preop Diagnosis/Indications for Procedure: Post Allo SCT  Planned Procedure: BMBx  Planned Anesthetic: Local    Medical History/Comorbid Conditions:    Significant Medical/Surgical History: Yes (comment) (AML, HTN, HF, DM II)  Normal Mental Status: Yes    Examination Pertinent to Procedure Being Performed:    Evaluation of Operative Site: No signs of erythema, cellulitis, or rash    Other Findings:    Reviewed Current Medications and Allergies: Yes    Pertinent Lab/Diagnostic Tests:    Pertinent Lab / Diagnostic Tests: Other (comment) (WBC 8.2, ANC 1.6, Hgb 10.8, plt 66k)      JERARDO Heredia  9/8/2021    
done

## 2021-12-08 NOTE — ASU PATIENT PROFILE, ADULT - NSICDXPASTMEDICALHX_GEN_ALL_CORE_FT
PAST MEDICAL HISTORY:  Atrial flutter 10/2017 off Eliquis (MD aware)    CAD (coronary artery disease) Stents placed x3 10/2021    GERD (gastroesophageal reflux disease)     HTN (hypertension)     Lung cancer s/p LLL Lobectomy, radiation, chemotherapy    Other and unspecified ventral hernia with obstruction, without gangrene     Other nonspecific abnormal finding of lung field     Smoking history Quit 2017

## 2021-12-09 ENCOUNTER — TRANSCRIPTION ENCOUNTER (OUTPATIENT)
Age: 71
End: 2021-12-09

## 2021-12-09 LAB
ANION GAP SERPL CALC-SCNC: 12 MMOL/L — SIGNIFICANT CHANGE UP (ref 7–14)
BUN SERPL-MCNC: 28 MG/DL — HIGH (ref 7–23)
CALCIUM SERPL-MCNC: 8.8 MG/DL — SIGNIFICANT CHANGE UP (ref 8.4–10.5)
CHLORIDE SERPL-SCNC: 99 MMOL/L — SIGNIFICANT CHANGE UP (ref 98–107)
CO2 SERPL-SCNC: 21 MMOL/L — LOW (ref 22–31)
CREAT SERPL-MCNC: 1.27 MG/DL — SIGNIFICANT CHANGE UP (ref 0.5–1.3)
GLUCOSE SERPL-MCNC: 140 MG/DL — HIGH (ref 70–99)
HCT VFR BLD CALC: 41.9 % — SIGNIFICANT CHANGE UP (ref 39–50)
HGB BLD-MCNC: 13.6 G/DL — SIGNIFICANT CHANGE UP (ref 13–17)
MAGNESIUM SERPL-MCNC: 1.5 MG/DL — LOW (ref 1.6–2.6)
MCHC RBC-ENTMCNC: 29.8 PG — SIGNIFICANT CHANGE UP (ref 27–34)
MCHC RBC-ENTMCNC: 32.5 GM/DL — SIGNIFICANT CHANGE UP (ref 32–36)
MCV RBC AUTO: 91.7 FL — SIGNIFICANT CHANGE UP (ref 80–100)
NRBC # BLD: 0 /100 WBCS — SIGNIFICANT CHANGE UP
NRBC # FLD: 0 K/UL — SIGNIFICANT CHANGE UP
PHOSPHATE SERPL-MCNC: 3.6 MG/DL — SIGNIFICANT CHANGE UP (ref 2.5–4.5)
PLATELET # BLD AUTO: 223 K/UL — SIGNIFICANT CHANGE UP (ref 150–400)
POTASSIUM SERPL-MCNC: 4.8 MMOL/L — SIGNIFICANT CHANGE UP (ref 3.5–5.3)
POTASSIUM SERPL-SCNC: 4.8 MMOL/L — SIGNIFICANT CHANGE UP (ref 3.5–5.3)
RBC # BLD: 4.57 M/UL — SIGNIFICANT CHANGE UP (ref 4.2–5.8)
RBC # FLD: 15.9 % — HIGH (ref 10.3–14.5)
SODIUM SERPL-SCNC: 132 MMOL/L — LOW (ref 135–145)
WBC # BLD: 13 K/UL — HIGH (ref 3.8–10.5)
WBC # FLD AUTO: 13 K/UL — HIGH (ref 3.8–10.5)

## 2021-12-09 PROCEDURE — 99233 SBSQ HOSP IP/OBS HIGH 50: CPT

## 2021-12-09 PROCEDURE — 71045 X-RAY EXAM CHEST 1 VIEW: CPT | Mod: 26

## 2021-12-09 RX ORDER — METOPROLOL TARTRATE 50 MG
12.5 TABLET ORAL
Refills: 0 | Status: DISCONTINUED | OUTPATIENT
Start: 2021-12-09 | End: 2021-12-11

## 2021-12-09 RX ORDER — OXYCODONE HYDROCHLORIDE 5 MG/1
5 TABLET ORAL
Refills: 0 | Status: DISCONTINUED | OUTPATIENT
Start: 2021-12-09 | End: 2021-12-14

## 2021-12-09 RX ORDER — FUROSEMIDE 40 MG
10 TABLET ORAL ONCE
Refills: 0 | Status: COMPLETED | OUTPATIENT
Start: 2021-12-09 | End: 2021-12-09

## 2021-12-09 RX ORDER — POLYETHYLENE GLYCOL 3350 17 G/17G
17 POWDER, FOR SOLUTION ORAL
Qty: 0 | Refills: 0 | DISCHARGE
Start: 2021-12-09

## 2021-12-09 RX ORDER — CLOPIDOGREL BISULFATE 75 MG/1
75 TABLET, FILM COATED ORAL DAILY
Refills: 0 | Status: DISCONTINUED | OUTPATIENT
Start: 2021-12-09 | End: 2021-12-14

## 2021-12-09 RX ORDER — SODIUM CHLORIDE 9 MG/ML
500 INJECTION, SOLUTION INTRAVENOUS ONCE
Refills: 0 | Status: COMPLETED | OUTPATIENT
Start: 2021-12-09 | End: 2021-12-09

## 2021-12-09 RX ORDER — METOPROLOL TARTRATE 50 MG
12.5 TABLET ORAL ONCE
Refills: 0 | Status: COMPLETED | OUTPATIENT
Start: 2021-12-09 | End: 2021-12-09

## 2021-12-09 RX ORDER — INFLUENZA VIRUS VACCINE 15; 15; 15; 15 UG/.5ML; UG/.5ML; UG/.5ML; UG/.5ML
0.7 SUSPENSION INTRAMUSCULAR ONCE
Refills: 0 | Status: DISCONTINUED | OUTPATIENT
Start: 2021-12-09 | End: 2021-12-10

## 2021-12-09 RX ORDER — MAGNESIUM SULFATE 500 MG/ML
2 VIAL (ML) INJECTION ONCE
Refills: 0 | Status: COMPLETED | OUTPATIENT
Start: 2021-12-09 | End: 2021-12-09

## 2021-12-09 RX ORDER — OXYCODONE HYDROCHLORIDE 5 MG/1
2 TABLET ORAL
Qty: 40 | Refills: 0
Start: 2021-12-09 | End: 2021-12-13

## 2021-12-09 RX ORDER — SENNA PLUS 8.6 MG/1
2 TABLET ORAL
Qty: 0 | Refills: 0 | DISCHARGE
Start: 2021-12-09

## 2021-12-09 RX ADMIN — Medication 975 MILLIGRAM(S): at 07:38

## 2021-12-09 RX ADMIN — HEPARIN SODIUM 5000 UNIT(S): 5000 INJECTION INTRAVENOUS; SUBCUTANEOUS at 21:18

## 2021-12-09 RX ADMIN — SODIUM CHLORIDE 500 MILLILITER(S): 9 INJECTION, SOLUTION INTRAVENOUS at 00:39

## 2021-12-09 RX ADMIN — ALBUTEROL 2.5 MILLIGRAM(S): 90 AEROSOL, METERED ORAL at 07:47

## 2021-12-09 RX ADMIN — OXYCODONE HYDROCHLORIDE 5 MILLIGRAM(S): 5 TABLET ORAL at 14:50

## 2021-12-09 RX ADMIN — ALBUTEROL 2.5 MILLIGRAM(S): 90 AEROSOL, METERED ORAL at 16:17

## 2021-12-09 RX ADMIN — TAMSULOSIN HYDROCHLORIDE 0.4 MILLIGRAM(S): 0.4 CAPSULE ORAL at 21:18

## 2021-12-09 RX ADMIN — POLYETHYLENE GLYCOL 3350 17 GRAM(S): 17 POWDER, FOR SOLUTION ORAL at 12:56

## 2021-12-09 RX ADMIN — CLOPIDOGREL BISULFATE 75 MILLIGRAM(S): 75 TABLET, FILM COATED ORAL at 09:40

## 2021-12-09 RX ADMIN — SENNA PLUS 2 TABLET(S): 8.6 TABLET ORAL at 21:17

## 2021-12-09 RX ADMIN — HEPARIN SODIUM 5000 UNIT(S): 5000 INJECTION INTRAVENOUS; SUBCUTANEOUS at 06:31

## 2021-12-09 RX ADMIN — ALBUTEROL 2.5 MILLIGRAM(S): 90 AEROSOL, METERED ORAL at 22:22

## 2021-12-09 RX ADMIN — Medication 12.5 MILLIGRAM(S): at 21:17

## 2021-12-09 RX ADMIN — OXYCODONE HYDROCHLORIDE 5 MILLIGRAM(S): 5 TABLET ORAL at 16:54

## 2021-12-09 RX ADMIN — Medication 10 MILLIGRAM(S): at 17:27

## 2021-12-09 RX ADMIN — HYDROMORPHONE HYDROCHLORIDE 30 MILLILITER(S): 2 INJECTION INTRAMUSCULAR; INTRAVENOUS; SUBCUTANEOUS at 07:07

## 2021-12-09 RX ADMIN — Medication 12.5 MILLIGRAM(S): at 12:56

## 2021-12-09 RX ADMIN — ATORVASTATIN CALCIUM 40 MILLIGRAM(S): 80 TABLET, FILM COATED ORAL at 21:17

## 2021-12-09 RX ADMIN — OXYCODONE HYDROCHLORIDE 5 MILLIGRAM(S): 5 TABLET ORAL at 16:55

## 2021-12-09 RX ADMIN — HEPARIN SODIUM 5000 UNIT(S): 5000 INJECTION INTRAVENOUS; SUBCUTANEOUS at 12:55

## 2021-12-09 RX ADMIN — Medication 25 GRAM(S): at 06:51

## 2021-12-09 RX ADMIN — OXYCODONE HYDROCHLORIDE 5 MILLIGRAM(S): 5 TABLET ORAL at 09:40

## 2021-12-09 RX ADMIN — Medication 81 MILLIGRAM(S): at 09:40

## 2021-12-09 NOTE — PHYSICAL THERAPY INITIAL EVALUATION ADULT - DID THE PATIENT HAVE SURGERY?
Wedge resection of right upper lobe of lung with VATS, Pneumonolysis Thoracoscopic biopsy of mediastinal lymph node, Bronchoscopy, flexible/yes

## 2021-12-09 NOTE — DISCHARGE NOTE PROVIDER - CARE PROVIDER_API CALL
Kamaljit Larsen)  Surgery; Thoracic Surgery  [phone] [place] [address], [place] [address]  Phone: [phone]  Fax: [phone]  Follow Up Time:

## 2021-12-09 NOTE — DISCHARGE NOTE PROVIDER - NSDCMRMEDTOKEN_GEN_ALL_CORE_FT
aspirin 81 mg oral tablet: 1 tab(s) orally once a day  atorvastatin 40 mg oral tablet: 1 tab(s) orally once a day  clopidogrel 75 mg oral tablet: 1 tab(s) orally once a day last dose on 12/2/21  CXR PA and LAT s/p RVATS, Wedge Resection: Please give patient a copy and fax results to Dr Larsen [phone]  irbesartan 300 mg oral tablet: 1 tab(s) orally once a day  metoprolol tartrate 25 mg oral tablet: 0.5 tab(s) orally 2 times a day  oxyCODONE 5 mg oral tablet: 1-2 tab(s) orally every 4-6 hours as needed for moderate to severe pain MDD:8  polyethylene glycol 3350 oral powder for reconstitution: 17 gram(s) orally once a day, As Needed for constipation  senna oral tablet: 2 tab(s) orally once a day (at bedtime) As Needed for constipation  tamsulosin 0.4 mg oral capsule: 1 cap(s) orally once a day  Tylenol 325 mg oral tablet: 2 tab(s) orally every 4 hours, As Needed for mild pain   acetaminophen 325 mg oral tablet: 2 tab(s) orally every 6 hours, As needed, Mild Pain (1 - 3)  aspirin 81 mg oral tablet: 1 tab(s) orally once a day  atorvastatin 40 mg oral tablet: 1 tab(s) orally once a day  clopidogrel 75 mg oral tablet: 1 tab(s) orally once a day last dose on 12/2/21  CXR PA and LAT s/p RVATS, Wedge Resection: 1   irbesartan 300 mg oral tablet: 1 tab(s) orally once a day  levoFLOXacin 750 mg oral tablet: 1 tab(s) orally every 24 hours MDD:1  metoprolol tartrate 25 mg oral tablet: 0.5 tab(s) orally 2 times a day  oxyCODONE 5 mg oral tablet: 1-2 tab(s) orally every 4-6 hours as needed for moderate to severe pain MDD:8  polyethylene glycol 3350 oral powder for reconstitution: 17 gram(s) orally once a day, As Needed for constipation  senna oral tablet: 2 tab(s) orally once a day (at bedtime) As Needed for constipation  tamsulosin 0.4 mg oral capsule: 1 cap(s) orally once a day

## 2021-12-09 NOTE — PATIENT PROFILE ADULT - FALL HARM RISK - HARM RISK INTERVENTIONS

## 2021-12-09 NOTE — DISCHARGE NOTE PROVIDER - NSDCFUADDINST_GEN_ALL_CORE_FT
Follow up with Dr. Larsen in 12-14 days. Call Thoracic Surgery office [phone] to schedule an appointment. Please, obtain a CXR 1-2 days prior to your appointment and bring a copy with you.  Please, make an appointment with your PMD and Pulmonologist within 7 days.  Blue Stitch will be removed by Dr. Larsen in the office.  You may shower in 24 hours with dressing and remove in the shower. Keep the wound clean and dry it well after showering.  It’s OK if some fluid comes out of the chest tube hole unless blood or purulent.  No Driving while taking pain meds. Some coughing and discomfort is expected.

## 2021-12-09 NOTE — PHYSICAL THERAPY INITIAL EVALUATION ADULT - ADDITIONAL COMMENTS
Patient lives with girlfriend in private home, 4 steps to enter and no steps to bedroom/bathroom. patient was independent in all ADL prior to admission. Patient was not using assistive device prior to admission.

## 2021-12-09 NOTE — PHYSICAL THERAPY INITIAL EVALUATION ADULT - PERTINENT HX OF CURRENT PROBLEM, REHAB EVAL
69 yo male with hx of CAD with recent stent placed x 3 on 10/2021,  HTN ,HLD,  s/p lung ca s/p LLL lobectomy, treated with chemo and radiation in 2017 is diagnosed with other  non specific abnormal finding of lung field and is scheduled for flexible bronchoscopy ,right video  assisted thoracoscopy ,lung resection with interventional radiology marking

## 2021-12-09 NOTE — DISCHARGE NOTE PROVIDER - NSDCQMSTAIRS_GEN_ALL_CORE
"    [facility] EMERGENCY DEPARTMENT ([facility])     February 9, 2019    History     Chief Complaint   Patient presents with     Headache     reports slipped on ice and fell and hit head without loss of consciousness on tuesday; was seen at outside ER on wednesday and diagnosed with mild concussion; reports lingering headache, neck and back pain, nausea, left arm feels \"weird and light,\" photophobia     HPI  Jolie Anderson is a 28 year old female who presents to the ED with left-sided headache.  Patient reports she 4 days ago, slipped and fell on the ice.  She states that she struck the left side of her head, and was seen at [facility] the following day.  She denies having any imaging studies done and was told that she likely had a mild concussion.  She states that since then she has had worsening headache, nausea, and photophobia.  She describes her headache as pressure-like and radiates to her left side of her neck neck and left upper back.  She states she has been taking Tylenol for pain.  She is also asking for a work note.    PAST MEDICAL HISTORY  History reviewed. No pertinent past medical history.  PAST SURGICAL HISTORY  History reviewed. No pertinent surgical history.  FAMILY HISTORY  History reviewed. No pertinent family history.  SOCIAL HISTORY  Social History     Tobacco Use     Smoking status: Never Smoker     Smokeless tobacco: Never Used   Substance Use Topics     Alcohol use: No     Frequency: Never     MEDICATIONS  No current facility-administered medications for this encounter.      Current Outpatient Medications   Medication     acetaminophen (TYLENOL) 500 MG tablet     traMADol (ULTRAM) 50 MG tablet     traMADol (ULTRAM) 50 MG tablet     ALLERGIES  No Known Allergies    I have reviewed the Medications, Allergies, Past Medical and Surgical History, and Social History in the Epic system.    Review of Systems   Eyes: Positive for photophobia.   Gastrointestinal: Positive for nausea. "   Neurological: Positive for headaches (left sided).   All other systems reviewed and are negative.      Physical Exam   BP: 121/62  Pulse: 85  Temp: 96.1  F (35.6  C)  Resp: 16  Weight: 97.1 kg (214 lb)  SpO2: 98 %      Physical Exam   Constitutional: She is oriented to person, place, and time. No distress.   HENT:   Mouth/Throat: Oropharynx is clear and moist. No oropharyngeal exudate.   Left occipital/parietal tenderness   Eyes: EOM are normal. No scleral icterus.   Neck: Neck supple.   Cardiovascular: Normal rate, regular rhythm and normal heart sounds.   Pulmonary/Chest: Breath sounds normal. No respiratory distress.   Abdominal: Soft. She exhibits no distension. There is no tenderness.   Musculoskeletal: She exhibits no edema or deformity.   Neurological: She is alert and oriented to person, place, and time. No cranial nerve deficit or sensory deficit. Coordination normal.   Skin: Skin is warm and dry. She is not diaphoretic.   Psychiatric: She has a normal mood and affect. Her behavior is normal.       ED Course        Procedures   7:31 PM  The patient was seen and examined by Dr. Carr in Room 4.              Labs Ordered and Resulted from Time of ED Arrival Up to the Time of Departure from the ED - No data to display       Results for orders placed or performed during the hospital encounter of 02/09/19   CT Head w/o Contrast    Narrative    CT SCAN OF THE HEAD WITHOUT CONTRAST   2/9/2019 8:18 PM     HISTORY: Head trauma, delayed recovery, follow up. Persistent  concussions symptoms for 3 days after fall. Left-sided headache.  Slipped 4 days ago and fell on the ice and struck the left side of the  head.    TECHNIQUE:  Axial images of the head and coronal reformations without  IV contrast material. Radiation dose for this scan was reduced using  automated exposure control, adjustment of the mA and/or kV according  to patient size, or iterative reconstruction technique.    COMPARISON: None.    FINDINGS: The  ventricles are normal in size, shape and configuration.   The brain parenchyma and subarachnoid spaces are normal. There is no  evidence of intracranial hemorrhage, mass, acute infarct or anomaly.     There is scattered mucosal thickening in the paranasal sinuses. There  is no evidence of trauma.      Impression    IMPRESSION: Normal CT scan of the head.      ALEISHA MARRERO MD       Assessments & Plan (with Medical Decision Making)   28-year-old female presents to us with a chief complaint of persistent pain after head injury.  Differential includes but not limited to concussion, migraine headache, skull fracture, intracranial bleed.  Patient's general exam is unremarkable.  CT scan shows no evidence of trauma or other abnormality.  Symptoms are likely postconcussive in nature.  Patient had been seen by [employer]. and will continue to follow-up with them.  She also has a appointment with her primary care provider in 2 days.  We will give her a small prescription for tramadol for additional pain relief and a work note for a few more days off.  I have reviewed the nursing notes.    I have reviewed the findings, diagnosis, plan and need for follow up with the patient.       Medication List      Started    * traMADol 50 MG tablet  Commonly known as:  ULTRAM  50 mg, Oral, EVERY 4 HOURS PRN     * traMADol 50 MG tablet  Commonly known as:  ULTRAM  50 mg, Oral, EVERY 4 HOURS PRN         * This list has 2 medication(s) that are the same as other medications prescribed for you. Read the directions carefully, and ask your doctor or other care provider to review them with you.                Final diagnoses:   Postconcussive syndrome     Micky AGUERO, am serving as a trained medical scribe to document services personally performed by  Edison Carr DO, based on the provider's statements to me.      Edison AGUERO DO, was physically present and have reviewed and verified the accuracy of this note documented by  Micky Miranda.     2/9/2019   [facility], [place], EMERGENCY DEPARTMENT     Edison Carr,   02/09/19 [id number]     No

## 2021-12-09 NOTE — DISCHARGE NOTE PROVIDER - HOSPITAL COURSE
HPI:  71 yo male with hx of CAD with recent stent placed x 3 on 10/2021,  HTN ,HLD,  s/p lung ca s/p LLL lobectomy, treated with chemo and radiation in 2017 is diagnosed with other  non specific abnormal finding of lung field and is scheduled for flexible bronchoscopy ,right video  assisted thoracoscopy ,lung resection with interventional radiology marking (24 Nov 2021 09:06). Pt underwent FB, RVATS, RUL Wedge resection on 12/08/2021. Pt tolerated the procedure well. He was discharged after chest tube was removed.    HPI:  71 yo male with hx of CAD with recent stent placed x 3 on 10/2021,  HTN ,HLD,  s/p lung ca s/p LLL lobectomy, treated with chemo and radiation in 2017 is diagnosed with other  non specific abnormal finding of lung field and is scheduled for flexible bronchoscopy, right video  assisted thoracoscopy, lung resection with interventional radiology marking (24 Nov 2021 09:06). Pt underwent FB, RVATS, RUL Wedge resection on 12/08/2021. Pt tolerated the procedure well. He was discharged after chest tube was removed.    71 yo male with hx of CAD with recent stent placed x 3 on 10/2021,  HTN ,HLD,  s/p lung ca s/p LLL lobectomy, treated with chemo and radiation in 2017 is diagnosed with other  non specific abnormal finding of lung field and is scheduled for flexible bronchoscopy, right video  assisted thoracoscopy, lung resection with interventional radiology marking (24 Nov 2021 09:06). Pt underwent FB, RVATS, RUL Wedge resection on 12/08/2021. Pt tolerated the procedure well.  Postoperatively, pt developed pneumonia and was placed on antibiotics. He was discharged after chest tube was removed.

## 2021-12-09 NOTE — PATIENT PROFILE ADULT - INTERNATIONAL TRAVEL
What Type Of Note Output Would You Prefer (Optional)?: Bullet Format
Hpi Title: Evaluation of a Skin Lesion
How Severe Are Your Spot(S)?: mild
Have Your Spot(S) Been Treated In The Past?: has not been treated
No

## 2021-12-09 NOTE — DISCHARGE NOTE PROVIDER - NSDCACTIVITY_GEN_ALL_CORE
Showering allowed/Stairs allowed/Walking - Indoors allowed/No heavy lifting/straining/Walking - Outdoors allowed/Follow Instructions Provided by your Surgical Team Do not drive or operate machinery/Showering allowed/Stairs allowed/Walking - Indoors allowed/No heavy lifting/straining/Walking - Outdoors allowed/Follow Instructions Provided by your Surgical Team

## 2021-12-09 NOTE — DISCHARGE NOTE PROVIDER - NSDCCPTREATMENT_GEN_ALL_CORE_FT
PRINCIPAL PROCEDURE  Procedure: VATS, with lung wedge resection  Findings and Treatment: R VATS/RUL Wedge      SECONDARY PROCEDURE  Procedure: Thoracoscopic biopsy of mediastinal lymph node  Findings and Treatment:

## 2021-12-10 LAB
ANION GAP SERPL CALC-SCNC: 11 MMOL/L — SIGNIFICANT CHANGE UP (ref 7–14)
ANION GAP SERPL CALC-SCNC: 9 MMOL/L — SIGNIFICANT CHANGE UP (ref 7–14)
BUN SERPL-MCNC: 26 MG/DL — HIGH (ref 7–23)
BUN SERPL-MCNC: 28 MG/DL — HIGH (ref 7–23)
CA-I BLD-SCNC: 1.11 MMOL/L — LOW (ref 1.15–1.29)
CALCIUM SERPL-MCNC: 9.1 MG/DL — SIGNIFICANT CHANGE UP (ref 8.4–10.5)
CALCIUM SERPL-MCNC: 9.5 MG/DL — SIGNIFICANT CHANGE UP (ref 8.4–10.5)
CHLORIDE SERPL-SCNC: 98 MMOL/L — SIGNIFICANT CHANGE UP (ref 98–107)
CHLORIDE SERPL-SCNC: 98 MMOL/L — SIGNIFICANT CHANGE UP (ref 98–107)
CO2 SERPL-SCNC: 24 MMOL/L — SIGNIFICANT CHANGE UP (ref 22–31)
CO2 SERPL-SCNC: 28 MMOL/L — SIGNIFICANT CHANGE UP (ref 22–31)
CREAT SERPL-MCNC: 1.27 MG/DL — SIGNIFICANT CHANGE UP (ref 0.5–1.3)
CREAT SERPL-MCNC: 1.46 MG/DL — HIGH (ref 0.5–1.3)
GLUCOSE SERPL-MCNC: 111 MG/DL — HIGH (ref 70–99)
GLUCOSE SERPL-MCNC: 90 MG/DL — SIGNIFICANT CHANGE UP (ref 70–99)
HCT VFR BLD CALC: 43.3 % — SIGNIFICANT CHANGE UP (ref 39–50)
HGB BLD-MCNC: 14 G/DL — SIGNIFICANT CHANGE UP (ref 13–17)
MAGNESIUM SERPL-MCNC: 1.9 MG/DL — SIGNIFICANT CHANGE UP (ref 1.6–2.6)
MAGNESIUM SERPL-MCNC: 2.3 MG/DL — SIGNIFICANT CHANGE UP (ref 1.6–2.6)
MCHC RBC-ENTMCNC: 29.7 PG — SIGNIFICANT CHANGE UP (ref 27–34)
MCHC RBC-ENTMCNC: 32.3 GM/DL — SIGNIFICANT CHANGE UP (ref 32–36)
MCV RBC AUTO: 91.7 FL — SIGNIFICANT CHANGE UP (ref 80–100)
NRBC # BLD: 0 /100 WBCS — SIGNIFICANT CHANGE UP
NRBC # FLD: 0 K/UL — SIGNIFICANT CHANGE UP
NT-PROBNP SERPL-SCNC: 2719 PG/ML — HIGH
PHOSPHATE SERPL-MCNC: 2.2 MG/DL — LOW (ref 2.5–4.5)
PHOSPHATE SERPL-MCNC: 2.5 MG/DL — SIGNIFICANT CHANGE UP (ref 2.5–4.5)
PLATELET # BLD AUTO: 232 K/UL — SIGNIFICANT CHANGE UP (ref 150–400)
POTASSIUM SERPL-MCNC: 4.3 MMOL/L — SIGNIFICANT CHANGE UP (ref 3.5–5.3)
POTASSIUM SERPL-MCNC: 5 MMOL/L — SIGNIFICANT CHANGE UP (ref 3.5–5.3)
POTASSIUM SERPL-SCNC: 4.3 MMOL/L — SIGNIFICANT CHANGE UP (ref 3.5–5.3)
POTASSIUM SERPL-SCNC: 5 MMOL/L — SIGNIFICANT CHANGE UP (ref 3.5–5.3)
RBC # BLD: 4.72 M/UL — SIGNIFICANT CHANGE UP (ref 4.2–5.8)
RBC # FLD: 15.8 % — HIGH (ref 10.3–14.5)
SODIUM SERPL-SCNC: 133 MMOL/L — LOW (ref 135–145)
SODIUM SERPL-SCNC: 135 MMOL/L — SIGNIFICANT CHANGE UP (ref 135–145)
WBC # BLD: 13.64 K/UL — HIGH (ref 3.8–10.5)
WBC # FLD AUTO: 13.64 K/UL — HIGH (ref 3.8–10.5)

## 2021-12-10 PROCEDURE — 99233 SBSQ HOSP IP/OBS HIGH 50: CPT

## 2021-12-10 PROCEDURE — 71045 X-RAY EXAM CHEST 1 VIEW: CPT | Mod: 26

## 2021-12-10 RX ORDER — ALBUTEROL 90 UG/1
2.5 AEROSOL, METERED ORAL EVERY 6 HOURS
Refills: 0 | Status: DISCONTINUED | OUTPATIENT
Start: 2021-12-10 | End: 2021-12-14

## 2021-12-10 RX ORDER — ACETAMINOPHEN 500 MG
650 TABLET ORAL EVERY 6 HOURS
Refills: 0 | Status: DISCONTINUED | OUTPATIENT
Start: 2021-12-10 | End: 2021-12-14

## 2021-12-10 RX ORDER — MAGNESIUM SULFATE 500 MG/ML
2 VIAL (ML) INJECTION ONCE
Refills: 0 | Status: COMPLETED | OUTPATIENT
Start: 2021-12-10 | End: 2021-12-10

## 2021-12-10 RX ORDER — CALCIUM GLUCONATE 100 MG/ML
1 VIAL (ML) INTRAVENOUS ONCE
Refills: 0 | Status: COMPLETED | OUTPATIENT
Start: 2021-12-10 | End: 2021-12-10

## 2021-12-10 RX ORDER — FUROSEMIDE 40 MG
20 TABLET ORAL ONCE
Refills: 0 | Status: COMPLETED | OUTPATIENT
Start: 2021-12-10 | End: 2021-12-10

## 2021-12-10 RX ORDER — DORNASE ALFA 1 MG/ML
2.5 SOLUTION RESPIRATORY (INHALATION)
Refills: 0 | Status: DISCONTINUED | OUTPATIENT
Start: 2021-12-10 | End: 2021-12-14

## 2021-12-10 RX ORDER — SODIUM CHLORIDE 9 MG/ML
4 INJECTION INTRAMUSCULAR; INTRAVENOUS; SUBCUTANEOUS EVERY 6 HOURS
Refills: 0 | Status: DISCONTINUED | OUTPATIENT
Start: 2021-12-10 | End: 2021-12-14

## 2021-12-10 RX ORDER — METOPROLOL TARTRATE 50 MG
12.5 TABLET ORAL ONCE
Refills: 0 | Status: COMPLETED | OUTPATIENT
Start: 2021-12-10 | End: 2021-12-10

## 2021-12-10 RX ADMIN — Medication 650 MILLIGRAM(S): at 10:32

## 2021-12-10 RX ADMIN — HEPARIN SODIUM 5000 UNIT(S): 5000 INJECTION INTRAVENOUS; SUBCUTANEOUS at 14:08

## 2021-12-10 RX ADMIN — OXYCODONE HYDROCHLORIDE 5 MILLIGRAM(S): 5 TABLET ORAL at 10:32

## 2021-12-10 RX ADMIN — ALBUTEROL 2.5 MILLIGRAM(S): 90 AEROSOL, METERED ORAL at 22:23

## 2021-12-10 RX ADMIN — Medication 650 MILLIGRAM(S): at 20:05

## 2021-12-10 RX ADMIN — ATORVASTATIN CALCIUM 40 MILLIGRAM(S): 80 TABLET, FILM COATED ORAL at 22:04

## 2021-12-10 RX ADMIN — SODIUM CHLORIDE 4 MILLILITER(S): 9 INJECTION INTRAMUSCULAR; INTRAVENOUS; SUBCUTANEOUS at 15:53

## 2021-12-10 RX ADMIN — HEPARIN SODIUM 5000 UNIT(S): 5000 INJECTION INTRAVENOUS; SUBCUTANEOUS at 05:50

## 2021-12-10 RX ADMIN — ALBUTEROL 2.5 MILLIGRAM(S): 90 AEROSOL, METERED ORAL at 10:48

## 2021-12-10 RX ADMIN — Medication 12.5 MILLIGRAM(S): at 17:51

## 2021-12-10 RX ADMIN — Medication 20 MILLIGRAM(S): at 06:08

## 2021-12-10 RX ADMIN — CLOPIDOGREL BISULFATE 75 MILLIGRAM(S): 75 TABLET, FILM COATED ORAL at 11:37

## 2021-12-10 RX ADMIN — Medication 100 GRAM(S): at 05:54

## 2021-12-10 RX ADMIN — Medication 25 GRAM(S): at 07:09

## 2021-12-10 RX ADMIN — ALBUTEROL 2.5 MILLIGRAM(S): 90 AEROSOL, METERED ORAL at 15:54

## 2021-12-10 RX ADMIN — SODIUM CHLORIDE 4 MILLILITER(S): 9 INJECTION INTRAMUSCULAR; INTRAVENOUS; SUBCUTANEOUS at 22:23

## 2021-12-10 RX ADMIN — Medication 650 MILLIGRAM(S): at 10:37

## 2021-12-10 RX ADMIN — HEPARIN SODIUM 5000 UNIT(S): 5000 INJECTION INTRAVENOUS; SUBCUTANEOUS at 22:04

## 2021-12-10 RX ADMIN — POLYETHYLENE GLYCOL 3350 17 GRAM(S): 17 POWDER, FOR SOLUTION ORAL at 11:38

## 2021-12-10 RX ADMIN — Medication 12.5 MILLIGRAM(S): at 05:49

## 2021-12-10 RX ADMIN — Medication 12.5 MILLIGRAM(S): at 07:01

## 2021-12-10 RX ADMIN — TAMSULOSIN HYDROCHLORIDE 0.4 MILLIGRAM(S): 0.4 CAPSULE ORAL at 22:04

## 2021-12-10 RX ADMIN — Medication 650 MILLIGRAM(S): at 21:05

## 2021-12-10 RX ADMIN — Medication 81 MILLIGRAM(S): at 11:38

## 2021-12-10 RX ADMIN — ALBUTEROL 2.5 MILLIGRAM(S): 90 AEROSOL, METERED ORAL at 07:16

## 2021-12-10 RX ADMIN — DORNASE ALFA 2.5 MILLIGRAM(S): 1 SOLUTION RESPIRATORY (INHALATION) at 22:24

## 2021-12-10 RX ADMIN — SENNA PLUS 2 TABLET(S): 8.6 TABLET ORAL at 22:04

## 2021-12-10 RX ADMIN — OXYCODONE HYDROCHLORIDE 5 MILLIGRAM(S): 5 TABLET ORAL at 10:37

## 2021-12-10 RX ADMIN — SODIUM CHLORIDE 4 MILLILITER(S): 9 INJECTION INTRAMUSCULAR; INTRAVENOUS; SUBCUTANEOUS at 10:49

## 2021-12-10 NOTE — DIETITIAN INITIAL EVALUATION ADULT. - PERTINENT MEDS FT
MEDICATIONS  (STANDING):  ALBUTerol    0.083% 2.5 milliGRAM(s) Nebulizer every 6 hours  aspirin  chewable 81 milliGRAM(s) Oral daily  atorvastatin 40 milliGRAM(s) Oral at bedtime  clopidogrel Tablet 75 milliGRAM(s) Oral daily  dornase lucia Solution 2.5 milliGRAM(s) Inhalation two times a day  heparin   Injectable 5000 Unit(s) SubCutaneous every 8 hours  metoprolol tartrate 12.5 milliGRAM(s) Oral two times a day  polyethylene glycol 3350 17 Gram(s) Oral daily  senna 2 Tablet(s) Oral at bedtime  sodium chloride 3%  Inhalation 4 milliLiter(s) Inhalation every 6 hours  tamsulosin 0.4 milliGRAM(s) Oral at bedtime

## 2021-12-10 NOTE — DIETITIAN INITIAL EVALUATION ADULT. - OTHER INFO
Per chart---69 yo male with hx of CAD with recent stent placed x 3 on 10/2021,  HTN ,HLD,  s/p lung ca s/p LLL lobectomy, treated with chemo and radiation in 2017 is diagnosed with other  non specific abnormal finding of lung field and is scheduled for flexible bronchoscopy ,right video  assisted thoracoscopy ,lung resection with interventional radiology marking (24 Nov 2021 09:06)  PROCEDURES: Right upper lobe wedge resection, Level 3A lymph node, pneumolysis 08-Dec-2021     Pt on Regular textured diet since 12/8, remains with fair/good PO intakes. Denies chewing, swallowing difficulties or any nausea, vomiting, diarrhea, constipation. Last BM 12/8. Pt with no nutritional related concerns, does not want any nutritional supplements added at this time. Per chart---69 yo male with hx of CAD with recent stent placed x 3 on 10/2021,  HTN ,HLD,  s/p lung ca s/p LLL lobectomy, treated with chemo and radiation in 2017 is diagnosed with other  non specific abnormal finding of lung field and is scheduled for flexible bronchoscopy ,right video  assisted thoracoscopy ,lung resection with interventional radiology marking (24 Nov 2021 09:06)  PROCEDURES: Right upper lobe wedge resection, Level 3A lymph node, pneumolysis 08-Dec-2021     Pt on Regular textured diet since 12/8, remains with fair/good PO intakes. Denies chewing, swallowing difficulties or any nausea, vomiting, diarrhea, constipation. Last BM 12/8. Pt does not want any nutritional supplements added at this time.

## 2021-12-10 NOTE — DIETITIAN INITIAL EVALUATION ADULT. - ADD RECOMMEND
1. Liberalized diet and honor food preferences to facilitate improved PO.  2. Recommend daily multivitamin supplement.

## 2021-12-11 LAB
ANION GAP SERPL CALC-SCNC: 12 MMOL/L — SIGNIFICANT CHANGE UP (ref 7–14)
BASOPHILS # BLD AUTO: 0.05 K/UL — SIGNIFICANT CHANGE UP (ref 0–0.2)
BASOPHILS NFR BLD AUTO: 0.5 % — SIGNIFICANT CHANGE UP (ref 0–2)
BUN SERPL-MCNC: 25 MG/DL — HIGH (ref 7–23)
CA-I BLD-SCNC: 0.96 MMOL/L — LOW (ref 1.15–1.29)
CALCIUM SERPL-MCNC: 9.1 MG/DL — SIGNIFICANT CHANGE UP (ref 8.4–10.5)
CHLORIDE SERPL-SCNC: 99 MMOL/L — SIGNIFICANT CHANGE UP (ref 98–107)
CO2 SERPL-SCNC: 19 MMOL/L — LOW (ref 22–31)
CREAT SERPL-MCNC: 1.2 MG/DL — SIGNIFICANT CHANGE UP (ref 0.5–1.3)
EOSINOPHIL # BLD AUTO: 0.54 K/UL — HIGH (ref 0–0.5)
EOSINOPHIL NFR BLD AUTO: 5.8 % — SIGNIFICANT CHANGE UP (ref 0–6)
GLUCOSE SERPL-MCNC: 106 MG/DL — HIGH (ref 70–99)
HCT VFR BLD CALC: 44.8 % — SIGNIFICANT CHANGE UP (ref 39–50)
HGB BLD-MCNC: 13.9 G/DL — SIGNIFICANT CHANGE UP (ref 13–17)
IANC: 6.12 K/UL — SIGNIFICANT CHANGE UP (ref 1.5–8.5)
IMM GRANULOCYTES NFR BLD AUTO: 0.3 % — SIGNIFICANT CHANGE UP (ref 0–1.5)
LYMPHOCYTES # BLD AUTO: 1.16 K/UL — SIGNIFICANT CHANGE UP (ref 1–3.3)
LYMPHOCYTES # BLD AUTO: 12.6 % — LOW (ref 13–44)
MAGNESIUM SERPL-MCNC: 2 MG/DL — SIGNIFICANT CHANGE UP (ref 1.6–2.6)
MCHC RBC-ENTMCNC: 29.1 PG — SIGNIFICANT CHANGE UP (ref 27–34)
MCHC RBC-ENTMCNC: 31 GM/DL — LOW (ref 32–36)
MCV RBC AUTO: 93.9 FL — SIGNIFICANT CHANGE UP (ref 80–100)
MONOCYTES # BLD AUTO: 1.34 K/UL — HIGH (ref 0–0.9)
MONOCYTES NFR BLD AUTO: 14.5 % — HIGH (ref 2–14)
NEUTROPHILS # BLD AUTO: 6.12 K/UL — SIGNIFICANT CHANGE UP (ref 1.8–7.4)
NEUTROPHILS NFR BLD AUTO: 66.3 % — SIGNIFICANT CHANGE UP (ref 43–77)
NRBC # BLD: 0 /100 WBCS — SIGNIFICANT CHANGE UP
NRBC # FLD: 0 K/UL — SIGNIFICANT CHANGE UP
PHOSPHATE SERPL-MCNC: 3 MG/DL — SIGNIFICANT CHANGE UP (ref 2.5–4.5)
PLATELET # BLD AUTO: 200 K/UL — SIGNIFICANT CHANGE UP (ref 150–400)
POTASSIUM SERPL-MCNC: 5.3 MMOL/L — SIGNIFICANT CHANGE UP (ref 3.5–5.3)
POTASSIUM SERPL-SCNC: 5.3 MMOL/L — SIGNIFICANT CHANGE UP (ref 3.5–5.3)
RBC # BLD: 4.77 M/UL — SIGNIFICANT CHANGE UP (ref 4.2–5.8)
RBC # FLD: 15.9 % — HIGH (ref 10.3–14.5)
SODIUM SERPL-SCNC: 130 MMOL/L — LOW (ref 135–145)
WBC # BLD: 9.24 K/UL — SIGNIFICANT CHANGE UP (ref 3.8–10.5)
WBC # FLD AUTO: 9.24 K/UL — SIGNIFICANT CHANGE UP (ref 3.8–10.5)

## 2021-12-11 PROCEDURE — 71045 X-RAY EXAM CHEST 1 VIEW: CPT | Mod: 26

## 2021-12-11 PROCEDURE — 99233 SBSQ HOSP IP/OBS HIGH 50: CPT

## 2021-12-11 RX ORDER — MAGNESIUM SULFATE 500 MG/ML
1 VIAL (ML) INJECTION ONCE
Refills: 0 | Status: COMPLETED | OUTPATIENT
Start: 2021-12-11 | End: 2021-12-11

## 2021-12-11 RX ORDER — CALCIUM GLUCONATE 100 MG/ML
2 VIAL (ML) INTRAVENOUS ONCE
Refills: 0 | Status: COMPLETED | OUTPATIENT
Start: 2021-12-11 | End: 2021-12-11

## 2021-12-11 RX ORDER — CALCIUM GLUCONATE 100 MG/ML
1 VIAL (ML) INTRAVENOUS ONCE
Refills: 0 | Status: COMPLETED | OUTPATIENT
Start: 2021-12-11 | End: 2021-12-11

## 2021-12-11 RX ORDER — ALBUMIN HUMAN 25 %
250 VIAL (ML) INTRAVENOUS ONCE
Refills: 0 | Status: COMPLETED | OUTPATIENT
Start: 2021-12-11 | End: 2021-12-11

## 2021-12-11 RX ORDER — LIDOCAINE 4 G/100G
1 CREAM TOPICAL DAILY
Refills: 0 | Status: DISCONTINUED | OUTPATIENT
Start: 2021-12-11 | End: 2021-12-14

## 2021-12-11 RX ORDER — METOPROLOL TARTRATE 50 MG
25 TABLET ORAL
Refills: 0 | Status: DISCONTINUED | OUTPATIENT
Start: 2021-12-11 | End: 2021-12-12

## 2021-12-11 RX ADMIN — Medication 650 MILLIGRAM(S): at 18:00

## 2021-12-11 RX ADMIN — Medication 650 MILLIGRAM(S): at 22:36

## 2021-12-11 RX ADMIN — HEPARIN SODIUM 5000 UNIT(S): 5000 INJECTION INTRAVENOUS; SUBCUTANEOUS at 13:00

## 2021-12-11 RX ADMIN — Medication 200 GRAM(S): at 06:41

## 2021-12-11 RX ADMIN — Medication 650 MILLIGRAM(S): at 03:09

## 2021-12-11 RX ADMIN — ALBUTEROL 2.5 MILLIGRAM(S): 90 AEROSOL, METERED ORAL at 16:09

## 2021-12-11 RX ADMIN — Medication 125 MILLILITER(S): at 14:15

## 2021-12-11 RX ADMIN — Medication 100 GRAM(S): at 12:55

## 2021-12-11 RX ADMIN — Medication 650 MILLIGRAM(S): at 23:36

## 2021-12-11 RX ADMIN — ATORVASTATIN CALCIUM 40 MILLIGRAM(S): 80 TABLET, FILM COATED ORAL at 22:35

## 2021-12-11 RX ADMIN — SODIUM CHLORIDE 4 MILLILITER(S): 9 INJECTION INTRAMUSCULAR; INTRAVENOUS; SUBCUTANEOUS at 04:36

## 2021-12-11 RX ADMIN — HEPARIN SODIUM 5000 UNIT(S): 5000 INJECTION INTRAVENOUS; SUBCUTANEOUS at 22:35

## 2021-12-11 RX ADMIN — Medication 650 MILLIGRAM(S): at 02:05

## 2021-12-11 RX ADMIN — HEPARIN SODIUM 5000 UNIT(S): 5000 INJECTION INTRAVENOUS; SUBCUTANEOUS at 05:21

## 2021-12-11 RX ADMIN — ALBUTEROL 2.5 MILLIGRAM(S): 90 AEROSOL, METERED ORAL at 10:07

## 2021-12-11 RX ADMIN — Medication 25 MILLIGRAM(S): at 05:20

## 2021-12-11 RX ADMIN — SODIUM CHLORIDE 4 MILLILITER(S): 9 INJECTION INTRAMUSCULAR; INTRAVENOUS; SUBCUTANEOUS at 10:07

## 2021-12-11 RX ADMIN — LIDOCAINE 1 PATCH: 4 CREAM TOPICAL at 10:14

## 2021-12-11 RX ADMIN — Medication 650 MILLIGRAM(S): at 10:26

## 2021-12-11 RX ADMIN — Medication 100 GRAM(S): at 05:50

## 2021-12-11 RX ADMIN — SENNA PLUS 2 TABLET(S): 8.6 TABLET ORAL at 22:34

## 2021-12-11 RX ADMIN — Medication 100 GRAM(S): at 12:20

## 2021-12-11 RX ADMIN — Medication 650 MILLIGRAM(S): at 17:24

## 2021-12-11 RX ADMIN — LIDOCAINE 1 PATCH: 4 CREAM TOPICAL at 22:22

## 2021-12-11 RX ADMIN — Medication 81 MILLIGRAM(S): at 12:56

## 2021-12-11 RX ADMIN — Medication 650 MILLIGRAM(S): at 10:56

## 2021-12-11 RX ADMIN — LIDOCAINE 1 PATCH: 4 CREAM TOPICAL at 19:00

## 2021-12-11 RX ADMIN — ALBUTEROL 2.5 MILLIGRAM(S): 90 AEROSOL, METERED ORAL at 04:35

## 2021-12-11 RX ADMIN — TAMSULOSIN HYDROCHLORIDE 0.4 MILLIGRAM(S): 0.4 CAPSULE ORAL at 22:35

## 2021-12-11 RX ADMIN — CLOPIDOGREL BISULFATE 75 MILLIGRAM(S): 75 TABLET, FILM COATED ORAL at 12:56

## 2021-12-11 RX ADMIN — POLYETHYLENE GLYCOL 3350 17 GRAM(S): 17 POWDER, FOR SOLUTION ORAL at 12:56

## 2021-12-11 RX ADMIN — ALBUTEROL 2.5 MILLIGRAM(S): 90 AEROSOL, METERED ORAL at 22:23

## 2021-12-11 RX ADMIN — Medication 25 MILLIGRAM(S): at 17:25

## 2021-12-11 RX ADMIN — DORNASE ALFA 2.5 MILLIGRAM(S): 1 SOLUTION RESPIRATORY (INHALATION) at 22:32

## 2021-12-11 RX ADMIN — SODIUM CHLORIDE 4 MILLILITER(S): 9 INJECTION INTRAMUSCULAR; INTRAVENOUS; SUBCUTANEOUS at 16:09

## 2021-12-11 RX ADMIN — SODIUM CHLORIDE 4 MILLILITER(S): 9 INJECTION INTRAMUSCULAR; INTRAVENOUS; SUBCUTANEOUS at 22:24

## 2021-12-12 LAB
ANION GAP SERPL CALC-SCNC: 11 MMOL/L — SIGNIFICANT CHANGE UP (ref 7–14)
BUN SERPL-MCNC: 23 MG/DL — SIGNIFICANT CHANGE UP (ref 7–23)
CA-I BLD-SCNC: 1.2 MMOL/L — SIGNIFICANT CHANGE UP (ref 1.15–1.29)
CALCIUM SERPL-MCNC: 8.8 MG/DL — SIGNIFICANT CHANGE UP (ref 8.4–10.5)
CHLORIDE SERPL-SCNC: 100 MMOL/L — SIGNIFICANT CHANGE UP (ref 98–107)
CO2 SERPL-SCNC: 24 MMOL/L — SIGNIFICANT CHANGE UP (ref 22–31)
CREAT SERPL-MCNC: 1.21 MG/DL — SIGNIFICANT CHANGE UP (ref 0.5–1.3)
GLUCOSE SERPL-MCNC: 118 MG/DL — HIGH (ref 70–99)
GRAM STN FLD: SIGNIFICANT CHANGE UP
MAGNESIUM SERPL-MCNC: 2 MG/DL — SIGNIFICANT CHANGE UP (ref 1.6–2.6)
PHOSPHATE SERPL-MCNC: 3.1 MG/DL — SIGNIFICANT CHANGE UP (ref 2.5–4.5)
POTASSIUM SERPL-MCNC: 4 MMOL/L — SIGNIFICANT CHANGE UP (ref 3.5–5.3)
POTASSIUM SERPL-SCNC: 4 MMOL/L — SIGNIFICANT CHANGE UP (ref 3.5–5.3)
SODIUM SERPL-SCNC: 135 MMOL/L — SIGNIFICANT CHANGE UP (ref 135–145)
SPECIMEN SOURCE: SIGNIFICANT CHANGE UP

## 2021-12-12 PROCEDURE — 71045 X-RAY EXAM CHEST 1 VIEW: CPT | Mod: 26,77

## 2021-12-12 PROCEDURE — 71045 X-RAY EXAM CHEST 1 VIEW: CPT | Mod: 26,76

## 2021-12-12 PROCEDURE — 99233 SBSQ HOSP IP/OBS HIGH 50: CPT

## 2021-12-12 RX ORDER — PIPERACILLIN AND TAZOBACTAM 4; .5 G/20ML; G/20ML
3.38 INJECTION, POWDER, LYOPHILIZED, FOR SOLUTION INTRAVENOUS ONCE
Refills: 0 | Status: COMPLETED | OUTPATIENT
Start: 2021-12-12 | End: 2021-12-12

## 2021-12-12 RX ORDER — FUROSEMIDE 40 MG
20 TABLET ORAL ONCE
Refills: 0 | Status: COMPLETED | OUTPATIENT
Start: 2021-12-12 | End: 2021-12-12

## 2021-12-12 RX ORDER — METOPROLOL TARTRATE 50 MG
25 TABLET ORAL EVERY 8 HOURS
Refills: 0 | Status: DISCONTINUED | OUTPATIENT
Start: 2021-12-12 | End: 2021-12-14

## 2021-12-12 RX ORDER — VANCOMYCIN HCL 1 G
1000 VIAL (EA) INTRAVENOUS EVERY 12 HOURS
Refills: 0 | Status: DISCONTINUED | OUTPATIENT
Start: 2021-12-12 | End: 2021-12-14

## 2021-12-12 RX ORDER — PIPERACILLIN AND TAZOBACTAM 4; .5 G/20ML; G/20ML
3.38 INJECTION, POWDER, LYOPHILIZED, FOR SOLUTION INTRAVENOUS EVERY 8 HOURS
Refills: 0 | Status: DISCONTINUED | OUTPATIENT
Start: 2021-12-12 | End: 2021-12-14

## 2021-12-12 RX ADMIN — HEPARIN SODIUM 5000 UNIT(S): 5000 INJECTION INTRAVENOUS; SUBCUTANEOUS at 21:48

## 2021-12-12 RX ADMIN — DORNASE ALFA 2.5 MILLIGRAM(S): 1 SOLUTION RESPIRATORY (INHALATION) at 10:37

## 2021-12-12 RX ADMIN — Medication 20 MILLIGRAM(S): at 06:58

## 2021-12-12 RX ADMIN — ALBUTEROL 2.5 MILLIGRAM(S): 90 AEROSOL, METERED ORAL at 22:05

## 2021-12-12 RX ADMIN — Medication 81 MILLIGRAM(S): at 13:59

## 2021-12-12 RX ADMIN — PIPERACILLIN AND TAZOBACTAM 200 GRAM(S): 4; .5 INJECTION, POWDER, LYOPHILIZED, FOR SOLUTION INTRAVENOUS at 10:30

## 2021-12-12 RX ADMIN — HEPARIN SODIUM 5000 UNIT(S): 5000 INJECTION INTRAVENOUS; SUBCUTANEOUS at 05:12

## 2021-12-12 RX ADMIN — ALBUTEROL 2.5 MILLIGRAM(S): 90 AEROSOL, METERED ORAL at 16:13

## 2021-12-12 RX ADMIN — SODIUM CHLORIDE 4 MILLILITER(S): 9 INJECTION INTRAMUSCULAR; INTRAVENOUS; SUBCUTANEOUS at 22:05

## 2021-12-12 RX ADMIN — TAMSULOSIN HYDROCHLORIDE 0.4 MILLIGRAM(S): 0.4 CAPSULE ORAL at 21:48

## 2021-12-12 RX ADMIN — Medication 25 MILLIGRAM(S): at 13:58

## 2021-12-12 RX ADMIN — HEPARIN SODIUM 5000 UNIT(S): 5000 INJECTION INTRAVENOUS; SUBCUTANEOUS at 14:00

## 2021-12-12 RX ADMIN — ALBUTEROL 2.5 MILLIGRAM(S): 90 AEROSOL, METERED ORAL at 04:32

## 2021-12-12 RX ADMIN — Medication 250 MILLIGRAM(S): at 17:52

## 2021-12-12 RX ADMIN — Medication 650 MILLIGRAM(S): at 14:34

## 2021-12-12 RX ADMIN — ALBUTEROL 2.5 MILLIGRAM(S): 90 AEROSOL, METERED ORAL at 10:36

## 2021-12-12 RX ADMIN — SODIUM CHLORIDE 4 MILLILITER(S): 9 INJECTION INTRAMUSCULAR; INTRAVENOUS; SUBCUTANEOUS at 10:37

## 2021-12-12 RX ADMIN — PIPERACILLIN AND TAZOBACTAM 25 GRAM(S): 4; .5 INJECTION, POWDER, LYOPHILIZED, FOR SOLUTION INTRAVENOUS at 17:46

## 2021-12-12 RX ADMIN — Medication 25 MILLIGRAM(S): at 21:48

## 2021-12-12 RX ADMIN — Medication 650 MILLIGRAM(S): at 09:00

## 2021-12-12 RX ADMIN — ATORVASTATIN CALCIUM 40 MILLIGRAM(S): 80 TABLET, FILM COATED ORAL at 21:48

## 2021-12-12 RX ADMIN — CLOPIDOGREL BISULFATE 75 MILLIGRAM(S): 75 TABLET, FILM COATED ORAL at 13:59

## 2021-12-12 RX ADMIN — SODIUM CHLORIDE 4 MILLILITER(S): 9 INJECTION INTRAMUSCULAR; INTRAVENOUS; SUBCUTANEOUS at 16:13

## 2021-12-12 RX ADMIN — Medication 650 MILLIGRAM(S): at 15:00

## 2021-12-12 RX ADMIN — SODIUM CHLORIDE 4 MILLILITER(S): 9 INJECTION INTRAMUSCULAR; INTRAVENOUS; SUBCUTANEOUS at 04:32

## 2021-12-12 RX ADMIN — Medication 25 MILLIGRAM(S): at 05:12

## 2021-12-12 RX ADMIN — Medication 650 MILLIGRAM(S): at 08:31

## 2021-12-12 RX ADMIN — DORNASE ALFA 2.5 MILLIGRAM(S): 1 SOLUTION RESPIRATORY (INHALATION) at 22:05

## 2021-12-13 LAB
ANION GAP SERPL CALC-SCNC: 12 MMOL/L — SIGNIFICANT CHANGE UP (ref 7–14)
BUN SERPL-MCNC: 23 MG/DL — SIGNIFICANT CHANGE UP (ref 7–23)
CALCIUM SERPL-MCNC: 9.2 MG/DL — SIGNIFICANT CHANGE UP (ref 8.4–10.5)
CHLORIDE SERPL-SCNC: 99 MMOL/L — SIGNIFICANT CHANGE UP (ref 98–107)
CO2 SERPL-SCNC: 24 MMOL/L — SIGNIFICANT CHANGE UP (ref 22–31)
CREAT SERPL-MCNC: 1.27 MG/DL — SIGNIFICANT CHANGE UP (ref 0.5–1.3)
GLUCOSE SERPL-MCNC: 127 MG/DL — HIGH (ref 70–99)
HCT VFR BLD CALC: 40.7 % — SIGNIFICANT CHANGE UP (ref 39–50)
HGB BLD-MCNC: 13.3 G/DL — SIGNIFICANT CHANGE UP (ref 13–17)
MAGNESIUM SERPL-MCNC: 1.8 MG/DL — SIGNIFICANT CHANGE UP (ref 1.6–2.6)
MCHC RBC-ENTMCNC: 29.3 PG — SIGNIFICANT CHANGE UP (ref 27–34)
MCHC RBC-ENTMCNC: 32.7 GM/DL — SIGNIFICANT CHANGE UP (ref 32–36)
MCV RBC AUTO: 89.6 FL — SIGNIFICANT CHANGE UP (ref 80–100)
NRBC # BLD: 0 /100 WBCS — SIGNIFICANT CHANGE UP
NRBC # FLD: 0 K/UL — SIGNIFICANT CHANGE UP
PHOSPHATE SERPL-MCNC: 3 MG/DL — SIGNIFICANT CHANGE UP (ref 2.5–4.5)
PLATELET # BLD AUTO: 261 K/UL — SIGNIFICANT CHANGE UP (ref 150–400)
POTASSIUM SERPL-MCNC: 3.7 MMOL/L — SIGNIFICANT CHANGE UP (ref 3.5–5.3)
POTASSIUM SERPL-SCNC: 3.7 MMOL/L — SIGNIFICANT CHANGE UP (ref 3.5–5.3)
RBC # BLD: 4.54 M/UL — SIGNIFICANT CHANGE UP (ref 4.2–5.8)
RBC # FLD: 15.5 % — HIGH (ref 10.3–14.5)
SODIUM SERPL-SCNC: 135 MMOL/L — SIGNIFICANT CHANGE UP (ref 135–145)
SURGICAL PATHOLOGY STUDY: SIGNIFICANT CHANGE UP
WBC # BLD: 11.08 K/UL — HIGH (ref 3.8–10.5)
WBC # FLD AUTO: 11.08 K/UL — HIGH (ref 3.8–10.5)

## 2021-12-13 PROCEDURE — 99233 SBSQ HOSP IP/OBS HIGH 50: CPT

## 2021-12-13 PROCEDURE — 71045 X-RAY EXAM CHEST 1 VIEW: CPT | Mod: 26

## 2021-12-13 RX ORDER — POTASSIUM CHLORIDE 20 MEQ
40 PACKET (EA) ORAL ONCE
Refills: 0 | Status: COMPLETED | OUTPATIENT
Start: 2021-12-13 | End: 2021-12-13

## 2021-12-13 RX ORDER — MAGNESIUM SULFATE 500 MG/ML
2 VIAL (ML) INJECTION ONCE
Refills: 0 | Status: COMPLETED | OUTPATIENT
Start: 2021-12-13 | End: 2021-12-13

## 2021-12-13 RX ADMIN — ALBUTEROL 2.5 MILLIGRAM(S): 90 AEROSOL, METERED ORAL at 09:36

## 2021-12-13 RX ADMIN — SODIUM CHLORIDE 4 MILLILITER(S): 9 INJECTION INTRAMUSCULAR; INTRAVENOUS; SUBCUTANEOUS at 22:30

## 2021-12-13 RX ADMIN — CLOPIDOGREL BISULFATE 75 MILLIGRAM(S): 75 TABLET, FILM COATED ORAL at 11:49

## 2021-12-13 RX ADMIN — SODIUM CHLORIDE 4 MILLILITER(S): 9 INJECTION INTRAMUSCULAR; INTRAVENOUS; SUBCUTANEOUS at 15:19

## 2021-12-13 RX ADMIN — Medication 250 MILLIGRAM(S): at 17:12

## 2021-12-13 RX ADMIN — DORNASE ALFA 2.5 MILLIGRAM(S): 1 SOLUTION RESPIRATORY (INHALATION) at 09:36

## 2021-12-13 RX ADMIN — PIPERACILLIN AND TAZOBACTAM 25 GRAM(S): 4; .5 INJECTION, POWDER, LYOPHILIZED, FOR SOLUTION INTRAVENOUS at 03:00

## 2021-12-13 RX ADMIN — PIPERACILLIN AND TAZOBACTAM 25 GRAM(S): 4; .5 INJECTION, POWDER, LYOPHILIZED, FOR SOLUTION INTRAVENOUS at 18:14

## 2021-12-13 RX ADMIN — SODIUM CHLORIDE 4 MILLILITER(S): 9 INJECTION INTRAMUSCULAR; INTRAVENOUS; SUBCUTANEOUS at 04:11

## 2021-12-13 RX ADMIN — Medication 650 MILLIGRAM(S): at 07:45

## 2021-12-13 RX ADMIN — LIDOCAINE 1 PATCH: 4 CREAM TOPICAL at 11:52

## 2021-12-13 RX ADMIN — HEPARIN SODIUM 5000 UNIT(S): 5000 INJECTION INTRAVENOUS; SUBCUTANEOUS at 15:25

## 2021-12-13 RX ADMIN — ALBUTEROL 2.5 MILLIGRAM(S): 90 AEROSOL, METERED ORAL at 15:19

## 2021-12-13 RX ADMIN — PIPERACILLIN AND TAZOBACTAM 25 GRAM(S): 4; .5 INJECTION, POWDER, LYOPHILIZED, FOR SOLUTION INTRAVENOUS at 10:23

## 2021-12-13 RX ADMIN — LIDOCAINE 1 PATCH: 4 CREAM TOPICAL at 23:05

## 2021-12-13 RX ADMIN — DORNASE ALFA 2.5 MILLIGRAM(S): 1 SOLUTION RESPIRATORY (INHALATION) at 22:30

## 2021-12-13 RX ADMIN — Medication 250 MILLIGRAM(S): at 05:19

## 2021-12-13 RX ADMIN — Medication 25 MILLIGRAM(S): at 05:19

## 2021-12-13 RX ADMIN — ALBUTEROL 2.5 MILLIGRAM(S): 90 AEROSOL, METERED ORAL at 22:30

## 2021-12-13 RX ADMIN — HEPARIN SODIUM 5000 UNIT(S): 5000 INJECTION INTRAVENOUS; SUBCUTANEOUS at 05:20

## 2021-12-13 RX ADMIN — Medication 650 MILLIGRAM(S): at 08:15

## 2021-12-13 RX ADMIN — LIDOCAINE 1 PATCH: 4 CREAM TOPICAL at 20:59

## 2021-12-13 RX ADMIN — Medication 81 MILLIGRAM(S): at 11:51

## 2021-12-13 RX ADMIN — SODIUM CHLORIDE 4 MILLILITER(S): 9 INJECTION INTRAMUSCULAR; INTRAVENOUS; SUBCUTANEOUS at 09:36

## 2021-12-13 RX ADMIN — Medication 40 MILLIEQUIVALENT(S): at 07:43

## 2021-12-13 RX ADMIN — ALBUTEROL 2.5 MILLIGRAM(S): 90 AEROSOL, METERED ORAL at 04:11

## 2021-12-13 RX ADMIN — Medication 25 GRAM(S): at 07:50

## 2021-12-13 RX ADMIN — Medication 25 MILLIGRAM(S): at 15:25

## 2021-12-14 ENCOUNTER — TRANSCRIPTION ENCOUNTER (OUTPATIENT)
Age: 71
End: 2021-12-14

## 2021-12-14 VITALS — OXYGEN SATURATION: 96 %

## 2021-12-14 LAB
ANION GAP SERPL CALC-SCNC: 11 MMOL/L — SIGNIFICANT CHANGE UP (ref 7–14)
BUN SERPL-MCNC: 19 MG/DL — SIGNIFICANT CHANGE UP (ref 7–23)
CALCIUM SERPL-MCNC: 9.3 MG/DL — SIGNIFICANT CHANGE UP (ref 8.4–10.5)
CHLORIDE SERPL-SCNC: 99 MMOL/L — SIGNIFICANT CHANGE UP (ref 98–107)
CO2 SERPL-SCNC: 24 MMOL/L — SIGNIFICANT CHANGE UP (ref 22–31)
CREAT SERPL-MCNC: 1.25 MG/DL — SIGNIFICANT CHANGE UP (ref 0.5–1.3)
CULTURE RESULTS: SIGNIFICANT CHANGE UP
CULTURE RESULTS: SIGNIFICANT CHANGE UP
GLUCOSE SERPL-MCNC: 114 MG/DL — HIGH (ref 70–99)
HCT VFR BLD CALC: 42.1 % — SIGNIFICANT CHANGE UP (ref 39–50)
HGB BLD-MCNC: 13.6 G/DL — SIGNIFICANT CHANGE UP (ref 13–17)
MAGNESIUM SERPL-MCNC: 2.1 MG/DL — SIGNIFICANT CHANGE UP (ref 1.6–2.6)
MCHC RBC-ENTMCNC: 29.4 PG — SIGNIFICANT CHANGE UP (ref 27–34)
MCHC RBC-ENTMCNC: 32.3 GM/DL — SIGNIFICANT CHANGE UP (ref 32–36)
MCV RBC AUTO: 90.9 FL — SIGNIFICANT CHANGE UP (ref 80–100)
NRBC # BLD: 0 /100 WBCS — SIGNIFICANT CHANGE UP
NRBC # FLD: 0 K/UL — SIGNIFICANT CHANGE UP
PHOSPHATE SERPL-MCNC: 3 MG/DL — SIGNIFICANT CHANGE UP (ref 2.5–4.5)
PLATELET # BLD AUTO: 268 K/UL — SIGNIFICANT CHANGE UP (ref 150–400)
POTASSIUM SERPL-MCNC: 4.2 MMOL/L — SIGNIFICANT CHANGE UP (ref 3.5–5.3)
POTASSIUM SERPL-SCNC: 4.2 MMOL/L — SIGNIFICANT CHANGE UP (ref 3.5–5.3)
RBC # BLD: 4.63 M/UL — SIGNIFICANT CHANGE UP (ref 4.2–5.8)
RBC # FLD: 15.5 % — HIGH (ref 10.3–14.5)
SODIUM SERPL-SCNC: 134 MMOL/L — LOW (ref 135–145)
SPECIMEN SOURCE: SIGNIFICANT CHANGE UP
SPECIMEN SOURCE: SIGNIFICANT CHANGE UP
VANCOMYCIN TROUGH SERPL-MCNC: 13.6 UG/ML — SIGNIFICANT CHANGE UP (ref 10–20)
WBC # BLD: 10.81 K/UL — HIGH (ref 3.8–10.5)
WBC # FLD AUTO: 10.81 K/UL — HIGH (ref 3.8–10.5)

## 2021-12-14 PROCEDURE — 71045 X-RAY EXAM CHEST 1 VIEW: CPT | Mod: 26

## 2021-12-14 PROCEDURE — 99233 SBSQ HOSP IP/OBS HIGH 50: CPT

## 2021-12-14 RX ORDER — ACETAMINOPHEN 500 MG
2 TABLET ORAL
Qty: 0 | Refills: 0 | DISCHARGE

## 2021-12-14 RX ORDER — ACETAMINOPHEN 500 MG
2 TABLET ORAL
Qty: 0 | Refills: 0 | DISCHARGE
Start: 2021-12-14

## 2021-12-14 RX ORDER — CIPROFLOXACIN LACTATE 400MG/40ML
1 VIAL (ML) INTRAVENOUS
Qty: 4 | Refills: 0
Start: 2021-12-14 | End: 2021-12-17

## 2021-12-14 RX ADMIN — PIPERACILLIN AND TAZOBACTAM 25 GRAM(S): 4; .5 INJECTION, POWDER, LYOPHILIZED, FOR SOLUTION INTRAVENOUS at 02:08

## 2021-12-14 RX ADMIN — DORNASE ALFA 2.5 MILLIGRAM(S): 1 SOLUTION RESPIRATORY (INHALATION) at 08:37

## 2021-12-14 RX ADMIN — CLOPIDOGREL BISULFATE 75 MILLIGRAM(S): 75 TABLET, FILM COATED ORAL at 11:12

## 2021-12-14 RX ADMIN — ALBUTEROL 2.5 MILLIGRAM(S): 90 AEROSOL, METERED ORAL at 15:22

## 2021-12-14 RX ADMIN — Medication 81 MILLIGRAM(S): at 11:12

## 2021-12-14 RX ADMIN — HEPARIN SODIUM 5000 UNIT(S): 5000 INJECTION INTRAVENOUS; SUBCUTANEOUS at 13:05

## 2021-12-14 RX ADMIN — Medication 25 MILLIGRAM(S): at 06:07

## 2021-12-14 RX ADMIN — Medication 25 MILLIGRAM(S): at 13:05

## 2021-12-14 RX ADMIN — HEPARIN SODIUM 5000 UNIT(S): 5000 INJECTION INTRAVENOUS; SUBCUTANEOUS at 06:06

## 2021-12-14 RX ADMIN — ALBUTEROL 2.5 MILLIGRAM(S): 90 AEROSOL, METERED ORAL at 08:36

## 2021-12-14 RX ADMIN — ALBUTEROL 2.5 MILLIGRAM(S): 90 AEROSOL, METERED ORAL at 03:57

## 2021-12-14 RX ADMIN — SODIUM CHLORIDE 4 MILLILITER(S): 9 INJECTION INTRAMUSCULAR; INTRAVENOUS; SUBCUTANEOUS at 08:37

## 2021-12-14 RX ADMIN — SODIUM CHLORIDE 4 MILLILITER(S): 9 INJECTION INTRAMUSCULAR; INTRAVENOUS; SUBCUTANEOUS at 03:57

## 2021-12-14 RX ADMIN — SODIUM CHLORIDE 4 MILLILITER(S): 9 INJECTION INTRAMUSCULAR; INTRAVENOUS; SUBCUTANEOUS at 15:22

## 2021-12-14 NOTE — PROGRESS NOTE ADULT - SUBJECTIVE AND OBJECTIVE BOX
CHIEF COMPLAINT: FOLLOW UP IN ICU FOR POSTOPERATIVE CARE OF PATIENT WHO IS S/P LUNG RESECTION      PROCEDURES: Right upper lobe wedge resection, Level 3A lymph node, pneumolysis 08-Dec-2021       ISSUES:   Lung nodule  Post op pain  Chest tube in place  CAD s/p stent x3 (10/2021) on ASA and plavix  HTN  HLD  Hx of lung cancer s/p LLL lobectomy and chemo radiation (2017)  CHF  COPD  Hx of Atrial flutter  GERD      INTERVAL EVENTS:   OR today. Extubated in OR. Transferred to CTICU.      HISTORY:   Patient reports moderate pain at chest wall incision sites which is worse with coughing and deep breathing without associated fever or dyspnea. Pain is improved with use of pain meds.     PHYSICAL EXAM:   Gen: Comfortable, No acute distress  Eyes: Sclera white, Conjunctiva normal, Eyelids normal, Pupils symmetrical   ENT: Mucous membranes moist,  ,  ,    Neck: Trachea midline,  ,  ,  ,  ,  ,    CV: Rate regular, Rhythm regular,  ,  ,    Resp: Breath sounds clear, No accessory muscles use, R chest tube in place,  ,    Abd: Soft, Non-distended, Non-tender, Bowel sounds normal,  ,  ,    Skin: Warm, No peripheral edema of lower extremities,  ,    : No darden  Neuro: Moving all 4 extremities,    Psych: A&Ox3      ASSESSMENT AND PLAN:     NEURO:  Post-operative Pain - Stable. Pain control with PCA and Tylenol IV PRN.       RESPIRATORY:  Hypoxia - Wean nasal cannula for goal O2sat above 92. Obtain CXR. Incentive spirometry. Chest PT and frequent suctioning. Continue bronchodilators. OOB to chair & ambulate w/ assistance. Continuous pulse oximetry for support & to prevent decompensation.       Chest tube – Pleurevac water seal. Monitor chest tube output.    COPD - worsened by recent thoracic surgery. Continue home inhalers.                              CARDIOVASCULAR:  Hemodynamically stable - Not on pressors. Continue hemodynamic monitoring.  Telemetry (medical test) - Reviewed by me today independently. Normal sinus rhythm.  HTN - stable. Hold home antihypertensives for now.  CAD - stable. Continue aspirin. Hold plavix.       CHF - stable. maintain euvolemia.          RENAL:  Stable - Monitor IOs and electrolytes. Keep K above 4.0 and Mg above 2.0.              GASTROINTESTINAL:  GI prophylaxis not indicated  Zofran and Reglan IV PRN for nausea  Regular consistency diet                     HEMATOLOGIC:  No signs of active bleeding. Monitor Hgb in CBC in AM  DVT prophylaxis with heparin subQ and SCDs.               INFECTIOUS DISEASE:  All surgical sites appear clean. Will monitor for fever and leukocytosis.                      ENDOCRINE:  Stable – Monitor glucose fingersticks for goal 120-180.               ONCOLOGY:  Lung nodule - Improved. S/P resection. Follow up final pathology.      Pertinent clinical, laboratory, radiographic, hemodynamic, echocardiographic, respiratory data, microbiologic data and chart were reviewed by myself and analyzed frequently throughout the course of the day and night by myself.    Plan discussed at length with the CTICU staff and Attending CT Surgeon -   Dr Kamaljit Larsen      Patient's status was discussed with patient at bedside.           ________________________________________________    _________________________  VITAL SIGNS:  Vital Signs Last 24 Hrs  T(C): 35.8 (08 Dec 2021 20:00), Max: 36.7 (08 Dec 2021 07:45)  T(F): 96.5 (08 Dec 2021 20:00), Max: 98.1 (08 Dec 2021 10:14)  HR: 88 (08 Dec 2021 20:00) (66 - 95)  BP: 104/69 (08 Dec 2021 17:00) (104/63 - 108/80)  BP(mean): 77 (08 Dec 2021 17:00) (74 - 86)  RR: 15 (08 Dec 2021 20:00) (15 - 22)  SpO2: 96% (08 Dec 2021 20:00) (94% - 98%)  I/Os:   I&O's Detail    08 Dec 2021 07:01  -  08 Dec 2021 22:31  --------------------------------------------------------  IN:    Lactated Ringers: 180 mL  Total IN: 180 mL    OUT:    Chest Tube (mL): 0 mL    Voided (mL): 300 mL  Total OUT: 300 mL    Total NET: -120 mL              MEDICATIONS:  MEDICATIONS  (STANDING):  ALBUTerol    0.083% 2.5 milliGRAM(s) Nebulizer every 8 hours  aspirin  chewable 81 milliGRAM(s) Oral daily  atorvastatin 40 milliGRAM(s) Oral at bedtime  heparin   Injectable 5000 Unit(s) SubCutaneous every 8 hours  HYDROmorphone PCA (1 mG/mL) 30 milliLiter(s) PCA Continuous PCA Continuous  lactated ringers. 1000 milliLiter(s) (30 mL/Hr) IV Continuous <Continuous>  polyethylene glycol 3350 17 Gram(s) Oral daily  senna 2 Tablet(s) Oral at bedtime  tamsulosin 0.4 milliGRAM(s) Oral at bedtime    MEDICATIONS  (PRN):  butorphanol Injectable 0.125 milliGRAM(s) IV Push every 6 hours PRN Pruritus  HYDROmorphone PCA (1 mG/mL) Rescue Clinician Bolus 0.5 milliGRAM(s) IV Push every 15 minutes PRN for Pain Scale GREATER THAN 6  naloxone Injectable 0.1 milliGRAM(s) IV Push every 3 minutes PRN For ANY of the following changes in patient status:  A. RR LESS THAN 10 breaths per minute, B. Oxygen saturation LESS THAN 90%, C. Sedation score of 6  ondansetron Injectable 4 milliGRAM(s) IV Push every 6 hours PRN Nausea      LABS:  Laboratory data was independently reviewed by me today.                       RADIOLOGY:   Radiology images were independently reviewed by me today. Reports were reviewed by me today.      
  CHIEF COMPLAINT: FOLLOW UP IN ICU FOR POSTOPERATIVE CARE OF PATIENT WHO IS S/P LUNG RESECTION      PROCEDURES: Right upper lobe wedge resection, Level 3A lymph node, pneumolysis 08-Dec-2021       ISSUES:   Lung nodule  Post op pain  CAD s/p stent x3 (10/2021) on ASA and plavix  HTN  HLD  Hx of lung cancer s/p LLL lobectomy and chemo radiation (2017)  CHF  COPD  Hx of Atrial flutter  GERD      INTERVAL EVENTS:   Worsening L lung atelectasis.  Started on vanc and zosyn. Cultures sputum ordered        HISTORY:   Patient reports moderate pain at chest wall incision sites which is worse with coughing and deep breathing without associated fever or dyspnea. Pain is improved with use of pain meds.     PHYSICAL EXAM:   Gen: Comfortable, No acute distress  Eyes: Sclera white, Conjunctiva normal, Eyelids normal, Pupils symmetrical   ENT: Mucous membranes moist,  ,  ,    Neck: Trachea midline,  ,  ,  ,  ,  ,    CV: Rate regular, Rhythm regular,  ,  ,    Resp: Breath sounds clear, No accessory muscles use,  Abd: Soft, Non-distended, Non-tender, Bowel sounds normal,  ,  ,    Skin: Warm, No peripheral edema of lower extremities,  ,    : No darden  Neuro: Moving all 4 extremities,    Psych: A&Ox3      ASSESSMENT AND PLAN:     NEURO:  Post-operative Pain - Stable. Pain control with oxycodone and Tylenol IV PRN.       RESPIRATORY:  Hypoxia - Wean nasal cannula for goal O2sat above 92. Obtain CXR. Incentive spirometry. Chest PT and frequent suctioning. Continue bronchodilators. OOB to chair & ambulate w/ assistance. Continuous pulse oximetry for support & to prevent decompensation.   Arrange for Home O2 for COPD.    COPD - worsened by recent thoracic surgery. Continue home inhalers.         CARDIOVASCULAR:  Hemodynamically stable - Not on pressors. Continue hemodynamic monitoring.  Telemetry (medical test) - Reviewed by me today independently. Normal sinus rhythm.  HTN - stable. continue metoprolol.   CAD - stable. Continue aspirin and plavix       CHF - euvolemic currently.          RENAL:  CKD - Renally dose medications. Avoid nephrotoxic meds. Monitor IOs and electrolytes. Keep K above 4.0 and Mg above 2.0.              GASTROINTESTINAL:  GI prophylaxis not indicated  Zofran and Reglan IV PRN for nausea  Regular consistency diet          HEMATOLOGIC:  No signs of active bleeding. Monitor Hgb in CBC in AM  DVT prophylaxis with heparin subQ and SCDs.               INFECTIOUS DISEASE:  All surgical sites appear clean. Will monitor for fever and leukocytosis.     Atelectasis and PNA - Vanc and zosyn. Monitor vancomycin levels. Send Sputum culture and send MRSA nares.  May need bronchoscopy if lung does not reexpand.                 ENDOCRINE:  Stable – Monitor glucose fingersticks for goal 120-180.               ONCOLOGY:  Lung nodule - Improved. S/P resection. Follow up final pathology.      Pertinent clinical, laboratory, radiographic, hemodynamic, echocardiographic, respiratory data, microbiologic data and chart were reviewed by myself and analyzed frequently throughout the course of the day and night by myself.    Plan discussed at length with the CTICU staff and Attending CT Surgeon -   Dr Terrance Sahu      Patient's status was discussed with patient at bedside.    _________________________  VITAL SIGNS:  Vital Signs Last 24 Hrs  T(C): 36.8 (12 Dec 2021 08:00), Max: 37.2 (11 Dec 2021 20:00)  T(F): 98.3 (12 Dec 2021 08:00), Max: 98.9 (11 Dec 2021 20:00)  HR: 99 (12 Dec 2021 11:00) (78 - 113)  BP: 95/60 (12 Dec 2021 11:00) (81/46 - 127/65)  BP(mean): 69 (12 Dec 2021 11:00) (54 - 88)  RR: 23 (12 Dec 2021 11:00) (14 - 27)  SpO2: 98% (12 Dec 2021 11:00) (88% - 100%)  I/Os:   I&O's Detail    11 Dec 2021 07:01  -  12 Dec 2021 07:00  --------------------------------------------------------  IN:    IV PiggyBack: 400 mL    Oral Fluid: 600 mL  Total IN: 1000 mL    OUT:    Voided (mL): 850 mL  Total OUT: 850 mL    Total NET: 150 mL      12 Dec 2021 07:01  -  12 Dec 2021 11:43  --------------------------------------------------------  IN:    Oral Fluid: 120 mL  Total IN: 120 mL    OUT:    Voided (mL): 475 mL  Total OUT: 475 mL    Total NET: -355 mL              MEDICATIONS:  MEDICATIONS  (STANDING):  ALBUTerol    0.083% 2.5 milliGRAM(s) Nebulizer every 6 hours  aspirin  chewable 81 milliGRAM(s) Oral daily  atorvastatin 40 milliGRAM(s) Oral at bedtime  clopidogrel Tablet 75 milliGRAM(s) Oral daily  dornase lucia Solution 2.5 milliGRAM(s) Inhalation two times a day  heparin   Injectable 5000 Unit(s) SubCutaneous every 8 hours  lidocaine   4% Patch 1 Patch Transdermal daily  metoprolol tartrate 25 milliGRAM(s) Oral every 8 hours  piperacillin/tazobactam IVPB.. 3.375 Gram(s) IV Intermittent every 8 hours  polyethylene glycol 3350 17 Gram(s) Oral daily  senna 2 Tablet(s) Oral at bedtime  sodium chloride 3%  Inhalation 4 milliLiter(s) Inhalation every 6 hours  tamsulosin 0.4 milliGRAM(s) Oral at bedtime  vancomycin  IVPB 1000 milliGRAM(s) IV Intermittent every 12 hours    MEDICATIONS  (PRN):  acetaminophen     Tablet .. 650 milliGRAM(s) Oral every 6 hours PRN Mild Pain (1 - 3)  naloxone Injectable 0.1 milliGRAM(s) IV Push every 3 minutes PRN For ANY of the following changes in patient status:  A. RR LESS THAN 10 breaths per minute, B. Oxygen saturation LESS THAN 90%, C. Sedation score of 6  ondansetron Injectable 4 milliGRAM(s) IV Push every 6 hours PRN Nausea  oxyCODONE    IR 5 milliGRAM(s) Oral every 3 hours PRN Moderate and Severe Pain (4- 10)      LABS:  Laboratory data was independently reviewed by me today.                           13.9   9.24  )-----------( 200      ( 11 Dec 2021 02:58 )             44.8     12-12    135  |  100  |  23  ----------------------------<  118<H>  4.0   |  24  |  1.21    Ca    8.8      12 Dec 2021 03:30  Phos  3.1     12-12  Mg     2.00     12-12                RADIOLOGY:   Radiology images were independently reviewed by me today. Reports were reviewed by me today.    Xray Chest 1 View- PORTABLE-Routine:   ACC: [id number] EXAM:  XR CHEST PORTABLE ROUTINE 1V                          PROCEDURE DATE:  12/12/2021          INTERPRETATION:  DATE OF STUDY: 12/12/21    PRIOR: 12/11/21    CLINICAL INDICATION: S/P VATS    TECHNIQUE: portable chest.    FINDINGS/  IMPRESSION:  The heart is not enlarged.  Left-sided port with tip in the SVC.  Persistent bilateral interstitial edema changes.  Mild increase in left pleural effusion -associated left basilar   atelectasis.  No pneumothorax.    --- End of Report ---            RAOUL PATEL MD; Attending Radiologist  This document has been electronically signed. Dec 12 2021  9:11AM (12-12-21 @ 05:47)  Xray Chest 1 View- PORTABLE-Routine:   ACC: [id number] EXAM:  XR CHEST PORTABLE ROUTINE 1V                          PROCEDURE DATE:  12/11/2021          INTERPRETATION:  DATE OF STUDY: 12/11/21    PRIOR:12/10/21    CLINICAL INDICATION: S/P right VATS    TECHNIQUE: portable chest.    FINDINGS/  IMPRESSION:  The cardiomediastinal silhouette is within normal limits.  Left-sided port with tip in SVC.  Mild improvement in interstitial edema.  Interval decrease in left pleural effusion.  No pneumothorax.    --- End of Report ---            RAOUL PATEL MD; Attending Radiologist  This document has been electronically signed. Dec 11 2021  4:28PM (12-11-21 @ 05:27)  Xray Chest 1 View- PORTABLE-Routine:   ACC: [id number] EXAM:  XR CHEST PORTABLE ROUTINE 1V                          PROCEDURE DATE:  12/10/2021          INTERPRETATION:  TIME OF EXAM: December 10, 2021 at 5:00 AM    CLINICAL INFORMATION: Follow-up post right upper lobectomy    TECHNIQUE: Portable chest    INTERPRETATION:    Lungs continue to show bilateral diffuse interstitial opacities   presumably edema. Small left effusion is present appears increased from   the study of the day before. No pneumothorax. Heart size is stable and   doesn't appear enlarged.      COMPARISON:  December 9      IMPRESSION:  Follow-up post right thoracotomy with suspected interstitial   edema.    --- End of Report ---            ADIEL CONRAD MD; Attending Radiologist  This document has been electronically signed. Dec 10 2021 10:48AM (12-10-21 @ 05:00)  
Anesthesia Pain Management Service    SUBJECTIVE: Patient is doing well with IV PCA and no significant problems reported. Pain is worse w/ movement. Chest tube recently removed    Pain Scale Score 	At rest: ___ 	With Activity: _7/10_ 	[X ] Refer to charted pain scores    THERAPY:    [ ] IV PCA Morphine		[ ] 5 mg/mL	[ ] 1 mg/mL  [X ] IV PCA Hydromorphone	[ ] 5 mg/mL	[X ] 1 mg/mL  [ ] IV PCA Fentanyl		[ ] 50 micrograms/mL    Demand dose __0.2_ lockout __6_ (minutes) Continuous Rate _0__ Total: _5.1_   mg used (in past 24 hrs)      MEDICATIONS  (STANDING):  ALBUTerol    0.083% 2.5 milliGRAM(s) Nebulizer every 8 hours  aspirin  chewable 81 milliGRAM(s) Oral daily  atorvastatin 40 milliGRAM(s) Oral at bedtime  clopidogrel Tablet 75 milliGRAM(s) Oral daily  heparin   Injectable 5000 Unit(s) SubCutaneous every 8 hours  lactated ringers. 1000 milliLiter(s) (30 mL/Hr) IV Continuous <Continuous>  polyethylene glycol 3350 17 Gram(s) Oral daily  senna 2 Tablet(s) Oral at bedtime  tamsulosin 0.4 milliGRAM(s) Oral at bedtime    MEDICATIONS  (PRN):  naloxone Injectable 0.1 milliGRAM(s) IV Push every 3 minutes PRN For ANY of the following changes in patient status:  A. RR LESS THAN 10 breaths per minute, B. Oxygen saturation LESS THAN 90%, C. Sedation score of 6  ondansetron Injectable 4 milliGRAM(s) IV Push every 6 hours PRN Nausea  oxyCODONE    IR 5 milliGRAM(s) Oral every 3 hours PRN Moderate and Severe Pain (4- 10)      OBJECTIVE: Pt resting comfortably in bed, A&O x3    Sedation Score:	[ X] Alert	[ ] Drowsy 	[ ] Arousable	[ ] Asleep	[ ] Unresponsive    Side Effects:	[X ] None	[ ] Nausea	[ ] Vomiting	[ ] Pruritus  		[ ] Other:    Vital Signs Last 24 Hrs  T(C): 36.2 (09 Dec 2021 07:00), Max: 36.2 (09 Dec 2021 04:00)  T(F): 97.2 (09 Dec 2021 07:00), Max: 97.2 (09 Dec 2021 04:00)  HR: 99 (09 Dec 2021 10:00) (80 - 115)  BP: 95/61 (09 Dec 2021 10:00) (90/55 - 108/80)  BP(mean): 67 (09 Dec 2021 10:00) (65 - 86)  RR: 18 (09 Dec 2021 10:00) (12 - 24)  SpO2: 93% (09 Dec 2021 10:00) (84% - 100%)    ASSESSMENT/ PLAN    Therapy to  be:	[ ] Continue   [ X] Discontinued   [X ] Change to prn Analgesics    Documentation and Verification of current medications:   [X] Done	[ ] Not done, not elligible    Comments: IV PCA discontinued. Discussed with primary team. PRN Oral/IV opioids and/or Adjuvant non-opioid medication to be ordered at this point.    Progress Note written now but Patient was seen earlier.
DELFINO GONZALEZ                     [id number]    HPI:  71 yo male with hx of CAD with recent stent placed x 3 on 10/2021,  HTN ,HLD,  s/p lung ca s/p LLL lobectomy, treated with chemo and radiation in 2017 is diagnosed with other  non specific abnormal finding of lung field and is scheduled for flexible bronchoscopy ,right video  assisted thoracoscopy ,lung resection with interventional radiology marking (24 Nov 2021 09:06)    PROCEDURES: Right upper lobe wedge resection, Level 3A lymph node, pneumolysis 08-Dec-2021       ISSUES:   Lung nodule  Post op pain  Chest tube in place  CAD s/p stent x3 (10/2021) on ASA and plavix  HTN  HLD  Hx of lung cancer s/p LLL lobectomy and chemo radiation (2017)  CHF  COPD  Hx of Atrial flutter  GERD      PAST MEDICAL & SURGICAL HISTORY:  HTN (hypertension)    Smoking history  Quit 2017    Lung cancer  s/p LLL Lobectomy, radiation, chemotherapy    GERD (gastroesophageal reflux disease)    Atrial flutter  10/2017 off Eliquis (MD aware)    Other and unspecified ventral hernia with obstruction, without gangrene    Other nonspecific abnormal finding of lung field    CAD (coronary artery disease)  Stents placed x3 10/2021    H/O inguinal hernia repair  Bilateral in the 1970&#x27;s    History of chemotherapy  left chest wall infusaport placement    Status post lobectomy of lung  LLL lobectomy at [facility]    S/P pericardial surgery  For an effusion in 2019              VITAL SIGNS:  Vital Signs Last 24 Hrs  T(C): 36.3 (09 Dec 2021 11:00), Max: 36.3 (09 Dec 2021 11:00)  T(F): 97.4 (09 Dec 2021 11:00), Max: 97.4 (09 Dec 2021 11:00)  HR: 96 (09 Dec 2021 11:00) (80 - 115)  BP: 95/59 (09 Dec 2021 11:00) (90/55 - 108/80)  BP(mean): 67 (09 Dec 2021 10:00) (65 - 86)  RR: 21 (09 Dec 2021 11:00) (12 - 24)  SpO2: 93% (09 Dec 2021 11:00) (84% - 100%)    I/Os:   I&O's Detail    08 Dec 2021 07:01  -  09 Dec 2021 07:00  --------------------------------------------------------  IN:    IV PiggyBack: 50 mL    Lactated Ringers: 450 mL    Lactated Ringers Bolus: 500 mL    Oral Fluid: 240 mL  Total IN: 1240 mL    OUT:    Chest Tube (mL): 5 mL    Voided (mL): 500 mL  Total OUT: 505 mL    Total NET: 735 mL      09 Dec 2021 07:01  -  09 Dec 2021 11:33  --------------------------------------------------------  IN:    Lactated Ringers: 90 mL    Oral Fluid: 240 mL  Total IN: 330 mL    OUT:  Total OUT: 0 mL    Total NET: 330 mL          CAPILLARY BLOOD GLUCOSE          =======================MEDICATIONS===================  MEDICATIONS  (STANDING):  ALBUTerol    0.083% 2.5 milliGRAM(s) Nebulizer every 8 hours  aspirin  chewable 81 milliGRAM(s) Oral daily  atorvastatin 40 milliGRAM(s) Oral at bedtime  clopidogrel Tablet 75 milliGRAM(s) Oral daily  heparin   Injectable 5000 Unit(s) SubCutaneous every 8 hours  metoprolol tartrate 12.5 milliGRAM(s) Oral once  metoprolol tartrate 12.5 milliGRAM(s) Oral two times a day  polyethylene glycol 3350 17 Gram(s) Oral daily  senna 2 Tablet(s) Oral at bedtime  tamsulosin 0.4 milliGRAM(s) Oral at bedtime    MEDICATIONS  (PRN):  naloxone Injectable 0.1 milliGRAM(s) IV Push every 3 minutes PRN For ANY of the following changes in patient status:  A. RR LESS THAN 10 breaths per minute, B. Oxygen saturation LESS THAN 90%, C. Sedation score of 6  ondansetron Injectable 4 milliGRAM(s) IV Push every 6 hours PRN Nausea  oxyCODONE    IR 5 milliGRAM(s) Oral every 3 hours PRN Moderate and Severe Pain (4- 10)      PHYSICAL EXAM============================  General:                         Awake, alert, not in any distress  Neuro:                            Moving all extremities to commands.   Respiratory:	Air entry fair and  bilateral conducted sounds                                           Effort even and unlabored.  CV:		Regular rate and rhythm. Normal S1/S2                                          Distal pulses present.  Abdomen:	                     Soft, non-distended. Bowel sounds present   Skin:		No rash.  Extremities:	Warm, no cyanosis or edema.  Palpable pulses    ============================LABS=========================                        13.6   13.00 )-----------( 223      ( 09 Dec 2021 04:31 )             41.9     12-09    132<L>  |  99  |  28<H>  ----------------------------<  140<H>  4.8   |  21<L>  |  1.27    Ca    8.8      09 Dec 2021 04:31  Phos  3.6     12-09  Mg     1.50     12-09        ASSESSMENT AND PLAN:     NEURO:  Post-operative Pain - Stable. Pain control with PCA and Tylenol IV PRN.       RESPIRATORY:  Hypoxia - Wean nasal cannula for goal O2sat above 92. Obtain CXR. Incentive spirometry. Chest PT and frequent suctioning. Continue bronchodilators. OOB to chair & ambulate w/ assistance. Continuous pulse oximetry for support & to prevent decompensation.    Chest tube – Pleurevac water seal. Monitor chest tube output.  COPD - worsened by recent thoracic surgery. Continue home inhalers.         CARDIOVASCULAR:  Hemodynamically stable - Not on pressors. Continue hemodynamic monitoring.  Telemetry (medical test) - Reviewed by me today independently. Normal sinus rhythm.  HTN - stable. Hold home antihypertensives for now.  CAD - stable. Continue aspirin and plavix.  CHF - stable. maintain euvolemia. resume Lopressor       RENAL:  Stable - Monitor IOs and electrolytes. Keep K above 4.0 and Mg above 2.0.     GASTROINTESTINAL:  GI prophylaxis not indicated  Zofran and Reglan IV PRN for nausea  Regular consistency diet         HEMATOLOGIC:  No signs of active bleeding. Monitor Hgb in CBC in AM  DVT prophylaxis with heparin subQ and SCDs.               INFECTIOUS DISEASE:  All surgical sites appear clean. Will monitor for fever and leukocytosis.                      ENDOCRINE:  Stable – Monitor glucose fingersticks for goal 120-180.               ONCOLOGY:  Lung nodule - Improved. S/P resection. Follow up final pathology.      Pertinent clinical, laboratory, radiographic, hemodynamic, echocardiographic, respiratory data, microbiologic data and chart were reviewed by myself and analyzed frequently throughout the course of the day and night by myself.    Plan discussed at length with the CTICU staff and Attending CT Surgeon -   Dr Kamaljit Larsen      Patient's status was discussed with patient at bedside.  I spent 35 minutes at bedside , with 20 minutes managing hypoxia, tachycardia, post op care          Kiming Charles NUNEZ FACEP    
DELFINO GONZALEZ                     [id number]    HPI:  71 yo male with hx of CAD with recent stent placed x 3 on 10/2021,  HTN ,HLD,  s/p lung ca s/p LLL lobectomy, treated with chemo and radiation in 2017 is diagnosed with other  non specific abnormal finding of lung field and is scheduled for flexible bronchoscopy ,right video  assisted thoracoscopy ,lung resection with interventional radiology marking (24 Nov 2021 09:06)    PROCEDURES: Right upper lobe wedge resection, Level 3A lymph node, pneumolysis 08-Dec-2021       ISSUES:   Lung nodule  Post op pain  Chest tube in place  CAD s/p stent x3 (10/2021) on ASA and plavix  HTN  HLD  Hx of lung cancer s/p LLL lobectomy and chemo radiation (2017)  CHF  COPD  Hx of Atrial flutter  GERD    PAST MEDICAL & SURGICAL HISTORY:  HTN (hypertension)    Smoking history  Quit 2017    Lung cancer  s/p LLL Lobectomy, radiation, chemotherapy    GERD (gastroesophageal reflux disease)    Atrial flutter  10/2017 off Eliquis (MD aware)    Other and unspecified ventral hernia with obstruction, without gangrene    Other nonspecific abnormal finding of lung field    CAD (coronary artery disease)  Stents placed x3 10/2021    H/O inguinal hernia repair  Bilateral in the 1970&#x27;s    History of chemotherapy  left chest wall infusaport placement    Status post lobectomy of lung  LLL lobectomy at [facility]    S/P pericardial surgery  For an effusion in 2019              VITAL SIGNS:  Vital Signs Last 24 Hrs  T(C): 36.4 (11 Dec 2021 08:00), Max: 37.4 (10 Dec 2021 20:00)  T(F): 97.6 (11 Dec 2021 08:00), Max: 99.4 (10 Dec 2021 20:00)  HR: 91 (11 Dec 2021 08:00) (80 - 130)  BP: 127/69 (11 Dec 2021 08:00) (88/59 - 130/73)  BP(mean): 81 (11 Dec 2021 08:00) (65 - 95)  RR: 22 (11 Dec 2021 08:00) (16 - 30)  SpO2: 95% (11 Dec 2021 08:00) (87% - 100%)    I/Os:   I&O's Detail    10 Dec 2021 07:01  -  11 Dec 2021 07:00  --------------------------------------------------------  IN:    IV PiggyBack: 200 mL    Oral Fluid: 1080 mL  Total IN: 1280 mL    OUT:    Voided (mL): 1350 mL  Total OUT: 1350 mL    Total NET: -70 mL      11 Dec 2021 07:01  -  11 Dec 2021 09:25  --------------------------------------------------------  IN:  Total IN: 0 mL    OUT:    Voided (mL): 250 mL  Total OUT: 250 mL    Total NET: -250 mL          CAPILLARY BLOOD GLUCOSE          =======================MEDICATIONS===================  MEDICATIONS  (STANDING):  ALBUTerol    0.083% 2.5 milliGRAM(s) Nebulizer every 6 hours  aspirin  chewable 81 milliGRAM(s) Oral daily  atorvastatin 40 milliGRAM(s) Oral at bedtime  clopidogrel Tablet 75 milliGRAM(s) Oral daily  dornase lucia Solution 2.5 milliGRAM(s) Inhalation two times a day  heparin   Injectable 5000 Unit(s) SubCutaneous every 8 hours  metoprolol tartrate 25 milliGRAM(s) Oral two times a day  polyethylene glycol 3350 17 Gram(s) Oral daily  senna 2 Tablet(s) Oral at bedtime  sodium chloride 3%  Inhalation 4 milliLiter(s) Inhalation every 6 hours  tamsulosin 0.4 milliGRAM(s) Oral at bedtime    MEDICATIONS  (PRN):  acetaminophen     Tablet .. 650 milliGRAM(s) Oral every 6 hours PRN Mild Pain (1 - 3)  naloxone Injectable 0.1 milliGRAM(s) IV Push every 3 minutes PRN For ANY of the following changes in patient status:  A. RR LESS THAN 10 breaths per minute, B. Oxygen saturation LESS THAN 90%, C. Sedation score of 6  ondansetron Injectable 4 milliGRAM(s) IV Push every 6 hours PRN Nausea  oxyCODONE    IR 5 milliGRAM(s) Oral every 3 hours PRN Moderate and Severe Pain (4- 10)        PHYSICAL EXAM============================  General:                         Awake, alert, not in any distress  Neuro:                            Moving all extremities to commands.   Respiratory:	Air entry fair and  bilateral conducted sounds                                           Effort even and unlabored.  CV:		Regular rate and rhythm. Normal S1/S2                                          Distal pulses present.  Abdomen:	                     Soft, non-distended. Bowel sounds present   Skin:		No rash.  Extremities:	Warm, no cyanosis or edema.  Palpable pulses    ============================LABS=========================                        13.9   9.24  )-----------( 200      ( 11 Dec 2021 02:58 )             44.8     12-11    130<L>  |  99  |  25<H>  ----------------------------<  106<H>  5.3   |  19<L>  |  1.20    Ca    9.1      11 Dec 2021 02:58  Phos  3.0     12-11  Mg     2.00     12-11    ESSMENT AND PLAN:     NEURO:  Post-operative Pain - Stable. Pain control with PCA and Tylenol IV PRN.       RESPIRATORY:  Hypoxia - Wean nasal cannula for goal O2sat above 92. Obtain CXR. Incentive spirometry. Chest PT and frequent suctioning. Continue bronchodilators. OOB to chair & ambulate w/ assistance. Continuous pulse oximetry for support & to prevent decompensation.    Chest tube – Pleurevac water seal. Monitor chest tube output.  COPD - worsened by recent thoracic surgery. Continue home inhalers.         CARDIOVASCULAR:  Hemodynamically stable - Not on pressors. Continue hemodynamic monitoring.  Telemetry (medical test) - Reviewed by me today independently. Normal sinus rhythm.  HTN - stable. Hold home antihypertensives for now.  CAD - stable. Continue aspirin and plavix.  CHF - stable. maintain euvolemia. resume Lopressor       RENAL:  Stable - Monitor IOs and electrolytes. Keep K above 4.0 and Mg above 2.0.  Lasix PRN, daily weight      GASTROINTESTINAL:  GI prophylaxis not indicated  Zofran and Reglan IV PRN for nausea  Regular consistency diet     HEMATOLOGIC:  No signs of active bleeding. Monitor Hgb in CBC in AM  DVT prophylaxis with heparin subQ and SCDs.         INFECTIOUS DISEASE:  All surgical sites appear clean. Will monitor for fever and leukocytosis.          ENDOCRINE:  Stable – Monitor glucose fingersticks for goal 120-180.    ONCOLOGY:  Lung nodule - Improved. S/P resection. Follow up final pathology.      Pertinent clinical, laboratory, radiographic, hemodynamic, echocardiographic, respiratory data, microbiologic data and chart were reviewed by myself and analyzed frequently throughout the course of the day and night by myself.    Plan discussed at length with the CTICU staff and Attending CT Surgeon -   Dr Kamaljit Larsen      Patient's status was discussed with patient at bedside.  I spent 35 minutes at bedside , with 20 minutes managing hypoxia, tachycardia, post op care      Paul Soto DO FACEP    
POST ANESTHESIA EVALUATION    70y Male POSTOP DAY 1 S/P     MENTAL STATUS: Patient participation [ x ] Awake     [  ] Arousable     [  ] Sedated    AIRWAY PATENCY: [ x ] Satisfactory  [  ] Other:     Vital Signs Last 24 Hrs  T(C): 36.2 (09 Dec 2021 07:00), Max: 36.2 (09 Dec 2021 04:00)  T(F): 97.2 (09 Dec 2021 07:00), Max: 97.2 (09 Dec 2021 04:00)  HR: 99 (09 Dec 2021 10:00) (80 - 115)  BP: 95/61 (09 Dec 2021 10:00) (90/55 - 108/80)  BP(mean): 67 (09 Dec 2021 10:00) (65 - 86)  RR: 18 (09 Dec 2021 10:00) (12 - 24)  SpO2: 93% (09 Dec 2021 10:00) (84% - 100%)  I&O's Summary    08 Dec 2021 07:01  -  09 Dec 2021 07:00  --------------------------------------------------------  IN: 1240 mL / OUT: 505 mL / NET: 735 mL    09 Dec 2021 07:01  -  09 Dec 2021 10:40  --------------------------------------------------------  IN: 330 mL / OUT: 0 mL / NET: 330 mL          NAUSEA/ VOMITTING:  [ x ] NONE  [  ] CONTROLLED [  ] OTHER     PAIN: [ x ] CONTROLLED WITH CURRENT REGIMEN  [  ] OTHER    [ x ] NO APPARENT ANESTHESIA COMPLICATIONS      Comments: 
  CHIEF COMPLAINT: FOLLOW UP IN ICU FOR POSTOPERATIVE CARE OF PATIENT WHO IS S/P LUNG RESECTION      PROCEDURES: Right upper lobe wedge resection, Level 3A lymph node, pneumolysis 08-Dec-2021       ISSUES:   H. influenza pneumonia  Lung nodule  Post op pain  CAD s/p stent x3 (10/2021) on ASA and plavix  HTN  HLD  Hx of lung cancer s/p LLL lobectomy and chemo radiation (2017)  CHF  COPD  Hx of Atrial flutter  GERD      INTERVAL EVENTS:   Sputum culture for h influenzae on zosyn. Vanc d/c'd        HISTORY:   Patient reports moderate pain at chest wall incision sites which is worse with coughing and deep breathing without associated fever or dyspnea. Pain is improved with use of pain meds.     PHYSICAL EXAM:   Gen: Comfortable, No acute distress  Eyes: Sclera white, Conjunctiva normal, Eyelids normal, Pupils symmetrical   ENT: Mucous membranes moist,  ,  ,    Neck: Trachea midline,  ,  ,  ,  ,  ,    CV: Rate regular, Rhythm regular,  ,  ,    Resp: Breath sounds clear, No accessory muscles use,  Abd: Soft, Non-distended, Non-tender, Bowel sounds normal,  ,  ,    Skin: Warm, No peripheral edema of lower extremities,  ,    : No darden  Neuro: Moving all 4 extremities,    Psych: A&Ox3      ASSESSMENT AND PLAN:     NEURO:  Post-operative Pain - Stable. Pain control with oxycodone and Tylenol IV PRN.       RESPIRATORY:  Hypoxia - Wean nasal cannula for goal O2sat above 92. Obtain CXR. Incentive spirometry. Chest PT and frequent suctioning. Continue bronchodilators. OOB to chair & ambulate w/ assistance. Continuous pulse oximetry for support & to prevent decompensation.   Arrange for Home O2 for COPD.    COPD - worsened by recent thoracic surgery. Continue home inhalers.         CARDIOVASCULAR:  Hemodynamically stable - Not on pressors. Continue hemodynamic monitoring.  Telemetry (medical test) - Reviewed by me today independently. Normal sinus rhythm.  HTN - stable. continue metoprolol.   CAD - stable. Continue aspirin and plavix       CHF - euvolemic currently.          RENAL:  CKD - Renally dose medications. Avoid nephrotoxic meds. Monitor IOs and electrolytes. Keep K above 4.0 and Mg above 2.0.          GASTROINTESTINAL:  GI prophylaxis not indicated  Zofran and Reglan IV PRN for nausea  Regular consistency diet          HEMATOLOGIC:  No signs of active bleeding. Monitor Hgb in CBC in AM  DVT prophylaxis with heparin subQ and SCDs.               INFECTIOUS DISEASE:  All surgical sites appear clean. Will monitor for fever and leukocytosis.     H influenzae PNA - stable. zosyn. Transition to PO regimen when improved.             ENDOCRINE:  Stable – Monitor glucose fingersticks for goal 120-180.               ONCOLOGY:  Lung nodule - Improved. S/P resection. Follow up final pathology.      Pertinent clinical, laboratory, radiographic, hemodynamic, echocardiographic, respiratory data, microbiologic data and chart were reviewed by myself and analyzed frequently throughout the course of the day and night by myself.    Plan discussed at length with the CTICU staff and Attending CT Surgeon -   Dr Kamaljit Larsen    Patient's status was discussed with patient at bedside.  _________________________  VITAL SIGNS:  Vital Signs Last 24 Hrs  T(C): 36.9 (14 Dec 2021 08:00), Max: 37.6 (13 Dec 2021 16:00)  T(F): 98.4 (14 Dec 2021 08:00), Max: 99.7 (13 Dec 2021 16:00)  HR: 93 (14 Dec 2021 09:00) (80 - 114)  BP: 114/66 (14 Dec 2021 09:00) (95/61 - 127/66)  BP(mean): 78 (14 Dec 2021 09:00) (66 - 89)  RR: 20 (14 Dec 2021 09:00) (12 - 24)  SpO2: 98% (14 Dec 2021 09:00) (91% - 100%)  I/Os:   I&O's Detail    13 Dec 2021 07:01  -  14 Dec 2021 07:00  --------------------------------------------------------  IN:    IV PiggyBack: 600 mL    Oral Fluid: 360 mL  Total IN: 960 mL    OUT:    Voided (mL): 700 mL  Total OUT: 700 mL    Total NET: 260 mL              MEDICATIONS:  MEDICATIONS  (STANDING):  ALBUTerol    0.083% 2.5 milliGRAM(s) Nebulizer every 6 hours  aspirin  chewable 81 milliGRAM(s) Oral daily  atorvastatin 40 milliGRAM(s) Oral at bedtime  clopidogrel Tablet 75 milliGRAM(s) Oral daily  dornase lucia Solution 2.5 milliGRAM(s) Inhalation two times a day  heparin   Injectable 5000 Unit(s) SubCutaneous every 8 hours  lidocaine   4% Patch 1 Patch Transdermal daily  metoprolol tartrate 25 milliGRAM(s) Oral every 8 hours  piperacillin/tazobactam IVPB.. 3.375 Gram(s) IV Intermittent every 8 hours  polyethylene glycol 3350 17 Gram(s) Oral daily  senna 2 Tablet(s) Oral at bedtime  sodium chloride 3%  Inhalation 4 milliLiter(s) Inhalation every 6 hours  tamsulosin 0.4 milliGRAM(s) Oral at bedtime    MEDICATIONS  (PRN):  acetaminophen     Tablet .. 650 milliGRAM(s) Oral every 6 hours PRN Mild Pain (1 - 3)  naloxone Injectable 0.1 milliGRAM(s) IV Push every 3 minutes PRN For ANY of the following changes in patient status:  A. RR LESS THAN 10 breaths per minute, B. Oxygen saturation LESS THAN 90%, C. Sedation score of 6  ondansetron Injectable 4 milliGRAM(s) IV Push every 6 hours PRN Nausea  oxyCODONE    IR 5 milliGRAM(s) Oral every 3 hours PRN Moderate and Severe Pain (4- 10)      LABS:  Laboratory data was independently reviewed by me today.                           13.6   10.81 )-----------( 268      ( 14 Dec 2021 04:28 )             42.1     12-14    134<L>  |  99  |  19  ----------------------------<  114<H>  4.2   |  24  |  1.25    Ca    9.3      14 Dec 2021 04:28  Phos  3.0     12-14  Mg     2.10     12-14                RADIOLOGY:   Radiology images were independently reviewed by me today. Reports were reviewed by me today.    Xray Chest 1 View- PORTABLE-Routine:   ACC: [id number] EXAM:  XR CHEST PORTABLE ROUTINE 1V                          PROCEDURE DATE:  12/13/2021          INTERPRETATION:  TIME OF EXAM: December 13, 2021 at 6:30 AM.    CLINICAL INFORMATION: Atelectasis.    COMPARISON:  December 12, 2021 at 6:36 PM.    TECHNIQUE:   AP Portable chest x-ray.    INTERPRETATION:    Heart size and the mediastinum cannot be accurately evaluated on this   projection.  Left subclavian approach port, not significantly changed in position.   Surgical clips again project over the mediastinum.  Calcified thoracic aorta.  There is right lung postsurgical change with chain sutures again seen.  Part of previously noted right subcutaneous emphysema again seen.  No significant interval change in left lower opacities,possibly   loculated pleural fluid with associated passive atelectasis.  No right pleural effusion.  No pneumothorax seen.  No acute bony abnormality noted.      IMPRESSION:  Left lower opacities, possibly loculated pleural fluid with   associated passive atelectasis, not significantly changed. Subsegmental   atelectasis of other cause and/or pneumonia are not excluded in the   appropriate clinical context.    --- End of Report ---            GUILLE RAMIREZ MD; Attending Radiologist  This document has been electronically signed. Dec 13 2021  4:30PM (12-13-21 @ 06:32)  Xray Chest 1 View- PORTABLE-Urgent:   ACC: [id number] EXAM:  XR CHEST PORTABLE URGENT 1V                          PROCEDURE DATE:  12/12/2021          INTERPRETATION:  Chest one view    HISTORY: Follow-up atelectasis    COMPARISON STUDY: Earlier the same day    Frontal expiratory view ofthe chest shows the heart to be normal in   size. Left chest port is again noted.    The lungs show less pulmonary congestion with reduction of left   atelectasis. Left pleural effusion is smaller and there is no evidence of   pneumothorax nor right pleural effusion.    IMPRESSION:  Partial clearing of the lungs as noted.      Thank you for the courtesy of this referral.    --- End of Report ---            AZEEM SAM MD; Attending Interventional Radiologist  This document has been electronically signed. Dec 13 2021 12:07PM (12-12-21 @ 18:36)  Xray Chest 1 View- PORTABLE-Urgent:   ACC: [id number] EXAM:  XR CHEST PORTABLE URGENT 1V                          PROCEDURE DATE:  12/12/2021          INTERPRETATION:  DATE OF STUDY: 12/12/21 at 9:33AM    PRIOR: Earlier 12/12/21 plain chest.12/6/21 CT scan of the chest.    CLINICAL INDICATION: Reassess left pleural effusion.    TECHNIQUE: portable chest.    FINDINGS/  IMPRESSION:  The heart is not enlarged.  Left-sided MediPort with tip in SVC.  Patient is S/P prior LLL lobectomy with residual postsurgical changes.  Interval improvement in bilateral interstitial edema changes.  Interval decrease in left pleural effusion.  No pneumothorax.    --- End of Report ---            RAOUL PATEL MD; Attending Radiologist  This document has been electronically signed. Dec 12 2021 12:02PM (12-12-21 @ 09:34)  
  CHIEF COMPLAINT: FOLLOW UP IN ICU FOR POSTOPERATIVE CARE OF PATIENT WHO IS S/P LUNG RESECTION      PROCEDURES: Right upper lobe wedge resection, Level 3A lymph node, pneumolysis 08-Dec-2021       ISSUES:   Lung nodule  Post op pain  CAD s/p stent x3 (10/2021) on ASA and plavix  HTN  HLD  Hx of lung cancer s/p LLL lobectomy and chemo radiation (2017)  CHF  COPD  Hx of Atrial flutter  GERD      INTERVAL EVENTS:   Hypoxic on RA.  Given lasix for diuresis.  Arranging for home O2.      HISTORY:   Patient reports moderate pain at chest wall incision sites which is worse with coughing and deep breathing without associated fever or dyspnea. Pain is improved with use of pain meds.     PHYSICAL EXAM:   Gen: Comfortable, No acute distress  Eyes: Sclera white, Conjunctiva normal, Eyelids normal, Pupils symmetrical   ENT: Mucous membranes moist,  ,  ,    Neck: Trachea midline,  ,  ,  ,  ,  ,    CV: Rate regular, Rhythm regular,  ,  ,    Resp: Breath sounds clear, No accessory muscles use, R chest tube in place,  ,    Abd: Soft, Non-distended, Non-tender, Bowel sounds normal,  ,  ,    Skin: Warm, No peripheral edema of lower extremities,  ,    : No darden  Neuro: Moving all 4 extremities,    Psych: A&Ox3      ASSESSMENT AND PLAN:     NEURO:  Post-operative Pain - Stable. Pain control with PCA and Tylenol IV PRN.       RESPIRATORY:  Hypoxia - Wean nasal cannula for goal O2sat above 92. Obtain CXR. Incentive spirometry. Chest PT and frequent suctioning. Continue bronchodilators. OOB to chair & ambulate w/ assistance. Continuous pulse oximetry for support & to prevent decompensation.   Arrange for Home O2 for COPD.    COPD - worsened by recent thoracic surgery. Continue home inhalers.         CARDIOVASCULAR:  Hemodynamically stable - Not on pressors. Continue hemodynamic monitoring.  Telemetry (medical test) - Reviewed by me today independently. Normal sinus rhythm.  HTN - stable. continue metoprolol.   CAD - stable. Continue aspirin and plavix       CHF - fluid overloaded. lasix IV diuresis. monitor K after diuresis. monitor IOs.          RENAL:  Stable - Monitor IOs and electrolytes. Keep K above 4.0 and Mg above 2.0.              GASTROINTESTINAL:  GI prophylaxis not indicated  Zofran and Reglan IV PRN for nausea  Regular consistency diet                     HEMATOLOGIC:  No signs of active bleeding. Monitor Hgb in CBC in AM  DVT prophylaxis with heparin subQ and SCDs.               INFECTIOUS DISEASE:  All surgical sites appear clean. Will monitor for fever and leukocytosis.                      ENDOCRINE:  Stable – Monitor glucose fingersticks for goal 120-180.               ONCOLOGY:  Lung nodule - Improved. S/P resection. Follow up final pathology.      Pertinent clinical, laboratory, radiographic, hemodynamic, echocardiographic, respiratory data, microbiologic data and chart were reviewed by myself and analyzed frequently throughout the course of the day and night by myself.    Plan discussed at length with the CTICU staff and Attending CT Surgeon -   Dr Kamaljit Larsen      Patient's status was discussed with patient at bedside.  _________________________  VITAL SIGNS:  Vital Signs Last 24 Hrs  T(C): 36.7 (10 Dec 2021 08:00), Max: 36.7 (09 Dec 2021 20:00)  T(F): 98 (10 Dec 2021 08:00), Max: 98 (09 Dec 2021 20:00)  HR: 101 (10 Dec 2021 10:30) (79 - 115)  BP: 115/79 (10 Dec 2021 10:00) (95/59 - 157/80)  BP(mean): 87 (10 Dec 2021 10:00) (71 - 100)  RR: 27 (10 Dec 2021 10:30) (18 - 30)  SpO2: 99% (10 Dec 2021 10:30) (87% - 100%)  I/Os:   I&O's Detail    09 Dec 2021 07:01  -  10 Dec 2021 07:00  --------------------------------------------------------  IN:    IV PiggyBack: 100 mL    Lactated Ringers: 90 mL    Oral Fluid: 720 mL  Total IN: 910 mL    OUT:    Voided (mL): 1850 mL  Total OUT: 1850 mL    Total NET: -940 mL      10 Dec 2021 07:01  -  10 Dec 2021 10:53  --------------------------------------------------------  IN:    Oral Fluid: 240 mL  Total IN: 240 mL    OUT:    Voided (mL): 600 mL  Total OUT: 600 mL    Total NET: -360 mL              MEDICATIONS:  MEDICATIONS  (STANDING):  ALBUTerol    0.083% 2.5 milliGRAM(s) Nebulizer every 6 hours  aspirin  chewable 81 milliGRAM(s) Oral daily  atorvastatin 40 milliGRAM(s) Oral at bedtime  clopidogrel Tablet 75 milliGRAM(s) Oral daily  dornase lucia Solution 2.5 milliGRAM(s) Inhalation two times a day  heparin   Injectable 5000 Unit(s) SubCutaneous every 8 hours  metoprolol tartrate 12.5 milliGRAM(s) Oral two times a day  polyethylene glycol 3350 17 Gram(s) Oral daily  senna 2 Tablet(s) Oral at bedtime  sodium chloride 3%  Inhalation 4 milliLiter(s) Inhalation every 6 hours  tamsulosin 0.4 milliGRAM(s) Oral at bedtime    MEDICATIONS  (PRN):  acetaminophen     Tablet .. 650 milliGRAM(s) Oral every 6 hours PRN Mild Pain (1 - 3)  naloxone Injectable 0.1 milliGRAM(s) IV Push every 3 minutes PRN For ANY of the following changes in patient status:  A. RR LESS THAN 10 breaths per minute, B. Oxygen saturation LESS THAN 90%, C. Sedation score of 6  ondansetron Injectable 4 milliGRAM(s) IV Push every 6 hours PRN Nausea  oxyCODONE    IR 5 milliGRAM(s) Oral every 3 hours PRN Moderate and Severe Pain (4- 10)      LABS:  Laboratory data was independently reviewed by me today.                           14.0   13.64 )-----------( 232      ( 10 Dec 2021 04:38 )             43.3     12-10    133<L>  |  98  |  26<H>  ----------------------------<  111<H>  4.3   |  24  |  1.27    Ca    9.1      10 Dec 2021 04:38  Phos  2.5     12-10  Mg     1.90     12-10                RADIOLOGY:   Radiology images were independently reviewed by me today. Reports were reviewed by me today.    Xray Chest 1 View- PORTABLE-Routine:   ACC: [id number] EXAM:  XR CHEST PORTABLE ROUTINE 1V                          PROCEDURE DATE:  12/10/2021          INTERPRETATION:  TIME OF EXAM: December 10, 2021 at 5:00 AM    CLINICAL INFORMATION: Follow-up post right upper lobectomy    TECHNIQUE: Portable chest    INTERPRETATION:    Lungs continue to show bilateral diffuse interstitial opacities   presumably edema. Small left effusion is present appears increased from   the study of the day before. No pneumothorax. Heart size is stable and   doesn't appear enlarged.      COMPARISON:  December 9      IMPRESSION:  Follow-up post right thoracotomy with suspected interstitial   edema.    --- End of Report ---            ADIEL CONRAD MD; Attending Radiologist  This document has been electronically signed. Dec 10 2021 10:48AM (12-10-21 @ 05:00)  Xray Chest 1 View- PORTABLE-Urgent:   ACC: [id number] EXAM:  XR CHEST PORTABLE URGENT 1V                          PROCEDURE DATE:  12/09/2021          INTERPRETATION:  TIME OF EXAM: December 9, 2021 at 11:04 AM    CLINICAL INFORMATION: Chest tube removal.    TECHNIQUE:   Portable chest    INTERPRETATION:    Since the last exam, the right chest tube has been removed. No   complicating effusion or pneumothorax. Lungs are free of focal   consolidations.      COMPARISON:  December 9 at 5:18 AM      IMPRESSION:  Follow-up post successful chest tube removal.    --- End of Report ---            ADIEL CONRAD MD; Attending Radiologist  This document has been electronically signed. Dec  9 2021 11:20AM (12-09-21 @ 11:05)  Xray Chest 1 View AP/PA:   ACC: [id number] EXAM:  XR CHEST PORTABLE URGENT 1V                        ACC: [id number] EXAM:  XR CHEST AP OR PA 1V                          PROCEDURE DATE:  12/09/2021          INTERPRETATION:  TIME OF EXAM: December 8, 2021 at 2:55 PM and December 9   at 5:18 AM    CLINICAL INFORMATION: Post right thoracotomy with cough.    TECHNIQUE:   Portable chest    INTERPRETATION:    December 8 at 2:55 PM:  Right-sided chest tube is present with its tip overlying the apex of this   hemithorax. Chemotherapy port in place.    Lungs are well-expanded with prominent interstitial markings and chain   sutures overlying the right upper hemithorax. Small left effusion is   present. No pneumothorax.    December 9 at 5:18 AM:  No change in the position of the right chest tube. Lungs remain free of   focal consolidations and there is no right-sided pneumothorax or   effusion. The heart is not enlarged.      COMPARISON:  CT chest December 6      IMPRESSION:  Status post right thoracotomy with chest tube.    --- End of Report ---            ADIEL CONRAD MD; Attending Radiologist  This document has been electronically signed. Dec  9 2021  8:25AM (12-09-21 @ 05:20)     
DELFINO GONZALEZ      70y   Male   MRN-[id number]         No Known Allergies             Daily     Daily Drug Dosing Weight  Height (cm): 175.3 (08 Dec 2021 07:45)  Weight (kg): 87.1 (08 Dec 2021 07:45)  BMI (kg/m2): 28.3 (08 Dec 2021 07:45)  BSA (m2): 2.03 (08 Dec 2021 07:45)    HPI:  71 yo male with hx of CAD with recent stent placed x 3 on 10/2021,  HTN ,HLD,  s/p lung ca s/p LLL lobectomy, treated with chemo and radiation in 2017 is diagnosed with other  non specific abnormal finding of lung field and is scheduled for flexible bronchoscopy ,right video  assisted thoracoscopy ,lung resection with interventional radiology marking (24 Nov 2021 09:06)    Procedure: Right upper lobe wedge resection, Level 3A lymph node, pneumolysis 08-Dec-2021                        Issues:  Lung nodule  Post op pain  CAD s/p stent x3 (10/2021) on ASA and plavix  HTN  HLD  Hx of lung cancer s/p LLL lobectomy and chemo radiation (2017)  Chronic systolic CHF  COPD  Hx of Atrial flutter  GERDLung nodule              Postop pain                             Home Medications:  aspirin 81 mg oral tablet: 1 tab(s) orally once a day (08 Dec 2021 08:08)  atorvastatin 40 mg oral tablet: 1 tab(s) orally once a day (08 Dec 2021 08:08)  clopidogrel 75 mg oral tablet: 1 tab(s) orally once a day last dose on 12/2/21 (08 Dec 2021 08:08)  irbesartan 300 mg oral tablet: 1 tab(s) orally once a day (08 Dec 2021 08:08)  metoprolol tartrate 25 mg oral tablet: 0.5 tab(s) orally 2 times a day (08 Dec 2021 08:08)  polyethylene glycol 3350 oral powder for reconstitution: 17 gram(s) orally once a day, As Needed for constipation (09 Dec 2021 09:03)  senna oral tablet: 2 tab(s) orally once a day (at bedtime) As Needed for constipation (09 Dec 2021 09:03)  tamsulosin 0.4 mg oral capsule: 1 cap(s) orally once a day (08 Dec 2021 08:08)  Tylenol 325 mg oral tablet: 2 tab(s) orally every 4 hours, As Needed for mild pain (09 Dec 2021 09:03)    PAST MEDICAL & SURGICAL HISTORY:  HTN (hypertension)    Smoking history  Quit 2017    Lung cancer  s/p LLL Lobectomy, radiation, chemotherapy    GERD (gastroesophageal reflux disease)    Atrial flutter  10/2017 off Eliquis (MD sharpe)    Other and unspecified ventral hernia with obstruction, without gangrene    Other nonspecific abnormal finding of lung field    CAD (coronary artery disease)  Stents placed x3 10/2021    H/O inguinal hernia repair  Bilateral in the 1970&#x27;s    History of chemotherapy  left chest wall infusaport placement    Status post lobectomy of lung  LLL lobectomy at [facility]    S/P pericardial surgery  For an effusion in 2019      Vital Signs Last 24 Hrs  T(C): 36.6 (13 Dec 2021 08:00), Max: 37.2 (13 Dec 2021 04:00)  T(F): 97.8 (13 Dec 2021 08:00), Max: 99 (13 Dec 2021 04:00)  HR: 84 (13 Dec 2021 08:00) (78 - 109)  BP: 142/78 (13 Dec 2021 08:00) (81/63 - 142/78)  BP(mean): 95 (13 Dec 2021 08:00) (68 - 95)  RR: 22 (13 Dec 2021 08:00) (11 - 27)  SpO2: 97% (13 Dec 2021 08:00) (91% - 100%)  I&O's Detail    12 Dec 2021 07:01  -  13 Dec 2021 07:00  --------------------------------------------------------  IN:    IV PiggyBack: 800 mL    Oral Fluid: 600 mL  Total IN: 1400 mL    OUT:    Voided (mL): 1025 mL  Total OUT: 1025 mL    Total NET: 375 mL      13 Dec 2021 07:01  -  13 Dec 2021 08:22  --------------------------------------------------------  IN:    IV PiggyBack: 50 mL    Oral Fluid: 240 mL  Total IN: 290 mL    OUT:    Voided (mL): 200 mL  Total OUT: 200 mL    Total NET: 90 mL        CAPILLARY BLOOD GLUCOSE        Home Medications:  aspirin 81 mg oral tablet: 1 tab(s) orally once a day (08 Dec 2021 08:08)  atorvastatin 40 mg oral tablet: 1 tab(s) orally once a day (08 Dec 2021 08:08)  clopidogrel 75 mg oral tablet: 1 tab(s) orally once a day last dose on 12/2/21 (08 Dec 2021 08:08)  irbesartan 300 mg oral tablet: 1 tab(s) orally once a day (08 Dec 2021 08:08)  metoprolol tartrate 25 mg oral tablet: 0.5 tab(s) orally 2 times a day (08 Dec 2021 08:08)  polyethylene glycol 3350 oral powder for reconstitution: 17 gram(s) orally once a day, As Needed for constipation (09 Dec 2021 09:03)  senna oral tablet: 2 tab(s) orally once a day (at bedtime) As Needed for constipation (09 Dec 2021 09:03)  tamsulosin 0.4 mg oral capsule: 1 cap(s) orally once a day (08 Dec 2021 08:08)  Tylenol 325 mg oral tablet: 2 tab(s) orally every 4 hours, As Needed for mild pain (09 Dec 2021 09:03)    MEDICATIONS  (STANDING):  ALBUTerol    0.083% 2.5 milliGRAM(s) Nebulizer every 6 hours  aspirin  chewable 81 milliGRAM(s) Oral daily  atorvastatin 40 milliGRAM(s) Oral at bedtime  clopidogrel Tablet 75 milliGRAM(s) Oral daily  dornase lucia Solution 2.5 milliGRAM(s) Inhalation two times a day  heparin   Injectable 5000 Unit(s) SubCutaneous every 8 hours  lidocaine   4% Patch 1 Patch Transdermal daily  metoprolol tartrate 25 milliGRAM(s) Oral every 8 hours  piperacillin/tazobactam IVPB.. 3.375 Gram(s) IV Intermittent every 8 hours  polyethylene glycol 3350 17 Gram(s) Oral daily  senna 2 Tablet(s) Oral at bedtime  sodium chloride 3%  Inhalation 4 milliLiter(s) Inhalation every 6 hours  tamsulosin 0.4 milliGRAM(s) Oral at bedtime  vancomycin  IVPB 1000 milliGRAM(s) IV Intermittent every 12 hours    MEDICATIONS  (PRN):  acetaminophen     Tablet .. 650 milliGRAM(s) Oral every 6 hours PRN Mild Pain (1 - 3)  naloxone Injectable 0.1 milliGRAM(s) IV Push every 3 minutes PRN For ANY of the following changes in patient status:  A. RR LESS THAN 10 breaths per minute, B. Oxygen saturation LESS THAN 90%, C. Sedation score of 6  ondansetron Injectable 4 milliGRAM(s) IV Push every 6 hours PRN Nausea  oxyCODONE    IR 5 milliGRAM(s) Oral every 3 hours PRN Moderate and Severe Pain (4- 10)        Physical exam:                             General:               Pt is awake, alert,  appears to be in pain but not in  distress                                                  Neuro:                  Nonfocal                             Cardiovascular:   S1 & S2, regular                           Respiratory:         Air entry is fair and equal on both sides, has bilateral conducted sounds                           GI:                          Soft, nondistended and nontender, Bowel sounds active                            Ext:                        No cyanosis or edema     Labs:                                                                           13.3   11.08 )-----------( 261      ( 13 Dec 2021 05:56 )             40.7             12-13    135  |  99  |  23  ----------------------------<  127<H>  3.7   |  24  |  1.27    Ca    9.2      13 Dec 2021 05:56  Phos  3.0     12-13  Mg     1.80     12-13      Culture - Sputum (collected 12 Dec 2021 18:49)  Source: .Sputum Sputum  Gram Stain (12 Dec 2021 21:30):    Numerous polymorphonuclear leukocytes per low power field    Rare Squamous epithelial cells per low power field    Numerous Gram Negative Rods per oil power field    Few Gram Positive Rods per oil power field    Few Gram positive cocci in pairs per oil power field        CXR:  < from: Xray Chest 1 View- PORTABLE-Urgent (Xray Chest 1 View- PORTABLE-Urgent .) (12.12.21 @ 09:34) >  The heart is not enlarged.  Left-sided MediPort with tip in SVC.  Patient is S/P prior LLL lobectomy with residual postsurgical changes.  Interval improvement in bilateral interstitial edema changes.  Interval decrease in left pleural effusion.  No pneumothorax.      Plan:  General: 70yMale s/p Right upper lobe wedge resection, Level 3A lymph node, pneumolysis 08-Dec-2021, experiencing  pain with deep breathing.                             Neuro:                                         Pain control with Oxy /   Tylenol PRN                            Cardiovascular:                                          HTN / CAD / HLD: Continue hemodynamic monitoring.  Continue ASA, Plavix, Lopressor and Lipitor    CHF: May restart ARB                            Respiratory:                                         Pt is on RA                                         Comfortable, not in any distress.                                         Encourage incentive spirometry                                          Left lung pneumonia - Improved aeration with aggressive chest PT, bronchodilators and antibiotics                                                               COPD: Continue bronchodilators, pulmonary toilet                            GI                                         On  DASH  diet as tolerated                                         Continue Zofran / Reglan for nausea - PRN                                         Continue bowel regimen	                                                                 Renal:                                         Monitor I/Os and electrolytes                                                                                        Hem/ Onc:                                         DVT prophylaxis with SQ Heparin and SCDs                                         Follow CBC in AM                           Infectious disease:      Left lung pneumonia - Improved aeration with aggressive chest PT, bronchodilators and antibiotics  Continue Vanco and Zosyn. Monitor Vanco level                                                Monitor for fever / leukocytosis.                                          All surgical incision / chest tube  sites look clean                            Endocrine                                            Continue Accu-Checks with coverage       Pertinent clinical, laboratory, radiographic, hemodynamic, echocardiographic, respiratory data, microbiologic data and chart were reviewed and analyzed frequently throughout the course of the day and night  Patient seen, examined and plan discussed with CT Surgeon Dr. Larsne  / CTICU team during rounds.    OOB to chair and ambulate as tolerated.     Status discussed with patient /  patient's family and updated plan of care    I have spent 40 minutes with this patient including 20 minutes of time coordinating care, updating patient family.          Artie Call MD

## 2021-12-14 NOTE — DISCHARGE NOTE NURSING/CASE MANAGEMENT/SOCIAL WORK - PATIENT PORTAL LINK FT
You can access the FollowMyHealth Patient Portal offered by [facility] by registering at the following website: [email or web address]. By joining [facility]’s FollowMyHealth portal, you will also be able to view your health information using other applications (apps) compatible with our system.

## 2021-12-14 NOTE — PROGRESS NOTE ADULT - PROVIDER SPECIALTY LIST ADULT
Critical Care
Anesthesia
Critical Care
Pain Medicine

## 2021-12-14 NOTE — DISCHARGE NOTE NURSING/CASE MANAGEMENT/SOCIAL WORK - NSDCPNINST_GEN_ALL_CORE
Take medications as prescribed. Follow up with your doctor as directed. Shower daily with mild soap & water, pat sites dry. No wash cloth on incisions. No cream, lotion or oils on wounds. Increase activity slowly, as tolerated. Continue to use your spirometer and perform  your deep breathing & coughing exercises. No driving while on pain medication, no heavy lifting . Call your doctor for fever over 101 degrees, pain not relieved by pain medication or for any questions or concerns you may have. Remember to have a chest  x ray before you see the doctor & bring a copy of films with you to your appointment.  Please call 911 for chest pain, shortness of breath or for signs & symptoms of stroke.

## 2021-12-14 NOTE — DISCHARGE NOTE NURSING/CASE MANAGEMENT/SOCIAL WORK - NSDCPEFALRISK_GEN_ALL_CORE
For information on Fall & Injury Prevention, visit: [email or web address] OR  [email or web address] OR  https://www.cdc.gov/steadi/patient.html

## 2021-12-14 NOTE — ED PROVIDER NOTE - NS ED MD DISPO SPECIAL CONSIDERATION1
Patient: Ruth Vanegas    Procedure: Procedure(s):  ESOPHAGOGASTRODUODENOSCOPY, WITH BIOPSY  COLONOSCOPY       Diagnosis: Nausea and vomiting, intractability of vomiting not specified, unspecified vomiting type [R11.2]  Diagnosis Additional Information: No value filed.    Anesthesia Type:   General     Note:      Level of Consciousness: awake  Oxygen Supplementation: room air    Independent Airway: airway patency satisfactory and stable  Dentition: dentition unchanged  Vital Signs Stable: post-procedure vital signs reviewed and stable  Report to RN Given: handoff report given  Patient transferred to: PACU    Handoff Report: Identifed the Patient, Identified the Reponsible Provider, Reviewed the pertinent medical history, Discussed the surgical course, Reviewed Intra-OP anesthesia mangement and issues during anesthesia, Set expectations for post-procedure period and Allowed opportunity for questions and acknowledgement of understanding      Vitals:  Vitals Value Taken Time   /78 12/14/21 1554   Temp 98.1    Pulse 96 12/14/21 1554   Resp 16    SpO2 98 % 12/14/21 1558   Vitals shown include unvalidated device data.    Electronically Signed By: CARLOS Reyes CRNA  December 14, 2021  3:58 PM  
None

## 2021-12-18 ENCOUNTER — APPOINTMENT (OUTPATIENT)
Dept: RADIOLOGY | Facility: CLINIC | Age: 71
End: 2021-12-18
Payer: MEDICARE

## 2021-12-18 ENCOUNTER — OUTPATIENT (OUTPATIENT)
Dept: OUTPATIENT SERVICES | Facility: HOSPITAL | Age: 71
LOS: 1 days | End: 2021-12-18
Payer: MEDICARE

## 2021-12-18 DIAGNOSIS — Z90.2 ACQUIRED ABSENCE OF LUNG [PART OF]: Chronic | ICD-10-CM

## 2021-12-18 DIAGNOSIS — Z92.21 PERSONAL HISTORY OF ANTINEOPLASTIC CHEMOTHERAPY: Chronic | ICD-10-CM

## 2021-12-18 DIAGNOSIS — Z98.890 OTHER SPECIFIED POSTPROCEDURAL STATES: Chronic | ICD-10-CM

## 2021-12-18 DIAGNOSIS — Z00.8 ENCOUNTER FOR OTHER GENERAL EXAMINATION: ICD-10-CM

## 2021-12-18 PROCEDURE — 71046 X-RAY EXAM CHEST 2 VIEWS: CPT | Mod: 26

## 2021-12-18 PROCEDURE — 71046 X-RAY EXAM CHEST 2 VIEWS: CPT

## 2021-12-21 ENCOUNTER — APPOINTMENT (OUTPATIENT)
Dept: THORACIC SURGERY | Facility: CLINIC | Age: 71
End: 2021-12-21
Payer: MEDICARE

## 2021-12-21 VITALS
WEIGHT: 192 LBS | HEART RATE: 78 BPM | HEIGHT: 71 IN | OXYGEN SATURATION: 97 % | BODY MASS INDEX: 26.88 KG/M2 | SYSTOLIC BLOOD PRESSURE: 113 MMHG | RESPIRATION RATE: 17 BRPM | DIASTOLIC BLOOD PRESSURE: 75 MMHG

## 2021-12-21 PROCEDURE — 99024 POSTOP FOLLOW-UP VISIT: CPT

## 2022-01-25 NOTE — PROVIDER CONTACT NOTE (OTHER) - BACKGROUND
Physician Documentation                                                                           

 [facility]                                                                 

Name: Crystal Choi                                                                                 

Age: 7 yrs                                                                                        

Sex: Female                                                                                       

: 2014                                                                                   

MRN: [id number]                                                                                   

Arrival Date: 2022                                                                          

Time: 13:28                                                                                       

Account#: [id number]                                                                            

Bed DIS2                                                                                          

Private MD:                                                                                       

ED Physician Deisy Villanueva                                                                         

HPI:                                                                                              

                                                                                             

14:00 This 7 yrs old Female presents to ER via Ambulatory with complaints of r/o covid.       cp  

14:00 The patient presents to the emergency department with cough, fever, sore throat. Onset: cp  

      The symptoms/episode began/occurred today. Associated signs and symptoms: Pertinent         

      negatives: diarrhea, headache, vomiting.                                                    

                                                                                                  

Historical:                                                                                       

- Allergies:                                                                                      

13:51 No Known Allergies;                                                                     ab2 

- Home Meds:                                                                                      

13:51 None [Active];                                                                          ab2 

- PMHx:                                                                                           

13:51 None;                                                                                   ab2 

                                                                                                  

- Immunization history:: Client reports having NOT received the Covid vaccine.                    

  Childhood immunizations are up to date.                                                         

                                                                                                  

                                                                                                  

ROS:                                                                                              

14:05 Constitutional: Negative for fever, poor PO intake.                                     cp  

14:05 Eyes: Negative for injury, pain, redness, and discharge.                                cp  

14:05 ENT: Positive for sore throat, Negative for drainage from ear(s), ear pain, difficulty      

      swallowing, difficulty handling secretions.                                                 

14:05 Cardiovascular: Negative for chest pain.                                                    

14:05 Respiratory: Positive for cough, Negative for shortness of breath, wheezing.                

14:05 Abdomen/GI: Negative for abdominal pain, vomiting, diarrhea, constipation.                  

14:05 Skin: Negative for rash.                                                                    

14:05 Neuro: Negative for headache.                                                               

14:05 All other systems are negative.                                                             

                                                                                                  

Exam:                                                                                             

14:10 Constitutional: The patient appears in no acute distress, alert, awake, non-toxic, well cp  

      developed, well nourished.                                                                  

14:10 Head/Face:  Normocephalic, atraumatic.                                                  cp  

14:10 Eyes: Periorbital structures: appear normal, Conjunctiva: normal, no exudate, no            

      injection, Lids and lashes: appear normal, bilaterally.                                     

14:10 ENT: External ear(s): are unremarkable, Ear canal(s): are normal, clear, TM's:              

      dullness, bilaterally, Nose: is normal, Mouth: Lips: moist, Oral mucosa: moist,             

      Posterior pharynx: Airway: no evidence of obstruction, patent, Tonsils: with erythema,      

      no enlargement, no exudate, swelling, is not appreciated, erythema, that is mild,           

      exudate, is not appreciated.                                                                

14:10 Neck: ROM/movement: is normal, is supple, without pain, no range of motions                 

      limitations, Lymph nodes: no appreciated lymphadenopathy.                                   

14:10 Chest/axilla: Inspection: normal.                                                           

14:10 Cardiovascular: Rate: tachycardic.                                                          

14:10 Respiratory: the patient does not display signs of respiratory distress,  Respirations:     

      normal, no use of accessory muscles, no retractions, labored breathing, is not present,     

      Breath sounds: are clear throughout, no decreased breath sounds, no stridor, no             

      wheezing.                                                                                   

14:10 Abdomen/GI: Exam negative for discomfort, distension, guarding, Inspection: abdomen         

      appears normal.                                                                             

14:10 Skin: no rash present.                                                                      

                                                                                                  

Vital Signs:                                                                                      

13:50  / 74; Pulse 120; Resp 18; Temp 99.2; Pulse Ox 97% on R/A; Weight 48.99 kg;       ab2 

      Height 4 ft. 11 in. (149.86 cm); Pain 3/10;                                                 

13:50 Body Mass Index 21.81 (48.99 kg, 149.86 cm)                                             ab2 

                                                                                                  

MDM:                                                                                              

13:50 Patient medically screened.                                                             cp  

15:37 Data reviewed: vital signs, nurses notes, lab test result(s).                           cp  

15:37 Counseling: I had a detailed discussion with the patient and/or guardian regarding: the cp  

      historical points, exam findings, and any diagnostic results supporting the                 

      discharge/admit diagnosis, lab results, to return to the emergency department if            

      symptoms worsen or persist or if there are any questions or concerns that arise at home.    

                                                                                                  

                                                                                             

13:58 Order name: COVID-19/FLU A+B/RSV (Document "Date of Onset" if Symptomatic); Complete    cp  

      Time: 15:30                                                                                 

                                                                                             

15:30 Interpretation: Reviewed.                                                               cp  

                                                                                             

13:59 Order name: Strep; Complete Time: 15:30                                                 cp  

                                                                                             

15:07 Order name: Throat Culture                                                              EDMS

                                                                                                  

Administered Medications:                                                                         

No medications were administered                                                                  

                                                                                                  

                                                                                                  

Disposition Summary:                                                                              

22 15:38                                                                                    

Discharge Ordered                                                                                 

      Location: Home                                                                          cp  

      Problem: new                                                                            cp  

      Symptoms: are unchanged                                                                 cp  

      Condition: Stable                                                                       cp  

      Diagnosis                                                                                   

        - SARS-associated coronavirus as the cause of diseases classified elsewhere           cp  

      Followup:                                                                               cp  

        - With: Private Physician                                                                  

        - When: 2 - 3 days                                                                         

        - Reason: Worsening of condition                                                           

      Discharge Instructions:                                                                     

        - Discharge Summary Sheet                                                             cp  

        - Ibuprofen Dosage Chart, Pediatric                                                   cp  

        - Acetaminophen Dosage Chart, Pediatric                                               cp  

        - COVID-19                                                                            cp  

        - Things to Know about the COVID-19 Pandemic - [facility]                                    cp  

        - 10 Things You Can Do to Manage Your COVID-19 Symptoms at Home - [facility]                 cp  

        - COVID-19: Quarantine vs. Isolation - [facility]                                            cp  

        - Prevent the Spread of COVID-19 if You Are Sick - [facility]                                cp  

      Forms:                                                                                      

        - Medication Reconciliation Form                                                      cp  

        - Thank You Letter                                                                    cp  

        - Antibiotic Education                                                                cp  

        - Prescription Opioid Use                                                             cp  

Addendum:                                                                                         

2022                                                                                        

     09:02 Co-signature as Attending Physician, Deisy Villanueva MD I agree with the assessment and     s
p3

           plan of care.                                                                          

                                                                                                  

Signatures:                                                                                       

Dispatcher [employer]                           EDMS                                                 

Anthony Moore PA PA cp Patel, Setul, MD MD   sp3                                                  

Bernard Coburn2                                                  

                                                                                                  

Corrections: (The following items were deleted from the chart)                                    

                                                                                             

15:19 15:15 Constitutional: Negative for fever, cp                                            cp  

                                                                                                  

**************************************************************************************************
Pt admitted for SVT
admitted for SVT and elevated Troponin
admitted for SVT and elevated troponin

## 2022-03-15 ENCOUNTER — APPOINTMENT (OUTPATIENT)
Dept: CT IMAGING | Facility: CLINIC | Age: 72
End: 2022-03-15
Payer: MEDICARE

## 2022-03-15 PROCEDURE — G1004: CPT

## 2022-03-15 PROCEDURE — 71250 CT THORAX DX C-: CPT | Mod: MG

## 2022-03-20 ENCOUNTER — TRANSCRIPTION ENCOUNTER (OUTPATIENT)
Age: 72
End: 2022-03-20

## 2022-03-20 ENCOUNTER — INPATIENT (INPATIENT)
Facility: HOSPITAL | Age: 72
LOS: 2 days | Discharge: HOME | DRG: 872 | End: 2022-03-23
Attending: FAMILY MEDICINE | Admitting: HOSPITALIST
Payer: MEDICARE

## 2022-03-20 VITALS
DIASTOLIC BLOOD PRESSURE: 67 MMHG | WEIGHT: 190.04 LBS | OXYGEN SATURATION: 97 % | RESPIRATION RATE: 18 BRPM | HEIGHT: 69 IN | TEMPERATURE: 98 F | HEART RATE: 99 BPM | SYSTOLIC BLOOD PRESSURE: 101 MMHG

## 2022-03-20 DIAGNOSIS — N17.9 ACUTE KIDNEY FAILURE, UNSPECIFIED: ICD-10-CM

## 2022-03-20 DIAGNOSIS — Z92.21 PERSONAL HISTORY OF ANTINEOPLASTIC CHEMOTHERAPY: Chronic | ICD-10-CM

## 2022-03-20 DIAGNOSIS — I50.22 CHRONIC SYSTOLIC (CONGESTIVE) HEART FAILURE: ICD-10-CM

## 2022-03-20 DIAGNOSIS — Z98.890 OTHER SPECIFIED POSTPROCEDURAL STATES: Chronic | ICD-10-CM

## 2022-03-20 DIAGNOSIS — I10 ESSENTIAL (PRIMARY) HYPERTENSION: ICD-10-CM

## 2022-03-20 DIAGNOSIS — Z90.2 ACQUIRED ABSENCE OF LUNG [PART OF]: Chronic | ICD-10-CM

## 2022-03-20 DIAGNOSIS — N40.0 BENIGN PROSTATIC HYPERPLASIA WITHOUT LOWER URINARY TRACT SYMPTOMS: ICD-10-CM

## 2022-03-20 DIAGNOSIS — A41.9 SEPSIS, UNSPECIFIED ORGANISM: ICD-10-CM

## 2022-03-20 DIAGNOSIS — Z29.9 ENCOUNTER FOR PROPHYLACTIC MEASURES, UNSPECIFIED: ICD-10-CM

## 2022-03-20 DIAGNOSIS — I25.10 ATHEROSCLEROTIC HEART DISEASE OF NATIVE CORONARY ARTERY WITHOUT ANGINA PECTORIS: ICD-10-CM

## 2022-03-20 DIAGNOSIS — L03.90 CELLULITIS, UNSPECIFIED: ICD-10-CM

## 2022-03-20 DIAGNOSIS — I48.0 PAROXYSMAL ATRIAL FIBRILLATION: ICD-10-CM

## 2022-03-20 DIAGNOSIS — C34.90 MALIGNANT NEOPLASM OF UNSPECIFIED PART OF UNSPECIFIED BRONCHUS OR LUNG: ICD-10-CM

## 2022-03-20 DIAGNOSIS — J44.9 CHRONIC OBSTRUCTIVE PULMONARY DISEASE, UNSPECIFIED: ICD-10-CM

## 2022-03-20 DIAGNOSIS — I50.9 HEART FAILURE, UNSPECIFIED: ICD-10-CM

## 2022-03-20 LAB
ALBUMIN SERPL ELPH-MCNC: 2.4 G/DL — LOW (ref 3.3–5)
ALP SERPL-CCNC: 85 U/L — SIGNIFICANT CHANGE UP (ref 40–120)
ALT FLD-CCNC: 20 U/L — SIGNIFICANT CHANGE UP (ref 12–78)
ANION GAP SERPL CALC-SCNC: 9 MMOL/L — SIGNIFICANT CHANGE UP (ref 5–17)
APTT BLD: 29.2 SEC — SIGNIFICANT CHANGE UP (ref 27.5–35.5)
AST SERPL-CCNC: 12 U/L — LOW (ref 15–37)
BASOPHILS # BLD AUTO: 0.07 K/UL — SIGNIFICANT CHANGE UP (ref 0–0.2)
BASOPHILS NFR BLD AUTO: 0.4 % — SIGNIFICANT CHANGE UP (ref 0–2)
BILIRUB SERPL-MCNC: 0.7 MG/DL — SIGNIFICANT CHANGE UP (ref 0.2–1.2)
BUN SERPL-MCNC: 31 MG/DL — HIGH (ref 7–23)
CALCIUM SERPL-MCNC: 8.7 MG/DL — SIGNIFICANT CHANGE UP (ref 8.5–10.1)
CHLORIDE SERPL-SCNC: 102 MMOL/L — SIGNIFICANT CHANGE UP (ref 96–108)
CO2 SERPL-SCNC: 25 MMOL/L — SIGNIFICANT CHANGE UP (ref 22–31)
CREAT SERPL-MCNC: 2 MG/DL — HIGH (ref 0.5–1.3)
EGFR: 35 ML/MIN/1.73M2 — LOW
EOSINOPHIL # BLD AUTO: 0.01 K/UL — SIGNIFICANT CHANGE UP (ref 0–0.5)
EOSINOPHIL NFR BLD AUTO: 0.1 % — SIGNIFICANT CHANGE UP (ref 0–6)
FLUAV AG NPH QL: SIGNIFICANT CHANGE UP
FLUBV AG NPH QL: SIGNIFICANT CHANGE UP
GLUCOSE SERPL-MCNC: 126 MG/DL — HIGH (ref 70–99)
HCT VFR BLD CALC: 35 % — LOW (ref 39–50)
HGB BLD-MCNC: 11.9 G/DL — LOW (ref 13–17)
IMM GRANULOCYTES NFR BLD AUTO: 1 % — SIGNIFICANT CHANGE UP (ref 0–1.5)
INR BLD: 1.18 RATIO — HIGH (ref 0.88–1.16)
LACTATE SERPL-SCNC: 1.7 MMOL/L — SIGNIFICANT CHANGE UP (ref 0.7–2)
LACTATE SERPL-SCNC: 2.2 MMOL/L — HIGH (ref 0.7–2)
LYMPHOCYTES # BLD AUTO: 0.71 K/UL — LOW (ref 1–3.3)
LYMPHOCYTES # BLD AUTO: 4.2 % — LOW (ref 13–44)
MCHC RBC-ENTMCNC: 28.5 PG — SIGNIFICANT CHANGE UP (ref 27–34)
MCHC RBC-ENTMCNC: 34 GM/DL — SIGNIFICANT CHANGE UP (ref 32–36)
MCV RBC AUTO: 83.7 FL — SIGNIFICANT CHANGE UP (ref 80–100)
MONOCYTES # BLD AUTO: 1.05 K/UL — HIGH (ref 0–0.9)
MONOCYTES NFR BLD AUTO: 6.3 % — SIGNIFICANT CHANGE UP (ref 2–14)
NEUTROPHILS # BLD AUTO: 14.78 K/UL — HIGH (ref 1.8–7.4)
NEUTROPHILS NFR BLD AUTO: 88 % — HIGH (ref 43–77)
NRBC # BLD: 0 /100 WBCS — SIGNIFICANT CHANGE UP (ref 0–0)
PLATELET # BLD AUTO: 312 K/UL — SIGNIFICANT CHANGE UP (ref 150–400)
POTASSIUM SERPL-MCNC: 3.8 MMOL/L — SIGNIFICANT CHANGE UP (ref 3.5–5.3)
POTASSIUM SERPL-SCNC: 3.8 MMOL/L — SIGNIFICANT CHANGE UP (ref 3.5–5.3)
PROT SERPL-MCNC: 6.8 G/DL — SIGNIFICANT CHANGE UP (ref 6–8.3)
PROTHROM AB SERPL-ACNC: 13.8 SEC — HIGH (ref 10.5–13.4)
RBC # BLD: 4.18 M/UL — LOW (ref 4.2–5.8)
RBC # FLD: 15.7 % — HIGH (ref 10.3–14.5)
RSV RNA NPH QL NAA+NON-PROBE: SIGNIFICANT CHANGE UP
SARS-COV-2 RNA SPEC QL NAA+PROBE: SIGNIFICANT CHANGE UP
SODIUM SERPL-SCNC: 136 MMOL/L — SIGNIFICANT CHANGE UP (ref 135–145)
WBC # BLD: 16.78 K/UL — HIGH (ref 3.8–10.5)
WBC # FLD AUTO: 16.78 K/UL — HIGH (ref 3.8–10.5)

## 2022-03-20 PROCEDURE — 99285 EMERGENCY DEPT VISIT HI MDM: CPT | Mod: FS

## 2022-03-20 PROCEDURE — 93010 ELECTROCARDIOGRAM REPORT: CPT

## 2022-03-20 PROCEDURE — 99223 1ST HOSP IP/OBS HIGH 75: CPT

## 2022-03-20 PROCEDURE — 71045 X-RAY EXAM CHEST 1 VIEW: CPT | Mod: 26

## 2022-03-20 PROCEDURE — 93971 EXTREMITY STUDY: CPT | Mod: 26,RT

## 2022-03-20 RX ORDER — BUDESONIDE AND FORMOTEROL FUMARATE DIHYDRATE 160; 4.5 UG/1; UG/1
2 AEROSOL RESPIRATORY (INHALATION)
Refills: 0 | Status: DISCONTINUED | OUTPATIENT
Start: 2022-03-20 | End: 2022-03-23

## 2022-03-20 RX ORDER — ENOXAPARIN SODIUM 100 MG/ML
30 INJECTION SUBCUTANEOUS EVERY 24 HOURS
Refills: 0 | Status: DISCONTINUED | OUTPATIENT
Start: 2022-03-20 | End: 2022-03-20

## 2022-03-20 RX ORDER — CLOPIDOGREL BISULFATE 75 MG/1
1 TABLET, FILM COATED ORAL
Qty: 0 | Refills: 0 | DISCHARGE

## 2022-03-20 RX ORDER — SODIUM CHLORIDE 9 MG/ML
2700 INJECTION INTRAMUSCULAR; INTRAVENOUS; SUBCUTANEOUS ONCE
Refills: 0 | Status: COMPLETED | OUTPATIENT
Start: 2022-03-20 | End: 2022-03-20

## 2022-03-20 RX ORDER — CEFAZOLIN SODIUM 1 G
2000 VIAL (EA) INJECTION EVERY 8 HOURS
Refills: 0 | Status: DISCONTINUED | OUTPATIENT
Start: 2022-03-20 | End: 2022-03-23

## 2022-03-20 RX ORDER — METOPROLOL TARTRATE 50 MG
12.5 TABLET ORAL
Refills: 0 | Status: DISCONTINUED | OUTPATIENT
Start: 2022-03-20 | End: 2022-03-23

## 2022-03-20 RX ORDER — ASPIRIN/CALCIUM CARB/MAGNESIUM 324 MG
81 TABLET ORAL DAILY
Refills: 0 | Status: DISCONTINUED | OUTPATIENT
Start: 2022-03-20 | End: 2022-03-22

## 2022-03-20 RX ORDER — ALBUTEROL 90 UG/1
2 AEROSOL, METERED ORAL EVERY 6 HOURS
Refills: 0 | Status: DISCONTINUED | OUTPATIENT
Start: 2022-03-20 | End: 2022-03-23

## 2022-03-20 RX ORDER — CLOPIDOGREL BISULFATE 75 MG/1
75 TABLET, FILM COATED ORAL DAILY
Refills: 0 | Status: DISCONTINUED | OUTPATIENT
Start: 2022-03-20 | End: 2022-03-23

## 2022-03-20 RX ORDER — TAMSULOSIN HYDROCHLORIDE 0.4 MG/1
0.4 CAPSULE ORAL AT BEDTIME
Refills: 0 | Status: DISCONTINUED | OUTPATIENT
Start: 2022-03-20 | End: 2022-03-23

## 2022-03-20 RX ORDER — CEFAZOLIN SODIUM 1 G
2000 VIAL (EA) INJECTION ONCE
Refills: 0 | Status: COMPLETED | OUTPATIENT
Start: 2022-03-20 | End: 2022-03-20

## 2022-03-20 RX ORDER — TIOTROPIUM BROMIDE 18 UG/1
1 CAPSULE ORAL; RESPIRATORY (INHALATION) DAILY
Refills: 0 | Status: DISCONTINUED | OUTPATIENT
Start: 2022-03-20 | End: 2022-03-23

## 2022-03-20 RX ORDER — LANOLIN ALCOHOL/MO/W.PET/CERES
3 CREAM (GRAM) TOPICAL AT BEDTIME
Refills: 0 | Status: DISCONTINUED | OUTPATIENT
Start: 2022-03-20 | End: 2022-03-23

## 2022-03-20 RX ORDER — ATORVASTATIN CALCIUM 80 MG/1
1 TABLET, FILM COATED ORAL
Qty: 0 | Refills: 0 | DISCHARGE

## 2022-03-20 RX ORDER — ACETAMINOPHEN 500 MG
650 TABLET ORAL EVERY 6 HOURS
Refills: 0 | Status: DISCONTINUED | OUTPATIENT
Start: 2022-03-20 | End: 2022-03-23

## 2022-03-20 RX ADMIN — TAMSULOSIN HYDROCHLORIDE 0.4 MILLIGRAM(S): 0.4 CAPSULE ORAL at 23:09

## 2022-03-20 RX ADMIN — Medication 100 MILLIGRAM(S): at 14:19

## 2022-03-20 RX ADMIN — SODIUM CHLORIDE 2700 MILLILITER(S): 9 INJECTION INTRAMUSCULAR; INTRAVENOUS; SUBCUTANEOUS at 14:08

## 2022-03-20 RX ADMIN — Medication 100 MILLIGRAM(S): at 23:08

## 2022-03-20 NOTE — H&P ADULT - PROBLEM SELECTOR PLAN 7
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 Larsen, undergoes screening CT chest every 3 months to ensure remission  - Most recent CT chest last week and had follow up with Dr. Larsen this week to obtain results

## 2022-03-20 NOTE — ED ADULT TRIAGE NOTE - CHIEF COMPLAINT QUOTE
Pt with worsening right lower leg redness and swelling since Thursday - pt with wound that he states is from scratching dry skin

## 2022-03-20 NOTE — H&P ADULT - PROBLEM SELECTOR PLAN 1
Patient presenting with right leg swelling and erythema for 3 days  - Admit to [facility]  - Meets severe sepsis criteria (tachycardiac to 99, WBC 16.78, Lactic acidosis 2.2, suspected source of right lower extremity cellulitis)  - Received Ancef in the ED, continue inpatient  - Lactate 2.2, recieved 2.7L NS bolus, repeat downtrended to 1.7  - F/u BCx x2  - F/u UA and UCx  - Doppler of RLE negative for DVT  - CXR improved lung and pleural findings was residual and chronic loss of volume of left chest  - Monitor for fever  - Trend leukocytosis with CBC  - Infectious disease (Dr. Lezama) consulted, f/u recs Patient presenting with right leg swelling and erythema for 3 days  - Admit to [facility]  - Meets severe sepsis criteria (tachycardiac to 99, WBC 16.78, Lactic acidosis 2.2, suspected source of right lower extremity cellulitis)  - Received Ancef in the ED, continue inpatient  - Lactate 2.2, recieved 2.7L NS bolus, repeat downtrended to 1.7  - F/u BCx x2  - F/u UA and UCx  - Check MRSA PCR  - Doppler of RLE negative for DVT  - CXR improved lung and pleural findings was residual and chronic loss of volume of left chest  - Monitor for fever  - Trend leukocytosis with CBC  - Pain control with Tylenol Patient presenting with right leg swelling and erythema for 3 days  - Admit to [facility]  - Meets severe sepsis criteria (tachycardiac to 99, WBC 16.78, Lactic acidosis 2.2, suspected source of right lower extremity cellulitis)  - Received Ancef in the ED, continue inpatient  - Lactate 2.2, received 2.7L NS bolus, repeat downtrended to 1.7  - F/u BCx x2  - F/u UA and UCx  - Check MRSA PCR  - Doppler of RLE negative for DVT  - CXR improved lung and pleural findings was residual and chronic loss of volume of left chest  - Monitor for fever  - Trend leukocytosis with CBC  - Pain control with Tylenol Patient presenting with right leg swelling and erythema for 3 days  - Admit to [facility]  - Meets severe sepsis criteria (tachycardiac to 99, WBC 16.78, Lactic acidosis 2.2, suspected source of right lower extremity cellulitis)  - Received Ancef in the ED, continue inpatient  - Lactate 2.2, received 2.7L NS bolus, repeat downtrended to 1.7  - F/u BCx x2  - F/u UA and UCx  - Check MRSA PCR  - Doppler of RLE negative for DVT  - CXR improved lung and pleural findings was residual and chronic loss of volume of left chest  - Monitor for fever  - Trend leukocytosis with CBC  - Pain control with Tylenol  - elevate limb to alleviate pain& swelling  -fall precautions

## 2022-03-20 NOTE — H&P ADULT - PROBLEM SELECTOR PLAN 2
Plan as above History of CKD3  - Presenting with Cr 2  - Baseline Cr 1.4 per outpatient chart review  - Suspect prerenal azotemia in the setting of sepsis  - Received 2.7L NS in ED, will hold further IVF given questionable CHF history   - Hold nephrotoxic agents as able   - Trend with daily CMP History of CKD3  - Presenting with Cr 2  - Baseline Cr 1.4 per outpatient chart review  - Suspect prerenal azotemia in the setting of sepsis  - Received 2.7L NS in ED, will hold further IVF given questionable CHF history   - Hold nephrotoxic agents as able   - Trend with daily CMP  - HOLD ARB

## 2022-03-20 NOTE — H&P ADULT - NSHPREVIEWOFSYSTEMS_GEN_ALL_CORE
CONSTITUTIONAL: denies fever, chills, fatigue, weakness  HEENT: denies blurred vision, sore throat  SKIN: admits redness of right lower extremity  CARDIOVASCULAR: denies chest pain, chest pressure, palpitations  RESPIRATORY: denies shortness of breath, sputum production  GASTROINTESTINAL: denies nausea, vomiting, diarrhea, abdominal pain  GENITOURINARY: denies dysuria, discharge  NEUROLOGICAL: denies numbness, headache, focal weakness  MUSCULOSKELETAL: admits right leg pain

## 2022-03-20 NOTE — H&P ADULT - NSHPSOCIALHISTORY_GEN_ALL_CORE
Alcohol: occasional alcohol use  Tobacco: 40 pack year history, quit in 2017  Drugs: Denies past or present use    Vaccines: COVID vaccinated with Moderna x3    Lives with girlfriend   Ambulates without assistive devices  Performs ADLs independently

## 2022-03-20 NOTE — ED ADULT NURSE NOTE - OBJECTIVE STATEMENT
Stated he had right lower leg wound, he wore wool socks that aggravated the area, and last Thursday he developed increased swelling, redness with small drainage

## 2022-03-20 NOTE — H&P ADULT - PROBLEM SELECTOR PLAN 4
History of paroxysmal AFib  - Previously on Eliquis however no longer taking due to insurance coverage issues  - According to outpatient cardiologist taking low dose aspirin  - CHADSVASC = 3  - Encourage follow up with outpatient cardiology to follow up anticoagulation Underwent nuclear stress test in 9/2021 and incidentally found to have large anterior MI with inferior wall ischemia  - S/p ROSA to D1, proximal LAD and OM1  - Continue home Plavix, ASA 81  - Was briefly on statin after stent placement but only prescribed one month supply and stopped taking  - Check lipid panel in AM

## 2022-03-20 NOTE — H&P ADULT - PROBLEM SELECTOR PLAN 3
History of CKD3  - Presenting with Cr 2  - Baseline Cr 1.4 per outpatient chart review  - Suspect prerenal azotemia in the setting of sepsis  - Received 2.7L NS in ED, will hold further IVF given questionable CHF history   - Hold nephrotoxic agents as able   - Trend with daily CMP History of paroxysmal AFib  - Previously on Eliquis however no longer taking due to insurance coverage issues  - According to outpatient cardiologist taking low dose aspirin  - CHADSVASC = 3  - Encourage follow up with outpatient cardiology to follow up anticoagulation

## 2022-03-20 NOTE — H&P ADULT - ASSESSMENT
72yo male with PMH of HTN, paroxysmal AFib not on AC, CAD s/p 3 stents on Plavix, lung adenocarcinoma s/p left lower lung resection and VATS, COPD, CKD stage 3 who presents to ED with right lower extremity swelling. 72yo male with PMH of HTN, paroxysmal AFib not on AC, CAD s/p 3 stents on Plavix, lung adenocarcinoma s/p left lower lung resection and VATS, COPD, CKD stage 3 who presents to ED with right lower extremity swelling.   70yo male with PMH of HTN, paroxysmal AFib not on AC, CAD s/p 3 stents on Plavix, lung adenocarcinoma s/p left lower lung resection and VATS, COPD, CKD stage 3, ?systolic heart failure who presents to ED with right lower extremity swelling.

## 2022-03-20 NOTE — ED PROVIDER NOTE - OBJECTIVE STATEMENT
70 yo male with PMHx HTN, a-fib (on plavix), h/o stents, h/o lung CA s/p lung resection present to ED c/o RLE swelling, redness and pain that began Thursday evening. PCP Dr. Mccormick. Pt lives with girlfriend.     Denies fever, chills, HA. Denies dizziness, lightheadedness.   Denies vision changes from baseline.   Denies cough, throat pain.   Denies CP or SOB.   Denies abd pain, n/v/d/c.   Denies urinary symptoms, dysuria, frequency.   +++ RLE calf pain, rash, swelling and redness.   Denies numbness or tingling.

## 2022-03-20 NOTE — ED PROVIDER NOTE - NS ED ATTENDING STATEMENT MOD
This was a shared visit with the [facility]. I reviewed and verified the documentation and independently performed the documented:

## 2022-03-20 NOTE — H&P ADULT - NSHPPHYSICALEXAM_GEN_ALL_CORE
T(C): 36.5 (03-20-22 @ 13:08), Max: 36.5 (03-20-22 @ 13:08)  HR: 99 (03-20-22 @ 13:08) (99 - 99)  BP: 101/67 (03-20-22 @ 13:08) (101/67 - 101/67)  RR: 18 (03-20-22 @ 13:08) (18 - 18)  SpO2: 97% (03-20-22 @ 13:08) (97% - 97%)    GENERAL: patient appears well, no acute distress  EYES: sclera clear, no exudates  ENMT: oropharynx clear without erythema, no exudates, moist mucous membranes  NECK: supple, soft  LUNGS: good air entry bilaterally, clear to auscultation, symmetric breath sounds, no wheezing or rhonchi appreciated  HEART: soft S1/S2, regular rate and rhythm, no murmurs noted, 1+ edema of the right lower extremity   GASTROINTESTINAL: abdomen is soft, nontender, nondistended, normoactive bowel sounds, no palpable masses  INTEGUMENT: erythema of the right lower extremity, extending from right forefoot to 2 inches below right patella, scabbing with no pus or weeping from right ankle   MUSCULOSKELETAL: tenderness to palpation of the right calf   NEUROLOGIC: awake, alert, oriented x3, good muscle tone in 4 extremities, no obvious sensory deficits

## 2022-03-20 NOTE — H&P ADULT - NSHPOUTPATIENTPROVIDERS_GEN_ALL_CORE
PMD: Dr. Terrance Mccormick  Pulmonology: Dr. Nam Andrea  Cardiology: Dr. Houston   Heme/Onc: Dr. Maier   Thoracic Surgery: Dr. Darnell Larsen

## 2022-03-20 NOTE — H&P ADULT - ATTENDING COMMENTS
70yo male with PMH of HTN, paroxysmal AFib not on AC, CAD s/p 3 stents on Plavix, lung adenocarcinoma s/p left lower lung resection and VATS, COPD, CKD stage 3, ?systolic CHF who presents to ED with right lower extremity swelling - patient seen and examined bedside, non toxic appearing, unable to wt bear on RLE secondary to pain& swelling since 3 days    Ac non purulent Cellulitis, elevated lactate that improved with IVF, Acute on Chronic Renal Insufficiency likely secondary to decreased perfusion in the background of sepsis  - IV Ancef  -encourage po fluids  - trend CBC, renal indices  - pain control, RLE elevation, fall precautions  - follow up BCx  - DVT prophylaxis (RLE DVT ruled out)

## 2022-03-20 NOTE — H&P ADULT - PROBLEM SELECTOR PLAN 8
Chronic  - Continue home Tamsulosin 0.4mg daily Chronic  - Continue home Trelegy Ellipta with therapeutic interchange  - Continue Ventolin PRN for SOB

## 2022-03-20 NOTE — ED PROVIDER NOTE - CLINICAL SUMMARY MEDICAL DECISION MAKING FREE TEXT BOX
71 male with new onset RLE swelling, warmth and redness. Labs, doppler, abx, consider admission for cellulitis. r/o DVT.

## 2022-03-20 NOTE — H&P ADULT - PROBLEM SELECTOR PLAN 6
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 Larsen, undergoes screening CT chest every 3 months to ensure remission  - Most recent CT chest last week and had follow up with Dr. Larsen this week to obtain results Chronic  - /67 in the ED  - Likely soft blood pressure in the setting of sepsis   - On Irbesartan 100mg-HCTZ 25mg, Metoprolol 12.5mg BID at home   - Hold medications in the setting of RASHAWN   - Can uptitrate beta blocker as needed for BP control  - Monitor routine hemodynamics Chronic  - /67 in the ED  - Likely soft blood pressure in the setting of sepsis   - On Irbesartan 100mg-HCTZ 25mg, Metoprolol 12.5mg BID at home   - Hold ARB-HCTZ medications in the setting of RASHAWN   - Can uptitrate beta blocker as needed for BP control  - Monitor routine hemodynamics

## 2022-03-20 NOTE — ED PROVIDER NOTE - MUSCULOSKELETAL, MLM
RLE swelling, pitting edema to knee, redness and warmth, + wound with blisters near ankle, serous drainage. sensation intact.

## 2022-03-20 NOTE — H&P ADULT - PROBLEM SELECTOR PLAN 5
Chronic  - /67 in the ED  - Likely soft blood pressure in the setting of sepsis   - On Irbesartan 100mg-HCTZ 25mg at home   - Hold medications in the setting of RASHAWN   - Monitor routine hemodynamics and restart BP medications as able Most recent TTE from outpatient cardiology from 10/2021 showed EF 30-35%, moderate global LV systolic dysfunction, thickened pericardium after stent placement in 2021  - Prior TTE from 2019 showed normal EF 50%  - No heart failure diagnosis in outpatient cardiology notes, patient denies heart failure history as well   - Check TTE inpatient to reevaluate EF Most recent TTE from outpatient cardiology from 10/2021 showed EF 30-35%, moderate global LV systolic dysfunction, thickened pericardium after stent placement in 2021  - Prior TTE from 2019 showed normal EF 50%  - No heart failure diagnosis in outpatient cardiology notes, patient denies heart failure history as well   - Patient is currently euvolemic on exam and without shortness of breath or chest pain  - Will defer repeat TTE at this time and encourage outpatient follow up with cardiology for repeat TTE

## 2022-03-20 NOTE — H&P ADULT - HISTORY OF PRESENT ILLNESS
70yo male with PMH of HTN, paroxysmal AFib, CAD s/p stents on Plavix, h/o lung CA s/p lung resection, COPD, CKD stage 3 present to ED c/o RLE swelling, redness and pain that began Thursday evening.     In the ED:  Vitals: Temp 97.7F | HR 99 | /67 | RR 18 | SpO2 97% on RA  Labs: WBC 16.78, BUN 31, Cr 2, Lactate 2.2   CXR: improved lung and pleural findings was residual and chronic loss of volume of left chest  US doppler right leg: no evidence of right lower extremity DVT  ECG: ****  Received: Cefazolin 2g, 2.7L NS bolus  72yo male with PMH of HTN, paroxysmal AFib not on AC, CAD s/p 3 stents on Plavix, lung adenocarcinoma s/p left lower lung resection and VATS, COPD, CKD stage 3 who presents to ED with right lower extremity swelling. Patient states that three days ago he noticed that his right leg was starting to turn red and swell. The next day, patient had severe pain in his right calf and assumed he had pulled a muscle. Patient tried elevating the leg, placing ice on it but nothing seemed to help the swelling. His pain became progressively worse so he came to the ED to be evaluated. Patient notes that a few weeks ago he was wearing wool blend socks and that his ankle was extremely itchy. He scratched the ankle so much to the point that the skin was "rubbed raw" and that there was a small amount of bleeding. He stopped wearing these socks and the itchiness improved but then he started noticing the redness and swelling in his right ankle only. He denies any pus or malodorous discharge and also denies any direct trauma to the area. No fever or chills at home.     In the ED:  Vitals: Temp 97.7F | HR 99 | /67 | RR 18 | SpO2 97% on RA  Labs: WBC 16.78, BUN 31, Cr 2, Lactate 2.2   CXR: improved lung and pleural findings was residual and chronic loss of volume of left chest  US doppler right leg: no evidence of right lower extremity DVT  ECG: NSR rate 93, QTc 479  Received: Cefazolin 2g, 2.7L NS bolus  70yo male with PMH of HTN, paroxysmal AFib not on AC, CAD s/p 3 stents on Plavix, lung adenocarcinoma s/p left lower lung resection and VATS, COPD, CKD stage 3 who presents to ED with right lower extremity swelling. Patient states that three days ago he noticed that his right leg was starting to turn red and swell. The next day, patient had severe pain in his right calf and assumed he had pulled a muscle. Patient tried elevating the leg, placing ice on it but nothing seemed to help the swelling. His pain became progressively worse so he came to the ED to be evaluated. Patient notes that a few weeks ago he was wearing wool blend socks and that his ankle was extremely itchy. He scratched the ankle so much to the point that the skin was "rubbed raw" and that there was a small amount of bleeding. He stopped wearing these socks and the itchiness improved but then he started noticing the redness and swelling in his right ankle only. He has had difficulty ambulating due to his pain. He denies any pus or malodorous discharge and also denies any direct trauma to the area. No fever or chills at home.     In the ED:  Vitals: Temp 97.7F | HR 99 | /67 | RR 18 | SpO2 97% on RA  Labs: WBC 16.78, BUN 31, Cr 2, Lactate 2.2   CXR: improved lung and pleural findings was residual and chronic loss of volume of left chest  US doppler right leg: no evidence of right lower extremity DVT  ECG: NSR rate 93, QTc 479  Received: Cefazolin 2g, 2.7L NS bolus  70yo male with PMH of HTN, paroxysmal AFib not on AC, CAD s/p 3 stents on Plavix, lung adenocarcinoma s/p left lower lung resection and VATS, COPD, CKD stage 3, ?systolic CHF who presents to ED with right lower extremity swelling. Patient states that three days ago he noticed that his right leg was starting to turn red and swell. The next day, patient had severe pain in his right calf and assumed he had pulled a muscle. Patient tried elevating the leg, placing ice on it but nothing seemed to help the swelling. His pain became progressively worse so he came to the ED to be evaluated. Patient notes that a few weeks ago he was wearing wool blend socks and that his ankle was extremely itchy. He scratched the ankle so much to the point that the skin was "rubbed raw" and that there was a small amount of bleeding. He stopped wearing these socks and the itchiness improved but then he started noticing the redness and swelling in his right ankle only. He has had difficulty ambulating due to his pain. He denies any pus or malodorous discharge and also denies any direct trauma to the area. No fever or chills at home.     In the ED:  Vitals: Temp 97.7F | HR 99 | /67 | RR 18 | SpO2 97% on RA  Labs: WBC 16.78, BUN 31, Cr 2, Lactate 2.2   CXR: improved lung and pleural findings was residual and chronic loss of volume of left chest  US doppler right leg: no evidence of right lower extremity DVT  ECG: NSR rate 93, QTc 479  Received: Cefazolin 2g, 2.7L NS bolus

## 2022-03-20 NOTE — ED PROVIDER NOTE - ATTENDING CONTRIBUTION TO CARE
I have personally performed a face to face diagnostic evaluation on this patient.  I have reviewed the PA's note and agree with the history, exam, and plan of care, except as noted.  History and Exam by me shows 70 yo male with PMHx HTN, a-fib (on plavix), h/o stents, h/o lung CA s/p lung resection present to ED c/o RLE swelling, redness and pain that began Thursday evening. PCP Dr. Mccormick. Pt lives with girlfriend.  Denies fever, chills, HA. Denies dizziness, lightheadedness.   Denies vision changes from baseline.   Denies cough, throat pain.   Denies CP or SOB.   Denies abd pain, n/v/d/c.   Denies urinary symptoms, dysuria, frequency.   +++ RLE calf pain, rash, swelling and redness.   Denies numbness or tingling.   .  Patient is NAD.  A n O x 3. Head NC/AT. Lungs cta bl. Heart s1,s2, rrr, no murmurs. RLE- swollen and erythematous and calor.  Labs, us  and cxr were sig for wbc 16.7

## 2022-03-20 NOTE — H&P ADULT - NSICDXPASTSURGICALHX_GEN_ALL_CORE_FT
PAST SURGICAL HISTORY:  H/O inguinal hernia repair Bilateral in the 1970's    History of chemotherapy left chest wall infusaport placement    S/P pericardial surgery For an effusion in 2019    Status post lobectomy of lung LLL lobectomy at [facility]

## 2022-03-20 NOTE — PATIENT PROFILE ADULT - FALL HARM RISK - RISK INTERVENTIONS

## 2022-03-21 ENCOUNTER — TRANSCRIPTION ENCOUNTER (OUTPATIENT)
Age: 72
End: 2022-03-21

## 2022-03-21 ENCOUNTER — NON-APPOINTMENT (OUTPATIENT)
Age: 72
End: 2022-03-21

## 2022-03-21 LAB
ALBUMIN SERPL ELPH-MCNC: 2 G/DL — LOW (ref 3.3–5)
ALP SERPL-CCNC: 79 U/L — SIGNIFICANT CHANGE UP (ref 40–120)
ALT FLD-CCNC: 16 U/L — SIGNIFICANT CHANGE UP (ref 12–78)
ANION GAP SERPL CALC-SCNC: 8 MMOL/L — SIGNIFICANT CHANGE UP (ref 5–17)
APPEARANCE UR: CLEAR — SIGNIFICANT CHANGE UP
AST SERPL-CCNC: 11 U/L — LOW (ref 15–37)
BASOPHILS # BLD AUTO: 0.07 K/UL — SIGNIFICANT CHANGE UP (ref 0–0.2)
BASOPHILS NFR BLD AUTO: 0.4 % — SIGNIFICANT CHANGE UP (ref 0–2)
BILIRUB SERPL-MCNC: 0.4 MG/DL — SIGNIFICANT CHANGE UP (ref 0.2–1.2)
BILIRUB UR-MCNC: NEGATIVE — SIGNIFICANT CHANGE UP
BUN SERPL-MCNC: 24 MG/DL — HIGH (ref 7–23)
CALCIUM SERPL-MCNC: 8.3 MG/DL — LOW (ref 8.5–10.1)
CHLORIDE SERPL-SCNC: 104 MMOL/L — SIGNIFICANT CHANGE UP (ref 96–108)
CO2 SERPL-SCNC: 24 MMOL/L — SIGNIFICANT CHANGE UP (ref 22–31)
COLOR SPEC: YELLOW — SIGNIFICANT CHANGE UP
CREAT SERPL-MCNC: 1.5 MG/DL — HIGH (ref 0.5–1.3)
DIFF PNL FLD: ABNORMAL
EGFR: 49 ML/MIN/1.73M2 — LOW
EOSINOPHIL # BLD AUTO: 0.09 K/UL — SIGNIFICANT CHANGE UP (ref 0–0.5)
EOSINOPHIL NFR BLD AUTO: 0.6 % — SIGNIFICANT CHANGE UP (ref 0–6)
GLUCOSE SERPL-MCNC: 103 MG/DL — HIGH (ref 70–99)
GLUCOSE UR QL: NEGATIVE — SIGNIFICANT CHANGE UP
HCT VFR BLD CALC: 30.7 % — LOW (ref 39–50)
HGB BLD-MCNC: 10.5 G/DL — LOW (ref 13–17)
IMM GRANULOCYTES NFR BLD AUTO: 1.7 % — HIGH (ref 0–1.5)
KETONES UR-MCNC: ABNORMAL
LEUKOCYTE ESTERASE UR-ACNC: NEGATIVE — SIGNIFICANT CHANGE UP
LYMPHOCYTES # BLD AUTO: 0.8 K/UL — LOW (ref 1–3.3)
LYMPHOCYTES # BLD AUTO: 4.9 % — LOW (ref 13–44)
MCHC RBC-ENTMCNC: 28.8 PG — SIGNIFICANT CHANGE UP (ref 27–34)
MCHC RBC-ENTMCNC: 34.2 GM/DL — SIGNIFICANT CHANGE UP (ref 32–36)
MCV RBC AUTO: 84.1 FL — SIGNIFICANT CHANGE UP (ref 80–100)
MONOCYTES # BLD AUTO: 1.58 K/UL — HIGH (ref 0–0.9)
MONOCYTES NFR BLD AUTO: 9.7 % — SIGNIFICANT CHANGE UP (ref 2–14)
MRSA PCR RESULT.: SIGNIFICANT CHANGE UP
NEUTROPHILS # BLD AUTO: 13.44 K/UL — HIGH (ref 1.8–7.4)
NEUTROPHILS NFR BLD AUTO: 82.7 % — HIGH (ref 43–77)
NITRITE UR-MCNC: NEGATIVE — SIGNIFICANT CHANGE UP
NRBC # BLD: 0 /100 WBCS — SIGNIFICANT CHANGE UP (ref 0–0)
PH UR: 5 — SIGNIFICANT CHANGE UP (ref 5–8)
PLATELET # BLD AUTO: 290 K/UL — SIGNIFICANT CHANGE UP (ref 150–400)
POTASSIUM SERPL-MCNC: 3.6 MMOL/L — SIGNIFICANT CHANGE UP (ref 3.5–5.3)
POTASSIUM SERPL-SCNC: 3.6 MMOL/L — SIGNIFICANT CHANGE UP (ref 3.5–5.3)
PROT SERPL-MCNC: 5.9 G/DL — LOW (ref 6–8.3)
PROT UR-MCNC: 30 MG/DL
RBC # BLD: 3.65 M/UL — LOW (ref 4.2–5.8)
RBC # FLD: 15.7 % — HIGH (ref 10.3–14.5)
S AUREUS DNA NOSE QL NAA+PROBE: SIGNIFICANT CHANGE UP
SODIUM SERPL-SCNC: 136 MMOL/L — SIGNIFICANT CHANGE UP (ref 135–145)
SP GR SPEC: 1.02 — SIGNIFICANT CHANGE UP (ref 1.01–1.02)
UROBILINOGEN FLD QL: NEGATIVE — SIGNIFICANT CHANGE UP
WBC # BLD: 16.25 K/UL — HIGH (ref 3.8–10.5)
WBC # FLD AUTO: 16.25 K/UL — HIGH (ref 3.8–10.5)

## 2022-03-21 PROCEDURE — 99222 1ST HOSP IP/OBS MODERATE 55: CPT

## 2022-03-21 PROCEDURE — 99233 SBSQ HOSP IP/OBS HIGH 50: CPT | Mod: GC

## 2022-03-21 RX ORDER — APIXABAN 2.5 MG/1
1 TABLET, FILM COATED ORAL
Qty: 60 | Refills: 0
Start: 2022-03-21 | End: 2022-04-19

## 2022-03-21 RX ORDER — ASPIRIN/CALCIUM CARB/MAGNESIUM 324 MG
1 TABLET ORAL
Qty: 0 | Refills: 0 | DISCHARGE

## 2022-03-21 RX ORDER — ALBUTEROL 90 UG/1
2 AEROSOL, METERED ORAL
Qty: 0 | Refills: 0 | DISCHARGE

## 2022-03-21 RX ORDER — FLUTICASONE FUROATE, UMECLIDINIUM BROMIDE AND VILANTEROL TRIFENATATE 200; 62.5; 25 UG/1; UG/1; UG/1
1 POWDER RESPIRATORY (INHALATION)
Qty: 0 | Refills: 0 | DISCHARGE

## 2022-03-21 RX ORDER — CLOPIDOGREL BISULFATE 75 MG/1
1 TABLET, FILM COATED ORAL
Qty: 0 | Refills: 0 | DISCHARGE

## 2022-03-21 RX ORDER — ATORVASTATIN CALCIUM 80 MG/1
40 TABLET, FILM COATED ORAL AT BEDTIME
Refills: 0 | Status: DISCONTINUED | OUTPATIENT
Start: 2022-03-21 | End: 2022-03-23

## 2022-03-21 RX ORDER — TAMSULOSIN HYDROCHLORIDE 0.4 MG/1
1 CAPSULE ORAL
Qty: 0 | Refills: 0 | DISCHARGE

## 2022-03-21 RX ORDER — METOPROLOL TARTRATE 50 MG
0.5 TABLET ORAL
Qty: 0 | Refills: 0 | DISCHARGE

## 2022-03-21 RX ADMIN — TAMSULOSIN HYDROCHLORIDE 0.4 MILLIGRAM(S): 0.4 CAPSULE ORAL at 21:54

## 2022-03-21 RX ADMIN — Medication 100 MILLIGRAM(S): at 21:54

## 2022-03-21 RX ADMIN — CLOPIDOGREL BISULFATE 75 MILLIGRAM(S): 75 TABLET, FILM COATED ORAL at 11:29

## 2022-03-21 RX ADMIN — ATORVASTATIN CALCIUM 40 MILLIGRAM(S): 80 TABLET, FILM COATED ORAL at 21:54

## 2022-03-21 RX ADMIN — BUDESONIDE AND FORMOTEROL FUMARATE DIHYDRATE 2 PUFF(S): 160; 4.5 AEROSOL RESPIRATORY (INHALATION) at 07:45

## 2022-03-21 RX ADMIN — Medication 81 MILLIGRAM(S): at 11:29

## 2022-03-21 RX ADMIN — Medication 100 MILLIGRAM(S): at 15:04

## 2022-03-21 RX ADMIN — Medication 12.5 MILLIGRAM(S): at 05:06

## 2022-03-21 RX ADMIN — BUDESONIDE AND FORMOTEROL FUMARATE DIHYDRATE 2 PUFF(S): 160; 4.5 AEROSOL RESPIRATORY (INHALATION) at 18:30

## 2022-03-21 RX ADMIN — Medication 100 MILLIGRAM(S): at 05:06

## 2022-03-21 RX ADMIN — TIOTROPIUM BROMIDE 1 CAPSULE(S): 18 CAPSULE ORAL; RESPIRATORY (INHALATION) at 11:29

## 2022-03-21 NOTE — DISCHARGE NOTE PROVIDER - HOSPITAL COURSE
HPI:  72yo male with PMH of HTN, paroxysmal AFib not on AC, CAD s/p 3 stents on Plavix, lung adenocarcinoma s/p left lower lung resection and VATS, COPD, CKD stage 3, ?systolic CHF who presents to ED with right lower extremity swelling. Patient states that three days ago he noticed that his right leg was starting to turn red and swell. The next day, patient had severe pain in his right calf and assumed he had pulled a muscle. Patient tried elevating the leg, placing ice on it but nothing seemed to help the swelling. His pain became progressively worse so he came to the ED to be evaluated. Patient notes that a few weeks ago he was wearing wool blend socks and that his ankle was extremely itchy. He scratched the ankle so much to the point that the skin was "rubbed raw" and that there was a small amount of bleeding. He stopped wearing these socks and the itchiness improved but then he started noticing the redness and swelling in his right ankle only. He has had difficulty ambulating due to his pain. He denies any pus or malodorous discharge and also denies any direct trauma to the area. No fever or chills at home.     In the ED:  Vitals: Temp 97.7F | HR 99 | /67 | RR 18 | SpO2 97% on RA  Labs: WBC 16.78, BUN 31, Cr 2, Lactate 2.2   CXR: improved lung and pleural findings was residual and chronic loss of volume of left chest  US doppler right leg: no evidence of right lower extremity DVT  ECG: NSR rate 93, QTc 479  Received: Cefazolin 2g, 2.7L NS bolus  (20 Mar 2022 17:48)      ---  HOSPITAL COURSE:   Patient admitted to telemetry for severe sepsis 2/2 RLE cellulitis. Dopplers of RLE were negative for DVT. Patient started on IV Ancef, infectious disease consulted. Blood cultures were NGTD*****. Urine culture ***, MRSA PCR****. Patient also presented with RASHAWN on CKD, RASHAWN resolved s/p IVF and renal indices remained at baseline throughout admission. Irbesartan was held and eventually resumed once RASHAWN resolved. For PMH pAF, patient continued on Plavix and Metoprolol, was not restarted on Eliquis as patient with insurance issues and meds-to-beds not covered for patient to continue Eliquis outpatient. For PMH CAD s/p recent ACS, patient continued on Plavix, ASA, atorvastatin. Lipid panel showed *****. Patient improved clinically throughout admission.     Patient seen and examined on day of discharge. Overall feeling well, denies any acute complaints.     Vitals  Physical Exam    Patient optimized from medical standpoint for discharge **** with close outpatient follow up.     ---  CONSULTANTS:   ID: Dr. Lezama    ---  TIME SPENT:  I, the attending physician, was physically present for the key portions of the evaluation and management (E/M) service provided. The total amount of time spent reviewing the hospital notes, laboratory values, imaging findings, assessing/counseling the patient, discussing with consultant physicians, social work, nursing staff was -- minutes    ---   HPI:  70yo male with PMH of HTN, paroxysmal AFib not on AC, CAD s/p 3 stents on Plavix, lung adenocarcinoma s/p left lower lung resection and VATS, COPD, CKD stage 3, ?systolic CHF who presents to ED with right lower extremity swelling. Patient states that three days ago he noticed that his right leg was starting to turn red and swell. The next day, patient had severe pain in his right calf and assumed he had pulled a muscle. Patient tried elevating the leg, placing ice on it but nothing seemed to help the swelling. His pain became progressively worse so he came to the ED to be evaluated. Patient notes that a few weeks ago he was wearing wool blend socks and that his ankle was extremely itchy. He scratched the ankle so much to the point that the skin was "rubbed raw" and that there was a small amount of bleeding. He stopped wearing these socks and the itchiness improved but then he started noticing the redness and swelling in his right ankle only. He has had difficulty ambulating due to his pain. He denies any pus or malodorous discharge and also denies any direct trauma to the area. No fever or chills at home.     In the ED:  Vitals: Temp 97.7F | HR 99 | /67 | RR 18 | SpO2 97% on RA  Labs: WBC 16.78, BUN 31, Cr 2, Lactate 2.2   CXR: improved lung and pleural findings was residual and chronic loss of volume of left chest  US doppler right leg: no evidence of right lower extremity DVT  ECG: NSR rate 93, QTc 479  Received: Cefazolin 2g, 2.7L NS bolus  (20 Mar 2022 17:48)      ---  HOSPITAL COURSE:   Patient admitted to telemetry for severe sepsis 2/2 RLE cellulitis. Dopplers of RLE were negative for DVT. Patient started on IV Ancef, infectious disease consulted. Wound culture showed skin tolu, Blood cultures were NGTD*****. Urine culture NGTD, MRSA PCR negative. Patient transitioned from IV Ancef to PO *** Patient also presented with RASHAWN on CKD, RASHAWN resolved s/p IVF and renal indices remained at baseline throughout admission. Irbesartan was held due to RASHAWN, however once RASHAWN resolved patient was normotensive and irbesartan not resumed*****. For PMH pAF, patient continued on Plavix and Metoprolol, was not restarted on Eliquis as patient with insurance issues and meds-to-beds not covered for patient to continue Eliquis outpatient. For PMH CAD s/p recent ACS, patient continued on Plavix, ASA, atorvastatin. Lipid panel WNL except HDL low. Patient improved clinically throughout admission.     Patient seen and examined on day of discharge. Overall feeling well, denies any acute complaints.     Vitals  Physical Exam    Patient optimized from medical standpoint for discharge **** with close outpatient follow up.     ---  CONSULTANTS:   ID: Dr. Lezama    ---  TIME SPENT:  I, the attending physician, was physically present for the key portions of the evaluation and management (E/M) service provided. The total amount of time spent reviewing the hospital notes, laboratory values, imaging findings, assessing/counseling the patient, discussing with consultant physicians, social work, nursing staff was -- minutes    ---   HPI:  72yo male with PMH of HTN, paroxysmal AFib not on AC, CAD s/p 3 stents on Plavix, lung adenocarcinoma s/p left lower lung resection and VATS, COPD, CKD stage 3, ?systolic CHF who presents to ED with right lower extremity swelling. Patient states that three days ago he noticed that his right leg was starting to turn red and swell. The next day, patient had severe pain in his right calf and assumed he had pulled a muscle. Patient tried elevating the leg, placing ice on it but nothing seemed to help the swelling. His pain became progressively worse so he came to the ED to be evaluated. Patient notes that a few weeks ago he was wearing wool blend socks and that his ankle was extremely itchy. He scratched the ankle so much to the point that the skin was "rubbed raw" and that there was a small amount of bleeding. He stopped wearing these socks and the itchiness improved but then he started noticing the redness and swelling in his right ankle only. He has had difficulty ambulating due to his pain. He denies any pus or malodorous discharge and also denies any direct trauma to the area. No fever or chills at home.     In the ED:  Vitals: Temp 97.7F | HR 99 | /67 | RR 18 | SpO2 97% on RA  Labs: WBC 16.78, BUN 31, Cr 2, Lactate 2.2   CXR: improved lung and pleural findings was residual and chronic loss of volume of left chest  US doppler right leg: no evidence of right lower extremity DVT  ECG: NSR rate 93, QTc 479  Received: Cefazolin 2g, 2.7L NS bolus  (20 Mar 2022 17:48)      ---  HOSPITAL COURSE:   Patient admitted to telemetry for severe sepsis 2/2 RLE cellulitis. Dopplers of RLE were negative for DVT. Patient started on IV Ancef, infectious disease consulted. Wound culture showed skin tolu, Blood cultures were NGTD*****. Urine culture NGTD, MRSA PCR negative. Patient transitioned from IV Ancef to PO *** Patient also presented with RASHAWN on CKD, RASHAWN resolved s/p IVF and renal indices remained at baseline throughout admission. Irbesartan was held due to RASHAWN, however once RASHAWN resolved patient was normotensive and irbesartan not resumed*****. For PMH pAF, patient continued on Plavix and Metoprolol, patient was restarted on Eliquis via meds to beds. For PMH CAD s/p recent ACS, patient continued on Plavix, ASA, atorvastatin. Lipid panel WNL except HDL low. Patient improved clinically throughout admission.     Patient seen and examined on day of discharge. Overall feeling well, denies any acute complaints.     Vital Signs Last 24 Hrs  T(C): 37.2 (23 Mar 2022 03:33), Max: 37.2 (23 Mar 2022 03:33)  T(F): 98.9 (23 Mar 2022 03:33), Max: 98.9 (23 Mar 2022 03:33)  HR: 88 (23 Mar 2022 05:46) (83 - 88)  BP: 115/72 (23 Mar 2022 05:46) (101/64 - 130/69)  BP(mean): --  RR: 16 (23 Mar 2022 05:46) (16 - 19)  SpO2: 92% (23 Mar 2022 05:46) (92% - 96%)    PHYSICAL EXAM:  GENERAL: NAD  HEENT: PERRL, EOMI  CHEST/LUNG: CTA b/l, no rales, wheezes, or rhonchi  HEART:  RRR, S1, S2  ABDOMEN: soft, nontender, nondistended  EXTREMITIES: largely improved RLE edema and erythema below the knee; sensations and motor tone intact b/l LE  NERVOUS SYSTEM: answers questions and follows commands appropriately    Patient optimized from medical standpoint for discharge home with close outpatient follow up.     ---  CONSULTANTS:   ID: Dr. Lezama    ---  TIME SPENT:  I, the attending physician, was physically present for the key portions of the evaluation and management (E/M) service provided. The total amount of time spent reviewing the hospital notes, laboratory values, imaging findings, assessing/counseling the patient, discussing with consultant physicians, social work, nursing staff was -- minutes    ---   HPI:  72yo male with PMH of HTN, paroxysmal AFib not on AC, CAD s/p 3 stents on Plavix, lung adenocarcinoma s/p left lower lung resection and VATS, COPD, CKD stage 3, ?systolic CHF who presents to ED with right lower extremity swelling. Patient states that three days ago he noticed that his right leg was starting to turn red and swell. The next day, patient had severe pain in his right calf and assumed he had pulled a muscle. Patient tried elevating the leg, placing ice on it but nothing seemed to help the swelling. His pain became progressively worse so he came to the ED to be evaluated. Patient notes that a few weeks ago he was wearing wool blend socks and that his ankle was extremely itchy. He scratched the ankle so much to the point that the skin was "rubbed raw" and that there was a small amount of bleeding. He stopped wearing these socks and the itchiness improved but then he started noticing the redness and swelling in his right ankle only. He has had difficulty ambulating due to his pain. He denies any pus or malodorous discharge and also denies any direct trauma to the area. No fever or chills at home.     In the ED:  Vitals: Temp 97.7F | HR 99 | /67 | RR 18 | SpO2 97% on RA  Labs: WBC 16.78, BUN 31, Cr 2, Lactate 2.2   CXR: improved lung and pleural findings was residual and chronic loss of volume of left chest  US doppler right leg: no evidence of right lower extremity DVT  ECG: NSR rate 93, QTc 479  Received: Cefazolin 2g, 2.7L NS bolus  (20 Mar 2022 17:48)      ---  HOSPITAL COURSE:   Patient admitted to telemetry for severe sepsis 2/2 RLE cellulitis. Dopplers of RLE were negative for DVT. Patient started on IV Ancef, infectious disease consulted. Wound culture showed skin tolu, Blood cultures were NGTD*****. Urine culture NGTD, MRSA PCR negative. Patient transitioned from IV Ancef to PO *** Patient also presented with RASHAWN on CKD, RASHAWN resolved s/p IVF and renal indices remained at baseline throughout admission. Irbesartan was held due to RASHAWN, however once RASHAWN resolved patient was normotensive and irbesartan not resumed*****. For PMH pAF, patient continued on Plavix and Metoprolol, patient was restarted on Eliquis via meds to beds. For PMH CAD s/p recent ACS, patient continued on Plavix, ASA, atorvastatin. Lipid panel WNL except HDL low. Patient improved clinically throughout admission.     Patient seen and examined on day of discharge. Overall feeling well, denies any acute complaints.     Vital Signs Last 24 Hrs  T(C): 37.2 (23 Mar 2022 03:33), Max: 37.2 (23 Mar 2022 03:33)  T(F): 98.9 (23 Mar 2022 03:33), Max: 98.9 (23 Mar 2022 03:33)  HR: 88 (23 Mar 2022 05:46) (83 - 88)  BP: 115/72 (23 Mar 2022 05:46) (101/64 - 130/69)  BP(mean): --  RR: 16 (23 Mar 2022 05:46) (16 - 19)  SpO2: 92% (23 Mar 2022 05:46) (92% - 96%)    PHYSICAL EXAM:  GENERAL: NAD  HEENT: PERRL, EOMI  CHEST/LUNG: CTA b/l, no rales, wheezes, or rhonchi  HEART:  RRR, S1, S2  ABDOMEN: soft, nontender, nondistended  EXTREMITIES: largely improved RLE edema and erythema below the knee; sensations and motor tone intact b/l LE  NERVOUS SYSTEM: answers questions and follows commands appropriately    Patient optimized from medical standpoint for discharge home with close outpatient follow up.     ---  CONSULTANTS:   ID: Dr. Lezama    ---  TIME SPENT:  I, the attending physician, was physically present for the key portions of the evaluation and management (E/M) service provided. The total amount of time spent reviewing the hospital notes, laboratory values, imaging findings, assessing/counseling the patient, discussing with consultant physicians, social work, nursing staff was 70 minutes    ---   HPI:  72yo male with PMH of HTN, paroxysmal AFib not on AC, CAD s/p 3 stents on Plavix, lung adenocarcinoma s/p left lower lung resection and VATS, COPD, CKD stage 3, ?systolic CHF who presents to ED with right lower extremity swelling. Patient states that three days ago he noticed that his right leg was starting to turn red and swell. The next day, patient had severe pain in his right calf and assumed he had pulled a muscle. Patient tried elevating the leg, placing ice on it but nothing seemed to help the swelling. His pain became progressively worse so he came to the ED to be evaluated. Patient notes that a few weeks ago he was wearing wool blend socks and that his ankle was extremely itchy. He scratched the ankle so much to the point that the skin was "rubbed raw" and that there was a small amount of bleeding. He stopped wearing these socks and the itchiness improved but then he started noticing the redness and swelling in his right ankle only. He has had difficulty ambulating due to his pain. He denies any pus or malodorous discharge and also denies any direct trauma to the area. No fever or chills at home.     In the ED:  Vitals: Temp 97.7F | HR 99 | /67 | RR 18 | SpO2 97% on RA  Labs: WBC 16.78, BUN 31, Cr 2, Lactate 2.2   CXR: improved lung and pleural findings was residual and chronic loss of volume of left chest  US doppler right leg: no evidence of right lower extremity DVT  ECG: NSR rate 93, QTc 479  Received: Cefazolin 2g, 2.7L NS bolus  (20 Mar 2022 17:48)      ---  HOSPITAL COURSE:   Patient admitted to telemetry for severe sepsis 2/2 RLE cellulitis. Dopplers of RLE were negative for DVT. Patient started on IV Ancef, infectious disease consulted. Wound culture showed skin tolu, Blood cultures were NGTD at time of dc. Urine culture NGTD, MRSA PCR negative. Patient transitioned from IV Ancef to PO Keflex q6h for 7 days. Patient also presented with RASHAWN on CKD, RASHAWN resolved s/p IVF and renal indices remained at baseline throughout admission. Irbesartan was held due to RASHAWN, however once RASHAWN resolved patient was normotensive and irbesartan not resumed. For PMH pAF, patient continued on Plavix and Metoprolol, patient was restarted on Eliquis via meds to beds. For PMH CAD s/p recent ACS, patient continued on Plavix, ASA, atorvastatin. Lipid panel WNL except HDL low. Patient improved clinically throughout admission.     Patient seen and examined on day of discharge. Overall feeling well, denies any acute complaints.     Vital Signs Last 24 Hrs  T(C): 37.2 (23 Mar 2022 03:33), Max: 37.2 (23 Mar 2022 03:33)  T(F): 98.9 (23 Mar 2022 03:33), Max: 98.9 (23 Mar 2022 03:33)  HR: 88 (23 Mar 2022 05:46) (83 - 88)  BP: 115/72 (23 Mar 2022 05:46) (101/64 - 130/69)  BP(mean): --  RR: 16 (23 Mar 2022 05:46) (16 - 19)  SpO2: 92% (23 Mar 2022 05:46) (92% - 96%)    PHYSICAL EXAM:  GENERAL: NAD  HEENT: PERRL, EOMI  CHEST/LUNG: CTA b/l, no rales, wheezes, or rhonchi  HEART:  RRR, S1, S2  ABDOMEN: soft, nontender, nondistended  EXTREMITIES: largely improved RLE edema and erythema below the knee; sensations and motor tone intact b/l LE  NERVOUS SYSTEM: answers questions and follows commands appropriately    Patient optimized from medical standpoint for discharge home with close outpatient follow up.     ---  CONSULTANTS:   ID: Dr. Lezama    ---  TIME SPENT:  I, the attending physician, was physically present for the key portions of the evaluation and management (E/M) service provided. The total amount of time spent reviewing the hospital notes, laboratory values, imaging findings, assessing/counseling the patient, discussing with consultant physicians, social work, nursing staff was 70 minutes    ---

## 2022-03-21 NOTE — PROGRESS NOTE ADULT - PROBLEM SELECTOR PLAN 1
Patient presenting with right leg swelling and erythema for 3 days  - Admitted to [facility]  - Meets severe sepsis criteria (tachycardic to 98, WBC currently 16.25, suspected source of right lower extremity cellulitis)  - Received Ancef in the ED, continue inpatient  - Lactate currently 1.7. encourage fluid intake  - UA - mild ketonuria, mild protenuria, trace blood  - F/u BCx x2  - F/u UCx  - Check MRSA PCR  - Doppler of RLE negative for DVT  - CXR improved lung and pleural findings was residual and chronic loss of volume of left chest  - Monitor for fever  - Trend leukocytosis with CBC  - Pain control with Tylenol  - elevate limb to alleviate pain& swelling  -fall precautions Patient presenting with right leg swelling and erythema for 3 days  - Meets severe sepsis criteria (tachy to 98, WBC 16.25, suspected source of right lower extremity cellulitis)  - Doppler RLE negative for DVT  - CXR: Improved lung and pleural findings since 12/2021 and chronic loss of volume of L chest  - UA - mild ketonuria, mild proteinuria, trace blood  - S/p IV Ancef x1 in the ED  - Continue IV Ancef   - F/u BCx x2  - F/u UCx  - F/u MRSA PCR  - Monitor for fever  - Trend leukocytosis with CBC  - Pain control with Tylenol  - Leg elevation  - Fall precautions

## 2022-03-21 NOTE — DISCHARGE NOTE PROVIDER - ATTENDING DISCHARGE PHYSICAL EXAMINATION:
GENERAL: NAD  HEENT: PERRL, EOMI  CHEST/LUNG: CTA b/l, no rales, wheezes, or rhonchi  HEART:  RRR, S1, S2  ABDOMEN: soft, nontender, nondistended  EXTREMITIES: largely improved RLE edema and erythema below the knee; sensations and motor tone intact b/l LE  NERVOUS SYSTEM: answers questions and follows commands appropriately

## 2022-03-21 NOTE — DISCHARGE NOTE PROVIDER - NSDCFUADDINST_GEN_ALL_CORE_FT
Please follow up with your primary care doctor within 1 week of discharge from the hospital.  You or your family member are responsible for making all follow up appointments.  If you develop worsening leg redness or pain, palpitations, chest pain , please return to the ED immediately.

## 2022-03-21 NOTE — PROGRESS NOTE ADULT - SUBJECTIVE AND OBJECTIVE BOX
Patient is a 71y old  Male who presents with a chief complaint of Sepsis 2/2 cellulitis (21 Mar 2022 10:24)      INTERVAL HPI/OVERNIGHT EVENTS: Patient was seen an examined at bedside. No overnight events. States that pain and swelling has improved since admission yesterday. Cultures of blood, urine, and wound sent for analysis. Patient will be continued on antibiotics and will continue to have CBC monitored. Patient lactate level fallen WNL, will continue to monitor. Patient RASHAWN resolved with creatinine now within normal baseline range. Will continue to monitor CMP.        MEDICATIONS  (STANDING):  aspirin  chewable 81 milliGRAM(s) Oral daily  atorvastatin 40 milliGRAM(s) Oral at bedtime  budesonide  80 MICROgram(s)/formoterol 4.5 MICROgram(s) Inhaler 2 Puff(s) Inhalation two times a day  ceFAZolin   IVPB 2000 milliGRAM(s) IV Intermittent every 8 hours  clopidogrel Tablet 75 milliGRAM(s) Oral daily  metoprolol tartrate 12.5 milliGRAM(s) Oral two times a day  tamsulosin 0.4 milliGRAM(s) Oral at bedtime  tiotropium 18 MICROgram(s) Capsule 1 Capsule(s) Inhalation daily    MEDICATIONS  (PRN):  acetaminophen     Tablet .. 650 milliGRAM(s) Oral every 6 hours PRN Mild Pain (1 - 3)  ALBUTerol    90 MICROgram(s) HFA Inhaler 2 Puff(s) Inhalation every 6 hours PRN Shortness of Breath and/or Wheezing  melatonin 3 milliGRAM(s) Oral at bedtime PRN Insomnia      Allergies    No Known Allergies    Intolerances        REVIEW OF SYSTEMS:  CONSTITUTIONAL: No fever or chills  HEENT:  No headache, no sore throat  RESPIRATORY: No cough, wheezing, or shortness of breath  CARDIOVASCULAR: No chest pain, palpitations  GASTROINTESTINAL: No abd pain, nausea, vomiting, or diarrhea  GENITOURINARY: No dysuria, frequency, or hematuria  NEUROLOGICAL: no focal weakness or dizziness  MUSCULOSKELETAL: ADMITS myalgia on posterior aspect of right lower extremity.     Vital Signs Last 24 Hrs  T(C): 36.8 (21 Mar 2022 04:46), Max: 37.2 (20 Mar 2022 19:52)  T(F): 98.2 (21 Mar 2022 04:46), Max: 98.9 (20 Mar 2022 19:52)  HR: 98 (21 Mar 2022 04:46) (95 - 100)  BP: 111/69 (21 Mar 2022 04:46) (101/67 - 120/72)  BP(mean): --  RR: 17 (21 Mar 2022 04:46) (16 - 18)  SpO2: 95% (21 Mar 2022 04:46) (93% - 97%)    PHYSICAL EXAM:  GENERAL: NAD  HEENT:  anicteric, moist mucous membranes  CHEST/LUNG:  CTA b/l, no rales, wheezes, or rhonchi  HEART:  RRR, S1, S2  ABDOMEN:  BS+, soft, nontender, nondistended  EXTREMITIES: no edema, cyanosis, posterior right calf tenderness PRESENT. Marked non-pitting edema noted on R LE.  NERVOUS SYSTEM: answers questions and follows commands appropriately    LABS:                        10.5   16.25 )-----------( 290      ( 21 Mar 2022 07:33 )             30.7     CBC Full  -  ( 21 Mar 2022 07:33 )  WBC Count : 16.25 K/uL  Hemoglobin : 10.5 g/dL  Hematocrit : 30.7 %  Platelet Count - Automated : 290 K/uL  Mean Cell Volume : 84.1 fl  Mean Cell Hemoglobin : 28.8 pg  Mean Cell Hemoglobin Concentration : 34.2 gm/dL  Auto Neutrophil # : 13.44 K/uL  Auto Lymphocyte # : 0.80 K/uL  Auto Monocyte # : 1.58 K/uL  Auto Eosinophil # : 0.09 K/uL  Auto Basophil # : 0.07 K/uL  Auto Neutrophil % : 82.7 %  Auto Lymphocyte % : 4.9 %  Auto Monocyte % : 9.7 %  Auto Eosinophil % : 0.6 %  Auto Basophil % : 0.4 %    21 Mar 2022 07:33    136    |  104    |  24     ----------------------------<  103    3.6     |  24     |  1.50     Ca    8.3        21 Mar 2022 07:33    TPro  5.9    /  Alb  2.0    /  TBili  0.4    /  DBili  x      /  AST  11     /  ALT  16     /  AlkPhos  79     21 Mar 2022 07:33    PT/INR - ( 20 Mar 2022 14:02 )   PT: 13.8 sec;   INR: 1.18 ratio         PTT - ( 20 Mar 2022 14:02 )  PTT:29.2 sec  Urinalysis Basic - ( 21 Mar 2022 07:28 )    Color: Yellow / Appearance: Clear / S.020 / pH: x  Gluc: x / Ketone: Small  / Bili: Negative / Urobili: Negative   Blood: x / Protein: 30 mg/dL / Nitrite: Negative   Leuk Esterase: Negative / RBC: 0-2 /HPF / WBC 0-2   Sq Epi: x / Non Sq Epi: Negative / Bacteria: Moderate      CAPILLARY BLOOD GLUCOSE              RADIOLOGY & ADDITIONAL TESTS:    Personally reviewed.     Consultant(s) Notes Reviewed:  [x] YES  [ ] NO     Patient is a 71y old  Male who presents with a chief complaint of Sepsis 2/2 cellulitis (21 Mar 2022 10:24)      INTERVAL HPI/OVERNIGHT EVENTS: No acute events overnight. Patient seen an examined at bedside. Reports pain and swelling has largely improved since admission yesterday. Denies chest pain, shortness of breath, headache, dizziness, nausea/vomiting, abdominal pain.     MEDICATIONS  (STANDING):  aspirin  chewable 81 milliGRAM(s) Oral daily  atorvastatin 40 milliGRAM(s) Oral at bedtime  budesonide  80 MICROgram(s)/formoterol 4.5 MICROgram(s) Inhaler 2 Puff(s) Inhalation two times a day  ceFAZolin   IVPB 2000 milliGRAM(s) IV Intermittent every 8 hours  clopidogrel Tablet 75 milliGRAM(s) Oral daily  metoprolol tartrate 12.5 milliGRAM(s) Oral two times a day  tamsulosin 0.4 milliGRAM(s) Oral at bedtime  tiotropium 18 MICROgram(s) Capsule 1 Capsule(s) Inhalation daily    MEDICATIONS  (PRN):  acetaminophen     Tablet .. 650 milliGRAM(s) Oral every 6 hours PRN Mild Pain (1 - 3)  ALBUTerol    90 MICROgram(s) HFA Inhaler 2 Puff(s) Inhalation every 6 hours PRN Shortness of Breath and/or Wheezing  melatonin 3 milliGRAM(s) Oral at bedtime PRN Insomnia      Allergies    No Known Allergies    Intolerances        REVIEW OF SYSTEMS:  CONSTITUTIONAL: No fever or chills  HEENT:  No headache  RESPIRATORY: No shortness of breath  CARDIOVASCULAR: No chest pain, palpitations  GASTROINTESTINAL: No abd pain, nausea, vomiting  GENITOURINARY: No dysuria, frequency, or hematuria  NEUROLOGICAL: No dizziness    Vital Signs Last 24 Hrs  T(C): 36.8 (21 Mar 2022 04:46), Max: 37.2 (20 Mar 2022 19:52)  T(F): 98.2 (21 Mar 2022 04:46), Max: 98.9 (20 Mar 2022 19:52)  HR: 98 (21 Mar 2022 04:46) (95 - 100)  BP: 111/69 (21 Mar 2022 04:46) (101/67 - 120/72)  BP(mean): --  RR: 17 (21 Mar 2022 04:46) (16 - 18)  SpO2: 95% (21 Mar 2022 04:46) (93% - 97%)    PHYSICAL EXAM:  GENERAL: NAD  HEENT: PERRL, EOMI  CHEST/LUNG: CTA b/l, no rales, wheezes, or rhonchi  HEART: RRR, S1, S2  ABDOMEN: soft, nontender, nondistended  EXTREMITIES: RLE edema and erythema below the knee; palpable pedal pulses, sensations and motor tone intact in b/l LE  NERVOUS SYSTEM: answers questions and follows commands appropriately    LABS:                        10.5   16.25 )-----------( 290      ( 21 Mar 2022 07:33 )             30.7     CBC Full  -  ( 21 Mar 2022 07:33 )  WBC Count : 16.25 K/uL  Hemoglobin : 10.5 g/dL  Hematocrit : 30.7 %  Platelet Count - Automated : 290 K/uL  Mean Cell Volume : 84.1 fl  Mean Cell Hemoglobin : 28.8 pg  Mean Cell Hemoglobin Concentration : 34.2 gm/dL  Auto Neutrophil # : 13.44 K/uL  Auto Lymphocyte # : 0.80 K/uL  Auto Monocyte # : 1.58 K/uL  Auto Eosinophil # : 0.09 K/uL  Auto Basophil # : 0.07 K/uL  Auto Neutrophil % : 82.7 %  Auto Lymphocyte % : 4.9 %  Auto Monocyte % : 9.7 %  Auto Eosinophil % : 0.6 %  Auto Basophil % : 0.4 %    21 Mar 2022 07:33    136    |  104    |  24     ----------------------------<  103    3.6     |  24     |  1.50     Ca    8.3        21 Mar 2022 07:33    TPro  5.9    /  Alb  2.0    /  TBili  0.4    /  DBili  x      /  AST  11     /  ALT  16     /  AlkPhos  79     21 Mar 2022 07:33    PT/INR - ( 20 Mar 2022 14:02 )   PT: 13.8 sec;   INR: 1.18 ratio         PTT - ( 20 Mar 2022 14:02 )  PTT:29.2 sec  Urinalysis Basic - ( 21 Mar 2022 07:28 )    Color: Yellow / Appearance: Clear / S.020 / pH: x  Gluc: x / Ketone: Small  / Bili: Negative / Urobili: Negative   Blood: x / Protein: 30 mg/dL / Nitrite: Negative   Leuk Esterase: Negative / RBC: 0-2 /HPF / WBC 0-2   Sq Epi: x / Non Sq Epi: Negative / Bacteria: Moderate      CAPILLARY BLOOD GLUCOSE              RADIOLOGY & ADDITIONAL TESTS:    Personally reviewed.     Consultant(s) Notes Reviewed:  [x] YES  [ ] NO

## 2022-03-21 NOTE — DISCHARGE NOTE PROVIDER - NSDCMRMEDTOKEN_GEN_ALL_CORE_FT
aspirin 81 mg oral tablet: 1 tab(s) orally once a day  Eliquis 5 mg oral tablet: 1 tab(s) orally 2 times a day   irbesartan 300 mg oral tablet: 1 tab(s) orally once a day  metoprolol tartrate 25 mg oral tablet: 0.5 tab(s) orally 2 times a day  Plavix 75 mg oral tablet: 1 tab(s) orally once a day  tamsulosin 0.4 mg oral capsule: 1 cap(s) orally once a day  Trelegy Ellipta 100 mcg-62.5 mcg-25 mcg/inh inhalation powder: 1 puff(s) inhaled once a day  Ventolin HFA 90 mcg/inh inhalation aerosol: 2 puff(s) inhaled every 6 hours, As Needed for shortness of breath    apixaban 5 mg oral tablet: 1 tab(s) orally 2 times a day  aspirin 81 mg oral tablet: 1 tab(s) orally once a day  irbesartan 300 mg oral tablet: 1 tab(s) orally once a day  metoprolol tartrate 25 mg oral tablet: 0.5 tab(s) orally 2 times a day  Plavix 75 mg oral tablet: 1 tab(s) orally once a day  tamsulosin 0.4 mg oral capsule: 1 cap(s) orally once a day  Trelegy Ellipta 100 mcg-62.5 mcg-25 mcg/inh inhalation powder: 1 puff(s) inhaled once a day  Ventolin HFA 90 mcg/inh inhalation aerosol: 2 puff(s) inhaled every 6 hours, As Needed for shortness of breath    apixaban 5 mg oral tablet: 1 tab(s) orally 2 times a day  aspirin 81 mg oral tablet: 1 tab(s) orally once a day  cephalexin 500 mg oral tablet: 1 tab(s) orally every 6 hours   metoprolol tartrate 25 mg oral tablet: 0.5 tab(s) orally 2 times a day  Plavix 75 mg oral tablet: 1 tab(s) orally once a day  tamsulosin 0.4 mg oral capsule: 1 cap(s) orally once a day  Trelegy Ellipta 100 mcg-62.5 mcg-25 mcg/inh inhalation powder: 1 puff(s) inhaled once a day  Ventolin HFA 90 mcg/inh inhalation aerosol: 2 puff(s) inhaled every 6 hours, As Needed for shortness of breath

## 2022-03-21 NOTE — PROGRESS NOTE ADULT - PROBLEM SELECTOR PLAN 3
History of paroxysmal AFib  - Previously on Eliquis however no longer taking due to insurance coverage issues  - BEGIN COURSE OF MEDICATION?  - According to outpatient cardiologist taking low dose aspirin  - CHADSVASC = 3  - Encourage follow up with outpatient cardiology to follow up anticoagulation History of paroxysmal AFib  - Previously on Eliquis, however no longer taking due to insurance coverage issues  - Continue Plavix 75mg qd, Metoprolol 12.5mg bid  - CHADSVASC = 3  - Encourage follow up with outpatient cardiology to follow up AC

## 2022-03-21 NOTE — PROGRESS NOTE ADULT - PROBLEM SELECTOR PLAN 7
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 Larsen, undergoes screening CT chest every 3 months to ensure remission  - Most recent CT chest last week and had follow up with Dr. 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 Larsen, undergoes screening CT chest every 3 months to ensure remission  - Most recent CT chest last week and had follow up with Dr. Larsen this week to obtain results

## 2022-03-21 NOTE — PROGRESS NOTE ADULT - ASSESSMENT
72yo male with PMH of HTN, paroxysmal AFib not on AC, CAD s/p 3 stents on Plavix, lung adenocarcinoma s/p left lower lung resection and VATS, COPD, CKD stage 3, ?systolic heart failure who presents to ED with right lower extremity swelling.    72 yo male with PMH of HTN, paroxysmal AFib not on AC, CAD s/p 3 stents on Plavix, lung adenocarcinoma s/p left lower lung resection and VATS, COPD, CKD stage 3, ?systolic heart failure admitted for RLE cellulitis.

## 2022-03-21 NOTE — DISCHARGE NOTE PROVIDER - PROVIDER TOKENS
PROVIDER:[TOKEN:[[id number]],FOLLOWUP:[1 week]],PROVIDER:[TOKEN:[[id number]],FOLLOWUP:[1 week]] PROVIDER:[TOKEN:[[id number]],FOLLOWUP:[1 week],ESTABLISHEDPATIENT:[T]],PROVIDER:[TOKEN:[[id number]],FOLLOWUP:[1 week],ESTABLISHEDPATIENT:[T]],PROVIDER:[TOKEN:[[id number]]] PROVIDER:[TOKEN:[[id number]],FOLLOWUP:[1 week],ESTABLISHEDPATIENT:[T]],PROVIDER:[TOKEN:[[id number]],FOLLOWUP:[1 week],ESTABLISHEDPATIENT:[T]],PROVIDER:[TOKEN:[[id number]]],FREE:[LAST:[Outpatient providers],PHONE:[(   )    -],FAX:[(   )    -],ADDRESS:[PMD: Dr. Terrance Mccormick  Pulmonology: Dr. Nam Andrea  Cardiology: Dr. Houston   Heme/Onc: Dr. Maier   Thoracic Surgery: Dr. Darnell Larsen],FOLLOWUP:[1 week],ESTABLISHEDPATIENT:[T]]

## 2022-03-21 NOTE — PROGRESS NOTE ADULT - PROBLEM SELECTOR PLAN 4
Underwent nuclear stress test in 9/2021 and incidentally found to have large anterior MI with inferior wall ischemia  - S/p ROSA to D1, proximal LAD and OM1  - Continue home Plavix, ASA 81  - Was briefly on statin after stent placement but only prescribed one month supply and stopped taking  - BEGIN COURSE OF ATORVASTATIN  - Check lipid panel in AM Underwent nuclear stress test in 9/2021 and incidentally found to have large anterior MI with inferior wall ischemia  - S/p ROSA to D1, proximal LAD and OM1  - Continue Plavix 75mg qd, ASA 81mg qd  - Start Atorvastatin 40mg qhs  - F/u lipid panel

## 2022-03-21 NOTE — DISCHARGE NOTE PROVIDER - CARE PROVIDER_API CALL
Terrance Mccormick (DO)  [facility]  [address] [place] [address], [address]  [place] [address]  Phone: [phone]  Fax: [phone]  Follow Up Time: 1 week    Kamaljit Larsen)  Surgery; Thoracic Surgery  [address] [place] [address], [place] [address]  Phone: [phone]  Fax: [phone]  Follow Up Time: 1 week   Terrance Mccormick (DO)  Family Medicine  [address] [place] [address], [address]  [place] [address]  Phone: [phone]  Fax: [phone]  Established Patient  Follow Up Time: 1 week    Kamaljit Larsen)  Surgery; Thoracic Surgery  [address] [place] [address], [place] [address]  Phone: [phone]  Fax: [phone]  Established Patient  Follow Up Time: 1 week    Yenny Lezama)  Infectious Disease; Internal Medicine  [address] [place] [address]  Phone: [phone]  Fax: [phone]  Follow Up Time:    Terrance Mccormick (DO)  Family Medicine  [address] [place] [address], [address]  [place] [address]  Phone: [phone]  Fax: [phone]  Established Patient  Follow Up Time: 1 week    Kamaljit Larsen)  Surgery; Thoracic Surgery  [address] [place] [address], [place] [address]  Phone: [phone]  Fax: [phone]  Established Patient  Follow Up Time: 1 week    Yenny Lezama)  Infectious Disease; Internal Medicine  [address] [place] [address]  Phone: [phone]  Fax: [phone]  Follow Up Time:     Outpatient providers,   PMD: Dr. Terrance Mccormick  Pulmonology: Dr. Nam Andrea  Cardiology: Dr. Houston   Heme/Onc: Dr. Maier   Thoracic Surgery: Dr. Darnell Larsen  Phone: (   )    -  Fax: (   )    -  Established Patient  Follow Up Time: 1 week

## 2022-03-21 NOTE — DISCHARGE NOTE PROVIDER - CARE PROVIDERS DIRECT ADDRESSES
,DirectAddress_Unknown,[email or web address] ,[email or web address],[email or web address] ,DirectAddress_Unknown,[email or web address],DirectAddress_Unknown

## 2022-03-21 NOTE — CONSULT NOTE ADULT - SUBJECTIVE AND OBJECTIVE BOX
[facility]  INFECTIOUS DISEASES   [address] [place] [address]  Tel: [phone]     Fax: [phone]  ======================================================  MD Machelle Leyva Kaushal, MD Cho, Michelle, MD   ======================================================    [id number]  DELFINO GONZALEZ     CC: R leg swelling and pain     HPI:  70yo man with PMH of HTN, paroxysmal AFib, CAD, CHF, lung adenocarcinoma s/p LLL resection and VATS, COPD and CKD was admitted with right lower extremity swelling.  He had problem with this leg since 2-3 months ago when started using hunting socks, more in R leg, with scratches and itching, causing small superficial ulcers, last 3 days started getting worse with   swelling, redness and pain. No antibiotics used for the infection.  No fever or chills.     PAST MEDICAL & SURGICAL HISTORY:  HTN (hypertension)  Smoking history  Quit   Lung cancer  s/p LLL Lobectomy, radiation, chemotherapy  GERD (gastroesophageal reflux disease)  Atrial flutter  10/2017 off Eliquis (MD aware)  Other and unspecified ventral hernia with obstruction, without gangrene  Other nonspecific abnormal finding of lung field  CAD (coronary artery disease)  Stents placed x3 10/2021  H/O inguinal hernia repair  Bilateral in the &#x27;s  History of chemotherapy  left chest wall infusaport placement  Status post lobectomy of lung  LLL lobectomy at [facility]  S/P pericardial surgery  For an effusion in 2019    Social Hx: no smoking, ETOH or drugs     FAMILY HISTORY:  FH: heart disease (Sibling)    Allergies  No Known Allergies    Antibiotics:  MEDICATIONS  (STANDING):  aspirin  chewable 81 milliGRAM(s) Oral daily  budesonide  80 MICROgram(s)/formoterol 4.5 MICROgram(s) Inhaler 2 Puff(s) Inhalation two times a day  ceFAZolin   IVPB 2000 milliGRAM(s) IV Intermittent every 8 hours  clopidogrel Tablet 75 milliGRAM(s) Oral daily  metoprolol tartrate 12.5 milliGRAM(s) Oral two times a day  tamsulosin 0.4 milliGRAM(s) Oral at bedtime  tiotropium 18 MICROgram(s) Capsule 1 Capsule(s) Inhalation daily    REVIEW OF SYSTEMS:  CONSTITUTIONAL:  No Fever or chills  HEENT:  No diplopia or blurred vision.  No sore throat or runny nose.  CARDIOVASCULAR:  No chest pain or SOB.  RESPIRATORY:  No cough, shortness of breath, PND or orthopnea.  GASTROINTESTINAL:  No nausea, vomiting or diarrhea.  GENITOURINARY:  No dysuria, frequency or urgency. No Blood in urine  MUSCULOSKELETAL: R leg swelling and redness   SKIN:  R lower leg ulcers and scratches   NEUROLOGIC:  No paresthesias, fasciculations, seizures or weakness.  PSYCHIATRIC:  No disorder of thought or mood.  ENDOCRINE:  No heat or cold intolerance, polyuria or polydipsia.  HEMATOLOGICAL:  No easy bruising or bleeding.     Physical Exam:  Vital Signs Last 24 Hrs  T(C): 36.8 (21 Mar 2022 04:46), Max: 37.2 (20 Mar 2022 19:52)  T(F): 98.2 (21 Mar 2022 04:46), Max: 98.9 (20 Mar 2022 19:52)  HR: 98 (21 Mar 2022 04:46) (95 - 100)  BP: 111/69 (21 Mar 2022 04:46) (101/67 - 120/72)  RR: 17 (21 Mar 2022 04:46) (16 - 18)  SpO2: 95% (21 Mar 2022 04:46) (93% - 97%)  Height (cm): 175.3 ( @ 13:08)  Weight (kg): 87.5 ( @ 20:58)  BMI (kg/m2): 28.5 ( @ 20:58)  BSA (m2): 2.03 ( @ 20:58)  GEN: NAD  HEENT: normocephalic and atraumatic. EOMI. PERRL.    NECK: Supple.  No lymphadenopathy   LUNGS: Clear to auscultation.  HEART: Regular rate and rhythm without murmur.  ABDOMEN: Soft, nontender, and nondistended.  Positive bowel sounds.    : No CVA tenderness  EXTREMITIES: R leg with warmth, swelling and erythema below the knee,   some superficial ulcers around ankle and scattered in lower leg.   NEUROLOGIC: grossly intact.  PSYCHIATRIC: Appropriate affect .  SKIN: No ulceration or induration present.    Labs:      136  |  104  |  24<H>  ----------------------------<  103<H>  3.6   |  24  |  1.50<H>    Ca    8.3<L>      21 Mar 2022 07:33    TPro  5.9<L>  /  Alb  2.0<L>  /  TBili  0.4  /  DBili  x   /  AST  11<L>  /  ALT  16  /  AlkPhos  79                          10.5   16.25 )-----------( 290      ( 21 Mar 2022 07:33 )             30.7     PT/INR - ( 20 Mar 2022 14:02 )   PT: 13.8 sec;   INR: 1.18 ratio    PTT - ( 20 Mar 2022 14:02 )  PTT:29.2 sec  Urinalysis Basic - ( 21 Mar 2022 07:28 )    Color: Yellow / Appearance: Clear / S.020 / pH: x  Gluc: x / Ketone: Small  / Bili: Negative / Urobili: Negative   Blood: x / Protein: 30 mg/dL / Nitrite: Negative   Leuk Esterase: Negative / RBC: 0-2 /HPF / WBC 0-2   Sq Epi: x / Non Sq Epi: Negative / Bacteria: Moderate    LIVER FUNCTIONS - ( 21 Mar 2022 07:33 )  Alb: 2.0 g/dL / Pro: 5.9 g/dL / ALK PHOS: 79 U/L / ALT: 16 U/L / AST: 11 U/L / GGT: x           SARS-CoV-2 Result: NotDetec (22 @ 14:02)    All imaging and other data have been reviewed.  < from: US Duplex Venous Lower Ext Ltd, Right (22 @ 15:45) >  IMPRESSION:  No evidence of right lower extremity deep venous thrombosis.    < from: Xray Chest 1 View- PORTABLE-Urgent (22 @ 14:12) >  IMPRESSION: Improved lung and pleural findings was residual and chronic   loss of volume of left chest.    Assessment and Plan:   70yo man with PMH of HTN, paroxysmal AFib, CAD, CHF, lung adenocarcinoma s/p LLL resection and VATS, COPD and CKD was admitted with right lower extremity swelling.  No antitonics use in the past or hospitalization, no open wound with discharge, no positive culture to help with antibiotic selection, so cefazolin is a good choice that covers common skin tolu.   Doppler negative for DVT    RLE cellulitis   - Blood cultures testing   - MRSA PCR  - Agree with Cefazolin 2gm q8h  - After resolution of cellulitis can use steroid cream for skin rash and allergic reaction   - Antipruritic PRN  - Elevate RLE to help with swelling   - Will more response can switch to oral Abx    Thank you for courtesy of this consult.     Will follow.  Discussed with the primary team.     Yenny Lezama MD  Division of Infectious Diseases   Please call ID service at [phone] with any question.      55 minutes spent on total encounter assessing patient, examination, chart reivew, counseling and coordinating care by the attending physician/nurse/care manager.

## 2022-03-21 NOTE — DISCHARGE NOTE PROVIDER - NSDCCPCAREPLAN_GEN_ALL_CORE_FT
PRINCIPAL DISCHARGE DIAGNOSIS  Diagnosis: Cellulitis  Assessment and Plan of Treatment: You were admitted to the hospital with right lower extremity cellulitis. You were started on IV antibiotics and seen by an infectious disease doctor. Your infection improved and you were transitioned to oral antibiotics.   Upon discharge, please:  -START  Please follow up with your primary care doctor within 1 week of discharge from the hospital.  You or your family member are responsible for making all follow up appointments.  If you develop worsening pain or swelling of your right leg, please return to the ED immediately.      SECONDARY DISCHARGE DIAGNOSES  Diagnosis: Acute kidney injury superimposed on CKD  Assessment and Plan of Treatment: When you were admitted to the hospital your kidney function was slightly decreased. This was likely due to your underlying infection and dehydration. After you were given IV fluids, your kidney function returned to baseline.   Please follow up with your primary care doctor within 1 week of discharge from the hospital to make sure your kidney function is stable.    Diagnosis: Paroxysmal atrial fibrillation  Assessment and Plan of Treatment: You have a history of an abnormal heart rhythm, atrial fibrillation. This puts you at an increased risk for clots and stroke, which can lead to severe disability and death. You should be on a blood thinner, however you stopped taking Eliquis due to insurance issues. ****    Diagnosis: Lung cancer  Assessment and Plan of Treatment: Due to your history of lung cancer, continue following up with your CT surgery Dr. Larsen, to undergo a screening CT chest every 3 months to ensure remission. F/u with Dr. Larsen this week to obtain results from your most recent CT scan.     PRINCIPAL DISCHARGE DIAGNOSIS  Diagnosis: Cellulitis  Assessment and Plan of Treatment: You were admitted to the hospital with right lower extremity cellulitis. You were started on IV antibiotics and seen by an infectious disease doctor. Your infection improved and you were transitioned to oral antibiotics.   Upon discharge, please:  -START Augmentin ***  Please follow up with your primary care doctor within 1 week of discharge from the hospital.  You or your family member are responsible for making all follow up appointments.  If you develop worsening pain or swelling of your right leg, please return to the ED immediately.      SECONDARY DISCHARGE DIAGNOSES  Diagnosis: Acute kidney injury superimposed on CKD  Assessment and Plan of Treatment: When you were admitted to the hospital your kidney function was slightly decreased. This was likely due to your underlying infection and dehydration. After you were given IV fluids, your kidney function returned to baseline.   Please follow up with your primary care doctor within 1 week of discharge from the hospital to make sure your kidney function is stable.    Diagnosis: Paroxysmal atrial fibrillation  Assessment and Plan of Treatment: You have a history of an abnormal heart rhythm, atrial fibrillation. This puts you at an increased risk for clots and stroke, which can lead to severe disability and death. You previously you stopped taking Eliquis due to insurance issues, however were restarted while in the hospital.   Upon discharge, please:  -START Eliquis 5mg twice daily, you were given a 30 day supply at the the hospital  It is very important you follow up with Dr. Mccormick within 1 week of discharge to figure out continuation of Eliquis once you are out of the 30 day supply.    Diagnosis: Lung cancer  Assessment and Plan of Treatment: Due to your history of lung cancer, continue following up with your CT surgery Dr. Larsen, to undergo a screening CT chest every 3 months to ensure remission. F/u with Dr. Larsen this week to obtain results from your most recent CT scan.     PRINCIPAL DISCHARGE DIAGNOSIS  Diagnosis: Cellulitis  Assessment and Plan of Treatment: You were admitted to the hospital with right lower extremity cellulitis. You were started on IV antibiotics and seen by an infectious disease doctor. Your infection improved and you were transitioned to oral antibiotics.   Upon discharge, please:  -START Keflex every 6 hours for 7 more days. First dose starts tomorrow 3/24  Please follow up with your primary care doctor AND WOUND CARE/INFECTIOUS DISEASE within 1 week of discharge from the hospital.  You or your family member are responsible for making all follow up appointments.  If you develop worsening pain or swelling of your right leg, please return to the ED immediately.      SECONDARY DISCHARGE DIAGNOSES  Diagnosis: Acute kidney injury superimposed on CKD  Assessment and Plan of Treatment: When you were admitted to the hospital your kidney function was slightly decreased. This was likely due to your underlying infection and dehydration. After you were given IV fluids, your kidney function returned to baseline.   Please follow up with your primary care doctor within 1 week of discharge from the hospital to make sure your kidney function is stable.    Diagnosis: Paroxysmal atrial fibrillation  Assessment and Plan of Treatment: You have a history of an abnormal heart rhythm, atrial fibrillation. This puts you at an increased risk for clots and stroke, which can lead to severe disability and death. You previously you stopped taking Eliquis due to insurance issues, however were restarted while in the hospital.   Upon discharge, please:  -START Eliquis 5mg twice daily, you were given a 30 day supply at the the hospital  It is very important you follow up with Dr. Mccormick within 1 week of discharge to figure out continuation of Eliquis once you are out of the 30 day supply.    Diagnosis: Hypertension  Assessment and Plan of Treatment: You have a history of high blood pressure, however one of your blood pressure medications (irbesartan) was held as your kidney function was poor. Once your kidney function returned to baseline, your irbesartan was not resumed as your blood pressure was well controlled.  Upon discharge, pleas:  -STOP irbesartan  Follow up with Dr. Mccormick within 1 week of discharge from the hospital to determine if you need to restart irbesartan for better blood pressure control.    Diagnosis: Lung cancer  Assessment and Plan of Treatment: Due to your history of lung cancer, continue following up with your CT surgery Dr. Larsen, to undergo a screening CT chest every 3 months to ensure remission. F/u with Dr. Larsen this week to obtain results from your most recent CT scan.     PRINCIPAL DISCHARGE DIAGNOSIS  Diagnosis: Cellulitis  Assessment and Plan of Treatment: You were admitted to the hospital with right lower extremity cellulitis. You were started on IV antibiotics and seen by an infectious disease doctor. Your infection improved and you were transitioned to oral antibiotics.   Upon discharge, please:  -START Keflex every 6 hours for 7 more days. First dose starts tomorrow 3/24  Please follow up with your primary care doctor AND WOUND CARE/INFECTIOUS DISEASE within 1 week of discharge from the hospital.  You or your family member are responsible for making all follow up appointments.  If you develop worsening pain or swelling of your right leg, please return to the ED immediately.      SECONDARY DISCHARGE DIAGNOSES  Diagnosis: Acute kidney injury superimposed on CKD  Assessment and Plan of Treatment: When you were admitted to the hospital your kidney function was slightly decreased. This was likely due to your underlying infection and dehydration. After you were given IV fluids, your kidney function returned to baseline.   Please follow up with your primary care doctor within 1 week of discharge from the hospital to make sure your kidney function is stable.    Diagnosis: Paroxysmal atrial fibrillation  Assessment and Plan of Treatment: You have a history of an abnormal heart rhythm, atrial fibrillation. This puts you at an increased risk for clots and stroke, which can lead to severe disability and death. You previously you stopped taking Eliquis due to insurance issues, however were restarted while in the hospital.   Upon discharge, please:  -START Eliquis 5mg twice daily, you were given a 30 day supply at the the hospital  It is very important you follow up with Dr. Mccormick within 1 week of discharge to figure out continuation of Eliquis once you are out of the 30 day supply.    Diagnosis: Lung cancer  Assessment and Plan of Treatment: Due to your history of lung cancer, continue following up with your CT surgery Dr. Larsen, to undergo a screening CT chest every 3 months to ensure remission. F/u with Dr. Larsen this week to obtain results from your most recent CT scan.    Diagnosis: Hypertension  Assessment and Plan of Treatment: You have a history of high blood pressure, however one of your blood pressure medications (irbesartan) was held as your kidney function was poor. Once your kidney function returned to baseline, your irbesartan was not resumed as your blood pressure was well controlled.  Upon discharge, pleas:  -STOP irbesartan  Follow up with Dr. Mccormick within 1 week of discharge from the hospital to determine if you need to restart irbesartan for better blood pressure control.

## 2022-03-21 NOTE — PROGRESS NOTE ADULT - PROBLEM SELECTOR PLAN 2
History of CKD3  - Presenting with Cr 2  - Current Cr 1.5. RASHAWN resolved.  - Baseline Cr 1.4 per outpatient chart review  - Suspected prerenal azotemia in the setting of sepsis  - Received 2.7L NS in ED, will hold further IVF given questionable CHF history   - Hold nephrotoxic agents as able   - Trend with daily CMP  - Continue to HOLD ARB Resolved  - History of CKD3  - Cr 2 on admission  - Baseline Cr 1.4 per outpatient chart review  - Suspected prerenal azotemia in the setting of sepsis  - Received 2.7L NS in ED, will hold further IVF given questionable CHF history   - Hold home Irbesartan  - Hold nephrotoxic agents as able  - Trend with daily CMP

## 2022-03-21 NOTE — PROGRESS NOTE ADULT - PROBLEM SELECTOR PLAN 6
Chronic  - /67 in the ED  - Likely soft blood pressure in the setting of sepsis   - On Irbesartan 100mg-HCTZ 25mg, Metoprolol 12.5mg BID at home   - CONTINUE TO Hold ARB-HCTZ medications in the setting of RASHAWN   - Can uptitrate beta blocker as needed for BP control  - Monitor routine hemodynamics Chronic, although soft BPs this admission in the setting of sepsis   - Continue home Metoprolol 12.5mg bid  - Hold home Irbesartan in the setting of recently resolved RASHAWN, consider restarting when necessary  - Monitor routine hemodynamics

## 2022-03-21 NOTE — DISCHARGE NOTE PROVIDER - NPI NUMBER (FOR SYSADMIN USE ONLY) :
[[id number] [[id number]] [[id number]],[UNKNOWN]

## 2022-03-22 ENCOUNTER — APPOINTMENT (OUTPATIENT)
Dept: THORACIC SURGERY | Facility: CLINIC | Age: 72
End: 2022-03-22

## 2022-03-22 LAB
ALBUMIN SERPL ELPH-MCNC: 1.9 G/DL — LOW (ref 3.3–5)
ALP SERPL-CCNC: 81 U/L — SIGNIFICANT CHANGE UP (ref 40–120)
ALT FLD-CCNC: 12 U/L — SIGNIFICANT CHANGE UP (ref 12–78)
ANION GAP SERPL CALC-SCNC: 7 MMOL/L — SIGNIFICANT CHANGE UP (ref 5–17)
AST SERPL-CCNC: 11 U/L — LOW (ref 15–37)
BASOPHILS # BLD AUTO: 0.05 K/UL — SIGNIFICANT CHANGE UP (ref 0–0.2)
BASOPHILS NFR BLD AUTO: 0.4 % — SIGNIFICANT CHANGE UP (ref 0–2)
BILIRUB SERPL-MCNC: 0.4 MG/DL — SIGNIFICANT CHANGE UP (ref 0.2–1.2)
BUN SERPL-MCNC: 23 MG/DL — SIGNIFICANT CHANGE UP (ref 7–23)
CALCIUM SERPL-MCNC: 8.6 MG/DL — SIGNIFICANT CHANGE UP (ref 8.5–10.1)
CHLORIDE SERPL-SCNC: 105 MMOL/L — SIGNIFICANT CHANGE UP (ref 96–108)
CHOLEST SERPL-MCNC: 92 MG/DL — SIGNIFICANT CHANGE UP
CO2 SERPL-SCNC: 25 MMOL/L — SIGNIFICANT CHANGE UP (ref 22–31)
CREAT SERPL-MCNC: 1.4 MG/DL — HIGH (ref 0.5–1.3)
CULTURE RESULTS: NO GROWTH — SIGNIFICANT CHANGE UP
CULTURE RESULTS: SIGNIFICANT CHANGE UP
EGFR: 54 ML/MIN/1.73M2 — LOW
EOSINOPHIL # BLD AUTO: 0.2 K/UL — SIGNIFICANT CHANGE UP (ref 0–0.5)
EOSINOPHIL NFR BLD AUTO: 1.8 % — SIGNIFICANT CHANGE UP (ref 0–6)
GLUCOSE SERPL-MCNC: 132 MG/DL — HIGH (ref 70–99)
HCT VFR BLD CALC: 29.8 % — LOW (ref 39–50)
HDLC SERPL-MCNC: 20 MG/DL — LOW
HGB BLD-MCNC: 10.2 G/DL — LOW (ref 13–17)
IMM GRANULOCYTES NFR BLD AUTO: 1.2 % — SIGNIFICANT CHANGE UP (ref 0–1.5)
LIPID PNL WITH DIRECT LDL SERPL: 51 MG/DL — SIGNIFICANT CHANGE UP
LYMPHOCYTES # BLD AUTO: 0.73 K/UL — LOW (ref 1–3.3)
LYMPHOCYTES # BLD AUTO: 6.5 % — LOW (ref 13–44)
MAGNESIUM SERPL-MCNC: 2.2 MG/DL — SIGNIFICANT CHANGE UP (ref 1.6–2.6)
MCHC RBC-ENTMCNC: 28.3 PG — SIGNIFICANT CHANGE UP (ref 27–34)
MCHC RBC-ENTMCNC: 34.2 GM/DL — SIGNIFICANT CHANGE UP (ref 32–36)
MCV RBC AUTO: 82.5 FL — SIGNIFICANT CHANGE UP (ref 80–100)
MONOCYTES # BLD AUTO: 1.24 K/UL — HIGH (ref 0–0.9)
MONOCYTES NFR BLD AUTO: 11 % — SIGNIFICANT CHANGE UP (ref 2–14)
NEUTROPHILS # BLD AUTO: 8.93 K/UL — HIGH (ref 1.8–7.4)
NEUTROPHILS NFR BLD AUTO: 79.1 % — HIGH (ref 43–77)
NON HDL CHOLESTEROL: 72 MG/DL — SIGNIFICANT CHANGE UP
NRBC # BLD: 0 /100 WBCS — SIGNIFICANT CHANGE UP (ref 0–0)
PHOSPHATE SERPL-MCNC: 2.7 MG/DL — SIGNIFICANT CHANGE UP (ref 2.5–4.5)
PLATELET # BLD AUTO: 320 K/UL — SIGNIFICANT CHANGE UP (ref 150–400)
POTASSIUM SERPL-MCNC: 3.6 MMOL/L — SIGNIFICANT CHANGE UP (ref 3.5–5.3)
POTASSIUM SERPL-SCNC: 3.6 MMOL/L — SIGNIFICANT CHANGE UP (ref 3.5–5.3)
PROT SERPL-MCNC: 5.9 G/DL — LOW (ref 6–8.3)
RBC # BLD: 3.61 M/UL — LOW (ref 4.2–5.8)
RBC # FLD: 15.8 % — HIGH (ref 10.3–14.5)
SODIUM SERPL-SCNC: 137 MMOL/L — SIGNIFICANT CHANGE UP (ref 135–145)
SPECIMEN SOURCE: SIGNIFICANT CHANGE UP
SPECIMEN SOURCE: SIGNIFICANT CHANGE UP
TRIGL SERPL-MCNC: 101 MG/DL — SIGNIFICANT CHANGE UP
WBC # BLD: 11.28 K/UL — HIGH (ref 3.8–10.5)
WBC # FLD AUTO: 11.28 K/UL — HIGH (ref 3.8–10.5)

## 2022-03-22 PROCEDURE — 99233 SBSQ HOSP IP/OBS HIGH 50: CPT

## 2022-03-22 PROCEDURE — 99232 SBSQ HOSP IP/OBS MODERATE 35: CPT | Mod: GC

## 2022-03-22 RX ORDER — APIXABAN 2.5 MG/1
5 TABLET, FILM COATED ORAL
Refills: 0 | Status: DISCONTINUED | OUTPATIENT
Start: 2022-03-22 | End: 2022-03-23

## 2022-03-22 RX ADMIN — Medication 81 MILLIGRAM(S): at 11:54

## 2022-03-22 RX ADMIN — TIOTROPIUM BROMIDE 1 CAPSULE(S): 18 CAPSULE ORAL; RESPIRATORY (INHALATION) at 05:32

## 2022-03-22 RX ADMIN — Medication 12.5 MILLIGRAM(S): at 05:31

## 2022-03-22 RX ADMIN — APIXABAN 5 MILLIGRAM(S): 2.5 TABLET, FILM COATED ORAL at 21:56

## 2022-03-22 RX ADMIN — BUDESONIDE AND FORMOTEROL FUMARATE DIHYDRATE 2 PUFF(S): 160; 4.5 AEROSOL RESPIRATORY (INHALATION) at 17:44

## 2022-03-22 RX ADMIN — BUDESONIDE AND FORMOTEROL FUMARATE DIHYDRATE 2 PUFF(S): 160; 4.5 AEROSOL RESPIRATORY (INHALATION) at 05:32

## 2022-03-22 RX ADMIN — ATORVASTATIN CALCIUM 40 MILLIGRAM(S): 80 TABLET, FILM COATED ORAL at 21:56

## 2022-03-22 RX ADMIN — TAMSULOSIN HYDROCHLORIDE 0.4 MILLIGRAM(S): 0.4 CAPSULE ORAL at 21:56

## 2022-03-22 RX ADMIN — Medication 100 MILLIGRAM(S): at 14:41

## 2022-03-22 RX ADMIN — Medication 650 MILLIGRAM(S): at 11:59

## 2022-03-22 RX ADMIN — Medication 12.5 MILLIGRAM(S): at 17:43

## 2022-03-22 RX ADMIN — CLOPIDOGREL BISULFATE 75 MILLIGRAM(S): 75 TABLET, FILM COATED ORAL at 11:54

## 2022-03-22 RX ADMIN — Medication 100 MILLIGRAM(S): at 21:57

## 2022-03-22 RX ADMIN — Medication 100 MILLIGRAM(S): at 05:31

## 2022-03-22 RX ADMIN — Medication 650 MILLIGRAM(S): at 13:00

## 2022-03-22 NOTE — PROGRESS NOTE ADULT - SUBJECTIVE AND OBJECTIVE BOX
Patient is a 71y old  Male who presents with a chief complaint of Sepsis 2/2 cellulitis (22 Mar 2022 11:58)      INTERVAL HPI/OVERNIGHT EVENTS: No acute events overnight. Patient seen and examined at bedside. Reports RLE erythema and edema has been persistently improving since admission. Denies chest pain, shortness of breath, headache, dizziness, nausea/vomiting, abdominal pain.     MEDICATIONS  (STANDING):  aspirin  chewable 81 milliGRAM(s) Oral daily  atorvastatin 40 milliGRAM(s) Oral at bedtime  budesonide  80 MICROgram(s)/formoterol 4.5 MICROgram(s) Inhaler 2 Puff(s) Inhalation two times a day  ceFAZolin   IVPB 2000 milliGRAM(s) IV Intermittent every 8 hours  clopidogrel Tablet 75 milliGRAM(s) Oral daily  metoprolol tartrate 12.5 milliGRAM(s) Oral two times a day  tamsulosin 0.4 milliGRAM(s) Oral at bedtime  tiotropium 18 MICROgram(s) Capsule 1 Capsule(s) Inhalation daily    MEDICATIONS  (PRN):  acetaminophen     Tablet .. 650 milliGRAM(s) Oral every 6 hours PRN Mild Pain (1 - 3)  ALBUTerol    90 MICROgram(s) HFA Inhaler 2 Puff(s) Inhalation every 6 hours PRN Shortness of Breath and/or Wheezing  melatonin 3 milliGRAM(s) Oral at bedtime PRN Insomnia      Allergies    No Known Allergies    Intolerances        REVIEW OF SYSTEMS:  CONSTITUTIONAL: No fever or chills  HEENT:  No headache  RESPIRATORY: No shortness of breath  CARDIOVASCULAR: No chest pain, palpitations  GASTROINTESTINAL: No abd pain, nausea, vomiting  NEUROLOGICAL: dizziness  MUSCULOSKELETAL: + largely improved RLE edema and erythema      Vital Signs Last 24 Hrs  T(C): 36.5 (22 Mar 2022 11:53), Max: 37 (21 Mar 2022 20:12)  T(F): 97.7 (22 Mar 2022 11:53), Max: 98.6 (21 Mar 2022 20:12)  HR: 86 (22 Mar 2022 11:53) (83 - 99)  BP: 111/76 (22 Mar 2022 11:53) (103/70 - 113/72)  BP(mean): --  RR: 17 (22 Mar 2022 11:53) (17 - 18)  SpO2: 96% (22 Mar 2022 11:53) (92% - 96%)    PHYSICAL EXAM:  GENERAL: NAD  HEENT: PERRL, EOMI  CHEST/LUNG: CTA b/l, no rales, wheezes, or rhonchi  HEART:  RRR, S1, S2  ABDOMEN: soft, nontender, nondistended  EXTREMITIES: largely improved RLE edema and erythema below the knee; sensations and motor tone intact b/l LE  NERVOUS SYSTEM: answers questions and follows commands appropriately    LABS:                        10.2   11.28 )-----------( 320      ( 22 Mar 2022 07:27 )             29.8     CBC Full  -  ( 22 Mar 2022 07:27 )  WBC Count : 11.28 K/uL  Hemoglobin : 10.2 g/dL  Hematocrit : 29.8 %  Platelet Count - Automated : 320 K/uL  Mean Cell Volume : 82.5 fl  Mean Cell Hemoglobin : 28.3 pg  Mean Cell Hemoglobin Concentration : 34.2 gm/dL  Auto Neutrophil # : 8.93 K/uL  Auto Lymphocyte # : 0.73 K/uL  Auto Monocyte # : 1.24 K/uL  Auto Eosinophil # : 0.20 K/uL  Auto Basophil # : 0.05 K/uL  Auto Neutrophil % : 79.1 %  Auto Lymphocyte % : 6.5 %  Auto Monocyte % : 11.0 %  Auto Eosinophil % : 1.8 %  Auto Basophil % : 0.4 %    22 Mar 2022 07:27    137    |  105    |  23     ----------------------------<  132    3.6     |  25     |  1.40     Ca    8.6        22 Mar 2022 07:27  Phos  2.7       22 Mar 2022 07:27  Mg     2.2       22 Mar 2022 07:27    TPro  5.9    /  Alb  1.9    /  TBili  0.4    /  DBili  x      /  AST  11     /  ALT  12     /  AlkPhos  81     22 Mar 2022 07:27      Urinalysis Basic - ( 21 Mar 2022 07:28 )    Color: Yellow / Appearance: Clear / S.020 / pH: x  Gluc: x / Ketone: Small  / Bili: Negative / Urobili: Negative   Blood: x / Protein: 30 mg/dL / Nitrite: Negative   Leuk Esterase: Negative / RBC: 0-2 /HPF / WBC 0-2   Sq Epi: x / Non Sq Epi: Negative / Bacteria: Moderate      CAPILLARY BLOOD GLUCOSE            Culture - Urine (collected 22 @ 12:33)  Source: Clean Catch Clean Catch (Midstream)  Final Report (22 @ 09:40):    No growth    Culture - Blood (collected 22 @ 21:00)  Source: .Blood Blood-Peripheral  Preliminary Report (22 @ 21:01):    No growth to date.    Culture - Blood (collected 22 @ 21:00)  Source: .Blood Blood-Peripheral  Preliminary Report (22 @ 21:01):    No growth to date.    Culture - Other (collected 22 @ 20:54)  Source: Wound Wound  Final Report (22 @ 11:58):    Numerous Streptococcus dysgalactiae (Group C/G) "Susceptibilities not    performed"        RADIOLOGY & ADDITIONAL TESTS:    Personally reviewed.     Consultant(s) Notes Reviewed:  [x] YES  [ ] NO

## 2022-03-22 NOTE — PROGRESS NOTE ADULT - PROBLEM SELECTOR PLAN 6
Chronic, although soft BPs this admission in the setting of sepsis   - Continue home Metoprolol 12.5mg bid  - Hold home Irbesartan in the setting of recently resolved RASHAWN, consider restarting when necessary  - Monitor routine hemodynamics

## 2022-03-22 NOTE — PROGRESS NOTE ADULT - PROBLEM SELECTOR PLAN 4
Underwent nuclear stress test in 9/2021 and incidentally found to have large anterior MI with inferior wall ischemia  - S/p ROSA to D1, proximal LAD and OM1  - Continue Plavix 75mg qd, ASA 81mg qd, Atorvastatin 40mg qhs  - Lipid panel revealing HDL decreased, remainder wnl

## 2022-03-22 NOTE — PROGRESS NOTE ADULT - PROBLEM SELECTOR PLAN 2
Resolved  - History of CKD3  - Cr 2 on admission  - Baseline Cr 1.4 per outpatient chart review  - Suspected prerenal azotemia in the setting of sepsis  - Received 2.7L NS in ED, will hold further IVF given questionable CHF history   - Hold home Irbesartan  - Hold nephrotoxic agents as able  - Trend with daily CMP

## 2022-03-22 NOTE — PROGRESS NOTE ADULT - PROBLEM SELECTOR PLAN 7
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 Larsen, undergoes screening CT chest every 3 months to ensure remission  - Most recent CT chest last week and had follow up with Dr. Larsen this week to obtain results

## 2022-03-22 NOTE — PROGRESS NOTE ADULT - SUBJECTIVE AND OBJECTIVE BOX
[facility]  INFECTIOUS DISEASES   [address] [place] [address]  Tel: [phone]     Fax: [phone]  ======================================================  MD Machelle Leyva Kaushal, MD Cho, Michelle, MD   ======================================================    [id number]  DELFINO SHAHJULIAMIGUEL ANGEL     Follow up: R leg cellulitis    Swelling and erythema are improving. No fever.  Leukocytosis improving. No overnight event.  Cultures negative.     PAST MEDICAL & SURGICAL HISTORY:  HTN (hypertension)  Smoking history  Quit 2017  Lung cancer  s/p LLL Lobectomy, radiation, chemotherapy  GERD (gastroesophageal reflux disease)  Atrial flutter  10/2017 off Eliquis (MD aware)  Other and unspecified ventral hernia with obstruction, without gangrene  Other nonspecific abnormal finding of lung field  CAD (coronary artery disease)  Stents placed x3 10/2021  H/O inguinal hernia repair  Bilateral in the 1970&#x27;s  History of chemotherapy  left chest wall infusaport placement  Status post lobectomy of lung  LLL lobectomy at [facility]  S/P pericardial surgery  For an effusion in 2019    Social Hx: no smoking, ETOH or drugs     FAMILY HISTORY:  FH: heart disease (Sibling)    Allergies  No Known Allergies    Antibiotics:  MEDICATIONS  (STANDING):  aspirin  chewable 81 milliGRAM(s) Oral daily  budesonide  80 MICROgram(s)/formoterol 4.5 MICROgram(s) Inhaler 2 Puff(s) Inhalation two times a day  ceFAZolin   IVPB 2000 milliGRAM(s) IV Intermittent every 8 hours  clopidogrel Tablet 75 milliGRAM(s) Oral daily  metoprolol tartrate 12.5 milliGRAM(s) Oral two times a day  tamsulosin 0.4 milliGRAM(s) Oral at bedtime  tiotropium 18 MICROgram(s) Capsule 1 Capsule(s) Inhalation daily    REVIEW OF SYSTEMS:  CONSTITUTIONAL:  No Fever or chills  HEENT:  No diplopia or blurred vision.  No sore throat or runny nose.  CARDIOVASCULAR:  No chest pain or SOB.  RESPIRATORY:  No cough, shortness of breath, PND or orthopnea.  GASTROINTESTINAL:  No nausea, vomiting or diarrhea.  GENITOURINARY:  No dysuria, frequency or urgency. No Blood in urine  MUSCULOSKELETAL: R leg swelling and redness   SKIN:  R lower leg ulcers and scratches   NEUROLOGIC:  No paresthesias, fasciculations, seizures or weakness.  PSYCHIATRIC:  No disorder of thought or mood.  ENDOCRINE:  No heat or cold intolerance, polyuria or polydipsia.  HEMATOLOGICAL:  No easy bruising or bleeding.     Physical Exam:  Vital Signs Last 24 Hrs  T(C): 36.5 (22 Mar 2022 11:53), Max: 37 (21 Mar 2022 20:12)  T(F): 97.7 (22 Mar 2022 11:53), Max: 98.6 (21 Mar 2022 20:12)  HR: 86 (22 Mar 2022 11:53) (83 - 99)  BP: 111/76 (22 Mar 2022 11:53) (103/70 - 113/72)  BP(mean): --  RR: 17 (22 Mar 2022 11:53) (17 - 18)  SpO2: 96% (22 Mar 2022 11:53) (92% - 96%)  GEN: NAD  HEENT: normocephalic and atraumatic. EOMI. PERRL.    NECK: Supple.  No lymphadenopathy   LUNGS: Clear to auscultation.  HEART: Regular rate and rhythm without murmur.  ABDOMEN: Soft, nontender, and nondistended.  Positive bowel sounds.    : No CVA tenderness  EXTREMITIES: R leg with warmth, swelling and erythema below the knee,   some superficial ulcers around ankle and scattered in lower leg.   NEUROLOGIC: grossly intact.  PSYCHIATRIC: Appropriate affect .  SKIN: No ulceration or induration present.    Labs:                        10.2   11.28 )-----------( 320      ( 22 Mar 2022 07:27 )             29.8     03-22    137  |  105  |  23  ----------------------------<  132<H>  3.6   |  25  |  1.40<H>    Ca    8.6      22 Mar 2022 07:27  Phos  2.7     03-22  Mg     2.2     03-22    TPro  5.9<L>  /  Alb  1.9<L>  /  TBili  0.4  /  DBili  x   /  AST  11<L>  /  ALT  12  /  AlkPhos  81  03-22    Culture - Urine (collected 03-21-22 @ 12:33)  Source: Clean Catch Clean Catch (Midstream)  Final Report (03-22-22 @ 09:40):    No growth    Culture - Blood (collected 03-20-22 @ 21:00)  Source: .Blood Blood-Peripheral    Culture - Blood (collected 03-20-22 @ 21:00)  Source: .Blood Blood-Peripheral    Culture - Other (collected 03-20-22 @ 20:54)  Source: Wound Wound  Final Report (03-22-22 @ 11:58):    Numerous Streptococcus dysgalactiae (Group C/G) "Susceptibilities not    performed"    WBC Count: 11.28 K/uL (03-22-22 @ 07:27)  WBC Count: 16.25 K/uL (03-21-22 @ 07:33)  WBC Count: 16.78 K/uL (03-20-22 @ 14:02)    Creatinine, Serum: 1.40 mg/dL (03-22-22 @ 07:27)  Creatinine, Serum: 1.50 mg/dL (03-21-22 @ 07:33)  Creatinine, Serum: 2.00 mg/dL (03-20-22 @ 14:02)     SARS-CoV-2 Result: NotDetec (03-20-22 @ 14:02)    All imaging and other data have been reviewed.  < from: US Duplex Venous Lower Ext Ltd, Right (03.20.22 @ 15:45) >  IMPRESSION:  No evidence of right lower extremity deep venous thrombosis.    < from: Xray Chest 1 View- PORTABLE-Urgent (03.20.22 @ 14:12) >  IMPRESSION: Improved lung and pleural findings was residual and chronic   loss of volume of left chest.    Assessment and Plan:   72yo man with PMH of HTN, paroxysmal AFib, CAD, CHF, lung adenocarcinoma s/p LLL resection and VATS, COPD and CKD was admitted with right lower extremity swelling.  No antitonics use in the past or hospitalization, no open wound with discharge, no positive culture to help with antibiotic selection, so cefazolin is a good choice that covers common skin tolu.   Doppler negative for DVT    RLE cellulitis   - Blood cultures NGTD  - Continue Cefazolin 2gm q8h  - After resolution of cellulitis can use steroid cream for skin rash and allergic reaction   - Antipruritic PRN  - Elevate RLE to help with swelling   - WBC 16k-->11k  - Possible switch to oral Abx tomorrow with further improvement     Will follow.  Discussed with Dr. Eaton.    Yenny Lezama MD  Division of Infectious Diseases   Please call ID service at [phone] with any question.      35 minutes spent on total encounter assessing patient, examination, chart reivew, counseling and coordinating care by the attending physician/nurse/care manager.

## 2022-03-22 NOTE — PROGRESS NOTE ADULT - ASSESSMENT
70 yo male with PMH of HTN, paroxysmal AFib not on AC, CAD s/p 3 stents on Plavix, lung adenocarcinoma s/p left lower lung resection and VATS, COPD, CKD stage 3, ?systolic heart failure admitted for RLE cellulitis on IV Ancef.

## 2022-03-22 NOTE — PROGRESS NOTE ADULT - PROBLEM SELECTOR PLAN 3
History of paroxysmal AFib  - Previously on Eliquis, however no longer taking due to insurance coverage issues  - Continue Plavix 75mg qd, Metoprolol 12.5mg bid  - CHADSVASC = 3  - Encourage follow up with outpatient cardiology to follow up AC

## 2022-03-22 NOTE — PROGRESS NOTE ADULT - PROBLEM SELECTOR PLAN 1
Patient presenting with right leg swelling and erythema for 3 days  - Meets severe sepsis criteria (tachy to 98, WBC 16.25, suspected source of right lower extremity cellulitis)  - Doppler RLE negative for DVT  - CXR: Improved lung and pleural findings since 12/2021 and chronic loss of volume of L chest  - UA - mild ketonuria, mild proteinuria, trace blood  - S/p IV Ancef x1 in the ED  - Continue IV Ancef, will switch to PO Augmentin on dc for 10 days per ID  - BCx x2, UCx NGTD  - Wound Cx + for Strep Agylacticae  - MRSA PCR neg  - Monitor for fever  - Trend leukocytosis with CBC  - Pain control with Tylenol  - Leg elevation  - Fall precautions

## 2022-03-22 NOTE — PROGRESS NOTE ADULT - ATTENDING COMMENTS
72 yo male with PMH of HTN, paroxysmal AFib not on AC, CAD s/p 3 stents on Plavix, lung adenocarcinoma s/p left lower lung resection and VATS, COPD, CKD stage 3, ?systolic heart failure admitted for RLE cellulitis on IV Ancef.     Jose Maria braden and examined at bedside. RLE erythema much improved, but edema still present. patient states pain is much improved as well. Discussed with ID, recommend additional day of IV abx and hope to switch to oral abx in next 24-48 hrs. Patient with insurance coverage issues regarding AC medication. Discussed with patient and pharmacy, will be able to get one month supply of eliquis (for Atrial fibrillation) from [facility], but then patient will need to follow up with PCP- patient agreeable to restarting eliquis.  D/C planning.
70 yo male with PMH of HTN, paroxysmal AFib not on AC, CAD s/p 3 stents on Plavix, lung adenocarcinoma s/p left lower lung resection and VATS, COPD, CKD stage 3, ?systolic heart failure admitted for RLE cellulitis.     Patient seen and examined at bedside. States he feels well, states pain in RLE much improved and swelling and erythema in RLE improving as well.  Continue IV ancef, f/u blood cultures. ID consulted, appreciate recs. Plan of care discussed with significant other at bedside.

## 2022-03-23 ENCOUNTER — TRANSCRIPTION ENCOUNTER (OUTPATIENT)
Age: 72
End: 2022-03-23

## 2022-03-23 VITALS
SYSTOLIC BLOOD PRESSURE: 124 MMHG | HEART RATE: 84 BPM | RESPIRATION RATE: 16 BRPM | OXYGEN SATURATION: 96 % | TEMPERATURE: 98 F | DIASTOLIC BLOOD PRESSURE: 72 MMHG

## 2022-03-23 LAB
ALBUMIN SERPL ELPH-MCNC: 1.9 G/DL — LOW (ref 3.3–5)
ALP SERPL-CCNC: 76 U/L — SIGNIFICANT CHANGE UP (ref 40–120)
ALT FLD-CCNC: 9 U/L — LOW (ref 12–78)
ANION GAP SERPL CALC-SCNC: 9 MMOL/L — SIGNIFICANT CHANGE UP (ref 5–17)
AST SERPL-CCNC: 11 U/L — LOW (ref 15–37)
BASOPHILS # BLD AUTO: 0.04 K/UL — SIGNIFICANT CHANGE UP (ref 0–0.2)
BASOPHILS NFR BLD AUTO: 0.5 % — SIGNIFICANT CHANGE UP (ref 0–2)
BILIRUB SERPL-MCNC: 0.3 MG/DL — SIGNIFICANT CHANGE UP (ref 0.2–1.2)
BUN SERPL-MCNC: 24 MG/DL — HIGH (ref 7–23)
CALCIUM SERPL-MCNC: 8.7 MG/DL — SIGNIFICANT CHANGE UP (ref 8.5–10.1)
CHLORIDE SERPL-SCNC: 104 MMOL/L — SIGNIFICANT CHANGE UP (ref 96–108)
CO2 SERPL-SCNC: 24 MMOL/L — SIGNIFICANT CHANGE UP (ref 22–31)
CREAT SERPL-MCNC: 1.3 MG/DL — SIGNIFICANT CHANGE UP (ref 0.5–1.3)
EGFR: 59 ML/MIN/1.73M2 — LOW
EOSINOPHIL # BLD AUTO: 0.26 K/UL — SIGNIFICANT CHANGE UP (ref 0–0.5)
EOSINOPHIL NFR BLD AUTO: 3 % — SIGNIFICANT CHANGE UP (ref 0–6)
GLUCOSE SERPL-MCNC: 115 MG/DL — HIGH (ref 70–99)
HCT VFR BLD CALC: 31.4 % — LOW (ref 39–50)
HGB BLD-MCNC: 10.9 G/DL — LOW (ref 13–17)
IMM GRANULOCYTES NFR BLD AUTO: 1.6 % — HIGH (ref 0–1.5)
LYMPHOCYTES # BLD AUTO: 0.7 K/UL — LOW (ref 1–3.3)
LYMPHOCYTES # BLD AUTO: 8.1 % — LOW (ref 13–44)
MAGNESIUM SERPL-MCNC: 2.1 MG/DL — SIGNIFICANT CHANGE UP (ref 1.6–2.6)
MCHC RBC-ENTMCNC: 29.1 PG — SIGNIFICANT CHANGE UP (ref 27–34)
MCHC RBC-ENTMCNC: 34.7 GM/DL — SIGNIFICANT CHANGE UP (ref 32–36)
MCV RBC AUTO: 83.7 FL — SIGNIFICANT CHANGE UP (ref 80–100)
MONOCYTES # BLD AUTO: 0.99 K/UL — HIGH (ref 0–0.9)
MONOCYTES NFR BLD AUTO: 11.5 % — SIGNIFICANT CHANGE UP (ref 2–14)
NEUTROPHILS # BLD AUTO: 6.47 K/UL — SIGNIFICANT CHANGE UP (ref 1.8–7.4)
NEUTROPHILS NFR BLD AUTO: 75.3 % — SIGNIFICANT CHANGE UP (ref 43–77)
NRBC # BLD: 0 /100 WBCS — SIGNIFICANT CHANGE UP (ref 0–0)
PHOSPHATE SERPL-MCNC: 2.7 MG/DL — SIGNIFICANT CHANGE UP (ref 2.5–4.5)
PLATELET # BLD AUTO: 412 K/UL — HIGH (ref 150–400)
POTASSIUM SERPL-MCNC: 3.6 MMOL/L — SIGNIFICANT CHANGE UP (ref 3.5–5.3)
POTASSIUM SERPL-SCNC: 3.6 MMOL/L — SIGNIFICANT CHANGE UP (ref 3.5–5.3)
PROT SERPL-MCNC: 6 G/DL — SIGNIFICANT CHANGE UP (ref 6–8.3)
RBC # BLD: 3.75 M/UL — LOW (ref 4.2–5.8)
RBC # FLD: 15.9 % — HIGH (ref 10.3–14.5)
SODIUM SERPL-SCNC: 137 MMOL/L — SIGNIFICANT CHANGE UP (ref 135–145)
WBC # BLD: 8.6 K/UL — SIGNIFICANT CHANGE UP (ref 3.8–10.5)
WBC # FLD AUTO: 8.6 K/UL — SIGNIFICANT CHANGE UP (ref 3.8–10.5)

## 2022-03-23 PROCEDURE — 85610 PROTHROMBIN TIME: CPT

## 2022-03-23 PROCEDURE — 36415 COLL VENOUS BLD VENIPUNCTURE: CPT

## 2022-03-23 PROCEDURE — 99232 SBSQ HOSP IP/OBS MODERATE 35: CPT

## 2022-03-23 PROCEDURE — 83605 ASSAY OF LACTIC ACID: CPT

## 2022-03-23 PROCEDURE — 87641 MR-STAPH DNA AMP PROBE: CPT

## 2022-03-23 PROCEDURE — 96374 THER/PROPH/DIAG INJ IV PUSH: CPT

## 2022-03-23 PROCEDURE — 80053 COMPREHEN METABOLIC PANEL: CPT

## 2022-03-23 PROCEDURE — 87086 URINE CULTURE/COLONY COUNT: CPT

## 2022-03-23 PROCEDURE — 87040 BLOOD CULTURE FOR BACTERIA: CPT

## 2022-03-23 PROCEDURE — 85025 COMPLETE CBC W/AUTO DIFF WBC: CPT

## 2022-03-23 PROCEDURE — 81001 URINALYSIS AUTO W/SCOPE: CPT

## 2022-03-23 PROCEDURE — 87077 CULTURE AEROBIC IDENTIFY: CPT

## 2022-03-23 PROCEDURE — 93971 EXTREMITY STUDY: CPT

## 2022-03-23 PROCEDURE — 99285 EMERGENCY DEPT VISIT HI MDM: CPT | Mod: 25

## 2022-03-23 PROCEDURE — 85730 THROMBOPLASTIN TIME PARTIAL: CPT

## 2022-03-23 PROCEDURE — 93005 ELECTROCARDIOGRAM TRACING: CPT

## 2022-03-23 PROCEDURE — 99239 HOSP IP/OBS DSCHRG MGMT >30: CPT

## 2022-03-23 PROCEDURE — 84100 ASSAY OF PHOSPHORUS: CPT

## 2022-03-23 PROCEDURE — 83735 ASSAY OF MAGNESIUM: CPT

## 2022-03-23 PROCEDURE — 87637 SARSCOV2&INF A&B&RSV AMP PRB: CPT

## 2022-03-23 PROCEDURE — 94640 AIRWAY INHALATION TREATMENT: CPT

## 2022-03-23 PROCEDURE — 87070 CULTURE OTHR SPECIMN AEROBIC: CPT

## 2022-03-23 PROCEDURE — 71045 X-RAY EXAM CHEST 1 VIEW: CPT

## 2022-03-23 PROCEDURE — 87640 STAPH A DNA AMP PROBE: CPT

## 2022-03-23 PROCEDURE — 80061 LIPID PANEL: CPT

## 2022-03-23 RX ORDER — IRBESARTAN 75 MG/1
1 TABLET ORAL
Qty: 0 | Refills: 0 | DISCHARGE

## 2022-03-23 RX ORDER — APIXABAN 2.5 MG/1
1 TABLET, FILM COATED ORAL
Qty: 0 | Refills: 0 | DISCHARGE
Start: 2022-03-23

## 2022-03-23 RX ORDER — CEPHALEXIN 500 MG
1 CAPSULE ORAL
Qty: 28 | Refills: 0
Start: 2022-03-23 | End: 2022-03-29

## 2022-03-23 RX ADMIN — TIOTROPIUM BROMIDE 1 CAPSULE(S): 18 CAPSULE ORAL; RESPIRATORY (INHALATION) at 05:47

## 2022-03-23 RX ADMIN — Medication 12.5 MILLIGRAM(S): at 05:47

## 2022-03-23 RX ADMIN — Medication 100 MILLIGRAM(S): at 13:34

## 2022-03-23 RX ADMIN — APIXABAN 5 MILLIGRAM(S): 2.5 TABLET, FILM COATED ORAL at 05:47

## 2022-03-23 RX ADMIN — Medication 100 MILLIGRAM(S): at 05:47

## 2022-03-23 RX ADMIN — CLOPIDOGREL BISULFATE 75 MILLIGRAM(S): 75 TABLET, FILM COATED ORAL at 11:30

## 2022-03-23 RX ADMIN — BUDESONIDE AND FORMOTEROL FUMARATE DIHYDRATE 2 PUFF(S): 160; 4.5 AEROSOL RESPIRATORY (INHALATION) at 05:47

## 2022-03-23 NOTE — PROGRESS NOTE ADULT - PROBLEM SELECTOR PROBLEM 8
Chief Complaints and History of Present Illnesses   Patient presents with     Post Op (Ophthalmology) Left Eye     POD#1 s/p CE/IOL Left Eye 11/27/2017     HPI    Affected eye(s):  Both   Symptoms:     Floaters (Comment: Occasional floater in LE, no changes.)   No flashes   No redness   No tearing   No Dryness   No itching         Do you have eye pain now?:  No      Comments:  Pt slept well last night. No eye pain yesterday or today.    Brian JOSE November 28, 2017 8:00 AM               
COPD without exacerbation

## 2022-03-23 NOTE — PROGRESS NOTE ADULT - PROBLEM SELECTOR PLAN 1
Patient presenting with right leg swelling and erythema for 3 days  - Meets severe sepsis criteria (tachy to 98, WBC 16.25, suspected source of right lower extremity cellulitis)  - Doppler RLE negative for DVT  - CXR: Improved lung and pleural findings since 12/2021 and chronic loss of volume of L chest  - UA - mild ketonuria, mild proteinuria, trace blood  - S/p IV Ancef x1 in the ED  - Continue IV Ancef, will switch to Keflex 500mg PO q6h for 1 week per ID  - BCx x2, UCx NGTD  - Wound Cx + for Strep Agylacticae  - MRSA PCR neg  - Monitor for fever  - Trend leukocytosis with CBC  - Pain control with Tylenol  - Leg elevation  - Fall precautions

## 2022-03-23 NOTE — PROGRESS NOTE ADULT - ASSESSMENT
72 yo male with PMH of HTN, paroxysmal AFib not on AC, CAD s/p 3 stents on Plavix, lung adenocarcinoma s/p left lower lung resection and VATS, COPD, CKD stage 3, ?systolic heart failure admitted for RLE cellulitis on IV Ancef, to be switched to Keflex 500mg PO q6h for 1 week.

## 2022-03-23 NOTE — PROGRESS NOTE ADULT - PROBLEM SELECTOR PLAN 6
Chronic, although soft BPs this admission in the setting of sepsis   - Continue home Metoprolol 12.5mg bid  - Hold home Irbesartan in the setting of recently resolved RASHAWN, BPs remain stable  - Monitor routine hemodynamics

## 2022-03-23 NOTE — PROGRESS NOTE ADULT - SUBJECTIVE AND OBJECTIVE BOX
[facility]  INFECTIOUS DISEASES   [address] [place] [address]  Tel: [phone]     Fax: [phone]  ======================================================  MD Machelle Leyva Kaushal, MD Cho, Michelle, MD   ======================================================    [id number]  DELFINO SHAHJULIAMIGUEL ANGEL     Follow up: R leg cellulitis    Swelling and erythema are improving. No fever.  Leukocytosis normalized. No overnight event.  Cultures negative.     PAST MEDICAL & SURGICAL HISTORY:  HTN (hypertension)  Smoking history  Quit 2017  Lung cancer  s/p LLL Lobectomy, radiation, chemotherapy  GERD (gastroesophageal reflux disease)  Atrial flutter  10/2017 off Eliquis (MD aware)  Other and unspecified ventral hernia with obstruction, without gangrene  Other nonspecific abnormal finding of lung field  CAD (coronary artery disease)  Stents placed x3 10/2021  H/O inguinal hernia repair  Bilateral in the 1970&#x27;s  History of chemotherapy  left chest wall infusaport placement  Status post lobectomy of lung  LLL lobectomy at [facility]  S/P pericardial surgery  For an effusion in 2019    Social Hx: no smoking, ETOH or drugs     FAMILY HISTORY:  FH: heart disease (Sibling)    Allergies  No Known Allergies    Antibiotics:  MEDICATIONS  (STANDING):  aspirin  chewable 81 milliGRAM(s) Oral daily  budesonide  80 MICROgram(s)/formoterol 4.5 MICROgram(s) Inhaler 2 Puff(s) Inhalation two times a day  ceFAZolin   IVPB 2000 milliGRAM(s) IV Intermittent every 8 hours  clopidogrel Tablet 75 milliGRAM(s) Oral daily  metoprolol tartrate 12.5 milliGRAM(s) Oral two times a day  tamsulosin 0.4 milliGRAM(s) Oral at bedtime  tiotropium 18 MICROgram(s) Capsule 1 Capsule(s) Inhalation daily    REVIEW OF SYSTEMS:  CONSTITUTIONAL:  No Fever or chills  HEENT:  No diplopia or blurred vision.  No sore throat or runny nose.  CARDIOVASCULAR:  No chest pain or SOB.  RESPIRATORY:  No cough, shortness of breath, PND or orthopnea.  GASTROINTESTINAL:  No nausea, vomiting or diarrhea.  GENITOURINARY:  No dysuria, frequency or urgency. No Blood in urine  MUSCULOSKELETAL: R leg swelling and redness   SKIN:  R lower leg ulcers and scratches   NEUROLOGIC:  No paresthesias, fasciculations, seizures or weakness.  PSYCHIATRIC:  No disorder of thought or mood.  ENDOCRINE:  No heat or cold intolerance, polyuria or polydipsia.  HEMATOLOGICAL:  No easy bruising or bleeding.     Physical Exam:  Vital Signs Last 24 Hrs  T(C): 37.2 (23 Mar 2022 03:33), Max: 37.2 (23 Mar 2022 03:33)  T(F): 98.9 (23 Mar 2022 03:33), Max: 98.9 (23 Mar 2022 03:33)  HR: 88 (23 Mar 2022 05:46) (83 - 88)  BP: 115/72 (23 Mar 2022 05:46) (101/64 - 130/69)  BP(mean): --  RR: 16 (23 Mar 2022 05:46) (16 - 19)  SpO2: 92% (23 Mar 2022 05:46) (92% - 96%)  GEN: NAD  HEENT: normocephalic and atraumatic. EOMI. PERRL.    NECK: Supple.  No lymphadenopathy   LUNGS: Clear to auscultation.  HEART: Regular rate and rhythm without murmur.  ABDOMEN: Soft, nontender, and nondistended.  Positive bowel sounds.    : No CVA tenderness  EXTREMITIES: R leg with warmth, swelling and erythema below the knee,   some superficial ulcers around ankle and scattered in lower leg. Improved   NEUROLOGIC: grossly intact.  PSYCHIATRIC: Appropriate affect .  SKIN: No ulceration or induration present.    Labs:                        10.9   8.60  )-----------( 412      ( 23 Mar 2022 07:39 )             31.4     03-23    137  |  104  |  24<H>  ----------------------------<  115<H>  3.6   |  24  |  1.30    Ca    8.7      23 Mar 2022 07:39  Phos  2.7     03-23  Mg     2.1     03-23    TPro  6.0  /  Alb  1.9<L>  /  TBili  0.3  /  DBili  x   /  AST  11<L>  /  ALT  9<L>  /  AlkPhos  76  03-23    Culture - Urine (collected 03-21-22 @ 12:33)  Source: Clean Catch Clean Catch (Midstream)  Final Report (03-22-22 @ 09:40):    No growth    Culture - Blood (collected 03-20-22 @ 21:00)  Source: .Blood Blood-Peripheral    Culture - Blood (collected 03-20-22 @ 21:00)  Source: .Blood Blood-Peripheral    Culture - Other (collected 03-20-22 @ 20:54)  Source: Wound Wound  Final Report (03-22-22 @ 11:58):    Numerous Streptococcus dysgalactiae (Group C/G) "Susceptibilities not    performed"    WBC Count: 8.60 K/uL (03-23-22 @ 07:39)  WBC Count: 11.28 K/uL (03-22-22 @ 07:27)  WBC Count: 16.25 K/uL (03-21-22 @ 07:33)  WBC Count: 16.78 K/uL (03-20-22 @ 14:02)    Creatinine, Serum: 1.30 mg/dL (03-23-22 @ 07:39)  Creatinine, Serum: 1.40 mg/dL (03-22-22 @ 07:27)  Creatinine, Serum: 1.50 mg/dL (03-21-22 @ 07:33)  Creatinine, Serum: 2.00 mg/dL (03-20-22 @ 14:02)    SARS-CoV-2 Result: NotDetec (03-20-22 @ 14:02)    All imaging and other data have been reviewed.  < from: US Duplex Venous Lower Ext Ltd, Right (03.20.22 @ 15:45) >  IMPRESSION:  No evidence of right lower extremity deep venous thrombosis.    < from: Xray Chest 1 View- PORTABLE-Urgent (03.20.22 @ 14:12) >  IMPRESSION: Improved lung and pleural findings was residual and chronic   loss of volume of left chest.    Assessment and Plan:   72yo man with PMH of HTN, paroxysmal AFib, CAD, CHF, lung adenocarcinoma s/p LLL resection and VATS, COPD and CKD was admitted with right lower extremity swelling.  No antitonics use in the past or hospitalization, no open wound with discharge, no positive culture to help with antibiotic selection, so cefazolin is a good choice that covers common skin tolu.   Doppler negative for DVT    RLE cellulitis   - Blood cultures NGTD  - Continue Cefazolin 2gm q8h while in the hospital today  - when ready for discharg later today can switch to Keflex 500mg q6h for one week.  - Elevate RLE to help with swelling   - WBC 16k-->8k  - Dermatology follow up after discharge  - Wound center appointment in one week to see me and wound care     Will sign off please call with any question.   Discussed with Dr. Tejada.     Yenny Lezama MD  Division of Infectious Diseases   Please call ID service at [phone] with any question.      35 minutes spent on total encounter assessing patient, examination, chart reivew, counseling and coordinating care by the attending physician/nurse/care manager.

## 2022-03-23 NOTE — PROGRESS NOTE ADULT - SUBJECTIVE AND OBJECTIVE BOX
Patient is a 71y old  Male who presents with a chief complaint of Sepsis 2/2 cellulitis (23 Mar 2022 10:30)      INTERVAL HPI/OVERNIGHT EVENTS: No acute events overnight. Patient seen and examined at bedside. Reports his RLE "feels and looks a lot better". Denies chest pain, shortness of breath, headache, dizziness, nausea/vomiting, abdominal pain.     MEDICATIONS  (STANDING):  apixaban 5 milliGRAM(s) Oral two times a day  atorvastatin 40 milliGRAM(s) Oral at bedtime  budesonide  80 MICROgram(s)/formoterol 4.5 MICROgram(s) Inhaler 2 Puff(s) Inhalation two times a day  ceFAZolin   IVPB 2000 milliGRAM(s) IV Intermittent every 8 hours  clopidogrel Tablet 75 milliGRAM(s) Oral daily  metoprolol tartrate 12.5 milliGRAM(s) Oral two times a day  tamsulosin 0.4 milliGRAM(s) Oral at bedtime  tiotropium 18 MICROgram(s) Capsule 1 Capsule(s) Inhalation daily    MEDICATIONS  (PRN):  acetaminophen     Tablet .. 650 milliGRAM(s) Oral every 6 hours PRN Mild Pain (1 - 3)  ALBUTerol    90 MICROgram(s) HFA Inhaler 2 Puff(s) Inhalation every 6 hours PRN Shortness of Breath and/or Wheezing  melatonin 3 milliGRAM(s) Oral at bedtime PRN Insomnia      Allergies    No Known Allergies    Intolerances        REVIEW OF SYSTEMS:  CONSTITUTIONAL: No fever or chills  HEENT:  No headache  RESPIRATORY: No shortness of breath  CARDIOVASCULAR: No chest pain, palpitations  GASTROINTESTINAL: No abd pain, nausea, vomiting  NEUROLOGICAL: dizziness  MUSCULOSKELETAL: + largely improved RLE edema and erythema      Vital Signs Last 24 Hrs  T(C): 36.6 (23 Mar 2022 12:23), Max: 37.2 (23 Mar 2022 03:33)  T(F): 97.9 (23 Mar 2022 12:23), Max: 98.9 (23 Mar 2022 03:33)  HR: 84 (23 Mar 2022 12:23) (83 - 88)  BP: 124/72 (23 Mar 2022 12:23) (101/64 - 130/69)  BP(mean): --  RR: 16 (23 Mar 2022 12:23) (16 - 19)  SpO2: 96% (23 Mar 2022 12:23) (92% - 96%)    PHYSICAL EXAM:  GENERAL: NAD  HEENT: PERRL, EOMI  CHEST/LUNG: CTA b/l, no rales, wheezes, or rhonchi  HEART:  RRR, S1, S2  ABDOMEN: soft, nontender, nondistended  EXTREMITIES: largely improved RLE edema and erythema; sensations and motor tone intact b/l LE  NERVOUS SYSTEM: answers questions and follows commands appropriately    LABS:                        10.9   8.60  )-----------( 412      ( 23 Mar 2022 07:39 )             31.4     CBC Full  -  ( 23 Mar 2022 07:39 )  WBC Count : 8.60 K/uL  Hemoglobin : 10.9 g/dL  Hematocrit : 31.4 %  Platelet Count - Automated : 412 K/uL  Mean Cell Volume : 83.7 fl  Mean Cell Hemoglobin : 29.1 pg  Mean Cell Hemoglobin Concentration : 34.7 gm/dL  Auto Neutrophil # : 6.47 K/uL  Auto Lymphocyte # : 0.70 K/uL  Auto Monocyte # : 0.99 K/uL  Auto Eosinophil # : 0.26 K/uL  Auto Basophil # : 0.04 K/uL  Auto Neutrophil % : 75.3 %  Auto Lymphocyte % : 8.1 %  Auto Monocyte % : 11.5 %  Auto Eosinophil % : 3.0 %  Auto Basophil % : 0.5 %    23 Mar 2022 07:39    137    |  104    |  24     ----------------------------<  115    3.6     |  24     |  1.30     Ca    8.7        23 Mar 2022 07:39  Phos  2.7       23 Mar 2022 07:39  Mg     2.1       23 Mar 2022 07:39    TPro  6.0    /  Alb  1.9    /  TBili  0.3    /  DBili  x      /  AST  11     /  ALT  9      /  AlkPhos  76     23 Mar 2022 07:39        CAPILLARY BLOOD GLUCOSE            Culture - Urine (collected 03-21-22 @ 12:33)  Source: Clean Catch Clean Catch (Midstream)  Final Report (03-22-22 @ 09:40):    No growth    Culture - Blood (collected 03-20-22 @ 21:00)  Source: .Blood Blood-Peripheral  Preliminary Report (03-21-22 @ 21:01):    No growth to date.    Culture - Blood (collected 03-20-22 @ 21:00)  Source: .Blood Blood-Peripheral  Preliminary Report (03-21-22 @ 21:01):    No growth to date.    Culture - Other (collected 03-20-22 @ 20:54)  Source: Wound Wound  Final Report (03-22-22 @ 11:58):    Numerous Streptococcus dysgalactiae (Group C/G) "Susceptibilities not    performed"        RADIOLOGY & ADDITIONAL TESTS:    Personally reviewed.     Consultant(s) Notes Reviewed:  [x] YES  [ ] NO

## 2022-03-23 NOTE — PROGRESS NOTE ADULT - REASON FOR ADMISSION
Sepsis 2/2 cellulitis

## 2022-03-23 NOTE — PROGRESS NOTE ADULT - PROBLEM SELECTOR PLAN 8
Chronic  - Continue home Trelegy Ellipta with therapeutic interchange  - Continue Ventolin PRN for SOB

## 2022-03-23 NOTE — PROGRESS NOTE ADULT - PROBLEM SELECTOR PLAN 5
Most recent TTE from outpatient cardiology from 10/2021 showed EF 30-35%, moderate global LV systolic dysfunction, thickened pericardium after stent placement in 2021  - Prior TTE from 2019 showed normal EF 50%  - No heart failure diagnosis in outpatient cardiology notes, patient denies heart failure history as well   - Patient is currently euvolemic on exam and without shortness of breath or chest pain  - Will defer repeat TTE at this time and encourage outpatient follow up with cardiology for repeat TTE

## 2022-03-23 NOTE — DISCHARGE NOTE NURSING/CASE MANAGEMENT/SOCIAL WORK - NSDCPEFALRISK_GEN_ALL_CORE
For information on Fall & Injury Prevention, visit: [email or web address] OR  [email or web address] OR  https://www.cdc.gov/steadi/patient.html

## 2022-03-23 NOTE — PROGRESS NOTE ADULT - PROBLEM SELECTOR PLAN 9
Chronic  - Continue home Tamsulosin 0.4mg daily

## 2022-03-23 NOTE — DISCHARGE NOTE NURSING/CASE MANAGEMENT/SOCIAL WORK - PATIENT PORTAL LINK FT
You can access the FollowMyHealth Patient Portal offered by [facility] by registering at the following website: [email or web address]. By joining [facility]’s FollowMyHealth portal, you will also be able to view your health information using other applications (apps) compatible with our system.

## 2022-03-23 NOTE — PHARMACOTHERAPY INTERVENTION NOTE - COMMENTS
Meds to beds requested by provider.    The following prescriptions were filled at [facility]:    Eliquis 5 mg  Keflex 500 mg    Medications delivered by pharmacist at patient's bedside and counseled on indication, directions, and side effects.  Patient verbalized understanding and no further questions

## 2022-03-23 NOTE — PROGRESS NOTE ADULT - PROBLEM SELECTOR PLAN 3
History of paroxysmal AFib  - Previously on Eliquis, however no longer taking due to insurance coverage issues  - Patient to be given 1 month supply of Eliquis on dc and counselled on f/u with PCP for continuation of Eliquis  - Continue Plavix 75mg qd, Metoprolol 12.5mg bid  - CHADSVASC = 3

## 2022-03-23 NOTE — PROGRESS NOTE ADULT - PROBLEM SELECTOR PLAN 7
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 Larsen, undergoes screening CT chest every 3 months to ensure remission  - Most recent CT chest last week and had follow up with Dr. Chava rodriguez to admission to obtain results

## 2022-03-25 LAB
CULTURE RESULTS: SIGNIFICANT CHANGE UP
CULTURE RESULTS: SIGNIFICANT CHANGE UP
SPECIMEN SOURCE: SIGNIFICANT CHANGE UP
SPECIMEN SOURCE: SIGNIFICANT CHANGE UP

## 2022-04-14 ENCOUNTER — APPOINTMENT (OUTPATIENT)
Dept: INFECTIOUS DISEASE | Facility: CLINIC | Age: 72
End: 2022-04-14
Payer: MEDICARE

## 2022-04-14 ENCOUNTER — NON-APPOINTMENT (OUTPATIENT)
Age: 72
End: 2022-04-14

## 2022-04-14 VITALS
OXYGEN SATURATION: 96 % | HEART RATE: 74 BPM | DIASTOLIC BLOOD PRESSURE: 83 MMHG | SYSTOLIC BLOOD PRESSURE: 129 MMHG | BODY MASS INDEX: 25.06 KG/M2 | HEIGHT: 71 IN | WEIGHT: 179 LBS | TEMPERATURE: 98.2 F

## 2022-04-14 DIAGNOSIS — L97.911 NON-PRESSURE CHRONIC ULCER OF UNSPECIFIED PART OF RIGHT LOWER LEG LIMITED TO BREAKDOWN OF SKIN: ICD-10-CM

## 2022-04-14 PROCEDURE — 99214 OFFICE O/P EST MOD 30 MIN: CPT

## 2022-04-14 NOTE — ASSESSMENT
[FreeTextEntry1] : There is skin disorder that needs dermatology appt and possibly biopsy. Also he may have problems with arterial/venous blood flow that needs vascular work up.\par Wound center appointment at [facility] would be the best place to follow with vascular, wound care and podiatry. \par Needs to see dermatology \par Elevate leg \par Be vigilant about infection, if new erythema, warmth or fever or any new symptoms then will need to go to ED.\par At this time no sign of cellulitis, no antibiotics recommended.\par pain meds as needed.\par \par Patient was given the opportunity to ask questions and all questions were answered to their satisfaction.\par Counseling included lab results, differential diagnosis, treatment options, risks and benefits, lifestyle changes, current condition, medications and dose adjustments.\par The patient verbalized understanding.\par  [Treatment Education] : treatment education [Risk Reduction] : risk reduction [Universal Precautions] : universal precautions [Anticipatory Guidance] : anticipatory guidance

## 2022-04-14 NOTE — PHYSICAL EXAM
[General Appearance - Alert] : alert [General Appearance - In No Acute Distress] : in no acute distress [Sclera] : the sclera and conjunctiva were normal [PERRL With Normal Accommodation] : pupils were equal in size, round, reactive to light [Extraocular Movements] : extraocular movements were intact [Outer Ear] : the ears and nose were normal in appearance [Oropharynx] : the oropharynx was normal with no thrush [Neck Appearance] : the appearance of the neck was normal [Neck Cervical Mass (___cm)] : no neck mass was observed [Jugular Venous Distention Increased] : there was no jugular-venous distention [Thyroid Diffuse Enlargement] : the thyroid was not enlarged [Auscultation Breath Sounds / Voice Sounds] : lungs were clear to auscultation bilaterally [Heart Rate And Rhythm] : heart rate was normal and rhythm regular [Heart Sounds] : normal S1 and S2 [Heart Sounds Gallop] : no gallops [Heart Sounds Pericardial Friction Rub] : no pericardial rub [Edema] : there was no peripheral edema [Bowel Sounds] : normal bowel sounds [Abdomen Soft] : soft [Abdomen Tenderness] : non-tender [] : no hepato-splenomegaly [Abdomen Mass (___ Cm)] : no abdominal mass palpated [Costovertebral Angle Tenderness] : no CVA tenderness [No Palpable Adenopathy] : no palpable adenopathy [Musculoskeletal - Swelling] : no joint swelling [Nail Clubbing] : no clubbing  or cyanosis of the fingernails [Motor Tone] : muscle strength and tone were normal [FreeTextEntry1] : RLE below knee with superficial ulcers throughout worse in ankle area, very dry skin with many fissures , swelling+ no erythema or warmth no sign of cellulitis  [No Focal Deficits] : no focal deficits [Oriented To Time, Place, And Person] : oriented to person, place, and time

## 2022-04-14 NOTE — HISTORY OF PRESENT ILLNESS
[FreeTextEntry1] : 72yo man with PMH of HTN, paroxysmal AFib, CAD, CHF, lung adenocarcinoma s/p\par LLL resection and VATS, COPD and CKD was admitted at [facility] on 3/30 with right lower extremity\par swelling, ulcers and redness. \par He has chronic swelling with superficial ulcers for which was on IV cefazolin and discharged with Keflex which has completed and later seen by PMD and had bactrim as well. He is complaining of pain in leg but the redness and swelling have improved. \par In the hospital \par - Doppler negative for DVT\par - Blood cultures NGTD\par - WBC 16k-->8k\par

## 2022-04-19 ENCOUNTER — APPOINTMENT (OUTPATIENT)
Dept: THORACIC SURGERY | Facility: CLINIC | Age: 72
End: 2022-04-19
Payer: MEDICARE

## 2022-04-19 VITALS
HEART RATE: 105 BPM | OXYGEN SATURATION: 98 % | HEIGHT: 71 IN | BODY MASS INDEX: 26.46 KG/M2 | DIASTOLIC BLOOD PRESSURE: 80 MMHG | RESPIRATION RATE: 18 BRPM | SYSTOLIC BLOOD PRESSURE: 119 MMHG | WEIGHT: 189 LBS

## 2022-04-19 PROCEDURE — 99214 OFFICE O/P EST MOD 30 MIN: CPT

## 2022-04-19 RX ORDER — CLOPIDOGREL 75 MG/1
75 TABLET, FILM COATED ORAL
Refills: 0 | Status: DISCONTINUED | COMMUNITY
End: 2022-04-19

## 2022-04-19 NOTE — ASSESSMENT
[FreeTextEntry1] : 70 year old male, s/p EBUS June, 2017, which revealed Level 7 and level 10 lymph nodes positive for metastatic adenocarcinoma (cT1N2; Clinical Stage IIIA). He received neoadjuvant chemo/RT and subsequently underwent Left VATS, LLL Lobectomy, MLND, hilar node dissection on 11/5/2017; Pathology revealed mucinous adenocarcinoma, ypT2N0. \par \par He was hospitalized for management of SVT/AFib/Aflutter in February 2019 when workup revealed pericardial effusion and is now s/p subxiphoid anterior pericardiectomy, cardiac decortication on 2/25/19. He was started on Eliquis for AFlutter.\par \par Now, s/p Flexible bronchoscopy, bronchoalveolar lavage, uniportal Right VATS, pneumonolysis,  wedge resection of the RUL with Adrienne-Strips, and intercostal nerve block and MLN biopsy on 12/8/21. 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 This is a new lung primary.\par \par I have independently reviewed the medical records and imaging at the time of this office consultation, and discussed the following interpretations and recommendations with the patient:\par 1. CT reveals RLL gg nodule slightly enlarging. Return in June with repeat CT Chest to evaluate stability. \par \par I personally performed the services described in the documentation, reviewed the documentation recorded by the scribe in my presence and it accurately and completely records my words and actions.\par \par I, WILLY Heaton, am scribing for and in the presence of MADELEINE Allen, the following sections HISTORY OF PRESENT ILLNESS, PAST MEDICAL/FAMILY/SOCIAL HISTORY; REVIEW OF SYSTEMS; VITAL SIGNS; PHYSICAL EXAM; DISPOSITION.\par  \par \par

## 2022-04-19 NOTE — HISTORY OF PRESENT ILLNESS
[FreeTextEntry1] : 71 year old male, s/p EBUS , which revealed Level 7 and level 10 lymph nodes positive for metastatic adenocarcinoma (cT1N2; Clinical Stage IIIA). He received neoadjuvant chemo/RT and subsequently underwent Left VATS, LLL Lobectomy, MLND, hilar node dissection on 2017; Pathology revealed mucinous adenocarcinoma, ypT2N0. \par \par He was hospitalized for management of SVT/AFib/Aflutter in 2019 when workup revealed pericardial effusion and is now s/p subxiphoid anterior pericardiectomy, cardiac decortication on 19. He was started on Eliquis for AFlutter.\par \par CT chest scan 19:\par -New 1.0 x 0.6 cm and 0.6 cm nodules in the posterolateral right lung base. \par \par CT chest 21:\par - Further interval growth of the spiculated cavitary RUL nodule, currently measuring 11 mm, compared with 9 mm on 5/3/21 (9:110)\par - Adjacent 7 mm solid nodule is stable (9:112)\par - New solid 5 mm nodule in RLL slightly below lida (9:144)\par - B/l renal cysts. Gallstones. Hepatic cysts\par \par PET/CT on 2021:\par - 1.1 cm RUL cavitary nodule, SUV 1.2 (4:169)\par - 0.5 cm posterior RUL nodule identified on prior study has resolved.\par - B/l maxillary sinus disease with near complete opacification of the right maxillary sinus \par \par PFTs on 2021: (Pre- Bronchodilator) FVC: 3.73 (96%); FEV1: 2.12 (69%); DLCO: 14.11 (54%)\par \par Pulm Quant perfusion on 2021:Right lun% Left lun%\par - Right upper lun% Left upper lun%\par - Right mid lun% Left mid lun%\par - Right lower lun% Left lower lun%\par \par Flexible bronchoscopy, bronchoalveolar lavage, uniportal Right VATS, pneumonolysis,  wedge resection of the RUL with Adrienne-Strips, and intercostal nerve block and MLN biopsy on 21. 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 This is a new lung primary.\par \par CT Chest on 3/15/22:\par - Post op changes; Emphysema. \par - RLL 1 cm groundglass nodule (301, 83) is slightly enlarged.\par - Left-sided Mediport with tip in SVC\par \par Patient presents to office for follow up. Upon post op visit in  advised to follow up with Dr. Maier (heme/Onc). He had to cancel his last appt with this office in March secondary to right lower extremity cellulitis, following up with Dr. Lezama, completed course of antibiotic and pending appt with wound care tomorrow. Patient denies worsening shortness of breath, cough, chest pain, fever, chills, unintentional weight loss, hemoptysis. \par \par \par \par \par

## 2022-04-19 NOTE — PHYSICAL EXAM
[] : no respiratory distress [Respiration, Rhythm And Depth] : normal respiratory rhythm and effort [Bibasilar Rales/Crackles] : bibasilar rales [Heart Sounds] : normal S1 and S2 [Examination Of The Chest] : the chest was normal in appearance

## 2022-04-20 ENCOUNTER — OUTPATIENT (OUTPATIENT)
Dept: OUTPATIENT SERVICES | Facility: HOSPITAL | Age: 72
LOS: 1 days | Discharge: HOME | End: 2022-04-20
Payer: MEDICARE

## 2022-04-20 ENCOUNTER — RESULT REVIEW (OUTPATIENT)
Age: 72
End: 2022-04-20

## 2022-04-20 ENCOUNTER — APPOINTMENT (OUTPATIENT)
Dept: WOUND CARE | Facility: HOSPITAL | Age: 72
End: 2022-04-20
Payer: MEDICARE

## 2022-04-20 VITALS
RESPIRATION RATE: 20 BRPM | SYSTOLIC BLOOD PRESSURE: 100 MMHG | HEIGHT: 71 IN | DIASTOLIC BLOOD PRESSURE: 71 MMHG | BODY MASS INDEX: 26.46 KG/M2 | WEIGHT: 189 LBS | TEMPERATURE: 97.5 F | HEART RATE: 76 BPM | OXYGEN SATURATION: 96 %

## 2022-04-20 DIAGNOSIS — Z86.19 PERSONAL HISTORY OF OTHER INFECTIOUS AND PARASITIC DISEASES: ICD-10-CM

## 2022-04-20 DIAGNOSIS — I47.1 SUPRAVENTRICULAR TACHYCARDIA: ICD-10-CM

## 2022-04-20 DIAGNOSIS — J44.9 CHRONIC OBSTRUCTIVE PULMONARY DISEASE, UNSPECIFIED: ICD-10-CM

## 2022-04-20 DIAGNOSIS — I25.5 ISCHEMIC CARDIOMYOPATHY: ICD-10-CM

## 2022-04-20 DIAGNOSIS — Z92.21 PERSONAL HISTORY OF ANTINEOPLASTIC CHEMOTHERAPY: ICD-10-CM

## 2022-04-20 DIAGNOSIS — Z98.890 OTHER SPECIFIED POSTPROCEDURAL STATES: Chronic | ICD-10-CM

## 2022-04-20 DIAGNOSIS — R60.0 LOCALIZED EDEMA: ICD-10-CM

## 2022-04-20 DIAGNOSIS — L97.801 NON-PRESSURE CHRONIC ULCER OF OTHER PART OF UNSPECIFIED LOWER LEG LIMITED TO BREAKDOWN OF SKIN: ICD-10-CM

## 2022-04-20 DIAGNOSIS — Z83.3 FAMILY HISTORY OF DIABETES MELLITUS: ICD-10-CM

## 2022-04-20 DIAGNOSIS — Z79.82 LONG TERM (CURRENT) USE OF ASPIRIN: ICD-10-CM

## 2022-04-20 DIAGNOSIS — N18.9 CHRONIC KIDNEY DISEASE, UNSPECIFIED: ICD-10-CM

## 2022-04-20 DIAGNOSIS — Z85.118 PERSONAL HISTORY OF OTHER MALIGNANT NEOPLASM OF BRONCHUS AND LUNG: ICD-10-CM

## 2022-04-20 DIAGNOSIS — L03.115 CELLULITIS OF RIGHT LOWER LIMB: ICD-10-CM

## 2022-04-20 DIAGNOSIS — Z90.2 ACQUIRED ABSENCE OF LUNG [PART OF]: Chronic | ICD-10-CM

## 2022-04-20 DIAGNOSIS — Z98.890 OTHER SPECIFIED POSTPROCEDURAL STATES: ICD-10-CM

## 2022-04-20 DIAGNOSIS — I11.9 HYPERTENSIVE HEART DISEASE WITHOUT HEART FAILURE: ICD-10-CM

## 2022-04-20 DIAGNOSIS — Z95.5 PRESENCE OF CORONARY ANGIOPLASTY IMPLANT AND GRAFT: ICD-10-CM

## 2022-04-20 DIAGNOSIS — Z87.891 PERSONAL HISTORY OF NICOTINE DEPENDENCE: ICD-10-CM

## 2022-04-20 DIAGNOSIS — Z91.81 HISTORY OF FALLING: ICD-10-CM

## 2022-04-20 DIAGNOSIS — Z92.3 PERSONAL HISTORY OF IRRADIATION: ICD-10-CM

## 2022-04-20 DIAGNOSIS — Z79.899 OTHER LONG TERM (CURRENT) DRUG THERAPY: ICD-10-CM

## 2022-04-20 DIAGNOSIS — Z87.81 PERSONAL HISTORY OF (HEALED) TRAUMATIC FRACTURE: ICD-10-CM

## 2022-04-20 DIAGNOSIS — Z92.21 PERSONAL HISTORY OF ANTINEOPLASTIC CHEMOTHERAPY: Chronic | ICD-10-CM

## 2022-04-20 DIAGNOSIS — I31.0 CHRONIC ADHESIVE PERICARDITIS: ICD-10-CM

## 2022-04-20 DIAGNOSIS — I48.0 PAROXYSMAL ATRIAL FIBRILLATION: ICD-10-CM

## 2022-04-20 PROCEDURE — 99203 OFFICE O/P NEW LOW 30 MIN: CPT

## 2022-04-20 PROCEDURE — 73590 X-RAY EXAM OF LOWER LEG: CPT | Mod: 26,RT

## 2022-04-20 PROCEDURE — G0463: CPT

## 2022-04-20 PROCEDURE — 73590 X-RAY EXAM OF LOWER LEG: CPT

## 2022-04-20 RX ORDER — ATORVASTATIN CALCIUM 40 MG/1
40 TABLET, FILM COATED ORAL
Refills: 0 | Status: DISCONTINUED | COMMUNITY
End: 2022-04-20

## 2022-04-20 RX ORDER — METOPROLOL TARTRATE 25 MG/1
25 TABLET, FILM COATED ORAL
Qty: 180 | Refills: 3 | Status: DISCONTINUED | COMMUNITY
End: 2022-04-20

## 2022-04-20 RX ORDER — ASPIRIN 81 MG
81 TABLET, DELAYED RELEASE (ENTERIC COATED) ORAL
Refills: 0 | Status: DISCONTINUED | COMMUNITY
End: 2022-04-20

## 2022-04-20 RX ORDER — IRBESARTAN 300 MG/1
300 TABLET ORAL
Refills: 0 | Status: DISCONTINUED | COMMUNITY
End: 2022-04-20

## 2022-04-20 NOTE — REVIEW OF SYSTEMS
[Negative] : Heme/Lymph [FreeTextEntry5] : hypertension, CAD ,ASCVD [FreeTextEntry6] : Ca Lung, COPD

## 2022-04-20 NOTE — VITALS
[Pain related to present condition?] : The patient's  pain is related to present condition. [Throbbing] : throbbing [Tender] : tender [Occasional] : occasional [] : Yes [de-identified] : 6/20 at times  [FreeTextEntry1] : Leg elevation and Tylenol  [FreeTextEntry3] : Right calf  [FreeTextEntry2] : Standing in one place  [FreeTextEntry4] : legs left in elevated position during exam.

## 2022-04-20 NOTE — PLAN
[FreeTextEntry1] : xray right lower leg ordered\par venous studies ordered\par vascular consult ordered\par recommended lower leg elevation and for patient to try his compression stockings again\par return for vascular consultation with Dr Caputo\par 35 minutes spent in review, evaluation and treatment planning\par

## 2022-04-20 NOTE — ASSESSMENT
[Verbal] : Verbal [Written] : Written [Demo] : Demo [Patient] : Patient [Good - alert, interested, motivated] : Good - alert, interested, motivated [Verbalizes knowledge/Understanding] : Verbalizes knowledge/understanding [Skin Care] : skin care [Signs and symptoms of infection] : sign and symptoms of infection [Venous Disease] : venous disease [Arterial Disease] : arterial disease [How and When to Call] : how and when to call [Labs and Tests] : labs and tests [Pain Management] : pain management [Compression Therapy] : compression therapy [Patient responsibility to plan of care] : patient responsibility to plan of care [] : Yes [Stable] : stable [Hospital] : Hospital [Ambulatory] : Ambulatory [Not Applicable - Long Term Care/Home Health Agency] : Long Term Care/Home Health Agency: Not Applicable [FreeTextEntry2] : Infection prevention\par Goal remaining pain free \par Compression therapy \par  [FreeTextEntry4] : script provided for right leg x - ray, having completed today \par Auth submitted for venous studies \par Vascular consult submitted \par Follow up in 2 weeks \par

## 2022-04-20 NOTE — PHYSICAL EXAM
[Normal Breath Sounds] : Normal breath sounds [Normal Heart Sounds] : normal heart sounds [0] : left 0 [1+] : left 1+ [Ankle Swelling (On Exam)] : present [] : of the right leg [Ankle Swelling On The Right] : mild [Calm] : calm [Varicose Veins Of Lower Extremities] : not present [de-identified] : WD WN 72 Y/O white male in NAD [de-identified] : BRITTANY VELIZM intact [de-identified] : erythema right lower leg no opened ulcers [FreeTextEntry1] : Right leg - Dry flaky skin - No open wounds  [de-identified] : Own compression stockings  [de-identified] : Mechanically cleansed with sterile gauze and normal saline.\par  [de-identified] : Dorsalis Pedis:  +1 Bilateral \par Posterior Tibialis: 0\par Doppler pulses:  Present. \par Posterior Tibialis: monophasic Bilateral \par Extremity color: Mild erythema \par Extremity temperature: Warm \par Capillary refill: < 3 sec\par \par  [de-identified] : Other

## 2022-04-22 ENCOUNTER — NON-APPOINTMENT (OUTPATIENT)
Age: 72
End: 2022-04-22

## 2022-04-25 ENCOUNTER — OUTPATIENT (OUTPATIENT)
Dept: OUTPATIENT SERVICES | Facility: HOSPITAL | Age: 72
LOS: 1 days | Discharge: HOME | End: 2022-04-25
Payer: MEDICARE

## 2022-04-25 ENCOUNTER — APPOINTMENT (OUTPATIENT)
Dept: WOUND CARE | Facility: HOSPITAL | Age: 72
End: 2022-04-25
Payer: MEDICARE

## 2022-04-25 VITALS
HEIGHT: 71 IN | WEIGHT: 189 LBS | SYSTOLIC BLOOD PRESSURE: 115 MMHG | BODY MASS INDEX: 26.46 KG/M2 | OXYGEN SATURATION: 98 % | DIASTOLIC BLOOD PRESSURE: 78 MMHG | HEART RATE: 84 BPM | RESPIRATION RATE: 20 BRPM | TEMPERATURE: 97.8 F

## 2022-04-25 DIAGNOSIS — Z92.21 PERSONAL HISTORY OF ANTINEOPLASTIC CHEMOTHERAPY: Chronic | ICD-10-CM

## 2022-04-25 DIAGNOSIS — Z98.890 OTHER SPECIFIED POSTPROCEDURAL STATES: Chronic | ICD-10-CM

## 2022-04-25 DIAGNOSIS — R60.0 LOCALIZED EDEMA: ICD-10-CM

## 2022-04-25 DIAGNOSIS — R60.9 EDEMA, UNSPECIFIED: ICD-10-CM

## 2022-04-25 DIAGNOSIS — Z90.2 ACQUIRED ABSENCE OF LUNG [PART OF]: Chronic | ICD-10-CM

## 2022-04-25 PROCEDURE — 99213 OFFICE O/P EST LOW 20 MIN: CPT

## 2022-04-25 PROCEDURE — G0463: CPT

## 2022-04-25 NOTE — HISTORY OF PRESENT ILLNESS
[FreeTextEntry1] : 70 yo M with remote hx of lung Ca and resection. \par Admitted to [facility] 5 weeks ago with severe RLE cellulitis. Patient states he developed a skin reaction after wearing wool socks. Severe itchiness caused scratching to eventually get infected requiring admission and AB IV. He denies any LE pain prior to the event. Occasionally feels his feet are cold. No claudication. Never had a real wound.  He developed edema while process was active. Now its much improved.

## 2022-04-25 NOTE — ASSESSMENT
[FreeTextEntry1] : 72 yo M with resolving RLE cellulitis due to scratching from contact dermatitis? Still evidence of dermatitis with improved cellulitis. Still itching. No evidence of PVD or venous disease

## 2022-04-25 NOTE — PHYSICAL EXAM
[2+] : right 2+ [Ankle Swelling (On Exam)] : present [Ankle Swelling On The Right] : mild [Varicose Veins Of Lower Extremities] : not present [] : not present [FreeTextEntry1] : biphasic [de-identified] : papular dermatitis involving entire RLE from ankle to knee. No open wound

## 2022-04-26 DIAGNOSIS — Z85.118 PERSONAL HISTORY OF OTHER MALIGNANT NEOPLASM OF BRONCHUS AND LUNG: ICD-10-CM

## 2022-04-26 DIAGNOSIS — Z98.890 OTHER SPECIFIED POSTPROCEDURAL STATES: ICD-10-CM

## 2022-04-26 DIAGNOSIS — R60.9 EDEMA, UNSPECIFIED: ICD-10-CM

## 2022-04-26 DIAGNOSIS — Z92.21 PERSONAL HISTORY OF ANTINEOPLASTIC CHEMOTHERAPY: ICD-10-CM

## 2022-04-26 DIAGNOSIS — Z79.899 OTHER LONG TERM (CURRENT) DRUG THERAPY: ICD-10-CM

## 2022-04-26 DIAGNOSIS — Z87.2 PERSONAL HISTORY OF DISEASES OF THE SKIN AND SUBCUTANEOUS TISSUE: ICD-10-CM

## 2022-04-26 DIAGNOSIS — Z91.81 HISTORY OF FALLING: ICD-10-CM

## 2022-04-26 DIAGNOSIS — Z86.19 PERSONAL HISTORY OF OTHER INFECTIOUS AND PARASITIC DISEASES: ICD-10-CM

## 2022-04-26 DIAGNOSIS — Z92.3 PERSONAL HISTORY OF IRRADIATION: ICD-10-CM

## 2022-04-26 DIAGNOSIS — Z87.891 PERSONAL HISTORY OF NICOTINE DEPENDENCE: ICD-10-CM

## 2022-04-26 DIAGNOSIS — Z90.2 ACQUIRED ABSENCE OF LUNG [PART OF]: ICD-10-CM

## 2022-04-26 DIAGNOSIS — Z83.3 FAMILY HISTORY OF DIABETES MELLITUS: ICD-10-CM

## 2022-04-26 DIAGNOSIS — Z79.01 LONG TERM (CURRENT) USE OF ANTICOAGULANTS: ICD-10-CM

## 2022-04-26 DIAGNOSIS — Z87.81 PERSONAL HISTORY OF (HEALED) TRAUMATIC FRACTURE: ICD-10-CM

## 2022-05-04 NOTE — ED PROVIDER NOTE - IV ALTEPLASE EXCL REL HIDDEN
Subjective     Carmencita Ibanez is a 59 y.o. female who presents with Pharyngitis (Since last Sunday)      Pharyngitis   This is a new problem. The current episode started in the past 7 days (started 4 days ago). The problem has been unchanged. Neither side of throat is experiencing more pain than the other. There has been no fever. The pain is moderate. Associated symptoms include congestion, coughing (in the mornings), headaches, a hoarse voice and a plugged ear sensation. Pertinent negatives include no abdominal pain, diarrhea, ear pain, shortness of breath, stridor or vomiting. She has had no exposure to strep or mono. She has tried gargles for the symptoms. The treatment provided mild relief.       Review of Systems   Constitutional: Positive for malaise/fatigue. Negative for chills and fever.   HENT: Positive for congestion, hoarse voice and sore throat. Negative for ear pain and sinus pain.    Eyes: Negative for blurred vision and pain.   Respiratory: Positive for cough (in the mornings). Negative for shortness of breath and stridor.    Cardiovascular: Negative for chest pain and palpitations.   Gastrointestinal: Negative for abdominal pain, diarrhea, nausea and vomiting.   Musculoskeletal: Negative for myalgias.   Skin: Negative for rash.   Neurological: Positive for headaches. Negative for dizziness.         PMH:  has a past medical history of Arthritis, Cancer (HCC) (1989), Hepatitis B (1989), and PONV (postoperative nausea and vomiting).  MEDS: No current outpatient medications on file.  ALLERGIES:   Allergies   Allergen Reactions   • Sulfa Drugs Unspecified     Unsure of slight reaction long ago     SURGHX:   Past Surgical History:   Procedure Laterality Date   • PB KNEE SCOPE,DIAGNOSTIC Right 10/12/2020    Procedure: ARTHROSCOPY, KNEE;  Surgeon: Bryan Hung M.D.;  Location: SURGERY [facility];  Service: Orthopedics   • MENISCECTOMY, KNEE, MEDIAL Right 10/12/2020    Procedure: MENISCECTOMY,  "KNEE, MEDIAL - PARTIAL AND KIDD;  Surgeon: Bryan Hung M.D.;  Location: SURGERY [facility];  Service: Orthopedics   • DILATION AND CURETTAGE  2007   • ENDOMETRIAL ABLATION  2005   • CERVICAL CONIZATION  1989     SOCHX:  reports that she has never smoked. She has never used smokeless tobacco. She reports current alcohol use. She reports that she does not use drugs.  FH: Family history was reviewed, no pertinent findings to report      Objective     /58   Pulse (!) 58   Temp 36.3 °C (97.3 °F) (Temporal)   Resp 16   Ht 1.753 m (5' 9\")   Wt 63 kg (139 lb)   SpO2 97%   BMI 20.53 kg/m²      Physical Exam  Constitutional:       Appearance: She is well-developed.   HENT:      Head: Normocephalic and atraumatic.      Right Ear: Tympanic membrane, ear canal and external ear normal.      Left Ear: Tympanic membrane, ear canal and external ear normal.      Nose: Congestion present. No mucosal edema or rhinorrhea.      Mouth/Throat:      Lips: Pink.      Mouth: Mucous membranes are moist.      Pharynx: Uvula midline. Posterior oropharyngeal erythema present. No oropharyngeal exudate or uvula swelling.   Eyes:      Conjunctiva/sclera: Conjunctivae normal.      Pupils: Pupils are equal, round, and reactive to light.   Cardiovascular:      Rate and Rhythm: Normal rate and regular rhythm.      Heart sounds: Normal heart sounds. No murmur heard.  Pulmonary:      Effort: Pulmonary effort is normal.      Breath sounds: Normal breath sounds. No decreased breath sounds, wheezing, rhonchi or rales.   Musculoskeletal:      Cervical back: Normal range of motion.   Lymphadenopathy:      Cervical: No cervical adenopathy.   Skin:     General: Skin is warm and dry.      Capillary Refill: Capillary refill takes less than 2 seconds.   Neurological:      Mental Status: She is alert and oriented to person, place, and time.   Psychiatric:         Behavior: Behavior normal.         Judgment: Judgment normal.         POCT Rapid " Strep A - Negative    Assessment & Plan     1. Sore throat  - POCT Rapid Strep A  -Supportive care discussed to include salt water gargles, throat lozenges, and increased fluid intake    2. Viral URI  - OTC cold/flu medications  - PO fluids  - Rest  - Tylenol or ibuprofen as needed for fever > 100.4 F            Differential Diagnosis, natural history, and supportive care discussed. Return to the Urgent Care or follow up with your PCP if symptoms fail to resolve, or for any new or worsening symptoms. Emergency room precautions discussed. Patient and/or family appears understanding of information.     show

## 2022-05-10 ENCOUNTER — APPOINTMENT (OUTPATIENT)
Dept: CARDIOLOGY | Facility: CLINIC | Age: 72
End: 2022-05-10
Payer: MEDICARE

## 2022-05-10 VITALS
WEIGHT: 182 LBS | SYSTOLIC BLOOD PRESSURE: 124 MMHG | HEART RATE: 83 BPM | OXYGEN SATURATION: 97 % | DIASTOLIC BLOOD PRESSURE: 78 MMHG | BODY MASS INDEX: 25.38 KG/M2

## 2022-05-10 DIAGNOSIS — I31.3 PERICARDIAL EFFUSION (NONINFLAMMATORY): ICD-10-CM

## 2022-05-10 DIAGNOSIS — I48.0 PAROXYSMAL ATRIAL FIBRILLATION: ICD-10-CM

## 2022-05-10 PROCEDURE — 99214 OFFICE O/P EST MOD 30 MIN: CPT

## 2022-05-10 RX ORDER — ASPIRIN 81 MG
81 TABLET, DELAYED RELEASE (ENTERIC COATED) ORAL
Refills: 0 | Status: ACTIVE | COMMUNITY

## 2022-05-10 NOTE — PHYSICAL EXAM
[General Appearance - Well Developed] : well developed [Normal Appearance] : normal appearance [Well Groomed] : well groomed [General Appearance - Well Nourished] : well nourished [No Deformities] : no deformities [General Appearance - In No Acute Distress] : no acute distress [Normal Conjunctiva] : the conjunctiva exhibited no abnormalities [Normal Oral Mucosa] : normal oral mucosa [Normal Jugular Venous A Waves Present] : normal jugular venous A waves present [Normal Jugular Venous V Waves Present] : normal jugular venous V waves present [No Jugular Venous Henderson A Waves] : no jugular venous henderson A waves [Respiration, Rhythm And Depth] : normal respiratory rhythm and effort [Exaggerated Use Of Accessory Muscles For Inspiration] : no accessory muscle use [Auscultation Breath Sounds / Voice Sounds] : lungs were clear to auscultation bilaterally [Abdomen Soft] : soft [Bowel Sounds] : normal bowel sounds [Abdomen Tenderness] : non-tender [Abnormal Walk] : normal gait [Gait - Sufficient For Exercise Testing] : the gait was sufficient for exercise testing [Nail Clubbing] : no clubbing of the fingernails [Cyanosis, Localized] : no localized cyanosis [Skin Color & Pigmentation] : normal skin color and pigmentation [Skin Turgor] : normal skin turgor [] : no rash [Oriented To Time, Place, And Person] : oriented to person, place, and time [Impaired Insight] : insight and judgment were intact [No Anxiety] : not feeling anxious [Precordial Heave Left] : a precordial heave was noted at the left lower parasternal line [Normal Rate] : normal [Normal S1] : normal S1 [Normal S2] : normal S2 [No Murmur] : no murmurs heard [1+] : left 1+ [No Abnormalities] : the abdominal aorta was not enlarged and no bruit was heard [___ +] : bilateral [unfilled]U+ pretibial pitting edema [FreeTextEntry1] : Scattered expiratory rhonchi [S3] : no S3 [S4] : no S4 [Right Carotid Bruit] : no bruit heard over the right carotid [Left Carotid Bruit] : no bruit heard over the left carotid [Right Femoral Bruit] : no bruit heard over the right femoral artery [Left Femoral Bruit] : no bruit heard over the left femoral artery

## 2022-05-10 NOTE — DISCUSSION/SUMMARY
[FreeTextEntry1] : Clinically the patient is doing well.  His blood pressure and heart rate are well controlled.  I am starting him on atorvastatin 20 mg once a day for his cholesterol.  We went over the side effects.  If all is well he will repeat his blood work in 2 months.  The goal is to bring the LDL cholesterol below 70.  I am also changing from metoprolol to tartrate 12.5 milligrams twice a day, to metoprolol succinate 25 mg once a day.\par \par He will repeat his echocardiogram which to check on his ejection fraction. If his ejection fraction is still low we may need to further adjust his medication and consider in an ICD.\par \par Pending the above would see him in 3 months.  We again discussed the importance of anticoagulation in the presence of paroxysmal atrial fibrillation.  We  discussed the risk of stroke.  He does understand that the aspirin and Plavix are not strong enough to prevent blood clots if he does go into atrial fibrillation.

## 2022-05-10 NOTE — HISTORY OF PRESENT ILLNESS
[FreeTextEntry1] : I saw Nam Gleason in the office today for cardiac followup... He is a 71-year-old white male who underwent left lower lobe lobectomy for cancer in 2017. He was pretreated with chemotherapy and radiation  treatment and did quite well. He had a preop cardiac evaluation consisting of an echo that was normal. He did poorly on the stress test because of the chemotherapy and radiation therapy but for what he could do the stress test was normal. .\par \par He was a smoker until his surgery. He has hypertension. He denies any hyperlipidemia, diabetes, or family history of heart disease.\par \par On his initial office visit to my office 2019 he exhibited a regular SVT without symptoms.W He was sent to the hospital and was found to have a large pericardial effusion that was treated with catheter drainage. 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 He was placed on Lasix 20 mg once a day. The edema has improved and is no longer on diuretic therapy. He does have chronic kidney disease.\par \par Echocardiogram performed 4/19 demonstrates an ejection fraction of 50%. There was mild MR and AI with mild to moderate TR. There is no significant pulmonary hypertension. No pericardial effusion. \par \par \par The patient wore a 30 day event monitor to detect any arrhythmia. He had an episode of atrial fibrillation on 5/31/19 at 1:16 PM  that spontaneously converted to sinus rhythm.\par \par He restarted Eliquis 6/19 because of episodes of asymptomatic PAF. He lost his coverage for the Eliquis and  he is has been taking low-dose aspirin instead. We have discussed the risk of blood clot since he is a CHADsvasc 2.. He is not smoking and feels well without any specific symptoms.  Clinically he  had no recurrence of arrhythmia\par \par He was seen in the office 9/21 in anticipation of surgical biopsy of a right upper lobe nodule.  For preoperative assessment, he underwent a chemical nuclear stress test.  This showed large anterior MI with inferior wall ischemia.  He was sent for cardiac catheterization that showed a 90% LAD lesion, 100% D1, 100% OM1.  He underwent drug-eluting stents to D1, proximal LAD, and OM 1.  Echocardiogram showed ejection fraction of 30 to 35%.. \par \par He went a VATS procedure 12/21, with wedge resection of the right upper lobe.  It was positive for adeno CA.  Was felt to be a new primary lung tumor.  Margins and lymph nodes were negative.\par \par Most recently he was admitted to the hospital 2/22 with sepsis and cellulitis of his leg.  He was treated with antibiotics.  He was restarted on Eliquis for paroxysmal atrial fibrillation.  The patient cannot afford the Eliquis and does not want take the warfarin because of blood testing.  Does understand the potential risk of blood clots and stroke.  He promises to check his pulse every morning both the rate and rhythm regularity.  If the rate is faster or becomes irregular he would call us.\par \par Patient currently is not smoking.  The right leg that was involved with the cellulitis is still swollen and weak but he is starting to do more walking.

## 2022-05-11 ENCOUNTER — OUTPATIENT (OUTPATIENT)
Dept: OUTPATIENT SERVICES | Facility: HOSPITAL | Age: 72
LOS: 1 days | End: 2022-05-11
Payer: MEDICARE

## 2022-05-11 DIAGNOSIS — Z98.890 OTHER SPECIFIED POSTPROCEDURAL STATES: Chronic | ICD-10-CM

## 2022-05-11 DIAGNOSIS — Z92.21 PERSONAL HISTORY OF ANTINEOPLASTIC CHEMOTHERAPY: Chronic | ICD-10-CM

## 2022-05-11 DIAGNOSIS — R60.9 EDEMA, UNSPECIFIED: ICD-10-CM

## 2022-05-11 DIAGNOSIS — Z90.2 ACQUIRED ABSENCE OF LUNG [PART OF]: Chronic | ICD-10-CM

## 2022-05-11 PROCEDURE — 93970 EXTREMITY STUDY: CPT | Mod: 26

## 2022-05-11 PROCEDURE — 93970 EXTREMITY STUDY: CPT

## 2022-06-02 RX ORDER — ATORVASTATIN CALCIUM 20 MG/1
20 TABLET, FILM COATED ORAL DAILY
Qty: 90 | Refills: 3 | Status: ACTIVE | COMMUNITY

## 2022-06-02 RX ORDER — METOPROLOL SUCCINATE 25 MG/1
25 TABLET, EXTENDED RELEASE ORAL DAILY
Qty: 90 | Refills: 3 | Status: ACTIVE | COMMUNITY

## 2022-06-07 NOTE — H&P CARDIOLOGY - TEMPERATURE IN FAHRENHEIT (DEGREES F)
Show Aperture Variable?: Yes
Consent: Verbal consent was obtained including but not limited to risks of crusting, scabbing, blistering, scarring, darker or lighter pigmentary change, recurrence, incomplete removal and infection.
Post-Care Instructions: I reviewed with the patient in detail post-care instructions. Patient should also wear sun protection, and avoid picking at any of the treated lesions.
Number Of Freeze-Thaw Cycles: 3 freeze-thaw cycles
Duration Of Freeze Thaw-Cycle (Seconds): 5
Detail Level: Detailed
Aperture Size (Optional): D
Application Tool (Optional): Liquid Nitrogen Sprayer
97.8
Render Note In Bullet Format When Appropriate: No

## 2022-06-13 ENCOUNTER — APPOINTMENT (OUTPATIENT)
Dept: CARDIOLOGY | Facility: CLINIC | Age: 72
End: 2022-06-13
Payer: MEDICARE

## 2022-06-13 ENCOUNTER — MED ADMIN CHARGE (OUTPATIENT)
Age: 72
End: 2022-06-13

## 2022-06-13 PROCEDURE — 93306 TTE W/DOPPLER COMPLETE: CPT

## 2022-06-13 RX ORDER — PERFLUTREN 6.52 MG/ML
6.52 INJECTION, SUSPENSION INTRAVENOUS
Qty: 2 | Refills: 0 | Status: COMPLETED | OUTPATIENT
Start: 2022-06-13

## 2022-06-13 RX ADMIN — PERFLUTREN MG/ML: 6.52 INJECTION, SUSPENSION INTRAVENOUS at 00:00

## 2022-06-23 NOTE — PRE-OP CHECKLIST - WARM FLUIDS/WARM BLANKETS
Called to patient. She states she was able to  prescription from [place] and applied to her rectum with some relief. She also states the lasix she was given helped with swelling and wanted to know if we could order more. Will request records to see if creatinine stable for more lasix. She will call us tomorrow to follow up on symptoms. no

## 2022-08-03 ENCOUNTER — APPOINTMENT (OUTPATIENT)
Dept: CT IMAGING | Facility: CLINIC | Age: 72
End: 2022-08-03

## 2022-08-03 PROCEDURE — G1004: CPT

## 2022-08-03 PROCEDURE — 71250 CT THORAX DX C-: CPT | Mod: MG

## 2022-08-09 ENCOUNTER — APPOINTMENT (OUTPATIENT)
Dept: THORACIC SURGERY | Facility: CLINIC | Age: 72
End: 2022-08-09

## 2022-08-09 VITALS
OXYGEN SATURATION: 95 % | DIASTOLIC BLOOD PRESSURE: 82 MMHG | BODY MASS INDEX: 27.3 KG/M2 | SYSTOLIC BLOOD PRESSURE: 143 MMHG | HEART RATE: 78 BPM | WEIGHT: 195 LBS | HEIGHT: 71 IN | RESPIRATION RATE: 17 BRPM

## 2022-08-09 PROCEDURE — 99214 OFFICE O/P EST MOD 30 MIN: CPT

## 2022-08-09 RX ORDER — CLOPIDOGREL 75 MG/1
75 TABLET, FILM COATED ORAL
Refills: 0 | Status: DISCONTINUED | COMMUNITY
End: 2022-08-09

## 2022-08-09 NOTE — H&P PST ADULT - OPHTHALMOLOGIC COMMENTS
Hospitalist TAP Communication Note:    Pt with HTN, CAD, DM comes in with purulent drainage from foot wound on the right. Patient noted to have osteomyelitis of the right first metatarsal. Discussion had with Dr. Isbell of Vascular surgery, will follow if transferred here. At first patient presented with sepsis. Patient given vanc + cefepime and then switched to flagyl + zosyn with hx of MSSA. Patient vital signs stable. Patient normotensive and regular rate and rhythm. I was assured patient stable for transfer. I asked to be notified if change in patient clinically.     Transferring facility: [facility]  Transferring Provider: Dr. Womack  TAP Need: vascular surgery  Consultant @ [facility]: Dr. Isbell  Planned Procedure: none at the moment  Primary diagnosis: osteomyelitis of the first metatarsal on right    Lori ONOFRE Do, DO  08/09/22      
readers

## 2022-08-09 NOTE — PHYSICAL EXAM
[] : no respiratory distress [Respiration, Rhythm And Depth] : normal respiratory rhythm and effort [Exaggerated Use Of Accessory Muscles For Inspiration] : no accessory muscle use [Auscultation Breath Sounds / Voice Sounds] : lungs were clear to auscultation bilaterally [Heart Rate And Rhythm] : heart rate was normal and rhythm regular [Examination Of The Chest] : the chest was normal in appearance [Chest Visual Inspection Thoracic Asymmetry] : no chest asymmetry [Diminished Respiratory Excursion] : normal chest expansion [2+] : left 2+ [Breast Appearance] : normal in appearance [Breast Palpation Mass] : no palpable masses [Bowel Sounds] : normal bowel sounds [Abdomen Soft] : soft [Abdomen Tenderness] : non-tender [Cervical Lymph Nodes Enlarged Posterior Bilaterally] : posterior cervical [Cervical Lymph Nodes Enlarged Anterior Bilaterally] : anterior cervical [Supraclavicular Lymph Nodes Enlarged Bilaterally] : supraclavicular [No CVA Tenderness] : no ~M costovertebral angle tenderness [No Spinal Tenderness] : no spinal tenderness [Involuntary Movements] : no involuntary movements were seen [Skin Color & Pigmentation] : normal skin color and pigmentation [Skin Turgor] : normal skin turgor [No Focal Deficits] : no focal deficits [Oriented To Time, Place, And Person] : oriented to person, place, and time

## 2022-08-12 ENCOUNTER — NON-APPOINTMENT (OUTPATIENT)
Age: 72
End: 2022-08-12

## 2022-08-12 NOTE — ASSESSMENT
[FreeTextEntry1] : 71 year old male, s/p EBUS June, 2017, which revealed Level 7 and level 10 lymph nodes positive for metastatic adenocarcinoma (cT1N2; Clinical Stage IIIA). He received neoadjuvant chemo/RT and subsequently underwent Left VATS, LLL Lobectomy, MLND, hilar node dissection on 11/5/2017; Pathology revealed mucinous adenocarcinoma, ypT2N0. \par \par He was hospitalized for management of SVT/AFib/Aflutter in February 2019 when workup revealed pericardial effusion and is now s/p subxiphoid anterior pericardiectomy, cardiac decortication on 2/25/19. He was started on Eliquis for AFlutter.\par \par Flexible bronchoscopy, bronchoalveolar lavage, uniportal Right VATS, pneumonolysis,  wedge resection of the RUL with Adrienne-Strips, and intercostal nerve block and MLN biopsy on 12/8/21. 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 This is a new lung primary.\par \par Here today with follow up CT chest. \par \par I have independently reviewed the medical records and imaging at the time of this office consultation, and discussed the following interpretations with the patient:\par - CT Chest reviewed; Stable findings. Recommendation to return to clinic in 3 months with repeat CT Chest, no contrast, to re-evaluate stability\par \par Recommendations reviewed with patient during this office visit, and all questions answered; Patient instructed on the importance of follow up and verbalizes understanding.\par \par I personally performed the services described in the documentation, reviewed the documentation recorded by the scribe in my presence and it accurately and completely records my words and actions.\par \par I, OSWALD WallaceC, am scribing for and the presence of MADELEINE Allen, the following sections HISTORY OF PRESENT ILLNESS, PAST MEDICAL/FAMILY/SOCIAL HISTORY; REVIEW OF SYSTEMS; VITAL SIGNS; PHYSICAL EXAM; DISPOSITION.\par \par

## 2022-08-17 ENCOUNTER — NON-APPOINTMENT (OUTPATIENT)
Age: 72
End: 2022-08-17

## 2022-08-17 ENCOUNTER — APPOINTMENT (OUTPATIENT)
Dept: CARDIOLOGY | Facility: CLINIC | Age: 72
End: 2022-08-17

## 2022-08-17 VITALS
DIASTOLIC BLOOD PRESSURE: 87 MMHG | HEART RATE: 74 BPM | SYSTOLIC BLOOD PRESSURE: 145 MMHG | BODY MASS INDEX: 27.3 KG/M2 | WEIGHT: 195 LBS | OXYGEN SATURATION: 95 % | HEIGHT: 71 IN

## 2022-08-17 DIAGNOSIS — I10 ESSENTIAL (PRIMARY) HYPERTENSION: ICD-10-CM

## 2022-08-17 PROCEDURE — 99215 OFFICE O/P EST HI 40 MIN: CPT

## 2022-08-17 NOTE — CARDIOLOGY SUMMARY
[___] : [unfilled] [LVEF ___%] : LVEF [unfilled]% [de-identified] : SR \par LAD\par Q I/ AVL - unchanged \par  [de-identified] : 6/13/22\par EF 37%\par mild LV enlargement, hypokinesis anterior wall\par stage 1 diastolic [de-identified] : 10/2021\par ROSA D1, prox LAD, OM1,

## 2022-08-17 NOTE — PHYSICAL EXAM
[General Appearance - Well Developed] : well developed [Normal Appearance] : normal appearance [Well Groomed] : well groomed [General Appearance - Well Nourished] : well nourished [No Deformities] : no deformities [General Appearance - In No Acute Distress] : no acute distress [Normal Oral Mucosa] : normal oral mucosa [Normal Jugular Venous A Waves Present] : normal jugular venous A waves present [Normal Jugular Venous V Waves Present] : normal jugular venous V waves present [No Jugular Venous Henderson A Waves] : no jugular venous henderson A waves [Respiration, Rhythm And Depth] : normal respiratory rhythm and effort [Exaggerated Use Of Accessory Muscles For Inspiration] : no accessory muscle use [Auscultation Breath Sounds / Voice Sounds] : lungs were clear to auscultation bilaterally [Bowel Sounds] : normal bowel sounds [Abdomen Soft] : soft [Abdomen Tenderness] : non-tender [Abnormal Walk] : normal gait [Gait - Sufficient For Exercise Testing] : the gait was sufficient for exercise testing [Nail Clubbing] : no clubbing of the fingernails [Cyanosis, Localized] : no localized cyanosis [Skin Color & Pigmentation] : normal skin color and pigmentation [Skin Turgor] : normal skin turgor [] : no rash [Oriented To Time, Place, And Person] : oriented to person, place, and time [Impaired Insight] : insight and judgment were intact [No Anxiety] : not feeling anxious [Precordial Heave Left] : a precordial heave was noted at the left lower parasternal line [Normal Rate] : normal [1+] : left 1+ [___ +] : bilateral [unfilled]U+ pretibial pitting edema [Well Developed] : well developed [Well Nourished] : well nourished [No Acute Distress] : no acute distress [Normal Conjunctiva] : normal conjunctiva [Normal Venous Pressure] : normal venous pressure [No Carotid Bruit] : no carotid bruit [Normal S1, S2] : normal S1, S2 [No Murmur] : no murmur [No Rub] : no rub [No Gallop] : no gallop [Rhythm Regular] : regular [Normal S1] : normal S1 [Normal S2] : normal S2 [___+] : [unfilled]U+ pretibial pitting edema on the left [No Abnormalities] : the abdominal aorta was not enlarged and no bruit was heard [Clear Lung Fields] : clear lung fields [Good Air Entry] : good air entry [No Respiratory Distress] : no respiratory distress  [Soft] : abdomen soft [Non Tender] : non-tender [No Masses/organomegaly] : no masses/organomegaly [Normal Bowel Sounds] : normal bowel sounds [Normal Gait] : normal gait [No Edema] : no edema [No Cyanosis] : no cyanosis [No Clubbing] : no clubbing [No Varicosities] : no varicosities [No Rash] : no rash [No Skin Lesions] : no skin lesions [Moves all extremities] : moves all extremities [No Focal Deficits] : no focal deficits [Normal Speech] : normal speech [Alert and Oriented] : alert and oriented [Normal memory] : normal memory [FreeTextEntry1] : Scattered expiratory rhonchi [S3] : no S3 [S4] : no S4 [Right Carotid Bruit] : no bruit heard over the right carotid [Left Carotid Bruit] : no bruit heard over the left carotid [Right Femoral Bruit] : no bruit heard over the right femoral artery [Left Femoral Bruit] : no bruit heard over the left femoral artery

## 2022-08-17 NOTE — HISTORY OF PRESENT ILLNESS
[FreeTextEntry1] : I saw Nam Gleason in the office today for cardiac followup... He is a 71-year-old white male who underwent left lower lobe lobectomy for cancer in 2017. He was pretreated with chemotherapy and radiation  treatment and did quite well. He had a preop cardiac evaluation consisting of an echo that was normal. He did poorly on the stress test because of the chemotherapy and radiation therapy but for what he could do the stress test was normal. .\par \par He was a smoker until his surgery. He has hypertension. He denies any hyperlipidemia, diabetes, or family history of heart disease.\par \par On his initial office visit to my office 2019 he exhibited a regular SVT without symptoms.W He was sent to the hospital and was found to have a large pericardial effusion that was treated with catheter drainage. 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 He was placed on Lasix 20 mg once a day. The edema has improved and is no longer on diuretic therapy. He does have chronic kidney disease.\par \par Echocardiogram performed 4/19 demonstrates an ejection fraction of 50%. There was mild MR and AI with mild to moderate TR. There is no significant pulmonary hypertension. No pericardial effusion. \par \par \par The patient wore a 30 day event monitor to detect any arrhythmia. He had an episode of atrial fibrillation on 5/31/19 at 1:16 PM  that spontaneously converted to sinus rhythm.\par \par He restarted Eliquis 6/19 because of episodes of asymptomatic PAF. He lost his coverage for the Eliquis and  he is has been taking low-dose aspirin instead. We have discussed the risk of blood clot since he is a CHADsvasc 2.. He is not smoking and feels well without any specific symptoms.  Clinically he  had no recurrence of arrhythmia\par \par He was seen in the office 9/21 in anticipation of surgical biopsy of a right upper lobe nodule.  For preoperative assessment, he underwent a chemical nuclear stress test.  This showed large anterior MI with inferior wall ischemia.  He was sent for cardiac catheterization that showed a 90% LAD lesion, 100% D1, 100% OM1.  He underwent drug-eluting stents to D1, proximal LAD, and OM 1.  Echocardiogram showed ejection fraction of 30 to 35%.. \par \par He went a VATS procedure 12/21, with wedge resection of the right upper lobe.  It was positive for adeno CA.  Was felt to be a new primary lung tumor.  Margins and lymph nodes were negative.\par \par Most recently he was admitted to the hospital 2/22 with sepsis and cellulitis of his leg.  He was treated with antibiotics.  He was restarted on Eliquis for paroxysmal atrial fibrillation.  The patient cannot afford the Eliquis and does not want take the warfarin because of blood testing.  Does understand the potential risk of blood clots and stroke.  He promises to check his pulse every morning both the rate and rhythm regularity.  If the rate is faster or becomes irregular he would call us.\par \par Patient currently is not smoking.  The right leg that was involved with the cellulitis is still swollen and weak but he is starting to do more walking.\par \par Previous visit history as above:\par \par Mr Gleason presents today for routine follow up.  He was last see in May.  \par He states feeling well overall.\par He denies chest pains or pressure. He  denies dizzy spells, feeling faint or fainting, He   denies palpitations or difficulty breathing while laying flat. He denies lower extremity swelling.\par \par Dyspnea is at baseline for him.\par Denies symptoms consistent with angina.\par \par He is still recovering from right leg cellulitis from  April.  He was being followed by Dr Jeffery-Bariatric wound care clinic.  He is also being followed by dermatology.  He is only applying steroid creams at this time.  He is off oral antibiotics.\par \par Has had followup with thoracic surgeon, will have CT chest done again in 3 months for surveillance. \par \par He expresses that he had not been taking Valsartan as prescribed because he though hid blood pressure has been controlled, 140/80s.\par He has been off Plavix since May and is still not taking an oral anticoagulant for hx PAF.\par \par Most recent ECHO 6/13/22 mild LV enlargement, hypokinesis of anteriorwall  demonstrates EF 37%, mild-mod MR, NML  AV.  Mildly dilated aortic root 3.7, mild dilated ascending aorta 3.9\par

## 2022-08-17 NOTE — ADDENDUM
[FreeTextEntry1] : Has been noncompliant with his ARB.  Given his hypertension and cardiomyopathy, he will resume it, and increase the dose up to 80 mg daily.  We had an extensive conversation about the risk of stroke and atrial fibrillation, given his cardiomyopathy and history of malignancy.  He will research whether he can afford Xarelto.  He refuses warfarin.

## 2022-08-17 NOTE — DISCUSSION/SUMMARY
[FreeTextEntry1] : Clinically the patient is doing well.  He is in no acute distress.  ECG illustrates sinus rhythm/LAD unchanged from previous.  His blood pressure is a bit elevated even after resting a while in the office. \par He has had a repeat ECHO, demonstrating reduced EF 37%, anterior wall hypokinesis, Mildly dilated aortic root and ascending aorta.\par For improved blood pressure control and in light of cardiomyopathy he will increase valsartan to 80mg from 40mg daily and continue metoprolol 25mg ER daily.  I have stressed the importance of medication adherence. \par He will continue atorvastatin 20 mg once a day for Goal LDL <70, most recent was 51.\par He will continue ASA for CAD history.\par \par We have reviewed the risks of stroke given his history of PAF.  I have encouraged that he reconsider oral anticoagulation.  I asked that he followup with his insurance company to see which therapy would be less expensive for him.  He will check on the cost of xarelto.  \par He is in sinus rhythm today and at last visit.  Will assess the need for extended monitoring in the future.\par \par He will continue life style modifications such as diet and exercise.\par He will followup again in 3 months.  Sooner if questions or concerns arise.\par \par

## 2022-10-13 NOTE — CHART NOTE - NSCHARTNOTESELECT_GEN_ALL_CORE
Have You Had Red Spots Treated With Laser Before?: has had previous treatments Post op check/Event Note When Outside In The Sun, Do You...: mostly burns, rarely tans

## 2022-11-01 ENCOUNTER — APPOINTMENT (OUTPATIENT)
Dept: CT IMAGING | Facility: CLINIC | Age: 72
End: 2022-11-01

## 2022-11-01 PROCEDURE — 71250 CT THORAX DX C-: CPT

## 2022-11-08 ENCOUNTER — APPOINTMENT (OUTPATIENT)
Dept: THORACIC SURGERY | Facility: CLINIC | Age: 72
End: 2022-11-08

## 2022-11-08 VITALS
DIASTOLIC BLOOD PRESSURE: 86 MMHG | BODY MASS INDEX: 26.88 KG/M2 | SYSTOLIC BLOOD PRESSURE: 147 MMHG | HEIGHT: 71 IN | OXYGEN SATURATION: 95 % | HEART RATE: 72 BPM | WEIGHT: 192 LBS

## 2022-11-08 DIAGNOSIS — R59.0 LOCALIZED ENLARGED LYMPH NODES: ICD-10-CM

## 2022-11-08 DIAGNOSIS — C79.9 SECONDARY MALIGNANT NEOPLASM OF UNSPECIFIED SITE: ICD-10-CM

## 2022-11-08 PROCEDURE — 99214 OFFICE O/P EST MOD 30 MIN: CPT

## 2022-11-08 NOTE — HISTORY OF PRESENT ILLNESS
[FreeTextEntry1] : 71 year old male, s/p EBUS June, 2017, which revealed Level 7 and level 10 lymph nodes positive for metastatic adenocarcinoma (cT1N2; Clinical Stage IIIA). He received neoadjuvant chemo/RT and subsequently underwent Left VATS, LLL Lobectomy, MLND, hilar node dissection on 11/5/2017; Pathology revealed mucinous adenocarcinoma, ypT2N0. \par \par He was hospitalized for management of SVT/AFib/Aflutter in February 2019 when workup revealed pericardial effusion and is now s/p subxiphoid anterior pericardiectomy, cardiac decortication on 2/25/19. He was started on Eliquis for AFlutter.\par \par s/p Flexible bronchoscopy, bronchoalveolar lavage, uniportal Right VATS, pneumonolysis,  wedge resection of the RUL with Adrienne-Strips, and intercostal nerve block and MLN biopsy on 12/8/21. 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 This is a new lung primary.\par \par CT Chest on 3/15/22:\par - Post op changes; Emphysema. \par - RLL 1 cm groundglass nodule (301, 83) is slightly enlarged.\par - Left-sided Mediport with tip in SVC\par \par CT Chest on 8/3/22:\par - Prior RUL wedge resection and LLLobectomy. Stable mild paramediastinal post radiation changes. \par - Stable 1 cm RLL groundglass nodule (image 96 series 301). \par - There is new septal thickening in both lungs and new small right greater than left pleural effusions.\par - Stable compression fractures at T12 and T6.\par \par Was following with Dr. Judd ([facility])\par \par CT Chest on 11/1/22: \par - Stable post op changes\par - Right lower lobe 1.1 cm ground-glass nodule (image 82, series 301) unchanged compared to 3/15/2022 CT chest. \par - Unchanged left pleural thickening\par - Port-A-Cath tip terminates in the lower SVC.\par - Multiple indeterminate bilateral renal and hepatic hypodense lesions similar to prior exam 8/3/2022\par - Unchanged compression deformity of T6 and T12 vertebral bodies.\par \par Patient is here today for follow up. Overall, he reports to be feeling well. Denies any chest pain, shortness of breath, cough, or hemoptysis. \par \par \par

## 2022-11-08 NOTE — DATA REVIEWED
[FreeTextEntry1] : Independently reviewed the following:\par - CT Chest on 11/1/22\par \par \par \par

## 2022-11-09 NOTE — CHART NOTE - NSCHARTNOTEFT_GEN_A_CORE
CT ICU PA Note    pt is POD # 2 s/p FB, R VATS, RUL wedge, and MLND,     Vital Signs Last 24 Hrs  T(C): 36.7 (10 Dec 2021 08:00), Max: 36.7 (09 Dec 2021 20:00)  T(F): 98 (10 Dec 2021 08:00), Max: 98 (09 Dec 2021 20:00)  HR: 101 (10 Dec 2021 10:30) (79 - 115)  BP: 115/79 (10 Dec 2021 10:00) (95/59 - 157/80)  BP(mean): 87 (10 Dec 2021 10:00) (71 - 100)  RR: 27 (10 Dec 2021 10:30) (18 - 30)  SpO2: 99% (10 Dec 2021 10:30) (87% - 100%)    __ pt with SpO2-- 87 on RA, ambulating,   __ SpO2-- 99 % on 2 LPM ambulating,   __ SpO2-- 88 % on RA sitting,     __ pt will require Oxygen at home
Pt is s/p RVATS, Wedge 12/08/2021. Ambulating RA Sat drops to 84%. Resting RA Sat 93%, Pt becomes tachycardic to high 120's with ambulation. Will require home O2.
ICU PA POCUS NOTE     : KERRY Tiwari     INDICATION: Hypoxia     PROCEDURE:  [ x] LIMITED ECHO  [ x] LIMITED CHEST  [ ] LIMITED RETROPERITONEAL  [ ] LIMITED ABDOMINAL  [ ] LIMITED DVT  [ ] NEEDLE GUIDANCE VASCULAR  [ ] NEEDLE GUIDANCE THORACENTESIS  [ ] NEEDLE GUIDANCE PARACENTESIS  [ ] NEEDLE GUIDANCE PERICARDIOCENTESIS  [ ] OTHER    FINDINGS:  Poor cardiac windows   Predominantly A LINES with intermittent B LINES (L > R)   Scant right pleural effusion, no left effusion.     INTERPRETATION:  Small right pleural effusion     Darrell Tiwari PA-C  Department of Critical Care   In house [facility] [id number]
[facility]

## 2022-11-14 ENCOUNTER — NON-APPOINTMENT (OUTPATIENT)
Age: 72
End: 2022-11-14

## 2022-11-14 ENCOUNTER — APPOINTMENT (OUTPATIENT)
Dept: CARDIOLOGY | Facility: CLINIC | Age: 72
End: 2022-11-14

## 2022-11-14 VITALS
DIASTOLIC BLOOD PRESSURE: 79 MMHG | OXYGEN SATURATION: 95 % | BODY MASS INDEX: 26.88 KG/M2 | HEIGHT: 71 IN | SYSTOLIC BLOOD PRESSURE: 136 MMHG | WEIGHT: 192 LBS | HEART RATE: 71 BPM

## 2022-11-14 PROCEDURE — 93000 ELECTROCARDIOGRAM COMPLETE: CPT

## 2022-11-14 PROCEDURE — 99214 OFFICE O/P EST MOD 30 MIN: CPT

## 2022-11-14 NOTE — PHYSICAL EXAM
[Well Nourished] : well nourished [Well Developed] : well developed [No Acute Distress] : no acute distress [Normal Venous Pressure] : normal venous pressure [No Carotid Bruit] : no carotid bruit [Normal S1, S2] : normal S1, S2 [No Rub] : no rub [No Gallop] : no gallop [Rhythm Regular] : regular [___+] : [unfilled]U+ pretibial pitting edema on the left [Clear Lung Fields] : clear lung fields [Good Air Entry] : good air entry [No Respiratory Distress] : no respiratory distress  [Soft] : abdomen soft [Non Tender] : non-tender [No Masses/organomegaly] : no masses/organomegaly [Normal Bowel Sounds] : normal bowel sounds [Normal Gait] : normal gait [No Edema] : no edema [No Cyanosis] : no cyanosis [No Clubbing] : no clubbing [No Varicosities] : no varicosities [No Rash] : no rash [No Skin Lesions] : no skin lesions [Moves all extremities] : moves all extremities [No Focal Deficits] : no focal deficits [Normal Speech] : normal speech [Alert and Oriented] : alert and oriented [Normal memory] : normal memory [General Appearance - Well Developed] : well developed [Normal Appearance] : normal appearance [Well Groomed] : well groomed [General Appearance - Well Nourished] : well nourished [No Deformities] : no deformities [General Appearance - In No Acute Distress] : no acute distress [Normal Conjunctiva] : the conjunctiva exhibited no abnormalities [Normal Oral Mucosa] : normal oral mucosa [Normal Jugular Venous A Waves Present] : normal jugular venous A waves present [Normal Jugular Venous V Waves Present] : normal jugular venous V waves present [No Jugular Venous Henderson A Waves] : no jugular venous henderson A waves [Respiration, Rhythm And Depth] : normal respiratory rhythm and effort [Auscultation Breath Sounds / Voice Sounds] : lungs were clear to auscultation bilaterally [Exaggerated Use Of Accessory Muscles For Inspiration] : no accessory muscle use [Bowel Sounds] : normal bowel sounds [Abdomen Soft] : soft [Abdomen Tenderness] : non-tender [Abnormal Walk] : normal gait [Gait - Sufficient For Exercise Testing] : the gait was sufficient for exercise testing [Nail Clubbing] : no clubbing of the fingernails [Cyanosis, Localized] : no localized cyanosis [Skin Color & Pigmentation] : normal skin color and pigmentation [Skin Turgor] : normal skin turgor [] : no rash [Oriented To Time, Place, And Person] : oriented to person, place, and time [Impaired Insight] : insight and judgment were intact [No Anxiety] : not feeling anxious [Precordial Heave Left] : a precordial heave was noted at the left lower parasternal line [Normal Rate] : normal [Normal S1] : normal S1 [Normal S2] : normal S2 [No Murmur] : no murmurs heard [1+] : left 1+ [No Abnormalities] : the abdominal aorta was not enlarged and no bruit was heard [___ +] : bilateral [unfilled]U+ pretibial pitting edema [FreeTextEntry1] : Scattered expiratory rhonchi [S3] : no S3 [S4] : no S4 [Right Carotid Bruit] : no bruit heard over the right carotid [Left Carotid Bruit] : no bruit heard over the left carotid [Right Femoral Bruit] : no bruit heard over the right femoral artery [Left Femoral Bruit] : no bruit heard over the left femoral artery

## 2022-11-14 NOTE — HISTORY OF PRESENT ILLNESS
[FreeTextEntry1] : I saw Nam Gleason in the office today for cardiac followup..  He was last seen in the office about 3 months ago.\par \par He is now 71 years old, with a history of lung cancer, status post left lower lobe lobectomy.  He was treated with chemotherapy and radiation in addition to surgery.  He has a history of smoking in the past.  He has a history of hypertension.  He has a history of SVT, which presented in 2019, without symptoms.  At that time, he was found to have a large pericardial effusion, requiring catheter drainage.  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  He has a history of chronic kidney disease.  He has paroxysmal atrial fibrillation, not anticoagulated because of cost concerns.  He has coronary artery disease, which was noted first on a nuclear stress test, following which he had cardiac catheterization in October 2021.  Catheterization revealed a 90% stenosis of the LAD, and occluded first diagonal branch and first obtuse marginal branch.  Drug-eluting stents were placed to the proximal LAD, diagonal branch and first obtuse marginal branch.  Echocardiography at that time revealed an ejection fraction of 30 to 35%.\par \par He went a VATS procedure 12/21, with wedge resection of the right upper lobe.  It was positive for adenoCA.  Thia was felt to be a new primary lung tumor.  Margins and lymph nodes were negative.\par \par At the time of his visit about 3 months ago, he was feeling well.  His blood pressure was elevated, and I suggested increasing valsartan.  We reviewed his risk of cardioembolic stroke, and I suggested that he evaluate the cost of Xarelto.\par \par He presents to the office today having been feeling well from a cardiovascular perspective.  He reports no chest discomfort or shortness of breath with activity.  He denies orthopnea, PND and lower extremity edema.  He denies palpitations, dizziness and syncope.    He reports that his blood pressure has been mildly elevated, with systolic blood pressures at home in the range of 140/75. The thoracic surgeon has been pleased with his scan results.  He investigated the cost of Xarelto, but it was still too expensive.\par

## 2022-11-14 NOTE — CARDIOLOGY SUMMARY
[___] : [unfilled] [LVEF ___%] : LVEF [unfilled]% [de-identified] : SR \par LAD\par Q I/ AVL - unchanged \par  [de-identified] : 6/13/22\par EF 37%\par mild LV enlargement, hypokinesis anterior wall\par stage 1 diastolic [de-identified] : 10/2021\par ROSA D1, prox LAD, OM1,

## 2022-11-14 NOTE — DISCUSSION/SUMMARY
[FreeTextEntry1] : Clinically he is doing well.  He does not report symptoms suggestive of angina, congestive heart failure or arrhythmia.\par \par I reviewed his most recent echocardiogram, which revealed an ejection fraction of 37% with anterior wall hypokinesis.\par \par His blood pressure remains elevated, and noting his cardiomyopathy and persistent hypertension, we will increase valsartan up to 80 mg twice daily.  I once again emphasized the importance of anticoagulant therapy, but he wishes to remain on aspirin alone because of cost concerns.

## 2023-01-26 NOTE — ASU PATIENT PROFILE, ADULT - ATTEMPT TO OOB
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 Patient reports  + fetal movements, denies vaginal bleeding. Pt denies any other concerns.  Antepartum course is significant for:  - Gestational diabetes, controlled with diet  - Mild polyhydramnios, last ELIZABETH 21.14 and MVP 8 on 23  - GERD. controlled with tums prn  GBS unknown   US on 23: Fetal size is appropriate for gestational age, EFW is at the 85th percentile, AC at 95th percentile, ELIZABETH of 21, with an MVP of 8.87 cm consistent with mild polyhydramnios, BPP 8/8.  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 Safe at home/in relationship.    Meds: PNV, ASA    No Known Allergies                  
no

## 2023-02-14 ENCOUNTER — APPOINTMENT (OUTPATIENT)
Dept: CT IMAGING | Facility: CLINIC | Age: 73
End: 2023-02-14
Payer: MEDICARE

## 2023-02-14 PROCEDURE — 71250 CT THORAX DX C-: CPT

## 2023-02-15 ENCOUNTER — NON-APPOINTMENT (OUTPATIENT)
Age: 73
End: 2023-02-15

## 2023-02-21 ENCOUNTER — APPOINTMENT (OUTPATIENT)
Dept: THORACIC SURGERY | Facility: CLINIC | Age: 73
End: 2023-02-21
Payer: MEDICARE

## 2023-02-21 VITALS
SYSTOLIC BLOOD PRESSURE: 132 MMHG | HEIGHT: 71 IN | BODY MASS INDEX: 27.3 KG/M2 | OXYGEN SATURATION: 95 % | HEART RATE: 75 BPM | DIASTOLIC BLOOD PRESSURE: 79 MMHG | WEIGHT: 195 LBS

## 2023-02-21 PROCEDURE — 99214 OFFICE O/P EST MOD 30 MIN: CPT

## 2023-02-21 NOTE — HISTORY OF PRESENT ILLNESS
[FreeTextEntry1] : 72 year old male, s/p EBUS June, 2017, which revealed Level 7 and level 10 lymph nodes positive for metastatic adenocarcinoma (cT1N2; Clinical Stage IIIA). He received neoadjuvant chemo/RT and subsequently underwent Left VATS, LLL Lobectomy, MLND, hilar node dissection on 11/5/2017; Pathology revealed mucinous adenocarcinoma, ypT2N0. \par \par He was hospitalized for management of SVT/AFib/Aflutter in February 2019 when workup revealed pericardial effusion and is now s/p subxiphoid anterior pericardiectomy, cardiac decortication on 2/25/19. He was started on Eliquis for AFlutter.\par \par s/p Flexible bronchoscopy, bronchoalveolar lavage, uniportal Right VATS, pneumonolysis,  wedge resection of the RUL with Adrienne-Strips, and intercostal nerve block and MLN biopsy on 12/8/21. 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 This is a new lung primary.\par \par CT Chest on 3/15/22:\par - Post op changes; Emphysema. \par - RLL 1 cm groundglass nodule (301, 83) is slightly enlarged.\par - Left-sided Mediport with tip in SVC\par \par CT Chest on 8/3/22:\par - Prior RUL wedge resection and LLLobectomy. Stable mild paramediastinal post radiation changes. \par - Stable 1 cm RLL groundglass nodule (image 96 series 301). \par - There is new septal thickening in both lungs and new small right greater than left pleural effusions.\par - Stable compression fractures at T12 and T6.\par \par Was following with Dr. Judd ([facility])\par \par CT Chest on 11/1/22: \par - Stable post op changes\par - Right lower lobe 1.1 cm ground-glass nodule (image 82, series 301) unchanged compared to 3/15/2022 CT chest. \par - Unchanged left pleural thickening\par - Port-A-Cath tip terminates in the lower SVC.\par - Multiple indeterminate bilateral renal and hepatic hypodense lesions similar to prior exam 8/3/2022\par - Unchanged compression deformity of T6 and T12 vertebral bodies.\par \par CT Chest on 2/14/23:\par - s/p LLobectomy and RUL wedge resection with stable postsurgical changes. \par - Stable left basilar pleural thickening and left basilar subsegmental atelectasis/consolidation. \par - Stable smooth wall air cyst within left base\par - Stable 1.1 cm groundglass nodule within posterior segment of right lower lobe (series 301 image 85)\par - Mucoid impaction and bronchiectasis within basilar right lower lobe new from prior exam.\par - New trace right basilar pleural effusion/pleural thickening.\par - Tip of the left Mediport catheter within SVC\par - Moderate-large hiatal hernia\par - Bilateral renal cysts incompletely evaluated Cholelithiasis. Liver cysts.\par - Stable severe compression fracture of T6 and T12 vertebral bodies.\par \par Now follows with Dr. Valdez ([facility])\par \par Patient is here today for follow up. Today, patient denies worsening SOB, chest pain, cough, hemoptysis, fever, chills, night sweats, lightheadedness or dizziness.\par

## 2023-02-21 NOTE — ASSESSMENT
[FreeTextEntry1] : 72 year old male, s/p EBUS June, 2017, which revealed Level 7 and level 10 lymph nodes positive for metastatic adenocarcinoma (cT1N2; Clinical Stage IIIA). He received neoadjuvant chemo/RT and subsequently underwent Left VATS, LLL Lobectomy, MLND, hilar node dissection on 11/5/2017; Pathology revealed mucinous adenocarcinoma, ypT2N0. \par \par He was hospitalized for management of SVT/AFib/Aflutter in February 2019 when workup revealed pericardial effusion and is now s/p subxiphoid anterior pericardiectomy, cardiac decortication on 2/25/19. He was started on Eliquis for AFlutter.\par \par Flexible bronchoscopy, bronchoalveolar lavage, uniportal Right VATS, pneumonolysis,  wedge resection of the RUL with Adrienne-Strips, and intercostal nerve block and MLN biopsy on 12/8/21. 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 This is a new lung primary.\par \par Here today with follow up CT chest. \par \par I have independently reviewed the medical records and imaging at the time of this office consultation, and discussed the following interpretations with the patient:\par - CT Chest with stable findings. Discussed returning to clinic in 3 months with repeat CT Chest, no contrast, to re-evaluate stability. She is agreeable. \par \par Recommendations reviewed with patient during this office visit, and all questions answered; Patient instructed on the importance of follow up and verbalizes understanding.\par \par I, MADELEINE Allen, personally performed the evaluation and management (E/M) services for this established patient. That E/M includes conducting the examination, assessing all new/exacerbated conditions, and establishing a new plan of care. Today, My ACP, Ruchi Hidalgo, was here to observe my evaluation and management services for this patient to be followed going forward.\par \par \par

## 2023-02-21 NOTE — PHYSICAL EXAM
[] : no respiratory distress [Respiration, Rhythm And Depth] : normal respiratory rhythm and effort [Exaggerated Use Of Accessory Muscles For Inspiration] : no accessory muscle use [Auscultation Breath Sounds / Voice Sounds] : lungs were clear to auscultation bilaterally [Heart Rate And Rhythm] : heart rate was normal and rhythm regular [Examination Of The Chest] : the chest was normal in appearance [Chest Visual Inspection Thoracic Asymmetry] : no chest asymmetry [Diminished Respiratory Excursion] : normal chest expansion [2+] : left 2+ [Bowel Sounds] : normal bowel sounds [Abdomen Soft] : soft [Abdomen Tenderness] : non-tender [Cervical Lymph Nodes Enlarged Posterior Bilaterally] : posterior cervical [Cervical Lymph Nodes Enlarged Anterior Bilaterally] : anterior cervical [Supraclavicular Lymph Nodes Enlarged Bilaterally] : supraclavicular [Involuntary Movements] : no involuntary movements were seen [Oriented To Time, Place, And Person] : oriented to person, place, and time

## 2023-04-13 NOTE — ASU PATIENT PROFILE, ADULT - NSALCOHOLAMT_GEN_A_CORE_SD
Brief Operative Note    Patient: Yusef Real 47 year old male    MRN: [id number]    Surgeon(s): Javy Guajardo MD  Phone Number: [phone]                       Surgeon(s) and Role:     * Javy Guajardo MD - Primary    Assistant(s): None    Pre-Op Diagnosis: CHONDROMALACIA OF PATELLA, RIGHT, MEDIAL MENISCUS TEAR, RIGHT     Post-Op Diagnosis: Right knee medial and lateral meniscus tears, grade II chondromalacia PFJ and medial compartment      Procedure: Procedure(s):  RIGHT KNEE ARTHROSCOPY, PARTIAL LATERAL MENISECTOMY,  MEDIAL MENISCUS REPAIR, CHONDROPLASTY PATELLAR, MEDIAL AND FEMORAL COMPARTMENT    Anesthesia Type: General                                   Complications: None    Description: None    Findings: See above    Specimens Removed: No specimens collected     Estimated Blood Loss: Minimal    Assistant Tasks: N/A     Implants:   Implant Name Type Inv. Item Serial No.  Lot No. LRB No. Used Action   ANCHOR SUT 2-0 CRV FIBERSTITCH FBRWR POLY - [id number] [place] ANCHOR SUT 2-0 CRV FIBERSTITCH FBRWR POLY  [employer] [id number] 1 Implanted         I was present for the key portions of the procedure and was immediately available for the non-key portions      
5-6 drinks

## 2023-05-15 ENCOUNTER — APPOINTMENT (OUTPATIENT)
Dept: CARDIOLOGY | Facility: CLINIC | Age: 73
End: 2023-05-15
Payer: MEDICARE

## 2023-05-15 ENCOUNTER — NON-APPOINTMENT (OUTPATIENT)
Age: 73
End: 2023-05-15

## 2023-05-15 VITALS
OXYGEN SATURATION: 99 % | WEIGHT: 186 LBS | SYSTOLIC BLOOD PRESSURE: 102 MMHG | BODY MASS INDEX: 26.04 KG/M2 | HEIGHT: 71 IN | HEART RATE: 73 BPM | DIASTOLIC BLOOD PRESSURE: 69 MMHG

## 2023-05-15 VITALS — SYSTOLIC BLOOD PRESSURE: 110 MMHG | DIASTOLIC BLOOD PRESSURE: 65 MMHG

## 2023-05-15 DIAGNOSIS — I05.9 RHEUMATIC MITRAL VALVE DISEASE, UNSPECIFIED: ICD-10-CM

## 2023-05-15 PROCEDURE — 99214 OFFICE O/P EST MOD 30 MIN: CPT

## 2023-05-15 PROCEDURE — 93000 ELECTROCARDIOGRAM COMPLETE: CPT

## 2023-05-15 RX ORDER — VALSARTAN AND HYDROCHLOROTHIAZIDE 320; 12.5 MG/1; MG/1
320-12.5 TABLET, FILM COATED ORAL
Refills: 0 | Status: ACTIVE | COMMUNITY

## 2023-05-15 RX ORDER — VALSARTAN 80 MG/1
80 TABLET, COATED ORAL TWICE DAILY
Qty: 180 | Refills: 3 | Status: DISCONTINUED | COMMUNITY
End: 2023-03-15

## 2023-05-15 NOTE — HISTORY OF PRESENT ILLNESS
[FreeTextEntry1] : I saw Nam Gleason in the office today for cardiac followup..  He was last seen in the office about 6 months ago.\par \par He is now 72 years old, with a history of lung cancer, status post left lower lobe lobectomy.  He was treated with chemotherapy and radiation in addition to surgery.  He has a history of smoking in the past.  He has a history of hypertension.  He has a history of SVT, which presented in 2019, without symptoms.  At that time, he was found to have a large pericardial effusion, requiring catheter drainage.  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  He has a history of chronic kidney disease.  He has paroxysmal atrial fibrillation, not anticoagulated because of cost concerns.  He has coronary artery disease, which was noted first on a nuclear stress test, following which he had cardiac catheterization in October 2021.  Catheterization revealed a 90% stenosis of the LAD, and occluded first diagonal branch and first obtuse marginal branch.  Drug-eluting stents were placed to the proximal LAD, diagonal branch and first obtuse marginal branch.  Echocardiography at that time revealed an ejection fraction of 30 to 35%.\par \par He went a VATS procedure 12/21, with wedge resection of the right upper lobe.  It was positive for adenoCA.  Thia was felt to be a new primary lung tumor.  Margins and lymph nodes were negative.\par \par At the time of his visit in August 2022, he was feeling well.  His blood pressure was elevated, and I suggested increasing valsartan.  We reviewed his risk of cardioembolic stroke, and I suggested that he evaluate the cost of Xarelto.  I saw him again in November 2022, at which time he was feeling well.  He had decided that the cost of Xarelto was too high.  His blood pressure was elevated, and I increased valsartan.\par \par He presents to the office today having been feeling well from a cardiovascular perspective.  He reports no chest discomfort or shortness of breath with activity.  He denies orthopnea, PND.  His tendency toward right leg edema is unchanged, following a bout of cellulitis. He denies palpitations, dizziness and syncope.    He reports that his blood pressure had been elevated, and on his own, he changed his valsartan to 320/12.5, which she had at home.  Since then, his blood pressure at home has been in the range of 120/75.  His thoracic surgeon has been pleased with his CT scan results, and he is planned for another scan tomorrow.

## 2023-05-15 NOTE — CARDIOLOGY SUMMARY
[___] : [unfilled] [LVEF ___%] : LVEF [unfilled]% [de-identified] : SR \par LAD\par Q I/ AVL - unchanged \par  [de-identified] : 6/13/22\par EF 37%\par mild LV enlargement, hypokinesis anterior wall\par stage 1 diastolic [de-identified] : 10/2021\par ROSA D1, prox LAD, OM1,

## 2023-05-15 NOTE — DISCUSSION/SUMMARY
[FreeTextEntry1] : Clinically he is doing well.  He does not report symptoms suggestive of angina, congestive heart failure or arrhythmia.\par \par I reviewed his most recent echocardiogram from June 2022, which revealed an ejection fraction of 37% with anterior wall hypokinesis.  There was mild to moderate mitral regurgitation noted.  I reviewed his catheterization from October 2021 and his stress test from October 2021.\par \par Blood pressure is much lower today.  We discussed the fact that it may be too low.  He has not been lightheaded.  He will keep an eye on this closely.  His blood pressure at home have been excellent, and we will leave things as they are for now.  If he develops any symptoms he will call me, and we will have to reduce his medication.  Given his cardiomyopathy, I cannot say that it is unreasonable for him to have a blood pressure that is controlled to this degree.\par \par He will have blood work done today.  He will schedule a follow-up echocardiogram to reevaluate his cardiomyopathy and his mitral regurgitation.  I will be in contact with him to discuss the results.

## 2023-05-15 NOTE — PHYSICAL EXAM
[Well Developed] : well developed [Well Nourished] : well nourished [No Acute Distress] : no acute distress [Normal Venous Pressure] : normal venous pressure [No Carotid Bruit] : no carotid bruit [Normal S1, S2] : normal S1, S2 [No Rub] : no rub [No Gallop] : no gallop [Rhythm Regular] : regular [___+] : [unfilled]U+ pretibial pitting edema on the left [Clear Lung Fields] : clear lung fields [Good Air Entry] : good air entry [No Respiratory Distress] : no respiratory distress  [Soft] : abdomen soft [Non Tender] : non-tender [No Masses/organomegaly] : no masses/organomegaly [Normal Bowel Sounds] : normal bowel sounds [Normal Gait] : normal gait [No Edema] : no edema [No Cyanosis] : no cyanosis [No Clubbing] : no clubbing [No Varicosities] : no varicosities [No Rash] : no rash [No Skin Lesions] : no skin lesions [Moves all extremities] : moves all extremities [No Focal Deficits] : no focal deficits [Normal Speech] : normal speech [Alert and Oriented] : alert and oriented [Normal memory] : normal memory [Normal Appearance] : normal appearance [General Appearance - Well Developed] : well developed [Well Groomed] : well groomed [General Appearance - Well Nourished] : well nourished [No Deformities] : no deformities [General Appearance - In No Acute Distress] : no acute distress [Normal Conjunctiva] : the conjunctiva exhibited no abnormalities [Normal Oral Mucosa] : normal oral mucosa [Normal Jugular Venous A Waves Present] : normal jugular venous A waves present [Normal Jugular Venous V Waves Present] : normal jugular venous V waves present [No Jugular Venous Henderson A Waves] : no jugular venous henderson A waves [Respiration, Rhythm And Depth] : normal respiratory rhythm and effort [Auscultation Breath Sounds / Voice Sounds] : lungs were clear to auscultation bilaterally [Exaggerated Use Of Accessory Muscles For Inspiration] : no accessory muscle use [Bowel Sounds] : normal bowel sounds [Abdomen Soft] : soft [Abdomen Tenderness] : non-tender [Abnormal Walk] : normal gait [Gait - Sufficient For Exercise Testing] : the gait was sufficient for exercise testing [Nail Clubbing] : no clubbing of the fingernails [Cyanosis, Localized] : no localized cyanosis [Skin Color & Pigmentation] : normal skin color and pigmentation [Skin Turgor] : normal skin turgor [] : no rash [Oriented To Time, Place, And Person] : oriented to person, place, and time [Impaired Insight] : insight and judgment were intact [No Anxiety] : not feeling anxious [Precordial Heave Left] : a precordial heave was noted at the left lower parasternal line [Normal Rate] : normal [Normal S1] : normal S1 [Normal S2] : normal S2 [No Murmur] : no murmurs heard [1+] : left 1+ [No Abnormalities] : the abdominal aorta was not enlarged and no bruit was heard [___ +] : bilateral [unfilled]U+ pretibial pitting edema [FreeTextEntry1] : Scattered expiratory rhonchi [S3] : no S3 [S4] : no S4 [Right Carotid Bruit] : no bruit heard over the right carotid [Left Carotid Bruit] : no bruit heard over the left carotid [Right Femoral Bruit] : no bruit heard over the right femoral artery [Left Femoral Bruit] : no bruit heard over the left femoral artery

## 2023-05-16 ENCOUNTER — APPOINTMENT (OUTPATIENT)
Dept: CT IMAGING | Facility: CLINIC | Age: 73
End: 2023-05-16
Payer: MEDICARE

## 2023-05-16 PROCEDURE — 71250 CT THORAX DX C-: CPT

## 2023-05-17 ENCOUNTER — MED ADMIN CHARGE (OUTPATIENT)
Age: 73
End: 2023-05-17

## 2023-05-17 ENCOUNTER — APPOINTMENT (OUTPATIENT)
Dept: CARDIOLOGY | Facility: CLINIC | Age: 73
End: 2023-05-17
Payer: MEDICARE

## 2023-05-17 LAB
ALBUMIN SERPL ELPH-MCNC: 3.8 G/DL
ALP BLD-CCNC: 91 U/L
ALT SERPL-CCNC: 15 U/L
ANION GAP SERPL CALC-SCNC: 9 MMOL/L
AST SERPL-CCNC: 19 U/L
BASOPHILS # BLD AUTO: 0.06 K/UL
BASOPHILS NFR BLD AUTO: 0.7 %
BILIRUB SERPL-MCNC: 0.5 MG/DL
BUN SERPL-MCNC: 24 MG/DL
CALCIUM SERPL-MCNC: 9.4 MG/DL
CHLORIDE SERPL-SCNC: 100 MMOL/L
CHOLEST SERPL-MCNC: 115 MG/DL
CO2 SERPL-SCNC: 26 MMOL/L
CREAT SERPL-MCNC: 1.42 MG/DL
EGFR: 52 ML/MIN/1.73M2
EOSINOPHIL # BLD AUTO: 0.42 K/UL
EOSINOPHIL NFR BLD AUTO: 5.2 %
ESTIMATED AVERAGE GLUCOSE: 126 MG/DL
GLUCOSE SERPL-MCNC: 126 MG/DL
HBA1C MFR BLD HPLC: 6 %
HCT VFR BLD CALC: 47.5 %
HDLC SERPL-MCNC: 48 MG/DL
HGB BLD-MCNC: 15.4 G/DL
IMM GRANULOCYTES NFR BLD AUTO: 0.5 %
LDLC SERPL CALC-MCNC: 58 MG/DL
LYMPHOCYTES # BLD AUTO: 1.16 K/UL
LYMPHOCYTES NFR BLD AUTO: 14.4 %
MAN DIFF?: NORMAL
MCHC RBC-ENTMCNC: 29.3 PG
MCHC RBC-ENTMCNC: 32.4 GM/DL
MCV RBC AUTO: 90.5 FL
MONOCYTES # BLD AUTO: 1.1 K/UL
MONOCYTES NFR BLD AUTO: 13.6 %
NEUTROPHILS # BLD AUTO: 5.3 K/UL
NEUTROPHILS NFR BLD AUTO: 65.6 %
NONHDLC SERPL-MCNC: 67 MG/DL
PLATELET # BLD AUTO: 302 K/UL
POTASSIUM SERPL-SCNC: 4.5 MMOL/L
PROT SERPL-MCNC: 6.4 G/DL
RBC # BLD: 5.25 M/UL
RBC # FLD: 16.5 %
SODIUM SERPL-SCNC: 135 MMOL/L
TRIGL SERPL-MCNC: 45 MG/DL
TSH SERPL-ACNC: 1.71 UIU/ML
WBC # FLD AUTO: 8.08 K/UL

## 2023-05-17 PROCEDURE — 93306 TTE W/DOPPLER COMPLETE: CPT

## 2023-05-17 RX ORDER — PERFLUTREN 6.52 MG/ML
6.52 INJECTION, SUSPENSION INTRAVENOUS
Qty: 1 | Refills: 0 | Status: COMPLETED | OUTPATIENT
Start: 2023-05-17

## 2023-05-17 RX ADMIN — PERFLUTREN MG/ML: 6.52 INJECTION, SUSPENSION INTRAVENOUS at 00:00

## 2023-05-23 ENCOUNTER — APPOINTMENT (OUTPATIENT)
Dept: THORACIC SURGERY | Facility: CLINIC | Age: 73
End: 2023-05-23
Payer: MEDICARE

## 2023-05-23 VITALS
HEIGHT: 71 IN | HEART RATE: 82 BPM | BODY MASS INDEX: 26.32 KG/M2 | WEIGHT: 188 LBS | DIASTOLIC BLOOD PRESSURE: 77 MMHG | OXYGEN SATURATION: 94 % | SYSTOLIC BLOOD PRESSURE: 122 MMHG

## 2023-05-23 DIAGNOSIS — C34.92 MALIGNANT NEOPLASM OF UNSPECIFIED PART OF LEFT BRONCHUS OR LUNG: ICD-10-CM

## 2023-05-23 PROCEDURE — 99214 OFFICE O/P EST MOD 30 MIN: CPT

## 2023-05-23 NOTE — PHYSICAL EXAM
[] : no respiratory distress [Respiration, Rhythm And Depth] : normal respiratory rhythm and effort [Exaggerated Use Of Accessory Muscles For Inspiration] : no accessory muscle use [Auscultation Breath Sounds / Voice Sounds] : lungs were clear to auscultation bilaterally [Heart Rate And Rhythm] : heart rate was normal and rhythm regular [Examination Of The Chest] : the chest was normal in appearance [Chest Visual Inspection Thoracic Asymmetry] : no chest asymmetry [Diminished Respiratory Excursion] : normal chest expansion [2+] : left 2+ [Bowel Sounds] : normal bowel sounds [Abdomen Soft] : soft [Abdomen Tenderness] : non-tender [Cervical Lymph Nodes Enlarged Posterior Bilaterally] : posterior cervical [Supraclavicular Lymph Nodes Enlarged Bilaterally] : supraclavicular [Cervical Lymph Nodes Enlarged Anterior Bilaterally] : anterior cervical [Involuntary Movements] : no involuntary movements were seen [Skin Color & Pigmentation] : normal skin color and pigmentation [No Focal Deficits] : no focal deficits [Oriented To Time, Place, And Person] : oriented to person, place, and time

## 2023-05-23 NOTE — ASSESSMENT
[FreeTextEntry1] : 72 year old male, s/p EBUS June, 2017, which revealed Level 7 and level 10 lymph nodes positive for metastatic adenocarcinoma (cT1N2; Clinical Stage IIIA). He received neoadjuvant chemo/RT and subsequently underwent Left VATS, LLL Lobectomy, MLND, hilar node dissection on 11/5/2017; Pathology revealed mucinous adenocarcinoma, ypT2N0. \par \par He was hospitalized for management of SVT/AFib/Aflutter in February 2019 when workup revealed pericardial effusion and is now s/p subxiphoid anterior pericardiectomy, cardiac decortication on 2/25/19. He was started on Eliquis for AFlutter.\par \par Flexible bronchoscopy, bronchoalveolar lavage, uniportal Right VATS, pneumonolysis,  wedge resection of the RUL with Adrienne-Strips, and intercostal nerve block and MLN biopsy on 12/8/21. 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 This is a new lung primary.\par \par Here today with follow up CT chest. \par \par I have independently reviewed the medical records and imaging at the time of this office consultation, and discussed the following interpretations with the patient:\par - Current CT Chest reviewed and compared stable and showing no evidence of recurrence. Will await radiology read to confirm\par - Discussed returning to clinic in 6 months with repeat CT Chest, no contrast, to re-evaluate stability. \par \par Recommendations reviewed with patient during this office visit, and all questions answered; Patient instructed on the importance of follow up and verbalizes understanding.\par \par I, MADELEINE Allen, personally performed the evaluation and management (E/M) services for this established patient. That E/M includes conducting the examination, assessing all new/exacerbated conditions, and establishing a new plan of care. Today, My ACP, Ruchi Hidalgo, was here to observe my evaluation and management services for this patient to be followed going forward.\par \par \par \par \par

## 2023-05-23 NOTE — HISTORY OF PRESENT ILLNESS
[FreeTextEntry1] : 72 year old male, s/p EBUS June, 2017, which revealed Level 7 and level 10 lymph nodes positive for metastatic adenocarcinoma (cT1N2; Clinical Stage IIIA). He received neoadjuvant chemo/RT and subsequently underwent Left VATS, LLL Lobectomy, MLND, hilar node dissection on 11/5/2017; Pathology revealed mucinous adenocarcinoma, ypT2N0. \par \par He was hospitalized for management of SVT/AFib/Aflutter in February 2019 when workup revealed pericardial effusion and is now s/p subxiphoid anterior pericardiectomy, cardiac decortication on 2/25/19. He was started on Eliquis for AFlutter.\par \par s/p Flexible bronchoscopy, bronchoalveolar lavage, uniportal Right VATS, pneumonolysis,  wedge resection of the RUL with Adrienne-Strips, and intercostal nerve block and MLN biopsy on 12/8/21. 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 This is a new lung primary.\par \par CT Chest on 3/15/22:\par - Post op changes; Emphysema. \par - RLL 1 cm groundglass nodule (301, 83) is slightly enlarged.\par - Left-sided Mediport with tip in SVC\par \par CT Chest on 8/3/22:\par - Prior RUL wedge resection and LLLobectomy. Stable mild paramediastinal post radiation changes. \par - Stable 1 cm RLL groundglass nodule (image 96 series 301). \par - There is new septal thickening in both lungs and new small right greater than left pleural effusions.\par - Stable compression fractures at T12 and T6.\par \par Was following with Dr. Judd ([facility])\par \par CT Chest on 11/1/22: \par - Stable post op changes\par - Right lower lobe 1.1 cm ground-glass nodule (image 82, series 301) unchanged compared to 3/15/2022 CT chest. \par - Unchanged left pleural thickening\par - Port-A-Cath tip terminates in the lower SVC.\par - Multiple indeterminate bilateral renal and hepatic hypodense lesions similar to prior exam 8/3/2022\par - Unchanged compression deformity of T6 and T12 vertebral bodies.\par \par CT Chest on 2/14/23:\par - s/p LLobectomy and RUL wedge resection with stable postsurgical changes. \par - Stable left basilar pleural thickening and left basilar subsegmental atelectasis/consolidation. \par - Stable smooth wall air cyst within left base\par - Stable 1.1 cm groundglass nodule within posterior segment of right lower lobe (series 301 image 85)\par - Mucoid impaction and bronchiectasis within basilar right lower lobe new from prior exam.\par - New trace right basilar pleural effusion/pleural thickening.\par - Tip of the left Mediport catheter within SVC\par - Moderate-large hiatal hernia\par - Bilateral renal cysts incompletely evaluated Cholelithiasis. Liver cysts.\par - Stable severe compression fracture of T6 and T12 vertebral bodies.\par \par Now follows with Dr. Valdez ([facility])\par \par CT Chest on 5/16/23: (report pending) \par \par Patient is here today for follow up. Today, patient denies worsening SOB, chest pain, cough, hemoptysis, fever, chills, night sweats, lightheadedness or dizziness.\par

## 2023-05-26 ENCOUNTER — NON-APPOINTMENT (OUTPATIENT)
Age: 73
End: 2023-05-26

## 2023-10-01 PROBLEM — Z92.3 HISTORY OF RADIATION THERAPY: Status: RESOLVED | Noted: 2017-10-02 | Resolved: 2023-10-01

## 2023-11-13 ENCOUNTER — APPOINTMENT (OUTPATIENT)
Dept: CT IMAGING | Facility: CLINIC | Age: 73
End: 2023-11-13
Payer: MEDICARE

## 2023-11-13 PROCEDURE — 71250 CT THORAX DX C-: CPT

## 2023-11-16 ENCOUNTER — APPOINTMENT (OUTPATIENT)
Dept: CARDIOLOGY | Facility: CLINIC | Age: 73
End: 2023-11-16
Payer: MEDICARE

## 2023-11-16 ENCOUNTER — NON-APPOINTMENT (OUTPATIENT)
Age: 73
End: 2023-11-16

## 2023-11-16 VITALS
WEIGHT: 192 LBS | HEIGHT: 71 IN | DIASTOLIC BLOOD PRESSURE: 82 MMHG | SYSTOLIC BLOOD PRESSURE: 136 MMHG | BODY MASS INDEX: 26.88 KG/M2 | HEART RATE: 76 BPM | OXYGEN SATURATION: 97 %

## 2023-11-16 DIAGNOSIS — I25.10 ATHEROSCLEROTIC HEART DISEASE OF NATIVE CORONARY ARTERY W/OUT ANGINA PECTORIS: ICD-10-CM

## 2023-11-16 PROCEDURE — 99214 OFFICE O/P EST MOD 30 MIN: CPT

## 2023-11-16 PROCEDURE — 93000 ELECTROCARDIOGRAM COMPLETE: CPT

## 2023-11-17 NOTE — ASU DISCHARGE PLAN (ADULT/PEDIATRIC). - YOU WERE IN THE HOSPITAL FOR:
Infusaport placement Tetracycline Counseling: Patient counseled regarding possible photosensitivity and increased risk for sunburn.  Patient instructed to avoid sunlight, if possible.  When exposed to sunlight, patients should wear protective clothing, sunglasses, and sunscreen.  The patient was instructed to call the office immediately if the following severe adverse effects occur:  hearing changes, easy bruising/bleeding, severe headache, or vision changes.  The patient verbalized understanding of the proper use and possible adverse effects of tetracycline.  All of the patient's questions and concerns were addressed. Patient understands to avoid pregnancy while on therapy due to potential birth defects.

## 2023-11-28 ENCOUNTER — APPOINTMENT (OUTPATIENT)
Dept: THORACIC SURGERY | Facility: CLINIC | Age: 73
End: 2023-11-28
Payer: MEDICARE

## 2023-11-28 VITALS
SYSTOLIC BLOOD PRESSURE: 148 MMHG | RESPIRATION RATE: 17 BRPM | OXYGEN SATURATION: 98 % | DIASTOLIC BLOOD PRESSURE: 92 MMHG | HEART RATE: 82 BPM | HEIGHT: 71 IN | BODY MASS INDEX: 26.88 KG/M2 | WEIGHT: 192 LBS

## 2023-11-28 DIAGNOSIS — R91.1 SOLITARY PULMONARY NODULE: ICD-10-CM

## 2023-11-28 PROCEDURE — 99213 OFFICE O/P EST LOW 20 MIN: CPT

## 2023-12-17 NOTE — BRIEF OPERATIVE NOTE - NSICDXBRIEFPOSTOP_GEN_ALL_CORE_FT
POST-OP DIAGNOSIS:  Right upper lobe pulmonary nodule 08-Dec-2021 14:34:05  Robert Hurtado   Vaccine status unknown

## 2024-04-29 NOTE — PROGRESS NOTE ADULT - PROBLEM SELECTOR PROBLEM 2
unintentional
CKD (chronic kidney disease)

## 2024-05-05 ENCOUNTER — NON-APPOINTMENT (OUTPATIENT)
Age: 74
End: 2024-05-05

## 2024-05-10 ENCOUNTER — APPOINTMENT (OUTPATIENT)
Dept: CARDIOLOGY | Facility: CLINIC | Age: 74
End: 2024-05-10
Payer: MEDICARE

## 2024-05-10 ENCOUNTER — NON-APPOINTMENT (OUTPATIENT)
Age: 74
End: 2024-05-10

## 2024-05-10 VITALS
WEIGHT: 185 LBS | DIASTOLIC BLOOD PRESSURE: 84 MMHG | OXYGEN SATURATION: 95 % | BODY MASS INDEX: 25.9 KG/M2 | HEIGHT: 71 IN | HEART RATE: 83 BPM | SYSTOLIC BLOOD PRESSURE: 135 MMHG

## 2024-05-10 VITALS — DIASTOLIC BLOOD PRESSURE: 80 MMHG | SYSTOLIC BLOOD PRESSURE: 130 MMHG

## 2024-05-10 DIAGNOSIS — I25.10 ATHEROSCLEROTIC HEART DISEASE OF NATIVE CORONARY ARTERY W/OUT ANGINA PECTORIS: ICD-10-CM

## 2024-05-10 DIAGNOSIS — I25.5 ISCHEMIC CARDIOMYOPATHY: ICD-10-CM

## 2024-05-10 PROCEDURE — G2211 COMPLEX E/M VISIT ADD ON: CPT

## 2024-05-10 PROCEDURE — 99214 OFFICE O/P EST MOD 30 MIN: CPT

## 2024-05-10 PROCEDURE — 93000 ELECTROCARDIOGRAM COMPLETE: CPT

## 2024-05-10 NOTE — DISCUSSION/SUMMARY
[FreeTextEntry1] : Clinically he is doing well.  He does not report symptoms suggestive of angina, congestive heart failure or arrhythmia.  I reviewed his echocardiogram from June 2022, which revealed an ejection fraction of 37% with anterior wall hypokinesis.  There was mild to moderate mitral regurgitation noted.  Echocardiography was performed in May 2023.  This revealed an ejection fraction of 40% with hypokinesis of the anterior, basal anterolateral and mid anterolateral walls.  There was mild to moderate mitral and aortic regurgitation.  I reviewed his catheterization from October 2021 and his stress test from October 2021.  He has a little bit of right lower extremity edema, right greater than left, which is generally stable.  We had a conversation about the possibility that he could require a diuretic at times, but thus far he has remained free of decompensated heart failure.  He will keep an eye on things for me.  He will schedule a follow-up echocardiogram to reassess his ejection fraction, known to be mildly to moderately depressed.  I will be in contact with him to discuss the results.  We discussed the possibility of adding medication should he develop heart failure, or should his ejection fraction dropped, such as Entresto or an SGLT2 inhibitor.  I have suggested a follow-up in about 6 months.  . [EKG obtained to assist in diagnosis and management of assessed problem(s)] : EKG obtained to assist in diagnosis and management of assessed problem(s)

## 2024-05-10 NOTE — CARDIOLOGY SUMMARY
[___] : [unfilled] [LVEF ___%] : LVEF [unfilled]% [de-identified] : November 16, 2023, sinus rhythm left axis deviation May 10, 2024 sinus rhythm left axis deviation [de-identified] : 6/13/22\par  EF 37%\par  mild LV enlargement, hypokinesis anterior wall\par  stage 1 diastolic [de-identified] : 10/2021\par  ROSA D1, prox LAD, OM1,

## 2024-05-10 NOTE — HISTORY OF PRESENT ILLNESS
[FreeTextEntry1] : I saw Nam Gleason in the office today for cardiac followup..  He was last seen in the office about 6 months ago.  He is now 73 years old, with a history of lung cancer, status post left lower lobe lobectomy.  He was treated with chemotherapy and radiation in addition to surgery.  He has a history of smoking in the past.  He has a history of hypertension.  He has a history of SVT, which presented in 2019, without symptoms.  At that time, he was found to have a large pericardial effusion, requiring catheter drainage.  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  He has a history of chronic kidney disease.  He has paroxysmal atrial fibrillation, not anticoagulated because of cost concerns.  He has coronary artery disease, which was noted first on a nuclear stress test, following which he had cardiac catheterization in October 2021.  Catheterization revealed a 90% stenosis of the LAD, and occluded first diagonal branch and first obtuse marginal branch.  Drug-eluting stents were placed to the proximal LAD, diagonal branch and first obtuse marginal branch.  Echocardiography at that time revealed an ejection fraction of 30 to 35%.  He went a VATS procedure 12/21, with wedge resection of the right upper lobe.  It was positive for adenoCA.  Thia was felt to be a new primary lung tumor.  Margins and lymph nodes were negative.  At the time of his visit in August 2022, he was feeling well.  His blood pressure was elevated, and I suggested increasing valsartan.  We reviewed his risk of cardioembolic stroke, and I suggested that he evaluate the cost of Xarelto.  I saw him again in November 2022, at which time he was feeling well.  He had decided that the cost of Xarelto was too high.  His blood pressure was elevated, and I increased valsartan.  At the time of his visit in May 2023 he had on his own increased his valsartan to 320/12.5 mg daily, which he had at home.  His blood pressure had improved, and in fact was a little bit low.  I allowed him to continue his medication, with close follow-up.  I recommended a follow-up echocardiogram.  Echocardiography was performed May 17, 2023.  This revealed moderate LV dysfunction with an ejection fraction of 40%, with anterior, basal anterolateral and mid anterolateral hypokinesis.  There was mild to moderate mitral and aortic regurgitation.  He presents to the office today having been feeling well from a cardiovascular perspective.  He reports no chest discomfort or shortness of breath with activity.  He has been relaitvely sedentary. He denies orthopnea, PND.  His tendency toward right leg edema is unchanged, following a bout of cellulitis. He denies palpitations, dizziness and syncope.  He reports that his blood pressure has been well controlled.   He had cellulitis of the right leg in December, treated with oral antibiotics.

## 2024-05-10 NOTE — PHYSICAL EXAM
[Well Developed] : well developed [Well Nourished] : well nourished [No Acute Distress] : no acute distress [Normal Venous Pressure] : normal venous pressure [No Carotid Bruit] : no carotid bruit [Normal S1, S2] : normal S1, S2 [No Rub] : no rub [No Gallop] : no gallop [Rhythm Regular] : regular [___+] : [unfilled]U+ pretibial pitting edema on the left [Clear Lung Fields] : clear lung fields [Good Air Entry] : good air entry [No Respiratory Distress] : no respiratory distress  [Soft] : abdomen soft [Non Tender] : non-tender [No Masses/organomegaly] : no masses/organomegaly [Normal Bowel Sounds] : normal bowel sounds [Normal Gait] : normal gait [No Edema] : no edema [No Cyanosis] : no cyanosis [No Clubbing] : no clubbing [No Varicosities] : no varicosities [No Rash] : no rash [No Skin Lesions] : no skin lesions [Moves all extremities] : moves all extremities [No Focal Deficits] : no focal deficits [Normal Speech] : normal speech [Alert and Oriented] : alert and oriented [Normal memory] : normal memory [General Appearance - Well Developed] : well developed [Normal Appearance] : normal appearance [Well Groomed] : well groomed [General Appearance - Well Nourished] : well nourished [No Deformities] : no deformities [General Appearance - In No Acute Distress] : no acute distress [Normal Conjunctiva] : the conjunctiva exhibited no abnormalities [Normal Oral Mucosa] : normal oral mucosa [Normal Jugular Venous A Waves Present] : normal jugular venous A waves present [Normal Jugular Venous V Waves Present] : normal jugular venous V waves present [No Jugular Venous Henderson A Waves] : no jugular venous henderson A waves [Respiration, Rhythm And Depth] : normal respiratory rhythm and effort [Exaggerated Use Of Accessory Muscles For Inspiration] : no accessory muscle use [Auscultation Breath Sounds / Voice Sounds] : lungs were clear to auscultation bilaterally [Bowel Sounds] : normal bowel sounds [Abdomen Soft] : soft [Abdomen Tenderness] : non-tender [Abnormal Walk] : normal gait [Gait - Sufficient For Exercise Testing] : the gait was sufficient for exercise testing [Nail Clubbing] : no clubbing of the fingernails [Cyanosis, Localized] : no localized cyanosis [Skin Color & Pigmentation] : normal skin color and pigmentation [Skin Turgor] : normal skin turgor [] : no rash [Oriented To Time, Place, And Person] : oriented to person, place, and time [Impaired Insight] : insight and judgment were intact [No Anxiety] : not feeling anxious [Precordial Heave Left] : a precordial heave was noted at the left lower parasternal line [Normal Rate] : normal [Normal S1] : normal S1 [Normal S2] : normal S2 [No Murmur] : no murmurs heard [1+] : left 1+ [No Abnormalities] : the abdominal aorta was not enlarged and no bruit was heard [___ +] : bilateral [unfilled]U+ pretibial pitting edema [FreeTextEntry1] : Scattered expiratory rhonchi [S3] : no S3 [S4] : no S4 [Right Carotid Bruit] : no bruit heard over the right carotid [Left Carotid Bruit] : no bruit heard over the left carotid [Right Femoral Bruit] : no bruit heard over the right femoral artery [Left Femoral Bruit] : no bruit heard over the left femoral artery

## 2024-05-21 ENCOUNTER — APPOINTMENT (OUTPATIENT)
Dept: CT IMAGING | Facility: CLINIC | Age: 74
End: 2024-05-21
Payer: MEDICARE

## 2024-05-21 PROCEDURE — 71250 CT THORAX DX C-: CPT

## 2024-05-23 ENCOUNTER — MED ADMIN CHARGE (OUTPATIENT)
Age: 74
End: 2024-05-23

## 2024-05-23 ENCOUNTER — APPOINTMENT (OUTPATIENT)
Dept: CARDIOLOGY | Facility: CLINIC | Age: 74
End: 2024-05-23
Payer: MEDICARE

## 2024-05-23 PROCEDURE — 93306 TTE W/DOPPLER COMPLETE: CPT

## 2024-05-23 RX ORDER — PERFLUTREN 6.52 MG/ML
6.52 INJECTION, SUSPENSION INTRAVENOUS
Qty: 1 | Refills: 0 | Status: COMPLETED | OUTPATIENT
Start: 2024-05-23

## 2024-05-23 RX ADMIN — PERFLUTREN MG/ML: 6.52 INJECTION, SUSPENSION INTRAVENOUS at 00:00

## 2024-05-28 ENCOUNTER — APPOINTMENT (OUTPATIENT)
Dept: THORACIC SURGERY | Facility: CLINIC | Age: 74
End: 2024-05-28
Payer: MEDICARE

## 2024-05-28 VITALS
HEIGHT: 71 IN | SYSTOLIC BLOOD PRESSURE: 138 MMHG | DIASTOLIC BLOOD PRESSURE: 79 MMHG | OXYGEN SATURATION: 96 % | HEART RATE: 70 BPM | BODY MASS INDEX: 26.88 KG/M2 | WEIGHT: 192 LBS | RESPIRATION RATE: 16 BRPM

## 2024-05-28 DIAGNOSIS — C34.90 MALIGNANT NEOPLASM OF UNSPECIFIED PART OF UNSPECIFIED BRONCHUS OR LUNG: ICD-10-CM

## 2024-05-28 PROCEDURE — 99213 OFFICE O/P EST LOW 20 MIN: CPT

## 2024-05-28 NOTE — PHYSICAL EXAM
[] : no respiratory distress [Respiration, Rhythm And Depth] : normal respiratory rhythm and effort [Exaggerated Use Of Accessory Muscles For Inspiration] : no accessory muscle use [Auscultation Breath Sounds / Voice Sounds] : lungs were clear to auscultation bilaterally [Heart Rate And Rhythm] : heart rate was normal and rhythm regular [Examination Of The Chest] : the chest was normal in appearance [Chest Visual Inspection Thoracic Asymmetry] : no chest asymmetry [Diminished Respiratory Excursion] : normal chest expansion [2+] : left 2+ [Bowel Sounds] : normal bowel sounds [Abdomen Soft] : soft [Abdomen Tenderness] : non-tender [Cervical Lymph Nodes Enlarged Posterior Bilaterally] : posterior cervical [Cervical Lymph Nodes Enlarged Anterior Bilaterally] : anterior cervical [Supraclavicular Lymph Nodes Enlarged Bilaterally] : supraclavicular [Involuntary Movements] : no involuntary movements were seen [Skin Color & Pigmentation] : normal skin color and pigmentation

## 2024-05-29 NOTE — ASSESSMENT
[FreeTextEntry1] : 73 year old male, s/p EBUS June, 2017, which revealed Level 7 and level 10 lymph nodes positive for metastatic adenocarcinoma (cT1N2; Clinical Stage IIIA). He received neoadjuvant chemo/RT and subsequently underwent Left VATS, LLL Lobectomy, MLND, hilar node dissection on 11/5/2017; Pathology revealed mucinous adenocarcinoma, ypT2N0.  He was hospitalized for management of SVT/AFib/Aflutter in February 2019 when workup revealed pericardial effusion and is now s/p subxiphoid anterior pericardiectomy, cardiac decortication on 2/25/19. He was started on Eliquis for AFlutter.  s/p Flexible bronchoscopy, bronchoalveolar lavage, uniportal Right VATS, pneumonolysis, wedge resection of the RUL with Adrienne-Strips, and intercostal nerve block and MLN biopsy on 12/8/21. 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 This is a new lung primary.  Now follows with Dr. Valdez ([facility])  Patient presents today with follow up imaging.   I have independently reviewed the medical records and imaging at the time of this office consultation, and discussed the following interpretations with the patient: -  CT imaging reviewed and compared to prior. Stable findings. However, will await final radiology read to confirm. Will contact the patient if there are any discrepancies or change of plans.  - Discussed returning to clinic in 6 months to re-evaluate stability. He is agreeable.   Recommendations reviewed with patient during this office visit, and all questions answered; Patient instructed on the importance of follow up and verbalizes understanding.  I, JEANNINE AllenIM, personally performed the evaluation and management (E/M) services for this established patient. That E/M includes conducting the examination, assessing all new/exacerbated conditions, and establishing a new plan of care. Today, My ACP, Ruchi Hidalgo, was here to observe my evaluation and management services for this patient to be followed going forward.

## 2024-05-29 NOTE — HISTORY OF PRESENT ILLNESS
[FreeTextEntry1] : 73 year old male, s/p EBUS June, 2017, which revealed Level 7 and level 10 lymph nodes positive for metastatic adenocarcinoma (cT1N2; Clinical Stage IIIA). He received neoadjuvant chemo/RT and subsequently underwent Left VATS, LLL Lobectomy, MLND, hilar node dissection on 11/5/2017; Pathology revealed mucinous adenocarcinoma, ypT2N0.  He was hospitalized for management of SVT/AFib/Aflutter in February 2019 when workup revealed pericardial effusion and is now s/p subxiphoid anterior pericardiectomy, cardiac decortication on 2/25/19. He was started on Eliquis for AFlutter.  s/p Flexible bronchoscopy, bronchoalveolar lavage, uniportal Right VATS, pneumonolysis, wedge resection of the RUL with Adrienne-Strips, and intercostal nerve block and MLN biopsy on 12/8/21. 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 This is a new lung primary.  CT Chest on 3/15/22: - Post op changes; Emphysema. - RLL 1 cm groundglass nodule (301, 83) is slightly enlarged. - Left-sided Mediport with tip in SVC  CT Chest on 8/3/22: - Prior RUL wedge resection and LLLobectomy. Stable mild paramediastinal post radiation changes. - Stable 1 cm RLL groundglass nodule (image 96 series 301). - There is new septal thickening in both lungs and new small right greater than left pleural effusions. - Stable compression fractures at T12 and T6.  Was following with Dr. Judd ([facility])  CT Chest on 11/1/22: - Stable post op changes - Right lower lobe 1.1 cm ground-glass nodule (image 82, series 301) unchanged compared to 3/15/2022 CT chest. - Unchanged left pleural thickening - Port-A-Cath tip terminates in the lower SVC. - Multiple indeterminate bilateral renal and hepatic hypodense lesions similar to prior exam 8/3/2022 - Unchanged compression deformity of T6 and T12 vertebral bodies.  CT Chest on 2/14/23: - s/p LLobectomy and RUL wedge resection with stable postsurgical changes. - Stable left basilar pleural thickening and left basilar subsegmental atelectasis/consolidation. - Stable smooth wall air cyst within left base - Stable 1.1 cm groundglass nodule within posterior segment of right lower lobe (series 301 image 85) - Mucoid impaction and bronchiectasis within basilar right lower lobe new from prior exam. - New trace right basilar pleural effusion/pleural thickening. - Tip of the left Mediport catheter within SVC - Moderate-large hiatal hernia - Bilateral renal cysts incompletely evaluated Cholelithiasis. Liver cysts. - Stable severe compression fracture of T6 and T12 vertebral bodies.  Now follows with Dr. Valdez ([facility])  CT Chest on 5/16/23: - s/p LLLobectomy and right upper lobe wedge resection - Subsegmental changes in the lingula and anterior LLL grossly unchanged.  - Nodule in the medial RLL (series 301-82)  - Stable areas of mucoid impaction in the RLL  - No new significant discrete pulmonary nodules or consolidations. - Resolution of previous noted trace right pleural effusion; Stable left medial basilar pleural thickening. - Hiatal hernia containing an intrathoracic component of the proximal stomach. - Left-sided Mediport with tip in the SVC - Atherosclerotic changes of the aorta and coronary vasculature. - Multiple bilateral renal cysts. Hyperdensities in the left kidney likely representing hemorrhagic or proteinaceous renal cyst. - Hypodensities in the liver grossly unchanged. - Compression deformities of T6 and T12 unchanged.  CT Chest on 11/13/23: - New trace right pleural effusion; Mild left posterior pleural thickening is unchanged.  - S/p LLLobectomy and RUL wedge resection - There are mucous secretions in the distal trachea and left mainstem bronchus - Emphysema is present; Distal airway impaction at the medial basilar RLL with bronchial wall thickening. - Subpleural consolidation in the anterior SUNDEEP is unchanged, may be sequela of radiotherapy.  - Stable 1 cm RLL groundglass nodule (series 301, image 93). - Stable 1.4 cm short axis subcarinal node and other mediastinal nodes measuring up to 1 cm (i.e. right distal esophageal) -  Moderate-sized hiatal hernia - Coronary artery stents. Left chest wall port with tip terminating in the SVC - 4 cm mid ascending aorta; The pulmonary artery measures 3.1 cm - Cholelithiasis. Hepatic cysts and other subcentimeter hypodensities that are too small to characterize - Numerous renal hypodense and hyperdense cysts, similar to the prior study - T6 and T12 compression deformities are unchanged since the prior study  CT Chest on 5/21/24 (Pending final read)  Patient presents today for follow up. Overall, feels well. Today, patient denies worsening SOB, chest pain, cough, hemoptysis, fever, chills, night sweats, lightheadedness or dizziness.

## 2024-05-29 NOTE — ADDENDUM
[FreeTextEntry1] : Final radiology read reviewed. No change to current plan of care.   CT Chest on 5/21/24:  - Unchanged mild left posterior pleural thickening.   - s/p LLLobectomy and RUL wedge resection.  - Persistent mucous secretions in the left mainstem bronchus.  - Centrilobular and paraseptal emphysema.   - Subpleural consolidation in the anterior SUNDEEP, unchanged, may be sequela of radiotherapy. -  Unchanged previously described 1 cm right lower lobe groundglass nodular opacity (301, image 93). - Left chest port catheter terminating at distal SVC; Moderate hiatal hernia; Aortic and coronary artery calcifications. - Compression deformities of T6 and T12 unchanged. - Cholelithiasis; Hepatic cysts; Partially imaged bilateral renal cysts of various complexities, unchanged.
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
## 2024-07-12 NOTE — CC
Remote Patient Monitoring Treatment Plan    Received request from inpatient care management. Will be followed by ACM/CTN: Laura Crowell RN CTN to order remote patient monitoring for in home monitoring of CHF; Condition managed by [facility] and order completed.     Patient will be monitoring blood pressure   pulse ox   weight.      Patient will engage in Remote Patient Monitoring each day to develop the skills necessary for self management.       RPM Care Team Responsibilities:   Alerts will be reviewed daily and addressed within 2-4 hours during operational hours (Monday -Friday 9 am-4 pm)  Alert response and intervention documented in patient medical record  Alert response escalated to PCP per protocol and documented in patient medical record  Patient monitored over approximately  days  Discharge from program based on self-management readiness    See care coordination encounters for additional details.      [DrYseenia  ___] : Dr. SANTIAGO

## 2024-08-14 NOTE — PROGRESS NOTE ADULT - SUBJECTIVE AND OBJECTIVE BOX
[facility] - Jovi Houston, Franki, Isma, Crystal, Amarilys Jeong  Office Number:  [phone]    Patient resting comfortably in bed in NAD.  Laying flat with no respiratory distress.  No complaints of chest pain, dyspnea, palpitations, PND, or orthopnea.  no additional atrial flutter  off cardizem gtt    ROS: negative unless otherwise mentioned.    Telemetry:  SR    MEDICATIONS  (STANDING):  amiodarone    Tablet   Oral   amiodarone    Tablet 400 milliGRAM(s) Oral every 8 hours  aspirin enteric coated 81 milliGRAM(s) Oral daily  heparin  Injectable 5000 Unit(s) SubCutaneous every 8 hours  metoprolol succinate ER 25 milliGRAM(s) Oral two times a day    MEDICATIONS  (PRN):      Allergies    No Known Allergies    Intolerances        Vital Signs Last 24 Hrs  T(C): 36.7 (22 Feb 2019 04:49), Max: 36.7 (21 Feb 2019 21:07)  T(F): 98 (22 Feb 2019 04:49), Max: 98.1 (21 Feb 2019 21:07)  HR: 74 (22 Feb 2019 04:49) (65 - 74)  BP: 121/77 (22 Feb 2019 04:49) (105/68 - 121/77)  BP(mean): --  RR: 18 (22 Feb 2019 04:49) (18 - 18)  SpO2: 93% (22 Feb 2019 04:49) (93% - 97%)    I&O's Summary    21 Feb 2019 07:01  -  22 Feb 2019 07:00  --------------------------------------------------------  IN: 840 mL / OUT: 0 mL / NET: 840 mL    22 Feb 2019 07:01  -  22 Feb 2019 09:21  --------------------------------------------------------  IN: 240 mL / OUT: 0 mL / NET: 240 mL        ON EXAM:    Constitutional: well-nourished, well-developed, NAD   HEENT:  MMM, sclerae anicteric, conjunctivae clear, no oral cyanosis.  Pulmonary: Non-labored, mild wheeze bilaterally  Cardiovascular: Regular, S1 and S2.  No murmur.  No rubs, gallops or clicks  Gastrointestinal: Bowel Sounds present, soft, nontender.   Lymph: No peripheral edema.   Neurological: Alert, no focal deficits  Skin: No rashes.  Psych:  Mood & affect appropriate    LABS: All Labs Reviewed:                        11.4   8.2   )-----------( 495      ( 22 Feb 2019 05:51 )             33.1                         11.1   8.9   )-----------( 452      ( 21 Feb 2019 07:03 )             32.1                         10.7   11.63 )-----------( 465      ( 20 Feb 2019 13:10 )             32.7     22 Feb 2019 05:51    136    |  104    |  20     ----------------------------<  124    4.1     |  21     |  1.22   21 Feb 2019 07:03    133    |  102    |  24     ----------------------------<  115    4.4     |  19     |  1.25   20 Feb 2019 05:33    136    |  104    |  27     ----------------------------<  106    4.6     |  20     |  1.41     Ca    8.8        22 Feb 2019 05:51  Ca    8.6        21 Feb 2019 07:03  Ca    8.6        20 Feb 2019 05:33  Phos  3.0       22 Feb 2019 05:51  Mg     2.0       22 Feb 2019 05:51    TPro  5.8    /  Alb  2.5    /  TBili  0.3    /  DBili  0.1    /  AST  20     /  ALT  33     /  AlkPhos  87     20 Feb 2019 05:33  TPro  7.2    /  Alb  2.5    /  TBili  0.6    /  DBili  x      /  AST  24     /  ALT  45     /  AlkPhos  119    19 Feb 2019 15:47    PTT - ( 21 Feb 2019 07:03 )  PTT:29.0 sec      Blood Culture: Male Normal rate, regular rhythm.  Heart sounds S1, S2.  No murmurs, rubs or gallops.

## 2024-11-05 ENCOUNTER — APPOINTMENT (OUTPATIENT)
Dept: CARDIOLOGY | Facility: CLINIC | Age: 74
End: 2024-11-05
Payer: MEDICARE

## 2024-11-05 ENCOUNTER — NON-APPOINTMENT (OUTPATIENT)
Age: 74
End: 2024-11-05

## 2024-11-05 VITALS
OXYGEN SATURATION: 96 % | WEIGHT: 192 LBS | BODY MASS INDEX: 26.88 KG/M2 | HEIGHT: 71 IN | SYSTOLIC BLOOD PRESSURE: 131 MMHG | DIASTOLIC BLOOD PRESSURE: 83 MMHG | HEART RATE: 86 BPM

## 2024-11-05 DIAGNOSIS — I42.9 CARDIOMYOPATHY, UNSPECIFIED: ICD-10-CM

## 2024-11-05 PROCEDURE — 93000 ELECTROCARDIOGRAM COMPLETE: CPT

## 2024-11-05 PROCEDURE — G2211 COMPLEX E/M VISIT ADD ON: CPT

## 2024-11-05 PROCEDURE — 99214 OFFICE O/P EST MOD 30 MIN: CPT

## 2024-11-05 RX ORDER — DAPAGLIFLOZIN 10 MG/1
10 TABLET, FILM COATED ORAL
Qty: 90 | Refills: 3 | Status: ACTIVE | COMMUNITY
Start: 2024-11-05 | End: 1900-01-01

## 2024-11-19 ENCOUNTER — APPOINTMENT (OUTPATIENT)
Dept: CT IMAGING | Facility: CLINIC | Age: 74
End: 2024-11-19
Payer: MEDICARE

## 2024-11-19 PROCEDURE — 71250 CT THORAX DX C-: CPT

## 2024-11-20 ENCOUNTER — NON-APPOINTMENT (OUTPATIENT)
Age: 74
End: 2024-11-20

## 2024-11-26 ENCOUNTER — APPOINTMENT (OUTPATIENT)
Dept: THORACIC SURGERY | Facility: CLINIC | Age: 74
End: 2024-11-26
Payer: MEDICARE

## 2024-12-03 ENCOUNTER — APPOINTMENT (OUTPATIENT)
Dept: THORACIC SURGERY | Facility: CLINIC | Age: 74
End: 2024-12-03
Payer: MEDICARE

## 2024-12-03 VITALS
DIASTOLIC BLOOD PRESSURE: 87 MMHG | WEIGHT: 195 LBS | RESPIRATION RATE: 17 BRPM | BODY MASS INDEX: 27.3 KG/M2 | HEART RATE: 75 BPM | OXYGEN SATURATION: 96 % | HEIGHT: 71 IN | SYSTOLIC BLOOD PRESSURE: 145 MMHG

## 2024-12-03 DIAGNOSIS — C34.92 MALIGNANT NEOPLASM OF UNSPECIFIED PART OF LEFT BRONCHUS OR LUNG: ICD-10-CM

## 2024-12-03 DIAGNOSIS — C34.90 MALIGNANT NEOPLASM OF UNSPECIFIED PART OF UNSPECIFIED BRONCHUS OR LUNG: ICD-10-CM

## 2024-12-03 PROCEDURE — 99215 OFFICE O/P EST HI 40 MIN: CPT

## 2024-12-10 ENCOUNTER — APPOINTMENT (OUTPATIENT)
Dept: NUCLEAR MEDICINE | Facility: IMAGING CENTER | Age: 74
End: 2024-12-10
Payer: MEDICARE

## 2024-12-10 ENCOUNTER — OUTPATIENT (OUTPATIENT)
Dept: OUTPATIENT SERVICES | Facility: HOSPITAL | Age: 74
LOS: 1 days | End: 2024-12-10
Payer: MEDICARE

## 2024-12-10 DIAGNOSIS — Z90.2 ACQUIRED ABSENCE OF LUNG [PART OF]: Chronic | ICD-10-CM

## 2024-12-10 DIAGNOSIS — C34.92 MALIGNANT NEOPLASM OF UNSPECIFIED PART OF LEFT BRONCHUS OR LUNG: ICD-10-CM

## 2024-12-10 DIAGNOSIS — Z98.890 OTHER SPECIFIED POSTPROCEDURAL STATES: Chronic | ICD-10-CM

## 2024-12-10 DIAGNOSIS — C34.90 MALIGNANT NEOPLASM OF UNSPECIFIED PART OF UNSPECIFIED BRONCHUS OR LUNG: ICD-10-CM

## 2024-12-10 DIAGNOSIS — Z92.21 PERSONAL HISTORY OF ANTINEOPLASTIC CHEMOTHERAPY: Chronic | ICD-10-CM

## 2024-12-10 PROCEDURE — A9540: CPT

## 2024-12-10 PROCEDURE — 78597 LUNG PERFUSION DIFFERENTIAL: CPT | Mod: 26,MH

## 2024-12-10 PROCEDURE — 78597 LUNG PERFUSION DIFFERENTIAL: CPT

## 2024-12-16 ENCOUNTER — APPOINTMENT (OUTPATIENT)
Dept: PULMONOLOGY | Facility: CLINIC | Age: 74
End: 2024-12-16
Payer: MEDICARE

## 2024-12-16 PROCEDURE — 94726 PLETHYSMOGRAPHY LUNG VOLUMES: CPT

## 2024-12-16 PROCEDURE — 94729 DIFFUSING CAPACITY: CPT

## 2024-12-16 PROCEDURE — 94010 BREATHING CAPACITY TEST: CPT

## 2025-01-07 ENCOUNTER — NON-APPOINTMENT (OUTPATIENT)
Age: 75
End: 2025-01-07

## 2025-01-07 ENCOUNTER — APPOINTMENT (OUTPATIENT)
Dept: THORACIC SURGERY | Facility: CLINIC | Age: 75
End: 2025-01-07
Payer: MEDICARE

## 2025-01-07 VITALS
WEIGHT: 195 LBS | SYSTOLIC BLOOD PRESSURE: 135 MMHG | OXYGEN SATURATION: 95 % | RESPIRATION RATE: 16 BRPM | HEART RATE: 80 BPM | DIASTOLIC BLOOD PRESSURE: 87 MMHG | HEIGHT: 71 IN | BODY MASS INDEX: 27.3 KG/M2

## 2025-01-07 DIAGNOSIS — R91.1 SOLITARY PULMONARY NODULE: ICD-10-CM

## 2025-01-07 DIAGNOSIS — C34.90 MALIGNANT NEOPLASM OF UNSPECIFIED PART OF UNSPECIFIED BRONCHUS OR LUNG: ICD-10-CM

## 2025-01-07 PROCEDURE — 99215 OFFICE O/P EST HI 40 MIN: CPT

## 2025-01-09 NOTE — H&P PST ADULT - I HAVE PERSONALLY SEEN AND EXAMINED THE PATIENT. THERE HAVE NOT BEEN ANY CHANGES IN THE PATIENT'S HISTORY OR EXAM UNLESS COMMENTED BELOW
"Shamar Cunningham is a 44 y.o. male on day 3 of admission presenting with Cardiac arrest.    Subjective   Lying in bed with multiple blankets pulled over his head.  Once I start talking patient pulled the blanket down.  Denied any chest pain.  Started talking to me about evaluation for STDs, states he has red sores on his penis.  Patient then started talking about redness in his sputum although he shows me a cup with very little in it.  When re-directed to talk about genital symptoms but then patient states what STD issue? Denies any symptoms. States he just wanted tested to prove to his girlfriend he does not have it.     Objective     General: Lying in bed without distress in multiple blankets.  Cooperative.  Skin: No rashes or ulcerations.  HEENT: Mild swelling around right eye.  Mucous membranes moist.  Neck: Supple.  No JVD.  Cardiac: Regular rate and rhythm, S1/S2 normal.  Midsternal chest pain appears reproducible.  These subcutaneous nodules that he is reporting were hard to palpate.  Lungs: Clear to auscultation bilaterally, no wheezing or crackles, no accessory muscle use at rest.  Abdomen: Soft, nontender, nondistended, BS +  Extremities: No cyanosis.  No lower extremity edema.  Neurologic: Alert and oriented x3.  No focal deficits.  Psychiatric: Restless and somewhat bizarre behavior. Sometimes difficult to focus  Currently no agitation.    Last Recorded Vitals  Blood pressure 129/85, pulse 74, temperature 37.1 °C (98.8 °F), resp. rate 18, height 1.803 m (5' 10.98\"), weight 85.8 kg (189 lb 2.5 oz), SpO2 98%.  On room air.    Intake/Output last 3 Shifts:  I/O last 3 completed shifts:  In: 1986.3 (23.2 mL/kg) [I.V.:1986.3 (23.2 mL/kg)]  Out: 2850 (33.2 mL/kg) [Urine:2850 (0.9 mL/kg/hr)]  Weight: 85.8 kg     Relevant Results  acetaminophen, 650 mg, oral, q6h  benztropine, 2 mg, oral, BID  enoxaparin, 40 mg, subcutaneous, q24h  pantoprazole, 40 mg, oral, Daily before breakfast   Or  esomeprazole, 40 mg, " nasoduodenal tube, Daily before breakfast   Or  pantoprazole, 40 mg, intravenous, Daily before breakfast  FLUoxetine, 20 mg, oral, Daily  lidocaine, 2 patch, transdermal, q24h  melatonin, 10 mg, oral, Nightly  OLANZapine, 30 mg, oral, Nightly  rosuvastatin, 20 mg, oral, Nightly       lactated Ringer's, 125 mL/hr, Last Rate: 125 mL/hr (01/09/25 1440)       PRN medications: cloNIDine, hydrOXYzine HCL, LORazepam, oxyCODONE-acetaminophen     Results for orders placed or performed during the hospital encounter of 01/05/25 (from the past 24 hours)   DRUG SCREEN,URINE   Result Value Ref Range    Amphetamine Screen, Urine Presumptive Negative Presumptive Negative    Barbiturate Screen, Urine Presumptive Negative Presumptive Negative    Benzodiazepines Screen, Urine Presumptive Negative Presumptive Negative    Cannabinoid Screen, Urine Presumptive Positive (A) Presumptive Negative    Cocaine Metabolite Screen, Urine Presumptive Negative Presumptive Negative    Fentanyl Screen, Urine Presumptive Positive (A) Presumptive Negative    Opiate Screen, Urine Presumptive Negative Presumptive Negative    Oxycodone Screen, Urine Presumptive Positive (A) Presumptive Negative    PCP Screen, Urine Presumptive Positive (A) Presumptive Negative    Methadone Screen, Urine Presumptive Negative Presumptive Negative   CBC and Auto Differential   Result Value Ref Range    WBC 12.0 (H) 4.4 - 11.3 x10*3/uL    nRBC 0.0 0.0 - 0.0 /100 WBCs    RBC 3.95 (L) 4.50 - 5.90 x10*6/uL    Hemoglobin 12.5 (L) 13.5 - 17.5 g/dL    Hematocrit 36.2 (L) 41.0 - 52.0 %    MCV 92 80 - 100 fL    MCH 31.6 26.0 - 34.0 pg    MCHC 34.5 32.0 - 36.0 g/dL    RDW 13.1 11.5 - 14.5 %    Platelets 148 (L) 150 - 450 x10*3/uL    Neutrophils % 75.0 40.0 - 80.0 %    Immature Granulocytes %, Automated 0.3 0.0 - 0.9 %    Lymphocytes % 12.1 13.0 - 44.0 %    Monocytes % 11.7 2.0 - 10.0 %    Eosinophils % 0.6 0.0 - 6.0 %    Basophils % 0.3 0.0 - 2.0 %    Neutrophils Absolute 9.01 (H)  1.20 - 7.70 x10*3/uL    Immature Granulocytes Absolute, Automated 0.04 0.00 - 0.70 x10*3/uL    Lymphocytes Absolute 1.46 1.20 - 4.80 x10*3/uL    Monocytes Absolute 1.41 (H) 0.10 - 1.00 x10*3/uL    Eosinophils Absolute 0.07 0.00 - 0.70 x10*3/uL    Basophils Absolute 0.04 0.00 - 0.10 x10*3/uL   Renal Function Panel   Result Value Ref Range    Glucose 98 74 - 99 mg/dL    Sodium 140 136 - 145 mmol/L    Potassium 3.7 3.5 - 5.3 mmol/L    Chloride 106 98 - 107 mmol/L    Bicarbonate 29 21 - 32 mmol/L    Anion Gap 9 (L) 10 - 20 mmol/L    Urea Nitrogen 7 6 - 23 mg/dL    Creatinine 1.03 0.50 - 1.30 mg/dL    eGFR >90 >60 mL/min/1.73m*2    Calcium 9.4 8.6 - 10.6 mg/dL    Phosphorus 2.6 2.5 - 4.9 mg/dL    Albumin 3.8 3.4 - 5.0 g/dL   Magnesium   Result Value Ref Range    Magnesium 1.98 1.60 - 2.40 mg/dL   Creatine Kinase   Result Value Ref Range    Creatine Kinase 2,492 (H) 0 - 325 U/L   CBC   Result Value Ref Range    WBC 11.9 (H) 4.4 - 11.3 x10*3/uL    nRBC 0.0 0.0 - 0.0 /100 WBCs    RBC 4.40 (L) 4.50 - 5.90 x10*6/uL    Hemoglobin 13.7 13.5 - 17.5 g/dL    Hematocrit 40.5 (L) 41.0 - 52.0 %    MCV 92 80 - 100 fL    MCH 31.1 26.0 - 34.0 pg    MCHC 33.8 32.0 - 36.0 g/dL    RDW 13.0 11.5 - 14.5 %    Platelets 161 150 - 450 x10*3/uL   Blood Culture    Specimen: Peripheral Venipuncture; Blood culture   Result Value Ref Range    Blood Culture Loaded on Instrument - Culture in progress    Blood Culture    Specimen: Peripheral Venipuncture; Blood culture   Result Value Ref Range    Blood Culture Loaded on Instrument - Culture in progress    Nuclear Stress Test   Result Value Ref Range    Predicted METS 9.9 METS        Hospital Course:  Shamar Cunningham is a 45 y/o M with PMH including schizoaffective disorder, substance use disorder (PCP) who presented to [facility] ED on 1/5 after being found unresponsive by EMS, he was intubated in trauma bay, lost pulses, with ROSC achieved after 1 round of CPR.  Patient was then admitted to the MICU on  1/6.  Complete TTE done initially showed EF of 40 to 45% but unknown if some potential cardiac stunning involved.  Patient extubated and stable, transferred to floor on 1/7.  Patient admitted to using PCP and marijuana prior to being found down.    Assessment and plan:    Cardiac arrest  Shock  -Etiology not clear although suspect may be related to use of PCP.   -Repeat limited echo shows improved EF to 61%.  -CT angio coronaries ordered but patient could not reach target heart rate.  Initially ordered nuclear stress test for ischemic eval but in discussion with cardiology no need to pursue ischemic eval any further.  In discussion with cardiology patient had PEA arrest which is most likely related to PCP use.    Rhabdomyolysis  -CPK elevation most likely related to trauma.  -CPK levels improved today to 2492.  -Continue IV LR at 125 ml/hr.    Acute toxic encephalopathy  -Likely related to drug use.  -Resolved.    Multiple facial fractures  -CT on presentations with multiple subacute facial fractures, had lacerations which trauma sutured 1/5  -No cervical spine fractures noted on CT  -MRI neck was negative for acute changes.   -Patient has known drug seeking behavior.   -Recommend using nonopiate pain control as best as possible.  Okay to use Percocet while here since patient has facial fractures and being monitored.  Would not prescribe on discharge due to patient's known drug abuse problem and recent presentation of drug-induced cardiac arrest.    History of schizoaffective disorder  -Patient reports he has been without his psychiatric medications for 3 weeks due to someone stealing him.  He states his psychiatrist gave her refills but he has not filled them out yet.  -Patient currently stating that he is still hearing voices that is telling him to hurt himself, including walking out into traffic and hang himself.  Patient requesting to talk to psychiatry.  -Patient had been restarted on all his home psychiatric  medications.  -Psychiatry consulted and trazodone discontinued since patient reports not helping.  Patient does have bizarre behavior, but per psychiatry who has reevaluated the patient again today patient does not need inpatient psychiatry at this time.  Patient should follow-up with outpatient psychiatry.    Physical deconditioning  -PT has reassessed and recommends low intensity.    Disposition: Continue IV fluids, repeat CPK level tomorrow and if continues to improve patient likely will be discharged.    Everett Ceron MD       Statement Selected

## 2025-01-15 NOTE — DISCHARGE NOTE ADULT - ADDITIONAL INSTRUCTIONS
January 15, 2025     Patient: Cristobal Ramos   YOB: 2017   Date of Visit: 1/15/2025       To Whom it May Concern:    Cristobal Ramos was seen in my clinic on 1/15/2025. He may return to school on 1/20/2025 .    If you have any questions or concerns, please don't hesitate to call.         Sincerely,          ROGER Shelton        CC: No Recipients   Pt to be transferred to [facility] under thoracic surgery, Dr. Larsen.

## 2025-04-02 ENCOUNTER — APPOINTMENT (OUTPATIENT)
Dept: CT IMAGING | Facility: CLINIC | Age: 75
End: 2025-04-02
Payer: MEDICARE

## 2025-04-02 PROCEDURE — 71250 CT THORAX DX C-: CPT

## 2025-04-15 ENCOUNTER — APPOINTMENT (OUTPATIENT)
Dept: THORACIC SURGERY | Facility: CLINIC | Age: 75
End: 2025-04-15
Payer: MEDICARE

## 2025-04-15 VITALS
HEIGHT: 71 IN | BODY MASS INDEX: 27.3 KG/M2 | DIASTOLIC BLOOD PRESSURE: 85 MMHG | RESPIRATION RATE: 16 BRPM | WEIGHT: 195 LBS | SYSTOLIC BLOOD PRESSURE: 144 MMHG | OXYGEN SATURATION: 98 % | HEART RATE: 78 BPM

## 2025-04-15 DIAGNOSIS — C34.92 MALIGNANT NEOPLASM OF UNSPECIFIED PART OF LEFT BRONCHUS OR LUNG: ICD-10-CM

## 2025-04-15 DIAGNOSIS — R59.0 LOCALIZED ENLARGED LYMPH NODES: ICD-10-CM

## 2025-04-15 DIAGNOSIS — R91.1 SOLITARY PULMONARY NODULE: ICD-10-CM

## 2025-04-15 DIAGNOSIS — C34.90 MALIGNANT NEOPLASM OF UNSPECIFIED PART OF UNSPECIFIED BRONCHUS OR LUNG: ICD-10-CM

## 2025-04-15 PROCEDURE — 99214 OFFICE O/P EST MOD 30 MIN: CPT

## 2025-04-22 DIAGNOSIS — R91.1 SOLITARY PULMONARY NODULE: ICD-10-CM

## 2025-05-06 ENCOUNTER — OUTPATIENT (OUTPATIENT)
Dept: OUTPATIENT SERVICES | Facility: HOSPITAL | Age: 75
LOS: 1 days | End: 2025-05-06

## 2025-05-06 VITALS
TEMPERATURE: 98 F | OXYGEN SATURATION: 97 % | WEIGHT: 195.11 LBS | RESPIRATION RATE: 16 BRPM | HEIGHT: 71 IN | SYSTOLIC BLOOD PRESSURE: 116 MMHG | DIASTOLIC BLOOD PRESSURE: 77 MMHG | HEART RATE: 70 BPM

## 2025-05-06 DIAGNOSIS — K76.9 LIVER DISEASE, UNSPECIFIED: ICD-10-CM

## 2025-05-06 DIAGNOSIS — Z92.21 PERSONAL HISTORY OF ANTINEOPLASTIC CHEMOTHERAPY: Chronic | ICD-10-CM

## 2025-05-06 DIAGNOSIS — Z98.890 OTHER SPECIFIED POSTPROCEDURAL STATES: Chronic | ICD-10-CM

## 2025-05-06 DIAGNOSIS — Z87.438 PERSONAL HISTORY OF OTHER DISEASES OF MALE GENITAL ORGANS: ICD-10-CM

## 2025-05-06 DIAGNOSIS — I10 ESSENTIAL (PRIMARY) HYPERTENSION: ICD-10-CM

## 2025-05-06 DIAGNOSIS — R91.8 OTHER NONSPECIFIC ABNORMAL FINDING OF LUNG FIELD: ICD-10-CM

## 2025-05-06 DIAGNOSIS — Z90.2 ACQUIRED ABSENCE OF LUNG [PART OF]: Chronic | ICD-10-CM

## 2025-05-06 LAB
ALBUMIN SERPL ELPH-MCNC: 3.9 G/DL — SIGNIFICANT CHANGE UP (ref 3.3–5)
ALP SERPL-CCNC: 91 U/L — SIGNIFICANT CHANGE UP (ref 40–120)
ALT FLD-CCNC: 20 U/L — SIGNIFICANT CHANGE UP (ref 4–41)
ANION GAP SERPL CALC-SCNC: 10 MMOL/L — SIGNIFICANT CHANGE UP (ref 7–14)
APTT BLD: 30.2 SEC — SIGNIFICANT CHANGE UP (ref 26.1–36.8)
AST SERPL-CCNC: 25 U/L — SIGNIFICANT CHANGE UP (ref 4–40)
BILIRUB SERPL-MCNC: 0.3 MG/DL — SIGNIFICANT CHANGE UP (ref 0.2–1.2)
BUN SERPL-MCNC: 32 MG/DL — HIGH (ref 7–23)
CALCIUM SERPL-MCNC: 9.3 MG/DL — SIGNIFICANT CHANGE UP (ref 8.4–10.5)
CHLORIDE SERPL-SCNC: 99 MMOL/L — SIGNIFICANT CHANGE UP (ref 98–107)
CO2 SERPL-SCNC: 25 MMOL/L — SIGNIFICANT CHANGE UP (ref 22–31)
CREAT SERPL-MCNC: 1.46 MG/DL — HIGH (ref 0.5–1.3)
EGFR: 50 ML/MIN/1.73M2 — LOW
EGFR: 50 ML/MIN/1.73M2 — LOW
GLUCOSE SERPL-MCNC: 109 MG/DL — HIGH (ref 70–99)
HCT VFR BLD CALC: 48.7 % — SIGNIFICANT CHANGE UP (ref 39–50)
HGB BLD-MCNC: 16.4 G/DL — SIGNIFICANT CHANGE UP (ref 13–17)
INR BLD: 0.97 RATIO — SIGNIFICANT CHANGE UP (ref 0.85–1.16)
MCHC RBC-ENTMCNC: 29.8 PG — SIGNIFICANT CHANGE UP (ref 27–34)
MCHC RBC-ENTMCNC: 33.7 G/DL — SIGNIFICANT CHANGE UP (ref 32–36)
MCV RBC AUTO: 88.4 FL — SIGNIFICANT CHANGE UP (ref 80–100)
NRBC # BLD AUTO: 0 K/UL — SIGNIFICANT CHANGE UP (ref 0–0)
NRBC # FLD: 0 K/UL — SIGNIFICANT CHANGE UP (ref 0–0)
NRBC BLD AUTO-RTO: 0 /100 WBCS — SIGNIFICANT CHANGE UP (ref 0–0)
PLATELET # BLD AUTO: 224 K/UL — SIGNIFICANT CHANGE UP (ref 150–400)
POTASSIUM SERPL-MCNC: 4.1 MMOL/L — SIGNIFICANT CHANGE UP (ref 3.5–5.3)
POTASSIUM SERPL-SCNC: 4.1 MMOL/L — SIGNIFICANT CHANGE UP (ref 3.5–5.3)
PROT SERPL-MCNC: 7.1 G/DL — SIGNIFICANT CHANGE UP (ref 6–8.3)
PROTHROM AB SERPL-ACNC: 11.3 SEC — SIGNIFICANT CHANGE UP (ref 9.9–13.4)
RBC # BLD: 5.51 M/UL — SIGNIFICANT CHANGE UP (ref 4.2–5.8)
RBC # FLD: 15.2 % — HIGH (ref 10.3–14.5)
SODIUM SERPL-SCNC: 134 MMOL/L — LOW (ref 135–145)
WBC # BLD: 7.36 K/UL — SIGNIFICANT CHANGE UP (ref 3.8–10.5)
WBC # FLD AUTO: 7.36 K/UL — SIGNIFICANT CHANGE UP (ref 3.8–10.5)

## 2025-05-06 NOTE — H&P PST ADULT - PRIMARY CARE PROVIDER
Chief Complaint   Patient presents with   • Gyn Exam   • Office Visit     HISTORY OF PRESENT ILLNESS:  Patient is a 29 year old  alert female who presents today for annual exam. Doing well.     CONTRACEPTION: OCPs    ALLERGIES:  No Known Allergies  Outpatient Medications Marked as Taking for the 21 encounter (Office Visit) with Joyce Dennis CNM   Medication Sig Dispense Refill   • desogestrel-ethinyl estradiol (Kariva) 0.15-0.02/0.01 MG () per tablet Take 1 tablet by mouth daily. 84 tablet 3     Past Medical History:   Diagnosis Date   • Abnormal Pap smear    • Nexplanon insertion 2013    Removed    • Spontaneous vaginal delivery      Past Surgical History:   Procedure Laterality Date   • Hb etonogestrel (nexplanon) implant     • Pap smear,routine  2010   • Vaginal delivery       Social History     Socioeconomic History   • Marital status: Single     Spouse name: Not on file   • Number of children: 2   • Years of education: 12   • Highest education level: Not on file   Occupational History   • Occupation: Teachers [employer]     Employer: [employer]   Social Needs   • Financial resource strain: Not on file   • Food insecurity     Worry: Not on file     Inability: Not on file   • Transportation needs     Medical: Not on file     Non-medical: Not on file   Tobacco Use   • Smoking status: Former Smoker     Types: Cigarettes     Quit date: 2017     Years since quitting: 3.1   • Smokeless tobacco: Never Used   Substance and Sexual Activity   • Alcohol use: No     Frequency: Never     Drinks per session: 1 or 2     Binge frequency: Never   • Drug use: No   • Sexual activity: Yes     Partners: Male     Birth control/protection: None   Lifestyle   • Physical activity     Days per week: Not on file     Minutes per session: Not on file   • Stress: Not on file   Relationships   • Social connections     Talks on phone: Not on file     Gets together: Not on file     Attends Synagogue  service: Not on file     Active member of club or organization: Not on file     Attends meetings of clubs or organizations: Not on file     Relationship status: Not on file   • Intimate partner violence     Fear of current or ex partner: Not on file     Emotionally abused: Not on file     Physically abused: Not on file     Forced sexual activity: Not on file   Other Topics Concern   •  Service No   • Blood Transfusions No   • Caffeine Concern No   • Occupational Exposure No   • Hobby Hazards No   • Sleep Concern No   • Stress Concern No   • Weight Concern No   • Special Diet No   • Back Care No   • Exercise No   • Bike Helmet No   • Seat Belt Yes   • Self-Exams No   Social History Narrative   • Not on file     Family History   Problem Relation Age of Onset   • Cancer, Breast Maternal Grandmother      OB History    Para Term  AB Living   3 3 3 0 0 3   SAB TAB Ectopic Molar Multiple Live Births   0 0 0 0 0 3       GYNECOLOGIC HISTORY:  Patient's last menstrual period was 2021.  Menses are regular on OCPs.    REVIEW OF SYSTEMS:  Constitutional: Denies headache. No weight changes. No fevers or chills. No fatigue.  HEENT: Denies vision changes or hearing changes. No sinus problems.  Breasts: Denies breast masses, pain or nipple discharge.  Respiratory: No cough or shortness of breath.  Cardiovascular: Denies chest pain, syncope or palpitations.  Gastrointestinal: Denies nausea, vomiting, diarrhea, or constipation.  Gynecological: Denies pelvic pain, vaginal discharge, itching, or odor.  Endocrine: Denies hot flashes, night sweats, heat or cold intolerance.  Hematologic: Denies easy bruising or bleeding disorders.  Allergies/Immunologic: Denies seasonal allergies or any history of immunologic disorders.  Musculoskeletal: Denies joint pain, swelling, or erythema.  Skin: Denies rashes, significant lesions or pruritus.   Psychiatric: Denies anxiety, depression, memory deficits, and appetite or  sleep changes.  Stressors: Getting  June 4th.    OBJECTIVE:  Visit Vitals  /68   Pulse 68   Ht 5' 8\" (1.727 m)   Wt 96.1 kg   LMP 01/25/2021   BMI 32.20 kg/m²       GENERAL APPEARANCE: The patient is a pleasant, normal-appearing female with normal affect and in no distress.  NECK: Supple. No cervical lymphadenopathy. No thyromegaly, no nodules palpitated, trachea midline.  LUNGS: Clear bilaterally with normal respiratory effort.  HEART: Regular rate and rhythm. No murmurs noted pulses are full and symmetrical.  BREASTS: Breast exam performed seated and supine. No masses, nontender, no nipple discharge or lymphadenopathy.  ABDOMEN: Soft, nontender, nondistended. No hepatosplenomegaly. No umbilical or inguinal hernias.  SKIN: Warm and dry to touch. No lesions or rashes noted.  PSYCHIATRIC/NEUROLOGIC: Appropriate mood and affect, normal recall, alert and oriented ×3.  EXTREMITIES: Warm and well perfused. No edema noted. Muscle strength and sensation are normal bilaterally 5/5 in both upper and lower extremities.  GENITOURINARY:  Vulva: Inspection of her external genitalia reveals normal mons pubis, labia minora and labia majora. Normal appearing clitoris, urethral meatus and [place]'s glands.  Bladder: No evidence of urethral or bladder tenderness.  Vagina: Speculum exam reveals pink and moist vaginal mucosa. Bartholin gland is normal to palpitation.  Cervix: Cervix is normal in appearance with no lesions. There is no cervical motion tenderness.  Uterus: Uterus is normal size, midline, mobile and nontender. No adnexal masses are palpated. Adnexa are nontender to palpitation.  Perineum: Perineum appears normal.  Anus: Normal with no apparent lesions.    ASSESSMENT:  1. Annual physical exam    2. Screening for cervical cancer    3. Encounter for surveillance of contraceptive pills        PLAN:  1. Pap  2. GC and Chlamydia  3. Continue OCPs  4. RTC 1 year and as needed   Dr Mccormick [facility] [phone],

## 2025-05-06 NOTE — H&P PST ADULT - PROBLEM SELECTOR PLAN 1
Patient tentatively scheduled for Ct guided liver biopsy for 5/14/25. Pre-op instructions provided. Pt given verbal and written instructions with teach back on chlorhexidine shampoo and pepcid. Pt verbalized understanding with return demonstration.     CBC CMP PT/PTT/INR done as per surgeon

## 2025-05-06 NOTE — H&P PST ADULT - NSICDXPASTSURGICALHX_GEN_ALL_CORE_FT
PAST SURGICAL HISTORY:  H/O inguinal hernia repair Bilateral in the 1970's    History of chemotherapy left chest wall infusaport placement    History of lung surgery     S/P pericardial surgery For an effusion in 2019    Status post lobectomy of lung LLL lobectomy at [facility]

## 2025-05-06 NOTE — H&P PST ADULT - BSA (M2)
----- Message from Lulu Espinosa MA sent at 9/3/2020  9:26 AM EDT -----  Regarding: FW: Follow up CT head appointment   Angelica is going to put her on Dr AGUILAR's schedule for televisit for 9.10.20 at 3 pm.  I was not sure who to call.    ----- Message -----  From: Arsalan Chauhan MD  Sent: 9/2/2020   6:11 PM EDT  To: Lulu Espinosa MA  Subject: RE: Follow up CT head appointment                A tele-visit is fine  ----- Message -----  From: Lulu Espinosa MA  Sent: 9/1/2020   1:12 PM EDT  To: Arsalan Chauhan MD  Subject: FW: Follow up CT head appointment                Does this need to be a face to face visit or televisit ?  ----- Message -----  From: Siena Zamudio MA  Sent: 9/1/2020  12:36 PM EDT  To: Lulu Espinosa MA  Subject: FW: Follow up CT head appointment                 I rescheduled her to 9/8/20 at 3:45 [facility] for her head CT.  Can Dr. AGUILAR call her with results or work her into nds 9/9/20 to see her to give results?       Ty   ----- Message -----  From: Nisha Rojas APRN  Sent: 8/31/2020   7:20 PM EDT  To: Siena Zamudio MA  Subject: RE: Follow up CT head appointment                Why can't we move up CT and see Dr. AGUILAR after that?     ----- Message -----  From: Siena Zamudio MA  Sent: 8/31/2020  11:50 AM EDT  To: MATT Bain  Subject: FW: Follow up CT head appointment                Pt is scheduled for CT head 9/17/20 she does not have a follow up form the results. The day she is scheduled for CT Dr. AGUILAR will be on vacation. Could you review the results after she has the CT and call her with the result?      She will be 11 weeks out from bilateral craniotomy for SDH by Dr. Chauhan this week. She was suppose to follow up early August with a new CT.   ----- Message -----  From: Christy Beach  Sent: 8/17/2020  10:49 AM EDT  To: Siena Zamudio MA  Subject: RE: Follow up CT head appointment                Siena    Normally if they do not respond in 2-3 weeks of the letter  mailed, I cancel the order.    Christy   ----- Message -----  From: Siena Zamudio MA  Sent: 8/17/2020   8:58 AM EDT  To: Christy Beach  Subject: Follow up CT head appointment                    Do you have an update on this patient and scheduling the CT and follow up?  I see you tried to reach her 7/27/20 by note in Epic.                   2.09

## 2025-05-06 NOTE — H&P PST ADULT - NSICDXPASTMEDICALHX_GEN_ALL_CORE_FT
PAST MEDICAL HISTORY:  Adenocarcinoma of left lung     Atrial flutter 10/2017 off Eliquis (MD aware)    CAD (coronary artery disease) Stents placed x3 10/2021    Chronic kidney disease (CKD)     COPD, moderate     Fibrinous pericarditis     GERD (gastroesophageal reflux disease)     History of BPH     HLD (hyperlipidemia)     HTN (hypertension)     Ischemic cardiomyopathy     Lung cancer s/p LLL Lobectomy, radiation, chemotherapy    Metastatic adenocarcinoma     Other and unspecified ventral hernia with obstruction, without gangrene     Other nonspecific abnormal finding of lung field     PAF (paroxysmal atrial fibrillation)     Paroxysmal SVT (supraventricular tachycardia)     Smoking history Quit 2017

## 2025-05-06 NOTE — H&P PST ADULT - HISTORY OF PRESENT ILLNESS
75 y/o male PMH CAD (3 stents 10/2021) ischemic cardiomyopathy (EF 35-40%) SVT (2019, without symptoms) at the time, found to have a large pericardial effusion, requiring catheter drainage and Pericardiectomy (patho showed fibrinous pericarditis), PAF/Aflutter, HTN HLD CKD COPD presents to presurgical testing with diagnosis of liver lesion.  Pt with metastatic adenocarcinoma of lung diagnosed in 2017, s/p left lower lobectomy (2017) and wedge resection in the right upper lobe (2021), chemo/radiation. Recent surveillance CT scan positive for liver lesions. Also Ct scan noted a 0.8 cm nodule in the left upper lobe has increased in size when compared to previous exam. Pt is scheduled for CT guided liver biopsy.

## 2025-05-06 NOTE — H&P PST ADULT - NSANTHOBSERVEDRD_ENT_A_CORE
Sep 23 2308   Sat Sep 23, 2017   1915 BP: 113/67 [NP]   1916 Temp: 98.5 °F (36.9 °C) [NP]   1916 Pulse: 83 [NP]   1916 Resp: 20 [NP]   1916 SpO2: 100 % [NP]   2050 Protein, UA: Negative [NP]   2050 Glucose, UA: Negative [NP]   2050 Ketones, UA: (!) 2+ [NP]   2050 Bilirubin (UA): Negative [NP]   2050 Troponin I: <0.006 [NP]   2050 WBC: (!) 14.09 [NP]   2050 Hemoglobin: 12.1 [NP]   2050 Hematocrit: (!) 35.7 [NP]   2050 Platelets: 218 [NP]   2050 WBC, UA: (!) 10 [NP]   2050 Bacteria, UA: (!) Moderate [NP]      ED Course User Index  [NP] Jayme Foss MD     Clinical Impression:   The primary encounter diagnosis was Epigastric abdominal pain. Diagnoses of Nonintractable headache, unspecified chronicity pattern, unspecified headache type and Urinary tract infection without hematuria, site unspecified were also pertinent to this visit.    Disposition:   Disposition: Discharged  Condition: Stable                        Jayme Foss MD  09/23/17 5063     No

## 2025-05-06 NOTE — H&P PST ADULT - RESPIRATORY AND THORAX COMMENTS
COPD - stable denies exacerbations, on daily trelegy, h/o lung cancer s/p surgery, monitored with CT scans - liver lesion  noted

## 2025-05-06 NOTE — H&P PST ADULT - LAST ECHOCARDIOGRAM
2024 Left ventricular systolic function is moderately decreased with an ejection fraction visually estimated at 35 to 40 % mild to moderate mitral regurgitation.

## 2025-05-07 ENCOUNTER — NON-APPOINTMENT (OUTPATIENT)
Age: 75
End: 2025-05-07

## 2025-05-07 ENCOUNTER — APPOINTMENT (OUTPATIENT)
Dept: CARDIOLOGY | Facility: CLINIC | Age: 75
End: 2025-05-07
Payer: MEDICARE

## 2025-05-07 VITALS
WEIGHT: 191 LBS | SYSTOLIC BLOOD PRESSURE: 124 MMHG | DIASTOLIC BLOOD PRESSURE: 77 MMHG | HEIGHT: 71 IN | HEART RATE: 68 BPM | BODY MASS INDEX: 26.74 KG/M2 | OXYGEN SATURATION: 97 %

## 2025-05-07 DIAGNOSIS — I42.9 CARDIOMYOPATHY, UNSPECIFIED: ICD-10-CM

## 2025-05-07 DIAGNOSIS — I25.10 ATHEROSCLEROTIC HEART DISEASE OF NATIVE CORONARY ARTERY W/OUT ANGINA PECTORIS: ICD-10-CM

## 2025-05-07 PROBLEM — E78.5 HYPERLIPIDEMIA, UNSPECIFIED: Chronic | Status: ACTIVE | Noted: 2025-05-06

## 2025-05-07 PROBLEM — C79.9 SECONDARY MALIGNANT NEOPLASM OF UNSPECIFIED SITE: Chronic | Status: ACTIVE | Noted: 2025-05-06

## 2025-05-07 PROBLEM — I48.0 PAROXYSMAL ATRIAL FIBRILLATION: Chronic | Status: ACTIVE | Noted: 2025-05-06

## 2025-05-07 PROBLEM — I47.10 SUPRAVENTRICULAR TACHYCARDIA, UNSPECIFIED: Chronic | Status: ACTIVE | Noted: 2025-05-06

## 2025-05-07 PROBLEM — J44.9 CHRONIC OBSTRUCTIVE PULMONARY DISEASE, UNSPECIFIED: Chronic | Status: ACTIVE | Noted: 2025-05-06

## 2025-05-07 PROBLEM — I25.5 ISCHEMIC CARDIOMYOPATHY: Chronic | Status: ACTIVE | Noted: 2025-05-06

## 2025-05-07 PROBLEM — Z87.438 PERSONAL HISTORY OF OTHER DISEASES OF MALE GENITAL ORGANS: Chronic | Status: ACTIVE | Noted: 2025-05-06

## 2025-05-07 PROBLEM — N18.9 CHRONIC KIDNEY DISEASE, UNSPECIFIED: Chronic | Status: ACTIVE | Noted: 2025-05-06

## 2025-05-07 PROBLEM — I31.0: Chronic | Status: ACTIVE | Noted: 2025-05-06

## 2025-05-07 PROBLEM — C34.92 MALIGNANT NEOPLASM OF UNSPECIFIED PART OF LEFT BRONCHUS OR LUNG: Chronic | Status: ACTIVE | Noted: 2025-05-06

## 2025-05-07 PROCEDURE — 93000 ELECTROCARDIOGRAM COMPLETE: CPT

## 2025-05-07 PROCEDURE — 99214 OFFICE O/P EST MOD 30 MIN: CPT

## 2025-05-08 ENCOUNTER — APPOINTMENT (OUTPATIENT)
Dept: INTERVENTIONAL RADIOLOGY/VASCULAR | Facility: CLINIC | Age: 75
End: 2025-05-08

## 2025-05-08 ENCOUNTER — NON-APPOINTMENT (OUTPATIENT)
Age: 75
End: 2025-05-08

## 2025-05-08 VITALS — WEIGHT: 195 LBS | HEIGHT: 71 IN | BODY MASS INDEX: 27.3 KG/M2

## 2025-05-08 DIAGNOSIS — R91.8 OTHER NONSPECIFIC ABNORMAL FINDING OF LUNG FIELD: ICD-10-CM

## 2025-05-08 PROCEDURE — 99203 OFFICE O/P NEW LOW 30 MIN: CPT | Mod: 2W

## 2025-05-08 RX ORDER — FLUTICASONE FUROATE, UMECLIDINIUM BROMIDE AND VILANTEROL TRIFENATATE 200; 62.5; 25 UG/1; UG/1; UG/1
POWDER RESPIRATORY (INHALATION)
Refills: 0 | Status: ACTIVE | COMMUNITY

## 2025-05-14 ENCOUNTER — RESULT REVIEW (OUTPATIENT)
Age: 75
End: 2025-05-14

## 2025-05-14 RX ORDER — ATORVASTATIN CALCIUM 80 MG/1
1 TABLET, FILM COATED ORAL
Refills: 0 | DISCHARGE

## 2025-05-30 ENCOUNTER — NON-APPOINTMENT (OUTPATIENT)
Age: 75
End: 2025-05-30

## 2025-06-03 ENCOUNTER — APPOINTMENT (OUTPATIENT)
Dept: THORACIC SURGERY | Facility: CLINIC | Age: 75
End: 2025-06-03
Payer: MEDICARE

## 2025-06-03 VITALS
WEIGHT: 195 LBS | SYSTOLIC BLOOD PRESSURE: 143 MMHG | HEART RATE: 71 BPM | OXYGEN SATURATION: 95 % | HEIGHT: 71 IN | DIASTOLIC BLOOD PRESSURE: 82 MMHG | BODY MASS INDEX: 27.3 KG/M2 | RESPIRATION RATE: 16 BRPM

## 2025-06-03 DIAGNOSIS — C79.9 SECONDARY MALIGNANT NEOPLASM OF UNSPECIFIED SITE: ICD-10-CM

## 2025-06-03 DIAGNOSIS — C34.90 MALIGNANT NEOPLASM OF UNSPECIFIED PART OF UNSPECIFIED BRONCHUS OR LUNG: ICD-10-CM

## 2025-06-03 DIAGNOSIS — C34.92 MALIGNANT NEOPLASM OF UNSPECIFIED PART OF LEFT BRONCHUS OR LUNG: ICD-10-CM

## 2025-06-03 DIAGNOSIS — R91.1 SOLITARY PULMONARY NODULE: ICD-10-CM

## 2025-06-03 DIAGNOSIS — R91.8 OTHER NONSPECIFIC ABNORMAL FINDING OF LUNG FIELD: ICD-10-CM

## 2025-06-03 PROCEDURE — 99214 OFFICE O/P EST MOD 30 MIN: CPT

## 2025-07-24 NOTE — ED ADULT NURSE NOTE - ASSIGNED BED LOCATION
Keep dressing on for 24 hours  Can change dressing once daily after 24 hours  Keep surgifoam on until it falls off on it's own, can cut away pieces as needed  Keep wound clean and dry  
[id number]

## 2025-07-28 ENCOUNTER — APPOINTMENT (OUTPATIENT)
Dept: CARDIOLOGY | Facility: CLINIC | Age: 75
End: 2025-07-28
Payer: MEDICARE

## 2025-07-28 ENCOUNTER — MED ADMIN CHARGE (OUTPATIENT)
Age: 75
End: 2025-07-28

## 2025-07-28 PROCEDURE — 93306 TTE W/DOPPLER COMPLETE: CPT

## 2025-07-28 RX ORDER — PERFLUTREN 6.52 MG/ML
6.52 INJECTION, SUSPENSION INTRAVENOUS
Qty: 2 | Refills: 0 | Status: COMPLETED | OUTPATIENT
Start: 2025-07-28

## 2025-07-28 RX ADMIN — PERFLUTREN MG/ML: 6.52 INJECTION, SUSPENSION INTRAVENOUS at 00:00

## 2025-09-02 ENCOUNTER — APPOINTMENT (OUTPATIENT)
Dept: CT IMAGING | Facility: CLINIC | Age: 75
End: 2025-09-02
Payer: MEDICARE

## 2025-09-02 PROCEDURE — 71250 CT THORAX DX C-: CPT | Mod: TC

## 2025-09-05 ENCOUNTER — NON-APPOINTMENT (OUTPATIENT)
Age: 75
End: 2025-09-05

## 2025-09-09 ENCOUNTER — APPOINTMENT (OUTPATIENT)
Dept: THORACIC SURGERY | Facility: CLINIC | Age: 75
End: 2025-09-09
Payer: MEDICARE

## 2025-09-09 ENCOUNTER — NON-APPOINTMENT (OUTPATIENT)
Age: 75
End: 2025-09-09

## 2025-09-09 VITALS
OXYGEN SATURATION: 94 % | WEIGHT: 191 LBS | HEIGHT: 71 IN | SYSTOLIC BLOOD PRESSURE: 132 MMHG | DIASTOLIC BLOOD PRESSURE: 78 MMHG | HEART RATE: 83 BPM | BODY MASS INDEX: 26.74 KG/M2 | RESPIRATION RATE: 17 BRPM

## 2025-09-09 DIAGNOSIS — C34.92 MALIGNANT NEOPLASM OF UNSPECIFIED PART OF LEFT BRONCHUS OR LUNG: ICD-10-CM

## 2025-09-09 DIAGNOSIS — R91.1 SOLITARY PULMONARY NODULE: ICD-10-CM

## 2025-09-09 DIAGNOSIS — C79.9 SECONDARY MALIGNANT NEOPLASM OF UNSPECIFIED SITE: ICD-10-CM

## 2025-09-09 PROCEDURE — 99215 OFFICE O/P EST HI 40 MIN: CPT

## 2025-09-17 ENCOUNTER — NON-APPOINTMENT (OUTPATIENT)
Age: 75
End: 2025-09-17